# Patient Record
Sex: FEMALE | Race: WHITE | NOT HISPANIC OR LATINO | Employment: OTHER | URBAN - METROPOLITAN AREA
[De-identification: names, ages, dates, MRNs, and addresses within clinical notes are randomized per-mention and may not be internally consistent; named-entity substitution may affect disease eponyms.]

---

## 2023-03-13 ENCOUNTER — TELEPHONE (OUTPATIENT)
Dept: PRIMARY CARE | Facility: CLINIC | Age: 58
End: 2023-03-13

## 2023-03-13 DIAGNOSIS — J40 BRONCHITIS: Primary | ICD-10-CM

## 2023-03-13 RX ORDER — AZITHROMYCIN 250 MG/1
TABLET, FILM COATED ORAL
Qty: 6 TABLET | Refills: 0 | Status: SHIPPED | OUTPATIENT
Start: 2023-03-13 | End: 2023-03-18

## 2023-03-15 DIAGNOSIS — M79.2 NEURALGIA AND NEURITIS, UNSPECIFIED: ICD-10-CM

## 2023-03-15 RX ORDER — DOXEPIN HYDROCHLORIDE 50 MG/G
CREAM TOPICAL
Qty: 135 G | Refills: 1 | Status: SHIPPED
Start: 2023-03-15 | End: 2023-08-09 | Stop reason: SDUPTHER

## 2023-03-16 ENCOUNTER — TELEPHONE (OUTPATIENT)
Dept: PRIMARY CARE | Facility: CLINIC | Age: 58
End: 2023-03-16

## 2023-03-16 DIAGNOSIS — R05.3 CHRONIC COUGH: ICD-10-CM

## 2023-03-16 DIAGNOSIS — R11.0 NAUSEA: Primary | ICD-10-CM

## 2023-03-20 RX ORDER — ONDANSETRON 8 MG/1
8 TABLET, ORALLY DISINTEGRATING ORAL EVERY 8 HOURS PRN
Qty: 20 TABLET | Refills: 2 | Status: SHIPPED | OUTPATIENT
Start: 2023-03-20 | End: 2023-04-12 | Stop reason: SDUPTHER

## 2023-03-20 RX ORDER — PROMETHAZINE HYDROCHLORIDE AND DEXTROMETHORPHAN HYDROBROMIDE 6.25; 15 MG/5ML; MG/5ML
SYRUP ORAL
COMMUNITY
Start: 2023-01-24 | End: 2023-03-20 | Stop reason: SDUPTHER

## 2023-03-20 RX ORDER — ONDANSETRON 8 MG/1
TABLET, ORALLY DISINTEGRATING ORAL
COMMUNITY
Start: 2019-10-11 | End: 2023-03-20 | Stop reason: SDUPTHER

## 2023-03-20 RX ORDER — PROMETHAZINE HYDROCHLORIDE AND DEXTROMETHORPHAN HYDROBROMIDE 6.25; 15 MG/5ML; MG/5ML
5 SYRUP ORAL 4 TIMES DAILY PRN
Qty: 118 ML | Refills: 2 | Status: SHIPPED | OUTPATIENT
Start: 2023-03-20 | End: 2023-04-19

## 2023-03-21 LAB
ANION GAP IN SER/PLAS: 11 MMOL/L (ref 10–20)
CALCIUM (MG/DL) IN SER/PLAS: 9.6 MG/DL (ref 8.6–10.3)
CARBON DIOXIDE, TOTAL (MMOL/L) IN SER/PLAS: 29 MMOL/L (ref 21–32)
CHLORIDE (MMOL/L) IN SER/PLAS: 106 MMOL/L (ref 98–107)
CREATININE (MG/DL) IN SER/PLAS: 0.81 MG/DL (ref 0.5–1.05)
GFR FEMALE: 83 ML/MIN/1.73M2
GLUCOSE (MG/DL) IN SER/PLAS: 80 MG/DL (ref 74–99)
POTASSIUM (MMOL/L) IN SER/PLAS: 4.3 MMOL/L (ref 3.5–5.3)
SODIUM (MMOL/L) IN SER/PLAS: 142 MMOL/L (ref 136–145)
UREA NITROGEN (MG/DL) IN SER/PLAS: 18 MG/DL (ref 6–23)

## 2023-04-11 ENCOUNTER — TELEPHONE (OUTPATIENT)
Dept: PRIMARY CARE | Facility: CLINIC | Age: 58
End: 2023-04-11
Payer: COMMERCIAL

## 2023-04-11 DIAGNOSIS — R11.0 NAUSEA: ICD-10-CM

## 2023-04-12 RX ORDER — ONDANSETRON 8 MG/1
8 TABLET, ORALLY DISINTEGRATING ORAL EVERY 8 HOURS PRN
Qty: 30 TABLET | Refills: 2 | Status: SHIPPED | OUTPATIENT
Start: 2023-04-12 | End: 2023-05-12

## 2023-04-27 ENCOUNTER — TELEPHONE (OUTPATIENT)
Dept: PRIMARY CARE | Facility: CLINIC | Age: 58
End: 2023-04-27
Payer: COMMERCIAL

## 2023-04-27 DIAGNOSIS — J40 BRONCHITIS: Primary | ICD-10-CM

## 2023-04-27 RX ORDER — DEXTROMETHORPHAN HBR. AND GUAIFENESIN 10; 100 MG/5ML; MG/5ML
10 SOLUTION ORAL 4 TIMES DAILY PRN
Qty: 236 ML | Refills: 0 | Status: SHIPPED | OUTPATIENT
Start: 2023-04-27 | End: 2023-05-07

## 2023-04-27 RX ORDER — AMOXICILLIN AND CLAVULANATE POTASSIUM 875; 125 MG/1; MG/1
875 TABLET, FILM COATED ORAL 2 TIMES DAILY
Qty: 20 TABLET | Refills: 0 | Status: SHIPPED | OUTPATIENT
Start: 2023-04-27 | End: 2023-05-07

## 2023-04-27 NOTE — TELEPHONE ENCOUNTER
Patient was scheduled to see ALBINO tomorrow. We had to reschedule her. She is requesting an antibiotic and cough medication. Said she has been in bed for a week coughing

## 2023-05-15 ENCOUNTER — TELEPHONE (OUTPATIENT)
Dept: PRIMARY CARE | Facility: CLINIC | Age: 58
End: 2023-05-15
Payer: COMMERCIAL

## 2023-05-15 NOTE — TELEPHONE ENCOUNTER
Patient said she recently had a CT and it showed inflamed bladder. She is askinng what she should do next.

## 2023-05-22 ENCOUNTER — OFFICE VISIT (OUTPATIENT)
Dept: PRIMARY CARE | Facility: CLINIC | Age: 58
End: 2023-05-22
Payer: COMMERCIAL

## 2023-05-22 VITALS
WEIGHT: 149 LBS | BODY MASS INDEX: 25.44 KG/M2 | TEMPERATURE: 98 F | HEIGHT: 64 IN | OXYGEN SATURATION: 97 % | HEART RATE: 107 BPM | RESPIRATION RATE: 18 BRPM

## 2023-05-22 DIAGNOSIS — J43.9 PULMONARY EMPHYSEMA, UNSPECIFIED EMPHYSEMA TYPE (MULTI): ICD-10-CM

## 2023-05-22 DIAGNOSIS — R09.89 CHEST CONGESTION: ICD-10-CM

## 2023-05-22 DIAGNOSIS — J30.1 SEASONAL ALLERGIC RHINITIS DUE TO POLLEN: ICD-10-CM

## 2023-05-22 DIAGNOSIS — K58.2 IRRITABLE BOWEL SYNDROME WITH BOTH CONSTIPATION AND DIARRHEA: ICD-10-CM

## 2023-05-22 DIAGNOSIS — K52.9 COLITIS: ICD-10-CM

## 2023-05-22 DIAGNOSIS — K21.9 GASTROESOPHAGEAL REFLUX DISEASE WITHOUT ESOPHAGITIS: ICD-10-CM

## 2023-05-22 DIAGNOSIS — E78.5 BORDERLINE HYPERLIPIDEMIA: ICD-10-CM

## 2023-05-22 DIAGNOSIS — J41.0 SIMPLE CHRONIC BRONCHITIS (MULTI): ICD-10-CM

## 2023-05-22 DIAGNOSIS — E11.9 DIET-CONTROLLED TYPE 2 DIABETES MELLITUS (MULTI): ICD-10-CM

## 2023-05-22 DIAGNOSIS — D50.0 ANEMIA DUE TO BLOOD LOSS: ICD-10-CM

## 2023-05-22 DIAGNOSIS — N32.89 BLADDER WALL THICKENING: Primary | ICD-10-CM

## 2023-05-22 DIAGNOSIS — M79.7 FIBROMYALGIA, PRIMARY: ICD-10-CM

## 2023-05-22 PROBLEM — L73.9 FOLLICULITIS: Status: RESOLVED | Noted: 2023-05-22 | Resolved: 2023-05-22

## 2023-05-22 PROBLEM — R19.4 CHANGE IN BOWEL HABITS: Status: RESOLVED | Noted: 2023-05-22 | Resolved: 2023-05-22

## 2023-05-22 PROBLEM — M19.90 OSTEOARTHRITIS: Status: ACTIVE | Noted: 2023-05-22

## 2023-05-22 PROBLEM — F17.210 CIGARETTE SMOKER: Status: ACTIVE | Noted: 2023-05-22

## 2023-05-22 PROBLEM — M51.16 DISPLACEMENT OF LUMBAR DISC WITH RADICULOPATHY: Status: ACTIVE | Noted: 2023-05-22

## 2023-05-22 PROBLEM — R63.5 ABNORMAL WEIGHT GAIN: Status: RESOLVED | Noted: 2023-05-22 | Resolved: 2023-05-22

## 2023-05-22 PROBLEM — G93.32 CHRONIC FATIGUE SYNDROME: Status: ACTIVE | Noted: 2023-05-22

## 2023-05-22 PROBLEM — R20.2 LEFT HAND PARESTHESIA: Status: ACTIVE | Noted: 2023-05-22

## 2023-05-22 PROBLEM — G89.29 CHRONIC LOW BACK PAIN: Status: ACTIVE | Noted: 2023-05-22

## 2023-05-22 PROBLEM — G43.E09 CHRONIC MIGRAINE WITH AURA: Status: ACTIVE | Noted: 2023-05-22

## 2023-05-22 PROBLEM — M50.90 CERVICAL DISC DISEASE: Status: ACTIVE | Noted: 2023-05-22

## 2023-05-22 PROBLEM — B96.89 BACTERIAL VAGINITIS: Status: RESOLVED | Noted: 2023-05-22 | Resolved: 2023-05-22

## 2023-05-22 PROBLEM — M41.9 LUMBAR SCOLIOSIS: Status: ACTIVE | Noted: 2023-05-22

## 2023-05-22 PROBLEM — G47.00 INSOMNIA: Status: ACTIVE | Noted: 2023-05-22

## 2023-05-22 PROBLEM — R10.9 ABDOMINAL PAIN: Status: RESOLVED | Noted: 2023-05-22 | Resolved: 2023-05-22

## 2023-05-22 PROBLEM — M48.07 SPINAL STENOSIS OF LUMBOSACRAL REGION: Status: ACTIVE | Noted: 2023-05-22

## 2023-05-22 PROBLEM — R53.81 MALAISE AND FATIGUE: Status: ACTIVE | Noted: 2023-05-22

## 2023-05-22 PROBLEM — R25.2 MUSCLE CRAMPING: Status: ACTIVE | Noted: 2023-05-22

## 2023-05-22 PROBLEM — A09: Status: RESOLVED | Noted: 2023-05-22 | Resolved: 2023-05-22

## 2023-05-22 PROBLEM — J30.9 ALLERGIC RHINITIS: Status: ACTIVE | Noted: 2023-05-22

## 2023-05-22 PROBLEM — R21 RASH, SKIN: Status: ACTIVE | Noted: 2023-05-22

## 2023-05-22 PROBLEM — N89.8 VAGINAL DISCHARGE: Status: RESOLVED | Noted: 2023-05-22 | Resolved: 2023-05-22

## 2023-05-22 PROBLEM — R04.2 HEMOPTYSIS: Status: ACTIVE | Noted: 2023-05-22

## 2023-05-22 PROBLEM — E53.8 VITAMIN B12 DEFICIENCY: Status: ACTIVE | Noted: 2023-05-22

## 2023-05-22 PROBLEM — J98.9 RESPIRATORY ILLNESS: Status: RESOLVED | Noted: 2023-05-22 | Resolved: 2023-05-22

## 2023-05-22 PROBLEM — N76.0 BACTERIAL VAGINITIS: Status: RESOLVED | Noted: 2023-05-22 | Resolved: 2023-05-22

## 2023-05-22 PROBLEM — B37.81 CANDIDA ESOPHAGITIS (MULTI): Status: RESOLVED | Noted: 2023-05-22 | Resolved: 2023-05-22

## 2023-05-22 PROBLEM — M47.816 LUMBAR SPONDYLOSIS: Status: ACTIVE | Noted: 2023-05-22

## 2023-05-22 PROBLEM — M51.369 DEGENERATION OF INTERVERTEBRAL DISC OF LUMBAR REGION: Status: ACTIVE | Noted: 2023-05-22

## 2023-05-22 PROBLEM — R29.898 WEAKNESS OF BOTH LOWER EXTREMITIES: Status: ACTIVE | Noted: 2023-05-22

## 2023-05-22 PROBLEM — S92.355A CLOSED NONDISPLACED FRACTURE OF FIFTH LEFT METATARSAL BONE: Status: RESOLVED | Noted: 2023-05-22 | Resolved: 2023-05-22

## 2023-05-22 PROBLEM — N95.2 VAGINAL ATROPHY: Status: ACTIVE | Noted: 2023-05-22

## 2023-05-22 PROBLEM — K30 FUNCTIONAL DYSPEPSIA: Status: RESOLVED | Noted: 2023-05-22 | Resolved: 2023-05-22

## 2023-05-22 PROBLEM — E55.9 VITAMIN D DEFICIENCY: Status: ACTIVE | Noted: 2023-05-22

## 2023-05-22 PROBLEM — R19.7 DIARRHEA: Status: ACTIVE | Noted: 2023-05-22

## 2023-05-22 PROBLEM — M51.36 DEGENERATION OF INTERVERTEBRAL DISC OF LUMBAR REGION: Status: ACTIVE | Noted: 2023-05-22

## 2023-05-22 PROBLEM — J20.9 ACUTE BRONCHITIS: Status: RESOLVED | Noted: 2023-05-22 | Resolved: 2023-05-22

## 2023-05-22 PROBLEM — Z91.018 FOOD ALLERGY: Status: ACTIVE | Noted: 2023-05-22

## 2023-05-22 PROBLEM — M50.13 CERVICAL DISC DISORDER WITH RADICULOPATHY, CERVICOTHORACIC REGION: Status: ACTIVE | Noted: 2023-05-22

## 2023-05-22 PROBLEM — D72.829 LEUKOCYTOSIS: Status: ACTIVE | Noted: 2023-05-22

## 2023-05-22 PROBLEM — R14.0 BLOATING: Status: RESOLVED | Noted: 2023-05-22 | Resolved: 2023-05-22

## 2023-05-22 PROBLEM — R11.2 NAUSEA AND VOMITING: Status: ACTIVE | Noted: 2023-05-22

## 2023-05-22 PROBLEM — M48.062 NEUROGENIC CLAUDICATION DUE TO LUMBAR SPINAL STENOSIS: Status: ACTIVE | Noted: 2023-05-22

## 2023-05-22 PROBLEM — M54.16 LUMBAR RADICULOPATHY: Status: ACTIVE | Noted: 2023-05-22

## 2023-05-22 PROBLEM — M54.6 LEFT-SIDED THORACIC BACK PAIN: Status: ACTIVE | Noted: 2023-05-22

## 2023-05-22 PROBLEM — B34.9 ACUTE VIRAL SYNDROME: Status: RESOLVED | Noted: 2023-05-22 | Resolved: 2023-05-22

## 2023-05-22 PROBLEM — M81.0 OSTEOPOROSIS: Status: ACTIVE | Noted: 2023-05-22

## 2023-05-22 PROBLEM — R13.10 DYSPHAGIA: Status: ACTIVE | Noted: 2023-05-22

## 2023-05-22 PROBLEM — M17.9 DEGENERATIVE JOINT DISEASE OF KNEE: Status: ACTIVE | Noted: 2023-05-22

## 2023-05-22 PROBLEM — M54.2 CHRONIC NECK PAIN: Status: ACTIVE | Noted: 2023-05-22

## 2023-05-22 PROBLEM — L30.9 CHRONIC ECZEMA: Status: ACTIVE | Noted: 2023-05-22

## 2023-05-22 PROBLEM — F17.200 NICOTINE DEPENDENCE: Status: ACTIVE | Noted: 2023-05-22

## 2023-05-22 PROBLEM — G47.10 HYPERSOMNIA: Status: ACTIVE | Noted: 2023-05-22

## 2023-05-22 PROBLEM — R10.32 ABDOMINAL PAIN, ACUTE, LEFT LOWER QUADRANT: Status: RESOLVED | Noted: 2023-05-22 | Resolved: 2023-05-22

## 2023-05-22 PROBLEM — K31.819 GASTRIC AVM: Status: ACTIVE | Noted: 2023-05-22

## 2023-05-22 PROBLEM — R26.9 GAIT ABNORMALITY: Status: ACTIVE | Noted: 2023-05-22

## 2023-05-22 PROBLEM — K59.09 CHRONIC CONSTIPATION: Status: ACTIVE | Noted: 2023-05-22

## 2023-05-22 PROBLEM — R70.0 ELEVATED SED RATE: Status: ACTIVE | Noted: 2023-05-22

## 2023-05-22 PROBLEM — R19.5 LARGE STOOL: Status: RESOLVED | Noted: 2023-05-22 | Resolved: 2023-05-22

## 2023-05-22 PROBLEM — K92.1 MELENA: Status: ACTIVE | Noted: 2023-05-22

## 2023-05-22 PROBLEM — R41.3 TRANSIENT AMNESIA: Status: ACTIVE | Noted: 2023-05-22

## 2023-05-22 PROBLEM — M79.2 NEUROPATHIC PAIN: Status: ACTIVE | Noted: 2023-05-22

## 2023-05-22 PROBLEM — R15.9 FULL INCONTINENCE OF FECES: Status: RESOLVED | Noted: 2023-05-22 | Resolved: 2023-05-22

## 2023-05-22 PROBLEM — R53.83 MALAISE AND FATIGUE: Status: ACTIVE | Noted: 2023-05-22

## 2023-05-22 PROBLEM — K92.1 BLACK STOOLS: Status: RESOLVED | Noted: 2023-05-22 | Resolved: 2023-05-22

## 2023-05-22 PROBLEM — M79.18 CERVICAL MYOFASCIAL PAIN SYNDROME: Status: ACTIVE | Noted: 2023-05-22

## 2023-05-22 PROBLEM — R05.9 COUGH: Status: RESOLVED | Noted: 2023-05-22 | Resolved: 2023-05-22

## 2023-05-22 PROBLEM — M54.50 CHRONIC LOW BACK PAIN: Status: ACTIVE | Noted: 2023-05-22

## 2023-05-22 PROBLEM — G89.29 CHRONIC NECK PAIN: Status: ACTIVE | Noted: 2023-05-22

## 2023-05-22 LAB
POC APPEARANCE, URINE: CLEAR
POC BILIRUBIN, URINE: NEGATIVE
POC BLOOD, URINE: NEGATIVE
POC COLOR, URINE: YELLOW
POC GLUCOSE, URINE: NEGATIVE MG/DL
POC KETONES, URINE: NEGATIVE MG/DL
POC LEUKOCYTES, URINE: NEGATIVE
POC NITRITE,URINE: NEGATIVE
POC PH, URINE: 5 PH
POC PROTEIN, URINE: NEGATIVE MG/DL
POC SPECIFIC GRAVITY, URINE: <=1.005
POC UROBILINOGEN, URINE: 0.2 EU/DL

## 2023-05-22 PROCEDURE — 99214 OFFICE O/P EST MOD 30 MIN: CPT | Performed by: FAMILY MEDICINE

## 2023-05-22 PROCEDURE — 81003 URINALYSIS AUTO W/O SCOPE: CPT | Performed by: FAMILY MEDICINE

## 2023-05-22 RX ORDER — ESOMEPRAZOLE MAGNESIUM 40 MG/1
1 CAPSULE, DELAYED RELEASE ORAL DAILY
COMMUNITY
Start: 2016-01-18 | End: 2023-05-22 | Stop reason: SDUPTHER

## 2023-05-22 RX ORDER — PNV NO.95/FERROUS FUM/FOLIC AC 28MG-0.8MG
50 TABLET ORAL DAILY
COMMUNITY
End: 2023-11-07 | Stop reason: ALTCHOICE

## 2023-05-22 RX ORDER — PRAVASTATIN SODIUM 20 MG/1
TABLET ORAL
Qty: 30 TABLET | Refills: 11 | Status: SHIPPED | OUTPATIENT
Start: 2023-05-22 | End: 2023-06-13 | Stop reason: SDUPTHER

## 2023-05-22 RX ORDER — PRAVASTATIN SODIUM 20 MG/1
TABLET ORAL
COMMUNITY
End: 2023-05-22 | Stop reason: SDUPTHER

## 2023-05-22 RX ORDER — BUSPIRONE HYDROCHLORIDE 15 MG/1
TABLET ORAL
COMMUNITY
Start: 2016-03-09

## 2023-05-22 RX ORDER — SUMATRIPTAN 50 MG/1
TABLET, FILM COATED ORAL
COMMUNITY
Start: 2023-01-24 | End: 2023-10-30 | Stop reason: SDUPTHER

## 2023-05-22 RX ORDER — POLYETHYLENE GLYCOL 3350 17 G/17G
POWDER, FOR SOLUTION ORAL
COMMUNITY
Start: 2020-10-20 | End: 2023-11-07 | Stop reason: SDUPTHER

## 2023-05-22 RX ORDER — CYCLOBENZAPRINE HCL 10 MG
TABLET ORAL
COMMUNITY
Start: 2017-06-23 | End: 2023-05-22 | Stop reason: SDUPTHER

## 2023-05-22 RX ORDER — ONDANSETRON 8 MG/1
TABLET, ORALLY DISINTEGRATING ORAL
Qty: 20 TABLET | Refills: 5 | Status: SHIPPED | OUTPATIENT
Start: 2023-05-22 | End: 2023-07-13 | Stop reason: SDUPTHER

## 2023-05-22 RX ORDER — ASPIRIN 325 MG
TABLET, DELAYED RELEASE (ENTERIC COATED) ORAL
COMMUNITY
Start: 2016-08-24 | End: 2023-08-28

## 2023-05-22 RX ORDER — IPRATROPIUM BROMIDE AND ALBUTEROL SULFATE 2.5; .5 MG/3ML; MG/3ML
SOLUTION RESPIRATORY (INHALATION)
COMMUNITY
Start: 2017-01-26 | End: 2023-09-14 | Stop reason: SDUPTHER

## 2023-05-22 RX ORDER — ONDANSETRON 8 MG/1
TABLET, ORALLY DISINTEGRATING ORAL
COMMUNITY
Start: 2023-05-12 | End: 2023-05-22 | Stop reason: SDUPTHER

## 2023-05-22 RX ORDER — HYDROCORTISONE 1 %
CREAM (GRAM) TOPICAL
COMMUNITY
Start: 2023-02-09 | End: 2024-03-08 | Stop reason: ENTERED-IN-ERROR

## 2023-05-22 RX ORDER — GALCANEZUMAB 120 MG/ML
INJECTION, SOLUTION SUBCUTANEOUS
COMMUNITY
Start: 2020-10-27 | End: 2024-02-29 | Stop reason: WASHOUT

## 2023-05-22 RX ORDER — LORATADINE 10 MG/1
TABLET ORAL DAILY
COMMUNITY
Start: 2017-07-17 | End: 2023-05-22 | Stop reason: SDUPTHER

## 2023-05-22 RX ORDER — ESOMEPRAZOLE MAGNESIUM 40 MG/1
40 CAPSULE, DELAYED RELEASE ORAL DAILY
Qty: 30 CAPSULE | Refills: 11 | Status: SHIPPED | OUTPATIENT
Start: 2023-05-22 | End: 2023-05-30

## 2023-05-22 RX ORDER — PROMETHAZINE HYDROCHLORIDE 25 MG/1
TABLET ORAL
COMMUNITY
Start: 2016-12-29 | End: 2023-11-07 | Stop reason: ALTCHOICE

## 2023-05-22 RX ORDER — CYCLOBENZAPRINE HCL 10 MG
10 TABLET ORAL 3 TIMES DAILY PRN
Qty: 90 TABLET | Refills: 4 | Status: SHIPPED | OUTPATIENT
Start: 2023-05-22 | End: 2023-10-06

## 2023-05-22 RX ORDER — ALBUTEROL SULFATE 90 UG/1
AEROSOL, METERED RESPIRATORY (INHALATION)
COMMUNITY
Start: 2022-02-11 | End: 2023-10-30 | Stop reason: SDUPTHER

## 2023-05-22 RX ORDER — SELENIUM 50 MCG
TABLET ORAL DAILY
COMMUNITY
End: 2023-05-22 | Stop reason: SDUPTHER

## 2023-05-22 RX ORDER — PSYLLIUM HUSK 3.4 G/5.8G
POWDER ORAL
COMMUNITY
Start: 2022-10-24 | End: 2023-11-07 | Stop reason: SDUPTHER

## 2023-05-22 RX ORDER — ERGOCALCIFEROL 1.25 MG/1
50000 CAPSULE ORAL
COMMUNITY
End: 2023-10-31 | Stop reason: SDUPTHER

## 2023-05-22 RX ORDER — L. ACIDOPHILUS/L.BULGARICUS 1MM CELL
TABLET ORAL
COMMUNITY
Start: 2021-08-22 | End: 2023-05-22 | Stop reason: SDUPTHER

## 2023-05-22 RX ORDER — IBUPROFEN 200 MG
TABLET ORAL DAILY
COMMUNITY
End: 2023-08-01 | Stop reason: SDUPTHER

## 2023-05-22 RX ORDER — MUPIROCIN 20 MG/G
OINTMENT TOPICAL
COMMUNITY
Start: 2016-09-07

## 2023-05-22 RX ORDER — FLUTICASONE FUROATE, UMECLIDINIUM BROMIDE AND VILANTEROL TRIFENATATE 200; 62.5; 25 UG/1; UG/1; UG/1
POWDER RESPIRATORY (INHALATION)
COMMUNITY
Start: 2021-12-28 | End: 2024-01-04

## 2023-05-22 RX ORDER — PREGABALIN 150 MG/1
1 CAPSULE ORAL 3 TIMES DAILY
COMMUNITY
Start: 2016-01-19 | End: 2023-11-20 | Stop reason: SDUPTHER

## 2023-05-22 RX ORDER — DIAZEPAM 5 MG/1
TABLET ORAL
COMMUNITY

## 2023-05-22 RX ORDER — ACYCLOVIR 200 MG/1
200 CAPSULE ORAL DAILY
COMMUNITY
Start: 2017-01-06

## 2023-05-22 RX ORDER — LORATADINE 10 MG/1
10 TABLET ORAL DAILY
Qty: 30 TABLET | Refills: 11 | Status: SHIPPED | OUTPATIENT
Start: 2023-05-22 | End: 2023-06-02

## 2023-05-22 RX ORDER — SELENIUM 50 MCG
1 TABLET ORAL DAILY
Qty: 30 CAPSULE | Refills: 11 | Status: SHIPPED | OUTPATIENT
Start: 2023-05-22 | End: 2024-04-11 | Stop reason: ALTCHOICE

## 2023-05-22 RX ORDER — IBUPROFEN 200 MG
1 TABLET ORAL DAILY
COMMUNITY
Start: 2022-10-12 | End: 2023-11-07 | Stop reason: ALTCHOICE

## 2023-05-22 RX ORDER — DICYCLOMINE HYDROCHLORIDE 20 MG/1
TABLET ORAL
COMMUNITY
Start: 2021-10-19 | End: 2023-05-22 | Stop reason: SDUPTHER

## 2023-05-22 RX ORDER — ZOLPIDEM TARTRATE 10 MG/1
TABLET ORAL
COMMUNITY

## 2023-05-22 RX ORDER — DICYCLOMINE HYDROCHLORIDE 20 MG/1
TABLET ORAL
Qty: 180 TABLET | Refills: 2 | Status: SHIPPED | OUTPATIENT
Start: 2023-05-22 | End: 2023-11-07 | Stop reason: SDUPTHER

## 2023-05-22 ASSESSMENT — ENCOUNTER SYMPTOMS
ABDOMINAL PAIN: 1
DIARRHEA: 1
CONSTIPATION: 1
ABDOMINAL DISTENTION: 1
NAUSEA: 1

## 2023-05-22 NOTE — PROGRESS NOTES
"Subjective   Reason for Visit: Kelli Ortega is an 57 y.o. female here for a Medicare Wellness visit.     Past Medical, Surgical, and Family History reviewed and updated in chart.    Reviewed all medications by prescribing practitioner or clinical pharmacist (such as prescriptions, OTCs, herbal therapies and supplements) and documented in the medical record.    HPI  57 year old female presents for follo wup from GI and abnormal CT, as well as med refills.   Patient Care Team:  Mariaelena Dyer MD as PCP - General     Review of Systems   Gastrointestinal:  Positive for abdominal distention, abdominal pain, constipation, diarrhea and nausea.       Objective   Vitals:  Pulse 107   Temp 36.7 °C (98 °F) (Temporal)   Resp 18   Ht 1.626 m (5' 4\")   Wt 67.6 kg (149 lb)   SpO2 97%   BMI 25.58 kg/m²       Physical Exam  Vitals reviewed.   Constitutional:       Appearance: Normal appearance.   HENT:      Head: Normocephalic and atraumatic.      Right Ear: Tympanic membrane normal.      Left Ear: Tympanic membrane normal.      Nose: Nose normal.      Mouth/Throat:      Mouth: Mucous membranes are moist.      Pharynx: Oropharynx is clear.   Eyes:      Extraocular Movements: Extraocular movements intact.      Conjunctiva/sclera: Conjunctivae normal.      Pupils: Pupils are equal, round, and reactive to light.   Cardiovascular:      Rate and Rhythm: Normal rate and regular rhythm.      Pulses: Normal pulses.      Heart sounds: Normal heart sounds.   Pulmonary:      Effort: Pulmonary effort is normal.      Breath sounds: Normal breath sounds.   Abdominal:      General: Abdomen is flat. Bowel sounds are normal.      Palpations: Abdomen is soft.   Musculoskeletal:         General: Normal range of motion.      Cervical back: Normal range of motion and neck supple.   Skin:     General: Skin is warm and dry.      Capillary Refill: Capillary refill takes less than 2 seconds.   Neurological:      General: No focal deficit " present.      Mental Status: She is alert and oriented to person, place, and time.   Psychiatric:         Mood and Affect: Mood normal.         Behavior: Behavior normal.         Assessment/Plan   Problem List Items Addressed This Visit       Allergic rhinitis    Relevant Medications    loratadine (Claritin) 10 mg tablet    Anemia due to blood loss    Relevant Orders    POCT UA Automated manually resulted (Completed)    Hemoglobin A1C    CBC and Auto Differential    Comprehensive Metabolic Panel    Lipid Panel    TSH with reflex to Free T4 if abnormal    WIL with Reflex to ROBEL    C-Reactive Protein    Rheumatoid Factor    Sedimentation Rate    Uric Acid    GERD (gastroesophageal reflux disease)    Relevant Medications    esomeprazole (NexIUM) 40 mg DR capsule    COPD (chronic obstructive pulmonary disease) with emphysema (CMS/[facility])    Fibromyalgia, primary    Relevant Medications    cyclobenzaprine (Flexeril) 10 mg tablet    Other Relevant Orders    TSH with reflex to Free T4 if abnormal     Other Visit Diagnoses       Bladder wall thickening    -  Primary    Relevant Orders    POCT UA Automated manually resulted (Completed)    Referral to Urology    Colitis        Relevant Orders    CBC and Auto Differential    WIL with Reflex to ROBEL    C-Reactive Protein    Rheumatoid Factor    Sedimentation Rate    Chest congestion        Relevant Orders    XR chest 2 views    Diet-controlled type 2 diabetes mellitus (CMS/[facility])        Relevant Orders    Hemoglobin A1C    Borderline hyperlipidemia        Relevant Medications    pravastatin (Pravachol) 20 mg tablet    Other Relevant Orders    Lipid Panel    Irritable bowel syndrome with both constipation and diarrhea        Relevant Medications    lactobacillus acidophilus 500 million cell capsule    dicyclomine (Bentyl) 20 mg tablet    ondansetron ODT (Zofran-ODT) 8 mg disintegrating tablet

## 2023-05-22 NOTE — PATIENT INSTRUCTIONS
Problem List Items Addressed This Visit       Allergic rhinitis    Relevant Medications    loratadine (Claritin) 10 mg tablet    Anemia due to blood loss    Relevant Orders    POCT UA Automated manually resulted (Completed)    Hemoglobin A1C    CBC and Auto Differential    Comprehensive Metabolic Panel    Lipid Panel    TSH with reflex to Free T4 if abnormal    WIL with Reflex to ROBEL    C-Reactive Protein    Rheumatoid Factor    Sedimentation Rate    Uric Acid    GERD (gastroesophageal reflux disease)    Relevant Medications    esomeprazole (NexIUM) 40 mg DR capsule    COPD (chronic obstructive pulmonary disease) with emphysema (CMS/[facility])    Fibromyalgia, primary    Relevant Medications    cyclobenzaprine (Flexeril) 10 mg tablet    Other Relevant Orders    TSH with reflex to Free T4 if abnormal     Other Visit Diagnoses       Bladder wall thickening    -  Primary    Relevant Orders    POCT UA Automated manually resulted (Completed)    Referral to Urology    Colitis        Relevant Orders    CBC and Auto Differential    WIL with Reflex to ROBEL    C-Reactive Protein    Rheumatoid Factor    Sedimentation Rate    Chest congestion        Relevant Orders    XR chest 2 views    Diet-controlled type 2 diabetes mellitus (CMS/HCC)        Relevant Orders    Hemoglobin A1C    Borderline hyperlipidemia        Relevant Medications    pravastatin (Pravachol) 20 mg tablet    Other Relevant Orders    Lipid Panel    Irritable bowel syndrome with both constipation and diarrhea        Relevant Medications    lactobacillus acidophilus 500 million cell capsule    dicyclomine (Bentyl) 20 mg tablet    ondansetron ODT (Zofran-ODT) 8 mg disintegrating tablet

## 2023-05-23 ENCOUNTER — LAB (OUTPATIENT)
Dept: LAB | Facility: LAB | Age: 58
End: 2023-05-23
Payer: COMMERCIAL

## 2023-05-23 ENCOUNTER — TELEPHONE (OUTPATIENT)
Dept: PRIMARY CARE | Facility: CLINIC | Age: 58
End: 2023-05-23

## 2023-05-23 DIAGNOSIS — E11.9 DIET-CONTROLLED TYPE 2 DIABETES MELLITUS (MULTI): ICD-10-CM

## 2023-05-23 DIAGNOSIS — E78.5 BORDERLINE HYPERLIPIDEMIA: ICD-10-CM

## 2023-05-23 DIAGNOSIS — D50.0 ANEMIA DUE TO BLOOD LOSS: ICD-10-CM

## 2023-05-23 DIAGNOSIS — K52.9 COLITIS: ICD-10-CM

## 2023-05-23 DIAGNOSIS — M79.7 FIBROMYALGIA, PRIMARY: ICD-10-CM

## 2023-05-23 LAB
ALANINE AMINOTRANSFERASE (SGPT) (U/L) IN SER/PLAS: 13 U/L (ref 7–45)
ALBUMIN (G/DL) IN SER/PLAS: 4.4 G/DL (ref 3.4–5)
ALKALINE PHOSPHATASE (U/L) IN SER/PLAS: 38 U/L (ref 33–110)
ANION GAP IN SER/PLAS: 16 MMOL/L (ref 10–20)
ASPARTATE AMINOTRANSFERASE (SGOT) (U/L) IN SER/PLAS: 15 U/L (ref 9–39)
BASOPHILS (10*3/UL) IN BLOOD BY AUTOMATED COUNT: 0.03 X10E9/L (ref 0–0.1)
BASOPHILS/100 LEUKOCYTES IN BLOOD BY AUTOMATED COUNT: 0.2 % (ref 0–2)
BILIRUBIN TOTAL (MG/DL) IN SER/PLAS: 0.3 MG/DL (ref 0–1.2)
C REACTIVE PROTEIN (MG/L) IN SER/PLAS: 0.89 MG/DL
CALCIUM (MG/DL) IN SER/PLAS: 9.3 MG/DL (ref 8.6–10.3)
CARBON DIOXIDE, TOTAL (MMOL/L) IN SER/PLAS: 25 MMOL/L (ref 21–32)
CHLORIDE (MMOL/L) IN SER/PLAS: 102 MMOL/L (ref 98–107)
CHOLESTEROL (MG/DL) IN SER/PLAS: 232 MG/DL (ref 0–199)
CHOLESTEROL IN HDL (MG/DL) IN SER/PLAS: 54.7 MG/DL
CHOLESTEROL/HDL RATIO: 4.2
CREATININE (MG/DL) IN SER/PLAS: 0.86 MG/DL (ref 0.5–1.05)
EOSINOPHILS (10*3/UL) IN BLOOD BY AUTOMATED COUNT: 0.11 X10E9/L (ref 0–0.7)
EOSINOPHILS/100 LEUKOCYTES IN BLOOD BY AUTOMATED COUNT: 0.6 % (ref 0–6)
ERYTHROCYTE DISTRIBUTION WIDTH (RATIO) BY AUTOMATED COUNT: 19.4 % (ref 11.5–14.5)
ERYTHROCYTE MEAN CORPUSCULAR HEMOGLOBIN CONCENTRATION (G/DL) BY AUTOMATED: 31.8 G/DL (ref 32–36)
ERYTHROCYTE MEAN CORPUSCULAR VOLUME (FL) BY AUTOMATED COUNT: 89 FL (ref 80–100)
ERYTHROCYTES (10*6/UL) IN BLOOD BY AUTOMATED COUNT: 4.62 X10E12/L (ref 4–5.2)
GFR FEMALE: 78 ML/MIN/1.73M2
GLUCOSE (MG/DL) IN SER/PLAS: 101 MG/DL (ref 74–99)
HEMATOCRIT (%) IN BLOOD BY AUTOMATED COUNT: 40.9 % (ref 36–46)
HEMOGLOBIN (G/DL) IN BLOOD: 13 G/DL (ref 12–16)
IMMATURE GRANULOCYTES/100 LEUKOCYTES IN BLOOD BY AUTOMATED COUNT: 0.4 % (ref 0–0.9)
LDL: 128 MG/DL (ref 0–99)
LEUKOCYTES (10*3/UL) IN BLOOD BY AUTOMATED COUNT: 16.9 X10E9/L (ref 4.4–11.3)
LYMPHOCYTES (10*3/UL) IN BLOOD BY AUTOMATED COUNT: 2.99 X10E9/L (ref 1.2–4.8)
LYMPHOCYTES/100 LEUKOCYTES IN BLOOD BY AUTOMATED COUNT: 17.7 % (ref 13–44)
MONOCYTES (10*3/UL) IN BLOOD BY AUTOMATED COUNT: 1.07 X10E9/L (ref 0.1–1)
MONOCYTES/100 LEUKOCYTES IN BLOOD BY AUTOMATED COUNT: 6.3 % (ref 2–10)
NEUTROPHILS (10*3/UL) IN BLOOD BY AUTOMATED COUNT: 12.67 X10E9/L (ref 1.2–7.7)
NEUTROPHILS/100 LEUKOCYTES IN BLOOD BY AUTOMATED COUNT: 74.8 % (ref 40–80)
NON HDL CHOLESTEROL: 177 MG/DL
PLATELETS (10*3/UL) IN BLOOD AUTOMATED COUNT: 312 X10E9/L (ref 150–450)
POTASSIUM (MMOL/L) IN SER/PLAS: 4.4 MMOL/L (ref 3.5–5.3)
PROTEIN TOTAL: 6.7 G/DL (ref 6.4–8.2)
SEDIMENTATION RATE, ERYTHROCYTE: 40 MM/H (ref 0–30)
SODIUM (MMOL/L) IN SER/PLAS: 139 MMOL/L (ref 136–145)
THYROTROPIN (MIU/L) IN SER/PLAS BY DETECTION LIMIT <= 0.05 MIU/L: 1.36 MIU/L (ref 0.44–3.98)
TRIGLYCERIDE (MG/DL) IN SER/PLAS: 247 MG/DL (ref 0–149)
URATE (MG/DL) IN SER/PLAS: 4 MG/DL (ref 2.3–6.7)
UREA NITROGEN (MG/DL) IN SER/PLAS: 12 MG/DL (ref 6–23)
VLDL: 49 MG/DL (ref 0–40)

## 2023-05-23 PROCEDURE — 84550 ASSAY OF BLOOD/URIC ACID: CPT

## 2023-05-23 PROCEDURE — 85652 RBC SED RATE AUTOMATED: CPT

## 2023-05-23 PROCEDURE — 83036 HEMOGLOBIN GLYCOSYLATED A1C: CPT

## 2023-05-23 PROCEDURE — 86431 RHEUMATOID FACTOR QUANT: CPT

## 2023-05-23 PROCEDURE — 86140 C-REACTIVE PROTEIN: CPT

## 2023-05-23 PROCEDURE — 85025 COMPLETE CBC W/AUTO DIFF WBC: CPT

## 2023-05-23 PROCEDURE — 86038 ANTINUCLEAR ANTIBODIES: CPT

## 2023-05-23 PROCEDURE — 80061 LIPID PANEL: CPT

## 2023-05-23 PROCEDURE — 84443 ASSAY THYROID STIM HORMONE: CPT

## 2023-05-23 PROCEDURE — 80053 COMPREHEN METABOLIC PANEL: CPT

## 2023-05-23 PROCEDURE — 36415 COLL VENOUS BLD VENIPUNCTURE: CPT

## 2023-05-23 RX ORDER — AZITHROMYCIN 250 MG/1
TABLET, FILM COATED ORAL
Qty: 6 TABLET | Refills: 0 | Status: SHIPPED | OUTPATIENT
Start: 2023-05-23 | End: 2023-05-28

## 2023-05-24 LAB
ANTI-NUCLEAR ANTIBODY (ANA): NEGATIVE
ESTIMATED AVERAGE GLUCOSE FOR HBA1C: 126 MG/DL
HEMOGLOBIN A1C/HEMOGLOBIN TOTAL IN BLOOD: 6 %
RHEUMATOID FACTOR (IU/ML) IN SERUM OR PLASMA: 10 IU/ML (ref 0–15)

## 2023-05-24 NOTE — RESULT ENCOUNTER NOTE
Labs back and looked pretty good, Sed rate and white count were up, which can be infection in chest. Hopefully anitbiotic makes you feel better.

## 2023-05-26 ENCOUNTER — TELEPHONE (OUTPATIENT)
Dept: PRIMARY CARE | Facility: CLINIC | Age: 58
End: 2023-05-26
Payer: COMMERCIAL

## 2023-06-01 PROBLEM — N94.10 PAIN IN FEMALE GENITALIA ON INTERCOURSE: Status: ACTIVE | Noted: 2023-06-01

## 2023-06-05 ENCOUNTER — TELEPHONE (OUTPATIENT)
Dept: PRIMARY CARE | Facility: CLINIC | Age: 58
End: 2023-06-05
Payer: COMMERCIAL

## 2023-06-05 DIAGNOSIS — K21.9 GASTROESOPHAGEAL REFLUX DISEASE WITHOUT ESOPHAGITIS: ICD-10-CM

## 2023-06-05 DIAGNOSIS — J43.9 PULMONARY EMPHYSEMA, UNSPECIFIED EMPHYSEMA TYPE (MULTI): Primary | ICD-10-CM

## 2023-06-05 RX ORDER — ESOMEPRAZOLE MAGNESIUM 40 MG/1
40 CAPSULE, DELAYED RELEASE ORAL DAILY
Qty: 30 CAPSULE | Refills: 5 | Status: SHIPPED | OUTPATIENT
Start: 2023-06-05 | End: 2023-08-01 | Stop reason: SDUPTHER

## 2023-06-05 NOTE — TELEPHONE ENCOUNTER
Pt having trouble getting brand Nexium approved, asking for generic to [facility].    Also, face mask for nebulizer is not working. Requesting script, please send to [facility].

## 2023-06-13 DIAGNOSIS — E78.5 BORDERLINE HYPERLIPIDEMIA: ICD-10-CM

## 2023-06-13 RX ORDER — PRAVASTATIN SODIUM 20 MG/1
TABLET ORAL
Qty: 30 TABLET | Refills: 11 | Status: SHIPPED | OUTPATIENT
Start: 2023-06-13 | End: 2024-01-24 | Stop reason: WASHOUT

## 2023-06-16 ENCOUNTER — TELEPHONE (OUTPATIENT)
Dept: PRIMARY CARE | Facility: CLINIC | Age: 58
End: 2023-06-16
Payer: COMMERCIAL

## 2023-06-16 DIAGNOSIS — J43.9 PULMONARY EMPHYSEMA, UNSPECIFIED EMPHYSEMA TYPE (MULTI): Primary | ICD-10-CM

## 2023-06-16 DIAGNOSIS — J40 BRONCHITIS: ICD-10-CM

## 2023-06-16 RX ORDER — AZITHROMYCIN 250 MG/1
TABLET, FILM COATED ORAL
Qty: 6 TABLET | Refills: 0 | Status: SHIPPED | OUTPATIENT
Start: 2023-06-16 | End: 2023-06-21

## 2023-06-19 LAB
RBC, URINE: 1 /HPF (ref 0–5)
SQUAMOUS EPITHELIAL CELLS, URINE: 1 /HPF
WBC, URINE: <1 /HPF (ref 0–5)

## 2023-06-21 ENCOUNTER — APPOINTMENT (OUTPATIENT)
Dept: PRIMARY CARE | Facility: CLINIC | Age: 58
End: 2023-06-21
Payer: COMMERCIAL

## 2023-06-22 ENCOUNTER — TELEPHONE (OUTPATIENT)
Dept: PRIMARY CARE | Facility: CLINIC | Age: 58
End: 2023-06-22
Payer: COMMERCIAL

## 2023-06-26 NOTE — TELEPHONE ENCOUNTER
Notified patient that A1C is 6.0  in May.  Patient is not diabetic, although it runs in the family she is at risk.  Patient notified that her Appendectomy was in 2005, and that she still has her gallbladder.    Sent a note to Dr. Dyer.    Alba Ortiz MA

## 2023-06-29 ENCOUNTER — TELEPHONE (OUTPATIENT)
Dept: PRIMARY CARE | Facility: CLINIC | Age: 58
End: 2023-06-29
Payer: COMMERCIAL

## 2023-06-29 DIAGNOSIS — R70.0 ELEVATED SED RATE: Primary | ICD-10-CM

## 2023-06-29 NOTE — TELEPHONE ENCOUNTER
Pt needs refill of Vitamin B12 100mcg.     Also wants to know if she could be retaining water. She seems to gain weight overnight. Would you be able to refer her to Dr. Valerio again?

## 2023-07-06 ENCOUNTER — HOSPITAL ENCOUNTER (OUTPATIENT)
Dept: DATA CONVERSION | Facility: HOSPITAL | Age: 58
End: 2023-07-06
Attending: ANESTHESIOLOGY | Admitting: ANESTHESIOLOGY
Payer: MEDICARE

## 2023-07-06 DIAGNOSIS — M48.062 SPINAL STENOSIS, LUMBAR REGION WITH NEUROGENIC CLAUDICATION: ICD-10-CM

## 2023-07-06 DIAGNOSIS — M48.061 SPINAL STENOSIS, LUMBAR REGION WITHOUT NEUROGENIC CLAUDICATION: ICD-10-CM

## 2023-08-01 ENCOUNTER — TELEPHONE (OUTPATIENT)
Dept: PRIMARY CARE | Facility: CLINIC | Age: 58
End: 2023-08-01
Payer: COMMERCIAL

## 2023-08-01 DIAGNOSIS — K21.9 GASTROESOPHAGEAL REFLUX DISEASE WITHOUT ESOPHAGITIS: ICD-10-CM

## 2023-08-01 DIAGNOSIS — Z00.00 ROUTINE GENERAL MEDICAL EXAMINATION AT A HEALTH CARE FACILITY: Primary | ICD-10-CM

## 2023-08-01 RX ORDER — IBUPROFEN 200 MG
1 TABLET ORAL DAILY
Qty: 30 TABLET | Refills: 11 | Status: SHIPPED | OUTPATIENT
Start: 2023-08-01 | End: 2024-07-31

## 2023-08-01 RX ORDER — ESOMEPRAZOLE MAGNESIUM 40 MG/1
40 CAPSULE, DELAYED RELEASE ORAL DAILY
Qty: 30 CAPSULE | Refills: 5 | Status: SHIPPED | OUTPATIENT
Start: 2023-08-01 | End: 2023-08-03

## 2023-08-02 DIAGNOSIS — K21.9 GASTROESOPHAGEAL REFLUX DISEASE WITHOUT ESOPHAGITIS: ICD-10-CM

## 2023-08-03 RX ORDER — ESOMEPRAZOLE MAGNESIUM 40 MG/1
40 CAPSULE, DELAYED RELEASE ORAL DAILY
Qty: 30 CAPSULE | Refills: 0 | Status: SHIPPED | OUTPATIENT
Start: 2023-08-03 | End: 2023-08-09 | Stop reason: SDUPTHER

## 2023-08-09 ENCOUNTER — TELEPHONE (OUTPATIENT)
Dept: PRIMARY CARE | Facility: CLINIC | Age: 58
End: 2023-08-09
Payer: COMMERCIAL

## 2023-08-09 DIAGNOSIS — K21.9 GASTROESOPHAGEAL REFLUX DISEASE WITHOUT ESOPHAGITIS: ICD-10-CM

## 2023-08-09 DIAGNOSIS — M79.2 NEURALGIA AND NEURITIS, UNSPECIFIED: ICD-10-CM

## 2023-08-09 DIAGNOSIS — M79.672 LEFT FOOT PAIN: Primary | ICD-10-CM

## 2023-08-09 RX ORDER — ESOMEPRAZOLE MAGNESIUM 40 MG/1
40 CAPSULE, DELAYED RELEASE ORAL DAILY
Qty: 30 CAPSULE | Refills: 5 | Status: SHIPPED | OUTPATIENT
Start: 2023-08-09 | End: 2023-09-08

## 2023-08-09 RX ORDER — DOXEPIN HYDROCHLORIDE 50 MG/G
CREAM TOPICAL
Qty: 135 G | Refills: 1 | Status: SHIPPED | OUTPATIENT
Start: 2023-08-09 | End: 2023-10-06 | Stop reason: SDUPTHER

## 2023-08-09 NOTE — TELEPHONE ENCOUNTER
Refill    Nexium 40mg - [facility]     Doxepin cream -[facility]     Requesting xray of left foot, Thinks she broke it.

## 2023-08-26 ENCOUNTER — TELEPHONE (OUTPATIENT)
Dept: PRIMARY CARE | Facility: CLINIC | Age: 58
End: 2023-08-26
Payer: COMMERCIAL

## 2023-08-26 DIAGNOSIS — E55.9 VITAMIN D DEFICIENCY: Primary | ICD-10-CM

## 2023-08-28 RX ORDER — ASPIRIN 325 MG
TABLET, DELAYED RELEASE (ENTERIC COATED) ORAL
Qty: 3 CAPSULE | Refills: 0 | Status: SHIPPED | OUTPATIENT
Start: 2023-08-28 | End: 2023-09-26

## 2023-09-08 ENCOUNTER — TELEPHONE (OUTPATIENT)
Dept: PRIMARY CARE | Facility: CLINIC | Age: 58
End: 2023-09-08
Payer: COMMERCIAL

## 2023-09-08 DIAGNOSIS — J43.9 PULMONARY EMPHYSEMA, UNSPECIFIED EMPHYSEMA TYPE (MULTI): Primary | ICD-10-CM

## 2023-09-08 DIAGNOSIS — K21.9 GASTROESOPHAGEAL REFLUX DISEASE WITHOUT ESOPHAGITIS: ICD-10-CM

## 2023-09-08 RX ORDER — ESOMEPRAZOLE MAGNESIUM 40 MG/1
40 CAPSULE, DELAYED RELEASE ORAL DAILY
Qty: 30 CAPSULE | Refills: 0 | Status: SHIPPED | OUTPATIENT
Start: 2023-09-08 | End: 2023-09-11 | Stop reason: SDUPTHER

## 2023-09-08 NOTE — TELEPHONE ENCOUNTER
[facility] called and said that the doxepin cream needs prior auth   Nexium was approved - script needs to go to [facility]

## 2023-09-11 RX ORDER — ESOMEPRAZOLE MAGNESIUM 40 MG/1
40 CAPSULE, DELAYED RELEASE ORAL DAILY
Qty: 30 CAPSULE | Refills: 11 | Status: SHIPPED | OUTPATIENT
Start: 2023-09-11 | End: 2023-09-14 | Stop reason: ALTCHOICE

## 2023-09-14 RX ORDER — ESOMEPRAZOLE MAGNESIUM 40 MG/1
40 CAPSULE, DELAYED RELEASE ORAL
Qty: 30 CAPSULE | Refills: 11 | Status: SHIPPED | OUTPATIENT
Start: 2023-09-14 | End: 2023-09-14 | Stop reason: SDUPTHER

## 2023-09-14 RX ORDER — IPRATROPIUM BROMIDE AND ALBUTEROL SULFATE 2.5; .5 MG/3ML; MG/3ML
SOLUTION RESPIRATORY (INHALATION)
Qty: 180 ML | Refills: 11 | Status: SHIPPED | OUTPATIENT
Start: 2023-09-14 | End: 2023-09-18

## 2023-09-14 RX ORDER — ESOMEPRAZOLE MAGNESIUM 40 MG/1
40 CAPSULE, DELAYED RELEASE ORAL
Qty: 90 CAPSULE | Refills: 3 | Status: SHIPPED | OUTPATIENT
Start: 2023-09-14 | End: 2024-02-29 | Stop reason: WASHOUT

## 2023-09-26 DIAGNOSIS — E55.9 VITAMIN D DEFICIENCY: ICD-10-CM

## 2023-09-26 RX ORDER — ASPIRIN 325 MG
TABLET, DELAYED RELEASE (ENTERIC COATED) ORAL
Qty: 3 CAPSULE | Refills: 0 | Status: SHIPPED | OUTPATIENT
Start: 2023-09-26 | End: 2024-02-22 | Stop reason: SDUPTHER

## 2023-09-30 ENCOUNTER — PHARMACY VISIT (OUTPATIENT)
Dept: PHARMACY | Facility: CLINIC | Age: 58
End: 2023-09-30
Payer: COMMERCIAL

## 2023-09-30 PROCEDURE — RXMED WILLOW AMBULATORY MEDICATION CHARGE

## 2023-10-02 NOTE — TELEPHONE ENCOUNTER
Is wondering if you can send an antibiotic, covid test was negative but she has been in contact with someone who had covid, she has had diarrhea and vomiting since Saturday, no fever stuffy nose.

## 2023-10-02 NOTE — OP NOTE
Post Operative Note:     PreOp Diagnosis: Spinal stenosis with neurogenic  claudication   Post-Procedure Diagnosis: Spinal stenosis with neurogenic  claudication   Procedure: 1.  L4-L5 interlaminar lumbar epidural  steroid injection  2.   3.   4.   5.   Surgeon: Ricki Davis   Resident/Fellow/Other Assistant: Hakeem Puri   Anesthesia: Local   Estimated Blood Loss (mL): none   Specimen: no   Findings: No abnormal anatomical findings     Operative Report Dictated:  Dictation: not applicable - note contains Operative  Report   Operative Report:    History reviewed patient interviewed and examined.  Risks and benefits of procedure discussed patient agreed to proceed and consent was signed.  A second identification  was done in the operating room.  With the patient in the prone position and under IV sedation the back was prepped and draped in a sterile fashion.  Under fluoroscopic guidance L4-L5 interspace was identified.  Lidocaine 0.5% was used for skin and tissue  infiltration.  An 18-gauge Touhy needle was inserted into the epidural space under fluoroscopic guidance in AP and lateral view and with loss-of-resistance technique.  IV contrast showed adequate epidural spread without any vascular or intrathecal uptake  confirmed in AP and lateral views..  Depo-Medrol 40 mg mixed with lidocaine 0.5% a total of 5cc was injected.  Washout view showed good epidural spread in the AP view.  Needle removed Band-Aid applied.  Patient tolerated the procedure without any problems and was transferred to the recovery room in stable condition.      Electronic Signatures:  Ricki Davis)  (Signed 06-Jul-2023 13:31)   Authored: Post Operative Note, Note Completion      Last Updated: 06-Jul-2023 13:31 by Ricki Davis)

## 2023-10-06 ENCOUNTER — TELEPHONE (OUTPATIENT)
Dept: PRIMARY CARE | Facility: CLINIC | Age: 58
End: 2023-10-06
Payer: COMMERCIAL

## 2023-10-06 DIAGNOSIS — M54.50 CHRONIC BILATERAL LOW BACK PAIN WITHOUT SCIATICA: ICD-10-CM

## 2023-10-06 DIAGNOSIS — G89.29 CHRONIC BILATERAL LOW BACK PAIN WITHOUT SCIATICA: ICD-10-CM

## 2023-10-06 DIAGNOSIS — M79.7 FIBROMYALGIA, PRIMARY: ICD-10-CM

## 2023-10-06 DIAGNOSIS — J40 BRONCHITIS: Primary | ICD-10-CM

## 2023-10-06 RX ORDER — CYCLOBENZAPRINE HCL 10 MG
TABLET ORAL
Qty: 90 TABLET | Refills: 0 | Status: SHIPPED | OUTPATIENT
Start: 2023-10-06 | End: 2023-10-31 | Stop reason: SDUPTHER

## 2023-10-11 NOTE — TELEPHONE ENCOUNTER
Patient requesting a different type of cream for pain as doxepin is not on formulary, this LPN asked patient to call insurance company to see what is on formulary , patient to call back FELICITA

## 2023-10-13 RX ORDER — AZITHROMYCIN 250 MG/1
TABLET, FILM COATED ORAL
Qty: 6 TABLET | Refills: 0 | Status: SHIPPED | OUTPATIENT
Start: 2023-10-13 | End: 2023-10-18

## 2023-10-13 NOTE — TELEPHONE ENCOUNTER
Amoxapine   Amieriptyline  Desipramine   (Formulary from [facility])     She is also wondering if she can get an anitbiotic she has a chest cold for about 2 weeks.

## 2023-10-16 NOTE — TELEPHONE ENCOUNTER
Doxepin has not been approved suggested alternatives are  Hydrocortisone 1%  Fluocinolone 0.025%  Desonide 0.05% and Alclomitizole Dipropinate 0.05%   Please advise

## 2023-10-23 RX ORDER — LIDOCAINE 50 MG/G
OINTMENT TOPICAL AS NEEDED
Qty: 50 G | Refills: 3 | COMMUNITY
Start: 2023-10-23 | End: 2023-10-31 | Stop reason: SDUPTHER

## 2023-10-26 ENCOUNTER — TELEPHONE (OUTPATIENT)
Dept: PRIMARY CARE | Facility: CLINIC | Age: 58
End: 2023-10-26
Payer: COMMERCIAL

## 2023-10-26 DIAGNOSIS — K58.2 IRRITABLE BOWEL SYNDROME WITH BOTH CONSTIPATION AND DIARRHEA: ICD-10-CM

## 2023-10-26 DIAGNOSIS — E55.9 VITAMIN D DEFICIENCY: Primary | ICD-10-CM

## 2023-10-30 ENCOUNTER — TELEPHONE (OUTPATIENT)
Dept: NEUROLOGY | Facility: HOSPITAL | Age: 58
End: 2023-10-30
Payer: COMMERCIAL

## 2023-10-30 DIAGNOSIS — G43.E19 INTRACTABLE CHRONIC MIGRAINE WITH AURA AND WITHOUT STATUS MIGRAINOSUS: Primary | ICD-10-CM

## 2023-10-30 DIAGNOSIS — J43.9 PULMONARY EMPHYSEMA, UNSPECIFIED EMPHYSEMA TYPE (MULTI): Primary | ICD-10-CM

## 2023-10-30 RX ORDER — ALBUTEROL SULFATE 90 UG/1
2 AEROSOL, METERED RESPIRATORY (INHALATION) EVERY 4 HOURS PRN
Qty: 18 G | Refills: 3 | Status: SHIPPED | OUTPATIENT
Start: 2023-10-30 | End: 2024-01-10 | Stop reason: SDUPTHER

## 2023-10-30 RX ORDER — SUMATRIPTAN 50 MG/1
TABLET, FILM COATED ORAL
Qty: 9 TABLET | Refills: 5 | Status: SHIPPED | OUTPATIENT
Start: 2023-10-30 | End: 2024-01-24 | Stop reason: CLARIF

## 2023-10-30 NOTE — TELEPHONE ENCOUNTER
Patient called for refill on Sumatriptan 50 mg 1 at onset and 1 2 hours later if needed.     Ajit [facility]

## 2023-10-31 RX ORDER — ONDANSETRON 8 MG/1
TABLET, ORALLY DISINTEGRATING ORAL
Qty: 60 TABLET | Refills: 3 | Status: SHIPPED | OUTPATIENT
Start: 2023-10-31 | End: 2024-01-18 | Stop reason: SDUPTHER

## 2023-10-31 RX ORDER — ERGOCALCIFEROL 1.25 MG/1
50000 CAPSULE ORAL
Qty: 2 CAPSULE | Refills: 11 | Status: SHIPPED | OUTPATIENT
Start: 2023-10-31 | End: 2024-01-24 | Stop reason: WASHOUT

## 2023-10-31 RX ORDER — CYCLOBENZAPRINE HCL 10 MG
10 TABLET ORAL 3 TIMES DAILY PRN
Qty: 90 TABLET | Refills: 2 | Status: SHIPPED | OUTPATIENT
Start: 2023-10-31 | End: 2024-01-30 | Stop reason: SDUPTHER

## 2023-10-31 RX ORDER — LIDOCAINE 50 MG/G
OINTMENT TOPICAL AS NEEDED
Qty: 50 G | Refills: 3 | Status: SHIPPED | OUTPATIENT
Start: 2023-10-31 | End: 2024-01-18

## 2023-11-06 PROBLEM — R30.0 DYSURIA: Status: ACTIVE | Noted: 2023-11-06

## 2023-11-06 PROBLEM — N32.89 BLADDER WALL THICKENING: Status: ACTIVE | Noted: 2023-11-06

## 2023-11-06 PROBLEM — N31.8 FREQUENCY-URGENCY SYNDROME: Status: ACTIVE | Noted: 2023-11-06

## 2023-11-06 RX ORDER — NYSTATIN 100000 U/G
CREAM TOPICAL
COMMUNITY
Start: 2023-10-27

## 2023-11-06 RX ORDER — SIMETHICONE 125 MG/1
125 CAPSULE, LIQUID FILLED ORAL 4 TIMES DAILY PRN
COMMUNITY
Start: 2023-10-11 | End: 2024-06-03

## 2023-11-07 ENCOUNTER — OFFICE VISIT (OUTPATIENT)
Dept: GASTROENTEROLOGY | Facility: CLINIC | Age: 58
End: 2023-11-07
Payer: COMMERCIAL

## 2023-11-07 VITALS
BODY MASS INDEX: 25.75 KG/M2 | WEIGHT: 150 LBS | HEART RATE: 109 BPM | SYSTOLIC BLOOD PRESSURE: 114 MMHG | DIASTOLIC BLOOD PRESSURE: 75 MMHG

## 2023-11-07 DIAGNOSIS — K58.2 IRRITABLE BOWEL SYNDROME WITH BOTH CONSTIPATION AND DIARRHEA: Primary | ICD-10-CM

## 2023-11-07 DIAGNOSIS — R11.2 NAUSEA AND VOMITING, UNSPECIFIED VOMITING TYPE: ICD-10-CM

## 2023-11-07 PROBLEM — G43.709 CHRONIC MIGRAINE WITHOUT AURA WITHOUT STATUS MIGRAINOSUS, NOT INTRACTABLE: Status: ACTIVE | Noted: 2023-11-07

## 2023-11-07 PROBLEM — K58.9 IBS (IRRITABLE BOWEL SYNDROME): Chronic | Status: ACTIVE | Noted: 2023-11-07

## 2023-11-07 PROBLEM — F41.9 ANXIETY: Status: ACTIVE | Noted: 2023-11-07

## 2023-11-07 PROBLEM — K92.1 MELENA: Status: RESOLVED | Noted: 2023-05-22 | Resolved: 2023-11-07

## 2023-11-07 PROCEDURE — 99215 OFFICE O/P EST HI 40 MIN: CPT | Performed by: PHYSICIAN ASSISTANT

## 2023-11-07 RX ORDER — PSYLLIUM HUSK 3.4 G/5.8G
POWDER ORAL
Qty: 1040 G | Refills: 2 | Status: SHIPPED | OUTPATIENT
Start: 2023-11-07

## 2023-11-07 RX ORDER — DICYCLOMINE HYDROCHLORIDE 20 MG/1
TABLET ORAL
Qty: 180 TABLET | Refills: 6 | Status: SHIPPED | OUTPATIENT
Start: 2023-11-07

## 2023-11-07 RX ORDER — POLYETHYLENE GLYCOL 3350 17 G/17G
POWDER, FOR SOLUTION ORAL
Qty: 17 G | Refills: 11 | Status: SHIPPED | OUTPATIENT
Start: 2023-11-07

## 2023-11-07 ASSESSMENT — ENCOUNTER SYMPTOMS
APPETITE CHANGE: 0
VOMITING: 1
BLOOD IN STOOL: 0
NAUSEA: 1
ROS GI COMMENTS: +FECAL INCONTINENCE
UNEXPECTED WEIGHT CHANGE: 0
FEVER: 0
ABDOMINAL PAIN: 1
CONSTIPATION: 0
CHILLS: 0
DIARRHEA: 0

## 2023-11-07 NOTE — PATIENT INSTRUCTIONS
Thank you for coming in for your appointment.    -For the bowel incontinence, start metamucil daily  -Continue bentyl, miralax as needed. You can also use over the counter imodium as needed for diarrhea  -Continue acid reflux medications and antinausea medication per PCP  Follow up in 1 year, or sooner if needed.

## 2023-11-07 NOTE — PROGRESS NOTES
Subjective   Patient ID: Kelli Ortega is a 57 y.o. female presents for follow up of IBS.     Patient's PCP is Dr. Dyer    HPI  Patient presents for follow up. She has been seen numerous times for IBS, chronic N/V, oropharyngeal dysphagia and has had extensive GI evaluation, documented below. She states that overall she is doing OK. Last week on Friday, she had acute onset N/V/D, which lasted 1-2 days and has improved, although she doesn't feel back to 100%. No reported hematemesis or coffee ground emesis. No melena or hematochezia. Her PCP gives her chronic zofran and phenergan. She states that her only significant symptom at this time is some fecal incontinence with coughing that has been ongoing for several months She denies any urinary incontinence, severe back pain, or numbness/tingling. She has been using dicyclomine for abdominal pain. Denies weight loss. Still with episodic trouble swallowing liquids, stable. No significant heartburn reported.   Prior GI evaluation:      Esophagram 3/2023: Consistent with oropharygneal dysphagia, no esophageal dysphagia signs seen      CT scan 5/2023: Mild enterocolitis (distal ileum and right colon), bladder WT, stable liver lesion (likely hemangioma from prior testing and stable dating back to 2019)  Gastric emptying study normal in August 2022  EGD July 2022: Gastritis, biopsies showed chronic gastritis without dysplasia, malignancy, H. pylori infection  Colonoscopy February 2022: tortuous colon  EGD February 2022: 2 fundic gland polyps removed, chronic gastritis seen (confirmed on biopsy)  Colonoscopy December 2019: normal  EGD December 2019: small hiatal hernia and gastritis seen. Gastric biopsies showed reactive gastropathy  EGD March 2018: chronic gastritis and esophageal candidiasis seen  EGD October 2017: Chronic gastritis and gastric AVM seen  EGD October 2013: retained food in stomach, gastric polyps and gastric AVM seen     Review of Systems    Constitutional:  Negative for appetite change, chills, fever and unexpected weight change.   Gastrointestinal:  Positive for abdominal pain, nausea and vomiting. Negative for blood in stool, constipation and diarrhea.        +fecal incontinence     Medications:  Prior to Admission medications    Medication Sig Start Date End Date Taking? Authorizing Provider   acyclovir (Zovirax) 200 mg capsule Take by mouth once daily. 1/6/17  Yes Historical Provider, MD   albuterol 90 mcg/actuation inhaler Inhale 2 puffs every 4 hours if needed for wheezing or shortness of breath. 10/30/23  Yes REDD Fatima-CNP   b complex-vitamin c (multivitamin, stress formula) tablet Take 1 tablet by mouth once daily. 8/1/23 7/31/24 Yes Mariaelena Dyer MD   busPIRone (Buspar) 15 mg tablet TAKE 2 TABLETS 3 TIMES DAILY. 3/9/16  Yes Historical Provider, MD   cholecalciferol (Vitamin D-3) 1,250 mcg (50,000 unit) capsule TAKE ONE (1) CAPSULE BY MOUTH EVERY OTHER WEEK. 9/26/23  Yes Mariaelena Dyer MD   cyclobenzaprine (Flexeril) 10 mg tablet Take 1 tablet (10 mg) by mouth 3 times a day as needed for muscle spasms. 10/31/23 1/29/24 Yes Mariaelena Dyer MD   diazePAM (Valium) 5 mg tablet TAKE AS DIRECTED. TAKE 1 TABLET ORALLY TWICE DAILY AND 1 TABLET ORALLY DAILY IF NEEDED FOR ANXIETY.   Yes Historical Provider, MD   doxepin (Zonalon) 5 % cream APPLY 2-3 GRAMS TOPICALLY TWICE DAILY TO THREE TIMES DAILY AS NEEDED FOR NEUROPATHIC PAIN RELIEF. 90 days supply. Letter attached regarding approval. 10/6/23  Yes Mariaelena Dyer MD   Emgality Pen 120 mg/mL auto-injector Inject under the skin. 10/27/20  Yes Historical Provider, MD   ergocalciferol (Vitamin D-2) 1.25 MG (67445 UT) capsule Take 1 capsule (50,000 Units) by mouth every 14 (fourteen) days. 10/31/23 10/30/24 Yes Mariaelena Dyer MD   galcanezumab (Emgality) 120 mg/mL auto-injector INJECT 120 MG (1 PEN) UNDER THE SKIN ONCE A MONTH AS DIRECTED. 1/24/23 1/23/24 Yes Keven MARTINEZ  MD Shawn   Gas Relief Extra Strength 125 mg capsule Take 1 capsule (125 mg) by mouth 4 times a day as needed for flatulence. 10/11/23  Yes Historical Provider, MD   hydrocortisone (hydrocortisone-aloe vera) 1 % cream APPLY SPARINGLY AND RUB IN WELL TO  AFFECTED AREA(S) AS DIRECTED. 2/9/23  Yes Historical Provider, MD   hydrocortisone 1 % cream APPLY SPARINGLY AND RUB IN WELL TO  AFFECTED AREA(S) AS DIRECTED. 2/9/23  Yes Historical Provider, MD   ipratropium-albuteroL (Duo-Neb) 0.5-2.5 mg/3 mL nebulizer solution USE 1 VIAL IN NEBULIZER 4 TIMES DAILY 9/18/23  Yes Mariaelena Dyer MD   lactobacillus acidophilus 500 million cell capsule Take 1 capsule by mouth once daily. 5/22/23 5/21/24 Yes Mariaelena Dyer MD   lidocaine (Xylocaine) 5 % ointment Apply topically if needed for mild pain (1 - 3). 10/31/23  Yes Mariaelena Dyer MD   loratadine (Claritin) 10 mg tablet TAKE ONE (1) TABLET BY MOUTH ONCE DAILY AT BEDTIME. **(CLARITIN 10 MG TAB EQUIV)** 6/2/23  Yes Mariaelena Dyer MD   mupirocin (Bactroban) 2 % ointment Apply topically 3 times a day. 9/7/16  Yes Historical Provider, MD   nebulizer accessories kit 1 each once daily. Needs a replacement mask- hers is not working 6/5/23  Yes Thelma Jackson APRN-CNP   NexIUM 40 mg DR capsule Take 1 capsule (40 mg) by mouth once daily in the morning. Take before meals. Do not open capsule. 9/14/23 9/13/24 Yes Mary Duckworth APRN-CNP   nystatin (Mycostatin) cream Apply topically. 10/27/23  Yes Historical Provider, MD   ondansetron ODT (Zofran-ODT) 8 mg disintegrating tablet DISSOLVE 1 TABLET (8 MG) ON TONGUE AND SWALLOW EVERY 8 HOURS IF NEEDED FOR VOMITING. **(ZOFRAN ODT 8 MG TAB EQUIV)** 10/31/23  Yes Mariaelena Dyer MD   pravastatin (Pravachol) 20 mg tablet TAKE 1 TABLET BY MOUTH DAILY AT BEDTIME. **(PRAVACHOL 20 MG TAB EQUIV)** Clarification of previous order. 6/13/23  Yes Mariaelena Dyer MD   pregabalin (Lyrica) 150 mg capsule Take 1 capsule (150  mg) by mouth 3 times a day. 1/19/16  Yes Historical Provider, MD   SUMAtriptan (Imitrex) 50 mg tablet take 1 tab for migraine relief at onset of migraine. Can take another 1 tab 2 hours later. Max 2 tabs in 24 hours 10/30/23  Yes Keven Escobar MD   Trelegy Ellipta 200-62.5-25 mcg blister with device INHALE ONE (1) PUFF BY MOUTH INTO THE LUNGS ONCE DAILY. 12/28/21  Yes Historical Provider, MD   umeclidinium-vilanteroL (Anoro Ellipta) 62.5-25 mcg/actuation blister with device Inhale. 3/9/16  Yes Historical Provider, MD   zolpidem (Ambien) 10 mg tablet TAKE 1/2 - 1 TABLET BY MOUTH AT BEDTIME AS NEEDED FOR SLEEP. DX: INSOMNIA RELATED TO BIPOLAR D/O: F51.09/F31.81.   Yes Historical Provider, MD   dicyclomine (Bentyl) 20 mg tablet TAKE ONE TO TWO TABLETS BY MOUTH THREE (3) TIMES PER DAY OR AS NEEDED FOR PAIN. **(BENTYL 20 MG TAB EQUIV)** 5/22/23 11/7/23 Yes Mariaelena Dyer MD   polyethylene glycol (Miralax) 17 gram/dose powder MIX 1 CAPFUL IN 8 OUNCES OF WATER AND DRINK AT BEDTIME AS NEEDED FOR CONSTIPATION. 10/20/20 11/7/23 Yes Historical Provider, MD   psyllium husk, aspartame, (Metamucil Sugar-Free, aspart,) 3.4 gram/5.8 gram powder Take by mouth. 10/24/22 11/7/23 Yes Historical Provider, MD   dicyclomine (Bentyl) 20 mg tablet TAKE ONE TO TWO TABLETS BY MOUTH THREE (3) TIMES PER DAY OR AS NEEDED FOR PAIN. **(BENTYL 20 MG TAB EQUIV)** 11/7/23   Sheba Arciniega PA-C   polyethylene glycol (Miralax) 17 gram/dose powder MIX 1 CAPFUL IN 8 OUNCES OF WATER AND DRINK AT BEDTIME AS NEEDED FOR CONSTIPATION. 11/7/23   Sheba Arciniega PA-C   psyllium husk, aspartame, (Metamucil Sugar-Free, aspart,) 3.4 gram/5.8 gram powder Dissolve 2 scoops into 8 ounces of water and drink daily. 11/7/23   Sheba Arciniega PA-C   cyanocobalamin (Vitamin B-12) 1,000 mcg tablet TAKE ONE (1) TABLET BY MOUTH ONCE DAILY AS DIRECTED. 6/29/23 11/7/23  Mariaelena Dyer MD   cyanocobalamin (Vitamin B-12) 100 mcg tablet Take 0.5 tablets (50  mcg) by mouth once daily.  11/7/23  Historical Provider, MD   estradiol (Estrace) 0.1 MG/GM vaginal cream Insert into the vagina. 9/24/20 11/7/23  Historical Provider, MD   hyoscyamine 0.125 mg disintegrating tablet  3/3/20 11/7/23  Historical Provider, MD   L. acidophilus/Bifid. animalis 32 billion cell capsule Take by mouth. 12/11/20 11/7/23  Historical Provider, MD   nicotine (Nicoderm CQ) 14 mg/24 hr patch Place 1 patch on the skin once daily. 10/12/22 11/7/23  Historical Provider, MD   pantoprazole (ProtoNix) 40 mg EC tablet Take 1 tablet (40 mg) by mouth once daily.  11/7/23  Historical Provider, MD   promethazine (Phenergan) 25 mg tablet TAKE ONE (1) TABLET BY MOUTH EVERY SIX (6) HOURS IF NEEDED FOR NAUSEA. **(PHENERGAN 25 MG TAB EQUIV)** 12/29/16 11/7/23  Historical Provider, MD   promethazine-DM (Phenergan-DM) 6.25-15 mg/5 mL syrup  1/28/20 11/7/23  Historical Provider, MD   topiramate (Topamax) 25 mg tablet Take 1 tablet (25 mg) by mouth twice a day.  11/7/23  Historical Provider, MD       Allergies:  Amitriptyline, Bupropion, Bupropion hcl, Cefazolin, Diclofenac, Diphenhydramine, Divalproex, Divalproex sodium, Doxycycline, Guaifenesin, Hydrocodone, Hydrocodone-acetaminophen, Hydroxyzine, Ibuprofen, Ketorolac, Lepirudin, Loperamide, Meloxicam, Milnacipran, Mirtazapine, Morphine, Nabumetone, Ondansetron hcl, Paroxetine, Tramadol, and Tramadol-acetaminophen    Past Medical History:  She has a past medical history of Abdominal pain (05/22/2023), Abdominal pain, acute, left lower quadrant (05/22/2023), Acute bronchitis (05/22/2023), Acute viral syndrome (05/22/2023), Allergy status to unspecified drugs, medicaments and biological substances, Anxiety disorder, unspecified, 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, unspecified joint (CMS/HCC), Bacterial vaginitis (05/22/2023), Bipolar disorder, unspecified (CMS/[facility]), Candida esophagitis (CMS/[facility]) (05/22/2023), Change in bowel habits (05/22/2023), Change in bowel habits  (05/22/2023), Contact with and (suspected) exposure to covid-19 (12/01/2021), Cough (05/22/2023), Enterocolitis due to Clostridium difficile, not specified as recurrent, Folliculitis (05/22/2023), Full incontinence of feces (05/22/2023), Functional dyspepsia (05/22/2023), Other conditions influencing health status, Other conditions influencing health status (09/15/2021), Panic disorder (episodic paroxysmal anxiety), Personal history of other diseases of the digestive system, Personal history of other diseases of the musculoskeletal system and connective tissue, Personal history of other diseases of the musculoskeletal system and connective tissue, Personal history of other diseases of the musculoskeletal system and connective tissue, Personal history of other diseases of the musculoskeletal system and connective tissue, Personal history of other diseases of the nervous system and sense organs, Personal history of other diseases of the respiratory system, Personal history of other diseases of the respiratory system, Personal history of other infectious and parasitic diseases, Personal history of other infectious and parasitic diseases (10/03/2017), Personal history of other mental and behavioral disorders, Personal history of other specified conditions (08/22/2019), Personal history of pneumonia (recurrent), Respiratory illness (05/22/2023), and Vaginal discharge (05/22/2023).    Past Surgical History:  She has a past surgical history that includes Appendectomy (08/24/2016); Hysterectomy (08/24/2016); Esophagogastroduodenoscopy (10/30/2017); Colon surgery (10/03/2017); and Bladder surgery (10/03/2017).    Social History:  She reports that she has been smoking cigarettes. She has never used smokeless tobacco. She reports that she does not currently use alcohol. She reports that she does not use drugs.    Objective   Physical Exam  Vitals reviewed.   Constitutional:       General: She is not in acute distress.      Appearance: Normal appearance.   Cardiovascular:      Rate and Rhythm: Normal rate and regular rhythm.   Pulmonary:      Effort: Pulmonary effort is normal.      Breath sounds: Normal breath sounds.   Abdominal:      General: Bowel sounds are normal. There is no distension.      Palpations: Abdomen is soft.      Tenderness: There is no abdominal tenderness. There is no guarding or rebound.   Neurological:      General: No focal deficit present.      Mental Status: She is alert and oriented to person, place, and time.   Psychiatric:         Mood and Affect: Mood normal.         Behavior: Behavior normal.         Assessment/Plan   Problem List Items Addressed This Visit             ICD-10-CM       Gastrointestinal and Abdominal    IBS (irritable bowel syndrome) - Primary (Chronic) K58.9     Overall, symptoms are stable. Patient has had some fecal incontinence with coughing. No urinary incontinence, severe back pain, or numbness/tingling. Recommended fiber daily, prescription sent. If no improvement, recommended imodium PRN. Currently occurring 1-2 times a week. Refills of miralax and bentyl requested. She will follow up in 1 year or sooner if needed. If symptoms worsen, consider trial of xifaxan vs referral to integrative medicine. Reports allergy to amitriptyline. Could consider viberzi, but would need to be cautious given her other chronic medications.          Relevant Medications    dicyclomine (Bentyl) 20 mg tablet    psyllium husk, aspartame, (Metamucil Sugar-Free, aspart,) 3.4 gram/5.8 gram powder    polyethylene glycol (Miralax) 17 gram/dose powder    Nausea and vomiting R11.2     Last week, patient had 3 days of sudden onset vomiting and diarrhea. No report of hematemesis. Symptoms have improved and are near baseline. Likely viral illness. Patient to continue zofran and phenergan per PCP.                 Sheba Arciniega PA-C

## 2023-11-07 NOTE — ASSESSMENT & PLAN NOTE
Overall, symptoms are stable. Patient has had some fecal incontinence with coughing. No urinary incontinence, severe back pain, or numbness/tingling. Recommended fiber daily, prescription sent. If no improvement, recommended imodium PRN. Currently occurring 1-2 times a week. Refills of miralax and bentyl requested. She will follow up in 1 year or sooner if needed. If symptoms worsen, consider trial of xifaxan vs referral to integrative medicine. Reports allergy to amitriptyline. Could consider viberzi, but would need to be cautious given her other chronic medications.

## 2023-11-07 NOTE — ASSESSMENT & PLAN NOTE
Last week, patient had 3 days of sudden onset vomiting and diarrhea. No report of hematemesis. Symptoms have improved and are near baseline. Likely viral illness. Patient to continue zofran and phenergan per PCP.

## 2023-11-09 ENCOUNTER — PHARMACY VISIT (OUTPATIENT)
Dept: PHARMACY | Facility: CLINIC | Age: 58
End: 2023-11-09
Payer: COMMERCIAL

## 2023-11-09 ENCOUNTER — TELEPHONE (OUTPATIENT)
Dept: GASTROENTEROLOGY | Facility: CLINIC | Age: 58
End: 2023-11-09
Payer: COMMERCIAL

## 2023-11-09 DIAGNOSIS — R11.2 NAUSEA AND VOMITING, UNSPECIFIED VOMITING TYPE: Primary | ICD-10-CM

## 2023-11-09 RX ORDER — PROMETHAZINE HYDROCHLORIDE 12.5 MG/1
12.5 TABLET ORAL 3 TIMES DAILY PRN
Qty: 9 TABLET | Refills: 0 | Status: SHIPPED | OUTPATIENT
Start: 2023-11-09 | End: 2023-11-16

## 2023-11-09 NOTE — TELEPHONE ENCOUNTER
"At her office appointment yesterday, patient states that she had a \"GI bug\", the nausea may still be related to let. Short course of phenergan sent. Dicyclomine doesn't help nausea. As for the \"swelling\", she could try gas-x. She should go to the ER if swelling worsens or she develops significant abodminal pain or stops having BM/gas.  "

## 2023-11-09 NOTE — TELEPHONE ENCOUNTER
Left message in DETAIL of recommendations. (Patient previously mentioned on initial call that a detailed message would be appropriate.

## 2023-11-09 NOTE — TELEPHONE ENCOUNTER
"Patient calling- she was seen this week and at the time felt OK, but now has concerns.     She woke today with nausea and swelling in her \"lower stomach\"  The Zofran is not helping and she does not have any Phenergan on hand to try. She is questioning if increasing her Bentyl would be advisable.   No concerns with her Bms when asked.     Would you have any further recommendations? Please advise.   "

## 2023-11-13 ENCOUNTER — TELEPHONE (OUTPATIENT)
Dept: PRIMARY CARE | Facility: CLINIC | Age: 58
End: 2023-11-13
Payer: COMMERCIAL

## 2023-11-13 NOTE — TELEPHONE ENCOUNTER
Chief Complaint: pneumonia ?    Symptoms: Coughing, hot/cold sweats, low grade fever, chest hurts, SOB    Duration: Week to 2 weeks    Treatments: nothing    Patient Requesting:  Recommendations    Covid Test: No    Did patient have Covid Vaccine? no    Recent covid booster:  no    Pharmacy:  [facility]

## 2023-11-14 ENCOUNTER — APPOINTMENT (OUTPATIENT)
Dept: PAIN MEDICINE | Facility: CLINIC | Age: 58
End: 2023-11-14
Payer: COMMERCIAL

## 2023-11-17 ENCOUNTER — PHARMACY VISIT (OUTPATIENT)
Dept: PHARMACY | Facility: CLINIC | Age: 58
End: 2023-11-17
Payer: COMMERCIAL

## 2023-11-17 PROCEDURE — RXMED WILLOW AMBULATORY MEDICATION CHARGE

## 2023-11-20 ENCOUNTER — OFFICE VISIT (OUTPATIENT)
Dept: PAIN MEDICINE | Facility: CLINIC | Age: 58
End: 2023-11-20
Payer: COMMERCIAL

## 2023-11-20 DIAGNOSIS — M54.16 LUMBAR RADICULOPATHY: ICD-10-CM

## 2023-11-20 PROCEDURE — 99213 OFFICE O/P EST LOW 20 MIN: CPT | Performed by: ANESTHESIOLOGY

## 2023-11-20 RX ORDER — PREGABALIN 150 MG/1
150 CAPSULE ORAL 3 TIMES DAILY
Qty: 90 CAPSULE | Refills: 3 | Status: SHIPPED | OUTPATIENT
Start: 2023-11-20 | End: 2024-04-12 | Stop reason: SDUPTHER

## 2023-11-20 ASSESSMENT — PAIN - FUNCTIONAL ASSESSMENT: PAIN_FUNCTIONAL_ASSESSMENT: 0-10

## 2023-11-20 ASSESSMENT — PAIN SCALES - GENERAL: PAINLEVEL_OUTOF10: 8

## 2023-11-20 NOTE — PROGRESS NOTES
Subjective   Patient ID: Kelli Ortega is a 57 y.o. female with a past medical history of spinal stenosis with neurogenic claudication,  COPD, DM, HLD.    HPI:   56 yo F presenting with low back pain that goes down her left leg, into her toes. Moving and walking makes the pain worse, most notably over the past 3-4 days. Thinks it may have been from picking up an object at the grocery store. Notes that she can only stand for 10-15 min before she has to rest. Laying flat and a heating pad help.     In July of this year, she had a L4-5 interlaminar epidural steroid injection which provided her with 50% 2-3 months. Previously has an L5 transforaminal on the left.     Endorses numbness in her hands and feet, which comes and goes. No bowel or bladder incontinence.     Physical Therapy: The patient has not done physical therapy within the past six months  Other Conservative Measures she has tried: Heating Pad  Classes of medications tried in the past: Acetaminophen, NSAIDs, Gabapentenoids, Muscle Relaxants, and Topical agents    Last OARRS Review: No data recorded    I have personally reviewed the OARRS report for Kleli Ortega I have considered the risks of abuse, dependence, addiction and diversion    OARRS:  I have personally reviewed the OARRS report for Kelli Ortega. I have considered the risks of abuse, dependence, addiction and diversion    Is the patient prescribed a combination of a benzodiazepine and opioid?  No      Review of Systems   13-point ROS done and negative except for HPI.     Current Outpatient Medications   Medication Instructions    acyclovir (Zovirax) 200 mg capsule oral, Daily    albuterol 90 mcg/actuation inhaler 2 puffs, inhalation, Every 4 hours PRN    b complex-vitamin c (multivitamin, stress formula) tablet 1 tablet, oral, Daily    busPIRone (Buspar) 15 mg tablet TAKE 2 TABLETS 3 TIMES DAILY.    cholecalciferol (Vitamin D-3) 1,250 mcg (50,000 unit) capsule TAKE ONE (1) CAPSULE BY  MOUTH EVERY OTHER WEEK.    cyclobenzaprine (FLEXERIL) 10 mg, oral, 3 times daily PRN    diazePAM (Valium) 5 mg tablet TAKE AS DIRECTED. TAKE 1 TABLET ORALLY TWICE DAILY AND 1 TABLET ORALLY DAILY IF NEEDED FOR ANXIETY.    dicyclomine (Bentyl) 20 mg tablet TAKE ONE TO TWO TABLETS BY MOUTH THREE (3) TIMES PER DAY OR AS NEEDED FOR PAIN. **(BENTYL 20 MG TAB EQUIV)**    doxepin (Zonalon) 5 % cream APPLY 2-3 GRAMS TOPICALLY TWICE DAILY TO THREE TIMES DAILY AS NEEDED FOR NEUROPATHIC PAIN RELIEF. 90 days supply. Letter attached regarding approval.    Emgality Pen 120 mg/mL auto-injector subcutaneous    ergocalciferol (VITAMIN D-2) 50,000 Units, oral, Every 14 days    galcanezumab (Emgality) 120 mg/mL auto-injector INJECT 120 MG (1 PEN) UNDER THE SKIN ONCE A MONTH AS DIRECTED.    Gas Relief Extra Strength 125 mg, oral, 4 times daily PRN    hydrocortisone (hydrocortisone-aloe vera) 1 % cream APPLY SPARINGLY AND RUB IN WELL TO  AFFECTED AREA(S) AS DIRECTED.    hydrocortisone 1 % cream APPLY SPARINGLY AND RUB IN WELL TO  AFFECTED AREA(S) AS DIRECTED.    ipratropium-albuteroL (Duo-Neb) 0.5-2.5 mg/3 mL nebulizer solution USE 1 VIAL IN NEBULIZER 4 TIMES DAILY    lactobacillus acidophilus 500 million cell capsule 1 capsule, oral, Daily    lidocaine (Xylocaine) 5 % ointment Topical, As needed    loratadine (Claritin) 10 mg tablet TAKE ONE (1) TABLET BY MOUTH ONCE DAILY AT BEDTIME. **(CLARITIN 10 MG TAB EQUIV)**    mupirocin (Bactroban) 2 % ointment Topical, 3 times daily RT    nebulizer accessories kit 1 each, miscellaneous, Daily, Needs a replacement mask- hers is not working    NexIUM 40 mg, oral, Daily before breakfast, Do not open capsule.    nystatin (Mycostatin) cream Topical    ondansetron ODT (Zofran-ODT) 8 mg disintegrating tablet DISSOLVE 1 TABLET (8 MG) ON TONGUE AND SWALLOW EVERY 8 HOURS IF NEEDED FOR VOMITING. **(ZOFRAN ODT 8 MG TAB EQUIV)**    polyethylene glycol (Miralax) 17 gram/dose powder MIX 1 CAPFUL IN 8 OUNCES OF  WATER AND DRINK AT BEDTIME AS NEEDED FOR CONSTIPATION.    pravastatin (Pravachol) 20 mg tablet TAKE 1 TABLET BY MOUTH DAILY AT BEDTIME. **(PRAVACHOL 20 MG TAB EQUIV)** Clarification of previous order.    pregabalin (Lyrica) 150 mg capsule 1 capsule, oral, 3 times daily    psyllium husk, aspartame, (Metamucil Sugar-Free, aspart,) 3.4 gram/5.8 gram powder Dissolve 2 scoops into 8 ounces of water and drink daily.    SUMAtriptan (Imitrex) 50 mg tablet take 1 tab for migraine relief at onset of migraine. Can take another 1 tab 2 hours later. Max 2 tabs in 24 hours    Trelegy Ellipta 200-62.5-25 mcg blister with device INHALE ONE (1) PUFF BY MOUTH INTO THE LUNGS ONCE DAILY.    umeclidinium-vilanteroL (Anoro Ellipta) 62.5-25 mcg/actuation blister with device inhalation    zolpidem (Ambien) 10 mg tablet TAKE 1/2 - 1 TABLET BY MOUTH AT BEDTIME AS NEEDED FOR SLEEP. DX: INSOMNIA RELATED TO BIPOLAR D/O: F51.09/F31.81.       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, unspecified joint (CMS/[facility])     Pseudomonas arthritis    Bacterial vaginitis 05/22/2023    Bipolar disorder, unspecified (CMS/[facility])     Bipolar disease, chronic    Candida esophagitis (CMS/[facility]) 05/22/2023    Change in bowel habits 05/22/2023    Change in bowel habits 05/22/2023    Contact with and (suspected) exposure to covid-19 12/01/2021    Close exposure to COVID-19 virus    Cough 05/22/2023    Enterocolitis due to Clostridium difficile, not specified as recurrent     Clostridium difficile colitis    Folliculitis 05/22/2023    Full incontinence of feces 05/22/2023    Functional dyspepsia 05/22/2023    Other conditions influencing  health status     Epicondylitis    Other conditions influencing health status 09/15/2021    History of cough    Panic disorder (episodic paroxysmal anxiety)     Panic attacks    Personal history of other diseases of the digestive system     History of irritable bowel syndrome    Personal history of other diseases of the musculoskeletal system and connective tissue     History of fibromyalgia    Personal history of other diseases of the musculoskeletal system and connective tissue     History of osteopenia    Personal history of other diseases of the musculoskeletal system and connective tissue     History of degenerative disc disease    Personal history of other diseases of the musculoskeletal system and connective tissue     History of osteoporosis    Personal history of other diseases of the nervous system and sense organs     History of migraine    Personal history of other diseases of the respiratory system     History of pulmonary emphysema    Personal history of other diseases of the respiratory system     History of chronic obstructive lung disease    Personal history of other infectious and parasitic diseases     History of herpes genitalis    Personal history of other infectious and parasitic diseases 10/03/2017    History of Clostridioides difficile colitis    Personal history of other mental and behavioral disorders     History of depression    Personal history of other specified conditions 08/22/2019    History of abdominal pain    Personal history of pneumonia (recurrent)     History of pneumonia    Respiratory illness 05/22/2023    Vaginal discharge 05/22/2023        Past Surgical History:   Procedure Laterality Date    APPENDECTOMY  08/24/2016    Appendectomy    BLADDER SURGERY  10/03/2017    Bladder Surgery    COLON SURGERY  10/03/2017    Colon Surgery    ESOPHAGOGASTRODUODENOSCOPY  10/30/2017    Diagnostic Esophagogastroduodenoscopy    HYSTERECTOMY  08/24/2016    Hysterectomy        Family History    Problem Relation Name Age of Onset    Diabetes Mother      Hypertension Mother      Breast cancer Sister      Asthma Son      Cancer Other uncle         Allergies   Allergen Reactions    Amitriptyline Unknown     delirious    Bupropion Unknown     delirious    Bupropion Hcl Other    Cefazolin Unknown    Diclofenac Unknown     Black stools    Diphenhydramine Unknown     Interacts with other medications    Divalproex Unknown    Divalproex Sodium Unknown     Hair loss    Doxycycline Unknown     delirious    Guaifenesin Unknown     emesis    Hydrocodone Unknown    Hydrocodone-Acetaminophen Unknown     headache    Hydroxyzine Unknown     Itching and vomiting    Ibuprofen Unknown     Bleeding of stomach    Ketorolac Unknown    Lepirudin Itching    Loperamide Unknown    Meloxicam Unknown     Stomach bleeding    Milnacipran Other and Unknown     delerium    Mirtazapine Unknown     Black stools    Morphine Unknown     Severe mood change    Nabumetone Unknown     Black stools    Ondansetron Hcl Unknown     vomiting    Paroxetine Unknown    Tramadol Unknown     itching    Tramadol-Acetaminophen Unknown     itching        Objective     There were no vitals filed for this visit.     Physical Exam      General: NAD, well groomed, well nourished  Eyes: Non-icteric sclera, EOMI  Ears, Nose, Mouth, and Throat: External ears and nose appear to be without deformity or rash. No lesions or masses noted. Hearing is grossly intact.   Neck: Trachea midline  Respiratory: Nonlabored breathing   Cardiovascular: no peripheral edema   Skin: No rashes or open lesions/ulcers identified on skin.  Back:   Palpation: Tenderness to palpation over lumbar paraspinous muscles, L3-L5  Straight leg raise: positive at 40 degrees on the left  SI Joint: No tenderness to palpation over SI joint, bilaterally. No pain with compression, Gaenslen test, HELDER test, bilaterally.    Neurologic:   Cranial nerves grossly intact.   Strength 5/5 and symmetric  plantar/dorsiflexion   Sensation: Dec sensation to light touch on the L side  DTRs:normal and symmetric throughout  Knight: absent  Clonus: absent    Psychiatric: Alert, orientation to person, place, and time. Cooperative.    Imaging personally reviewed and independently interpreted: Central canal stenosis with ligamentum hypertrophy L3-L5.    Assessment/Plan   Kelli Ortega is a 57 y.o. female with a past medical history of spinal stenosis with neurogenic claudication, COPD, DM, HLD presenting with low back pain that goes down her left leg, into her toes.    Plan:  -L sided L4-L5 transforaminal epidural steroid injection  -Counseled the patient on the importance of smoking cessation  -If no benefit from transforaminal injection, may consider the MILD procedure for her       We discussed  the risks, benefits and alternatives of the procedure including but not limited to: , Lack of efficacy , Transiently worsening pain , Bleeding, Infection , and Nerve Damage    Follow up:  After procedure    The patient was invited to contact us back anytime with any questions or concerns and follow-up with us in the office as needed.     There are no diagnoses linked to this encounter.

## 2023-11-22 ENCOUNTER — OFFICE VISIT (OUTPATIENT)
Dept: PRIMARY CARE | Facility: CLINIC | Age: 58
End: 2023-11-22
Payer: COMMERCIAL

## 2023-11-22 VITALS
HEART RATE: 78 BPM | DIASTOLIC BLOOD PRESSURE: 80 MMHG | BODY MASS INDEX: 25.99 KG/M2 | RESPIRATION RATE: 18 BRPM | TEMPERATURE: 98.6 F | SYSTOLIC BLOOD PRESSURE: 124 MMHG | OXYGEN SATURATION: 94 % | HEIGHT: 64 IN | WEIGHT: 152.2 LBS

## 2023-11-22 DIAGNOSIS — G89.29 CHRONIC LOW BACK PAIN, UNSPECIFIED BACK PAIN LATERALITY, UNSPECIFIED WHETHER SCIATICA PRESENT: ICD-10-CM

## 2023-11-22 DIAGNOSIS — M54.50 CHRONIC LOW BACK PAIN, UNSPECIFIED BACK PAIN LATERALITY, UNSPECIFIED WHETHER SCIATICA PRESENT: ICD-10-CM

## 2023-11-22 DIAGNOSIS — M51.36 DEGENERATION OF INTERVERTEBRAL DISC OF LUMBAR REGION: ICD-10-CM

## 2023-11-22 DIAGNOSIS — R19.7 DIARRHEA, UNSPECIFIED TYPE: ICD-10-CM

## 2023-11-22 DIAGNOSIS — G89.29 CHRONIC NECK PAIN: ICD-10-CM

## 2023-11-22 DIAGNOSIS — Z23 FLU VACCINE NEED: ICD-10-CM

## 2023-11-22 DIAGNOSIS — M48.062 NEUROGENIC CLAUDICATION DUE TO LUMBAR SPINAL STENOSIS: ICD-10-CM

## 2023-11-22 DIAGNOSIS — E55.9 VITAMIN D DEFICIENCY: ICD-10-CM

## 2023-11-22 DIAGNOSIS — J43.9 PULMONARY EMPHYSEMA, UNSPECIFIED EMPHYSEMA TYPE (MULTI): ICD-10-CM

## 2023-11-22 DIAGNOSIS — F41.9 ANXIETY: ICD-10-CM

## 2023-11-22 DIAGNOSIS — K21.9 GASTROESOPHAGEAL REFLUX DISEASE WITHOUT ESOPHAGITIS: ICD-10-CM

## 2023-11-22 DIAGNOSIS — E78.2 MIXED HYPERLIPIDEMIA: Primary | ICD-10-CM

## 2023-11-22 DIAGNOSIS — D50.0 ANEMIA DUE TO BLOOD LOSS: ICD-10-CM

## 2023-11-22 DIAGNOSIS — R73.9 HYPERGLYCEMIA: ICD-10-CM

## 2023-11-22 DIAGNOSIS — K59.09 CHRONIC CONSTIPATION: ICD-10-CM

## 2023-11-22 DIAGNOSIS — M81.0 OSTEOPOROSIS, UNSPECIFIED OSTEOPOROSIS TYPE, UNSPECIFIED PATHOLOGICAL FRACTURE PRESENCE: ICD-10-CM

## 2023-11-22 DIAGNOSIS — F17.210 CIGARETTE NICOTINE DEPENDENCE WITHOUT COMPLICATION: ICD-10-CM

## 2023-11-22 DIAGNOSIS — M51.16 DISPLACEMENT OF LUMBAR DISC WITH RADICULOPATHY: ICD-10-CM

## 2023-11-22 DIAGNOSIS — J44.1 COPD EXACERBATION (MULTI): ICD-10-CM

## 2023-11-22 DIAGNOSIS — E53.8 VITAMIN B12 DEFICIENCY: ICD-10-CM

## 2023-11-22 DIAGNOSIS — M54.2 CHRONIC NECK PAIN: ICD-10-CM

## 2023-11-22 PROCEDURE — 4004F PT TOBACCO SCREEN RCVD TLK: CPT | Performed by: FAMILY MEDICINE

## 2023-11-22 PROCEDURE — 90686 IIV4 VACC NO PRSV 0.5 ML IM: CPT | Performed by: FAMILY MEDICINE

## 2023-11-22 PROCEDURE — G0008 ADMIN INFLUENZA VIRUS VAC: HCPCS | Performed by: FAMILY MEDICINE

## 2023-11-22 PROCEDURE — 99214 OFFICE O/P EST MOD 30 MIN: CPT | Performed by: FAMILY MEDICINE

## 2023-11-22 RX ORDER — LEVOFLOXACIN 500 MG/1
500 TABLET, FILM COATED ORAL DAILY
Qty: 7 TABLET | Refills: 0 | Status: SHIPPED | OUTPATIENT
Start: 2023-11-22 | End: 2023-11-29

## 2023-11-22 ASSESSMENT — ENCOUNTER SYMPTOMS
WHEEZING: 1
CHEST TIGHTNESS: 1
SHORTNESS OF BREATH: 1
COUGH: 1

## 2023-11-22 NOTE — PATIENT INSTRUCTIONS
Diagnoses and all orders for this visit:  Mixed hyperlipidemia  -     Hemoglobin A1C; Future  -     Vitamin B12; Future  -     Vitamin D 25-Hydroxy,Total (for eval of Vitamin D levels); Future  -     CBC and Auto Differential; Future  -     Comprehensive Metabolic Panel; Future  -     Lipid Panel; Future  -     TSH with reflex to Free T4 if abnormal; Future  Osteoporosis, unspecified osteoporosis type, unspecified pathological fracture presence  -     XR DEXA bone density; Future  Vitamin B12 deficiency  -     Hemoglobin A1C; Future  -     Vitamin B12; Future  -     Vitamin D 25-Hydroxy,Total (for eval of Vitamin D levels); Future  -     CBC and Auto Differential; Future  -     Comprehensive Metabolic Panel; Future  -     Lipid Panel; Future  -     TSH with reflex to Free T4 if abnormal; Future  Vitamin D deficiency  -     Hemoglobin A1C; Future  -     Vitamin B12; Future  -     Vitamin D 25-Hydroxy,Total (for eval of Vitamin D levels); Future  -     CBC and Auto Differential; Future  -     Comprehensive Metabolic Panel; Future  -     Lipid Panel; Future  -     TSH with reflex to Free T4 if abnormal; Future  Chronic constipation  Diarrhea, unspecified type  Gastroesophageal reflux disease without esophagitis  -     Hemoglobin A1C; Future  -     Vitamin B12; Future  -     Vitamin D 25-Hydroxy,Total (for eval of Vitamin D levels); Future  -     CBC and Auto Differential; Future  -     Comprehensive Metabolic Panel; Future  -     Lipid Panel; Future  -     TSH with reflex to Free T4 if abnormal; Future  Anemia due to blood loss  -     Hemoglobin A1C; Future  -     Vitamin B12; Future  -     Vitamin D 25-Hydroxy,Total (for eval of Vitamin D levels); Future  -     CBC and Auto Differential; Future  -     Comprehensive Metabolic Panel; Future  -     Lipid Panel; Future  -     TSH with reflex to Free T4 if abnormal; Future  Anxiety  Chronic low back pain, unspecified back pain laterality, unspecified whether sciatica  present  Chronic neck pain  Neurogenic claudication due to lumbar spinal stenosis  Degeneration of intervertebral disc of lumbar region  Displacement of lumbar disc with radiculopathy  Pulmonary emphysema, unspecified emphysema type (CMS/HCC)  -     Hemoglobin A1C; Future  -     Vitamin B12; Future  -     Vitamin D 25-Hydroxy,Total (for eval of Vitamin D levels); Future  -     CBC and Auto Differential; Future  -     Comprehensive Metabolic Panel; Future  -     Lipid Panel; Future  -     TSH with reflex to Free T4 if abnormal; Future  Cigarette nicotine dependence without complication  Flu vaccine need  -     Flu vaccine (IIV4) age 6 months and greater, preservative free  COPD exacerbation (CMS/[facility])  -     levoFLOXacin (Levaquin) 500 mg tablet; Take 1 tablet (500 mg) by mouth once daily for 7 days.  Hyperglycemia  -     Hemoglobin A1C; Future

## 2023-11-22 NOTE — TELEPHONE ENCOUNTER
MD Ladi Royal MA  Caller: Unspecified (1 week ago)  Looked like she has appointment tomorrow- good, she needs to be seen and I would like to listen to her lungs.

## 2023-11-22 NOTE — LETTER
November 22, 2023       Marielena Finn had nothing to do with you being discharged from our practice     Sincerely,         Mariaelena Dyer M.D.  [facility]  [address] [place] [address] [address]  [place] [address]  [phone]  f: [phone]

## 2023-11-22 NOTE — PROGRESS NOTES
Subjective   Patient ID: Kelli Ortega is a 57 y.o. female.    HPI  Cough congestion three weeks ago, cough congestion low temperature which is a fever for her, got over the counter tussin,nebulizer   Review of Systems   Respiratory:  Positive for cough, chest tightness, shortness of breath and wheezing.    Cardiovascular:  Positive for chest pain.   All other systems reviewed and are negative.      Objective   Physical Exam  Vitals reviewed.   Constitutional:       Appearance: Normal appearance.   HENT:      Head: Normocephalic and atraumatic.      Right Ear: Tympanic membrane normal.      Left Ear: Tympanic membrane normal.      Nose: Nose normal.      Mouth/Throat:      Mouth: Mucous membranes are moist.      Pharynx: Oropharynx is clear.   Eyes:      Extraocular Movements: Extraocular movements intact.      Conjunctiva/sclera: Conjunctivae normal.      Pupils: Pupils are equal, round, and reactive to light.   Cardiovascular:      Rate and Rhythm: Normal rate and regular rhythm.      Pulses: Normal pulses.      Heart sounds: Normal heart sounds.   Pulmonary:      Effort: Pulmonary effort is normal.      Breath sounds: Normal breath sounds.   Abdominal:      General: Abdomen is flat. Bowel sounds are normal.      Palpations: Abdomen is soft.   Musculoskeletal:         General: Normal range of motion.      Cervical back: Normal range of motion and neck supple.   Skin:     General: Skin is warm and dry.      Capillary Refill: Capillary refill takes less than 2 seconds.   Neurological:      General: No focal deficit present.      Mental Status: She is alert and oriented to person, place, and time.   Psychiatric:         Mood and Affect: Mood normal.         Behavior: Behavior normal.         Assessment/Plan   Diagnoses and all orders for this visit:  Mixed hyperlipidemia  -     Hemoglobin A1C; Future  -     Vitamin B12; Future  -     Vitamin D 25-Hydroxy,Total (for eval of Vitamin D levels); Future  -     CBC and  Auto Differential; Future  -     Comprehensive Metabolic Panel; Future  -     Lipid Panel; Future  -     TSH with reflex to Free T4 if abnormal; Future  Osteoporosis, unspecified osteoporosis type, unspecified pathological fracture presence  -     XR DEXA bone density; Future  Vitamin B12 deficiency  -     Hemoglobin A1C; Future  -     Vitamin B12; Future  -     Vitamin D 25-Hydroxy,Total (for eval of Vitamin D levels); Future  -     CBC and Auto Differential; Future  -     Comprehensive Metabolic Panel; Future  -     Lipid Panel; Future  -     TSH with reflex to Free T4 if abnormal; Future  Vitamin D deficiency  -     Hemoglobin A1C; Future  -     Vitamin B12; Future  -     Vitamin D 25-Hydroxy,Total (for eval of Vitamin D levels); Future  -     CBC and Auto Differential; Future  -     Comprehensive Metabolic Panel; Future  -     Lipid Panel; Future  -     TSH with reflex to Free T4 if abnormal; Future  Chronic constipation  Diarrhea, unspecified type  Gastroesophageal reflux disease without esophagitis  -     Hemoglobin A1C; Future  -     Vitamin B12; Future  -     Vitamin D 25-Hydroxy,Total (for eval of Vitamin D levels); Future  -     CBC and Auto Differential; Future  -     Comprehensive Metabolic Panel; Future  -     Lipid Panel; Future  -     TSH with reflex to Free T4 if abnormal; Future  Anemia due to blood loss  -     Hemoglobin A1C; Future  -     Vitamin B12; Future  -     Vitamin D 25-Hydroxy,Total (for eval of Vitamin D levels); Future  -     CBC and Auto Differential; Future  -     Comprehensive Metabolic Panel; Future  -     Lipid Panel; Future  -     TSH with reflex to Free T4 if abnormal; Future  Anxiety  Chronic low back pain, unspecified back pain laterality, unspecified whether sciatica present  Chronic neck pain  Neurogenic claudication due to lumbar spinal stenosis  Degeneration of intervertebral disc of lumbar region  Displacement of lumbar disc with radiculopathy  Pulmonary emphysema,  unspecified emphysema type (CMS/HCC)  -     Hemoglobin A1C; Future  -     Vitamin B12; Future  -     Vitamin D 25-Hydroxy,Total (for eval of Vitamin D levels); Future  -     CBC and Auto Differential; Future  -     Comprehensive Metabolic Panel; Future  -     Lipid Panel; Future  -     TSH with reflex to Free T4 if abnormal; Future  Cigarette nicotine dependence without complication  Flu vaccine need  -     Flu vaccine (IIV4) age 6 months and greater, preservative free  COPD exacerbation (CMS/[facility])  -     levoFLOXacin (Levaquin) 500 mg tablet; Take 1 tablet (500 mg) by mouth once daily for 7 days.  Hyperglycemia  -     Hemoglobin A1C; Future  Recheck six months  HAPPY HOLIDAYS.

## 2023-11-27 ENCOUNTER — TELEPHONE (OUTPATIENT)
Dept: PHARMACY | Facility: HOSPITAL | Age: 58
End: 2023-11-27
Payer: COMMERCIAL

## 2023-11-27 NOTE — TELEPHONE ENCOUNTER
Population Health: Outreach by Ambulatory Pharmacy Team    Payor: [employer] [place]  Reason: Adherence  Medication: pravastatin (Pravachol) 20 mg tablet   Outcome: Left Voicemail    Ivania Knott, PharmD   Clinical Pharmacist, [facility]

## 2023-11-29 ENCOUNTER — HOSPITAL ENCOUNTER (OUTPATIENT)
Dept: RADIOLOGY | Facility: HOSPITAL | Age: 58
Discharge: HOME | End: 2023-11-29
Payer: COMMERCIAL

## 2023-11-29 ENCOUNTER — APPOINTMENT (OUTPATIENT)
Dept: LAB | Facility: LAB | Age: 58
End: 2023-11-29
Payer: COMMERCIAL

## 2023-11-29 DIAGNOSIS — F17.210 NICOTINE DEPENDENCE, CIGARETTES, UNCOMPLICATED: ICD-10-CM

## 2023-11-29 PROCEDURE — 71271 CT THORAX LUNG CANCER SCR C-: CPT

## 2023-11-29 PROCEDURE — 71271 CT THORAX LUNG CANCER SCR C-: CPT | Performed by: RADIOLOGY

## 2023-12-04 ENCOUNTER — TELEPHONE (OUTPATIENT)
Dept: GASTROENTEROLOGY | Facility: CLINIC | Age: 58
End: 2023-12-04
Payer: COMMERCIAL

## 2023-12-04 NOTE — TELEPHONE ENCOUNTER
Patient notified and stated she did take metamucil (not miralax) this morning after speaking with the office and it did seem to help. She will call back in a few days with an update.

## 2023-12-04 NOTE — TELEPHONE ENCOUNTER
NIKKI PT - out until 12/11/23    Patient called and stated that she is having bowel movements with coughing and without even knowing it. She was last seen 11/7 and advised to do miralax and metamucil. She is taking the bentyl but she stopped these due to the incontinence (did not think it was helping) but stated that she will try restarting the metamucil to see if it helps but did request any other recommendations. Please advise.

## 2023-12-06 ENCOUNTER — PREP FOR PROCEDURE (OUTPATIENT)
Dept: PULMONOLOGY | Facility: HOSPITAL | Age: 58
End: 2023-12-06
Payer: COMMERCIAL

## 2023-12-06 DIAGNOSIS — F17.210 CIGARETTE SMOKER: Primary | ICD-10-CM

## 2023-12-06 DIAGNOSIS — J43.9 PULMONARY EMPHYSEMA, UNSPECIFIED EMPHYSEMA TYPE (MULTI): ICD-10-CM

## 2023-12-08 ENCOUNTER — TELEPHONE (OUTPATIENT)
Dept: PULMONOLOGY | Facility: HOSPITAL | Age: 58
End: 2023-12-08
Payer: COMMERCIAL

## 2023-12-08 RX ORDER — PREDNISONE 10 MG/1
TABLET ORAL
Qty: 40 TABLET | Refills: 0 | Status: SHIPPED | OUTPATIENT
Start: 2023-12-08 | End: 2023-12-24

## 2023-12-08 NOTE — TELEPHONE ENCOUNTER
----- Message from AYAZ Fatima sent at 12/8/2023  9:46 AM EST -----  I sent her a prednisone taper, I do not think she needs further antibiotics at this time.   ----- Message -----  From: Ilda Pruitt RN  Sent: 12/8/2023   8:49 AM EST  To: AYAZ Fatima    Patient was just at her PCP 1 week ago and she was coughing and had a fever and they gave her levaquin. Patient is still coughing up off white mucous. Patient admits to still having shortness of breath. Patient also takes mucinex 1x per week because it causes her Diarrhea.   ----- Message -----  From: AYAZ Fatima  Sent: 12/6/2023   4:05 PM EST  To: Ilda Pruitt RN    Please call the patient and let her know that I got her CT chest and it shows a new area that looks like it could be mucous impaction but I want to see if she has any infectious s/s cough? SOB? If so I will send her an antibiotic. If not no antibiotics needed. We have to repeat a CT chest for her in the end of February for follow up. I ordered this please give her info to schedule she does not see me back until April. I would recommend Mucinex for her but it is listed as one of her allergies if you want to ask what her reaction is. I recommended it due to it looking like mucous plugging and this will help thin secretions.

## 2023-12-13 PROCEDURE — RXMED WILLOW AMBULATORY MEDICATION CHARGE

## 2023-12-15 ENCOUNTER — SPECIALTY PHARMACY (OUTPATIENT)
Dept: PHARMACY | Facility: CLINIC | Age: 58
End: 2023-12-15

## 2023-12-16 ENCOUNTER — SPECIALTY PHARMACY (OUTPATIENT)
Dept: PHARMACY | Facility: CLINIC | Age: 58
End: 2023-12-16

## 2023-12-18 ENCOUNTER — PHARMACY VISIT (OUTPATIENT)
Dept: PHARMACY | Facility: CLINIC | Age: 58
End: 2023-12-18
Payer: COMMERCIAL

## 2023-12-20 ENCOUNTER — TELEPHONE (OUTPATIENT)
Dept: GASTROENTEROLOGY | Facility: CLINIC | Age: 58
End: 2023-12-20
Payer: COMMERCIAL

## 2023-12-29 ENCOUNTER — SPECIALTY PHARMACY (OUTPATIENT)
Dept: PHARMACY | Facility: CLINIC | Age: 58
End: 2023-12-29

## 2024-01-04 ENCOUNTER — TELEPHONE (OUTPATIENT)
Dept: PRIMARY CARE | Facility: CLINIC | Age: 59
End: 2024-01-04
Payer: MEDICARE

## 2024-01-04 ENCOUNTER — APPOINTMENT (OUTPATIENT)
Dept: RADIOLOGY | Facility: CLINIC | Age: 59
End: 2024-01-04
Payer: COMMERCIAL

## 2024-01-04 DIAGNOSIS — J42 CHRONIC BRONCHITIS, UNSPECIFIED CHRONIC BRONCHITIS TYPE (MULTI): Primary | ICD-10-CM

## 2024-01-04 DIAGNOSIS — J43.9 PULMONARY EMPHYSEMA, UNSPECIFIED EMPHYSEMA TYPE (MULTI): Primary | ICD-10-CM

## 2024-01-04 RX ORDER — FLUTICASONE FUROATE, UMECLIDINIUM BROMIDE AND VILANTEROL TRIFENATATE 200; 62.5; 25 UG/1; UG/1; UG/1
POWDER RESPIRATORY (INHALATION)
Qty: 1 EACH | Refills: 5 | Status: SHIPPED | OUTPATIENT
Start: 2024-01-04 | End: 2024-05-29

## 2024-01-04 RX ORDER — AZITHROMYCIN 250 MG/1
TABLET, FILM COATED ORAL
Qty: 6 TABLET | Refills: 0 | Status: SHIPPED | OUTPATIENT
Start: 2024-01-04 | End: 2024-01-09

## 2024-01-04 NOTE — TELEPHONE ENCOUNTER
Chief Complaint : Pneumonia     Symptoms: Mucus brown, blood when she blows her nose, trouble sleeping, lung pain, vomiting     Duration: 3 days     Treatments:     Patient Requesting: Antibiotics     Did the Patient have the covid Vaccine:    Pharmacy: [facility]     Covid Tested:

## 2024-01-08 ENCOUNTER — APPOINTMENT (OUTPATIENT)
Dept: GASTROENTEROLOGY | Facility: CLINIC | Age: 59
End: 2024-01-08
Payer: MEDICARE

## 2024-01-10 DIAGNOSIS — J43.9 PULMONARY EMPHYSEMA, UNSPECIFIED EMPHYSEMA TYPE (MULTI): ICD-10-CM

## 2024-01-10 RX ORDER — ALBUTEROL SULFATE 90 UG/1
2 AEROSOL, METERED RESPIRATORY (INHALATION) EVERY 4 HOURS PRN
Qty: 18 G | Refills: 11 | Status: SHIPPED | OUTPATIENT
Start: 2024-01-10

## 2024-01-12 PROCEDURE — RXMED WILLOW AMBULATORY MEDICATION CHARGE

## 2024-01-13 ENCOUNTER — PHARMACY VISIT (OUTPATIENT)
Dept: PHARMACY | Facility: CLINIC | Age: 59
End: 2024-01-13
Payer: MEDICARE

## 2024-01-16 ENCOUNTER — DOCUMENTATION (OUTPATIENT)
Dept: NEUROLOGY | Facility: HOSPITAL | Age: 59
End: 2024-01-16
Payer: MEDICARE

## 2024-01-16 ENCOUNTER — TELEPHONE (OUTPATIENT)
Dept: PRIMARY CARE | Facility: CLINIC | Age: 59
End: 2024-01-16
Payer: MEDICARE

## 2024-01-16 ENCOUNTER — SPECIALTY PHARMACY (OUTPATIENT)
Dept: PHARMACY | Facility: CLINIC | Age: 59
End: 2024-01-16

## 2024-01-17 ENCOUNTER — TELEPHONE (OUTPATIENT)
Dept: NEUROLOGY | Facility: HOSPITAL | Age: 59
End: 2024-01-17
Payer: MEDICARE

## 2024-01-17 NOTE — TELEPHONE ENCOUNTER
Pt calling to report that she received a phone call ([phone], Adithya/Javid?) with the representative stating that any further Sumatriptan needs a PA.    Provider (REY Clark) can alternatively order Rizatriptan with no PA being necessary.    Pt has next appt 1/24 with Amber, will discuss then.

## 2024-01-18 ENCOUNTER — TELEPHONE (OUTPATIENT)
Dept: PRIMARY CARE | Facility: CLINIC | Age: 59
End: 2024-01-18
Payer: MEDICARE

## 2024-01-18 DIAGNOSIS — K58.2 IRRITABLE BOWEL SYNDROME WITH BOTH CONSTIPATION AND DIARRHEA: ICD-10-CM

## 2024-01-18 RX ORDER — ONDANSETRON 8 MG/1
TABLET, ORALLY DISINTEGRATING ORAL
Qty: 60 TABLET | Refills: 0 | Status: SHIPPED | OUTPATIENT
Start: 2024-01-18 | End: 2024-02-02

## 2024-01-18 NOTE — TELEPHONE ENCOUNTER
Med Refill   ondansetron ODT (Zofran-ODT) 8 mg disintegrating tablet [[id number]]     [facility] - [place], [place] - [address]  [address] [facility] [address]  Phone: [phone]  Fax: [phone]

## 2024-01-20 ENCOUNTER — SPECIALTY PHARMACY (OUTPATIENT)
Dept: PHARMACY | Facility: CLINIC | Age: 59
End: 2024-01-20

## 2024-01-23 RX ORDER — GALCANEZUMAB 120 MG/ML
INJECTION, SOLUTION SUBCUTANEOUS
Qty: 1 ML | Refills: 11 | OUTPATIENT
Start: 2024-01-23 | End: 2025-01-21

## 2024-01-24 ENCOUNTER — OFFICE VISIT (OUTPATIENT)
Dept: NEUROLOGY | Facility: HOSPITAL | Age: 59
End: 2024-01-24
Payer: MEDICARE

## 2024-01-24 ENCOUNTER — LAB (OUTPATIENT)
Dept: LAB | Facility: LAB | Age: 59
End: 2024-01-24
Payer: MEDICARE

## 2024-01-24 ENCOUNTER — APPOINTMENT (OUTPATIENT)
Dept: NEUROLOGY | Facility: HOSPITAL | Age: 59
End: 2024-01-24
Payer: MEDICARE

## 2024-01-24 VITALS
RESPIRATION RATE: 16 BRPM | DIASTOLIC BLOOD PRESSURE: 77 MMHG | BODY MASS INDEX: 26.52 KG/M2 | WEIGHT: 149.7 LBS | HEART RATE: 114 BPM | SYSTOLIC BLOOD PRESSURE: 111 MMHG | HEIGHT: 63 IN

## 2024-01-24 DIAGNOSIS — R41.3 TRANSIENT AMNESIA: ICD-10-CM

## 2024-01-24 DIAGNOSIS — R63.5 ABNORMAL WEIGHT GAIN: Primary | ICD-10-CM

## 2024-01-24 DIAGNOSIS — M54.16 LUMBAR RADICULOPATHY: ICD-10-CM

## 2024-01-24 DIAGNOSIS — R63.5 ABNORMAL WEIGHT GAIN: ICD-10-CM

## 2024-01-24 DIAGNOSIS — G43.E09 CHRONIC MIGRAINE WITH AURA WITHOUT STATUS MIGRAINOSUS, NOT INTRACTABLE: Primary | ICD-10-CM

## 2024-01-24 PROBLEM — F31.9 BIPOLAR DISORDER (MULTI): Status: ACTIVE | Noted: 2024-01-24

## 2024-01-24 PROBLEM — Z87.898 HISTORY OF HALLUCINATIONS: Status: ACTIVE | Noted: 2024-01-24

## 2024-01-24 LAB
CORTIS SERPL-MCNC: 9.3 UG/DL (ref 2.5–20)
CRP SERPL-MCNC: 0.83 MG/DL
ERYTHROCYTE [SEDIMENTATION RATE] IN BLOOD BY WESTERGREN METHOD: 42 MM/H (ref 0–30)
IGG SERPL-MCNC: 615 MG/DL (ref 700–1600)
RHEUMATOID FACT SER NEPH-ACNC: 10 IU/ML (ref 0–15)
TSH SERPL-ACNC: 1.02 MIU/L (ref 0.44–3.98)

## 2024-01-24 PROCEDURE — 86317 IMMUNOASSAY INFECTIOUS AGENT: CPT

## 2024-01-24 PROCEDURE — 86140 C-REACTIVE PROTEIN: CPT

## 2024-01-24 PROCEDURE — 85652 RBC SED RATE AUTOMATED: CPT

## 2024-01-24 PROCEDURE — 82784 ASSAY IGA/IGD/IGG/IGM EACH: CPT

## 2024-01-24 PROCEDURE — 84443 ASSAY THYROID STIM HORMONE: CPT

## 2024-01-24 PROCEDURE — 86431 RHEUMATOID FACTOR QUANT: CPT

## 2024-01-24 PROCEDURE — 82533 TOTAL CORTISOL: CPT

## 2024-01-24 PROCEDURE — 99215 OFFICE O/P EST HI 40 MIN: CPT | Performed by: NURSE PRACTITIONER

## 2024-01-24 PROCEDURE — 36415 COLL VENOUS BLD VENIPUNCTURE: CPT

## 2024-01-24 RX ORDER — FREMANEZUMAB-VFRM 225 MG/1.5ML
225 INJECTION SUBCUTANEOUS
Qty: 1.5 ML | Refills: 11 | Status: SHIPPED | OUTPATIENT
Start: 2024-01-24 | End: 2024-05-17 | Stop reason: ALTCHOICE

## 2024-01-24 RX ORDER — RIZATRIPTAN BENZOATE 10 MG/1
10 TABLET ORAL ONCE AS NEEDED
Qty: 9 TABLET | Refills: 0 | Status: SHIPPED | OUTPATIENT
Start: 2024-01-24 | End: 2024-02-29

## 2024-01-24 ASSESSMENT — ENCOUNTER SYMPTOMS
LOSS OF SENSATION IN FEET: 1
DEPRESSION: 1
OCCASIONAL FEELINGS OF UNSTEADINESS: 1

## 2024-01-24 ASSESSMENT — PATIENT HEALTH QUESTIONNAIRE - PHQ9
2. FEELING DOWN, DEPRESSED OR HOPELESS: SEVERAL DAYS
10. IF YOU CHECKED OFF ANY PROBLEMS, HOW DIFFICULT HAVE THESE PROBLEMS MADE IT FOR YOU TO DO YOUR WORK, TAKE CARE OF THINGS AT HOME, OR GET ALONG WITH OTHER PEOPLE: SOMEWHAT DIFFICULT
1. LITTLE INTEREST OR PLEASURE IN DOING THINGS: SEVERAL DAYS
SUM OF ALL RESPONSES TO PHQ9 QUESTIONS 1 AND 2: 2

## 2024-01-24 ASSESSMENT — PAIN SCALES - GENERAL: PAINLEVEL: 0-NO PAIN

## 2024-01-24 NOTE — ASSESSMENT & PLAN NOTE
Pt has paperwork from insurance stating Emgality was only temporarily authorized, no further information. Also that sumatriptan is no longer on formulary but rizatriptan is approved without PA.   Rx sent to change to rizatriptan as needed.   Rx sent to try Ajovy - previously failed aimovig. Discussed dosing and possible side effects.  To let me know if she has any issues.

## 2024-01-24 NOTE — PROGRESS NOTES
Patient ID: Kelli Ortega is a 58 y.o. female.    Transforaminal    Date/Time: 1/24/2024 7:54 AM    Performed by: Ricki Davis MD  Authorized by: Ricki Davis MD    Consent:     Consent obtained:  Written    Consent given by:  Patient    Risks, benefits, and alternatives were discussed: yes      Risks discussed:  Infection, pain, bleeding and nerve damage  Universal protocol:     Procedure explained and questions answered to patient or proxy's satisfaction: yes      Relevant documents present and verified: yes      Test results available: yes      Imaging studies available: yes      Required blood products, implants, devices, and special equipment available: yes      Site/side marked: yes      Immediately prior to procedure, a time out was called: yes      Patient identity confirmed:  Verbally with patient and arm band  Indications:     Indications:  BACK PAIN  Pre-procedure details:     Skin preparation:  Chlorhexidine    Preparation: Patient was prepped and draped in the usual sterile fashion    Sedation:     Sedation type:  None  Anesthesia:     Anesthesia method:  Local infiltration    Local anesthetic: LIDOCAINE 0.5%  Procedure specific details:      History reviewed patient interviewed and examined.  Risks and benefits of procedures discussed and patient agreed to proceed and consent was signed.  With the patient in the prone position the lower back was prepped and draped in sterile fashion.  Under AP guidance L4 and L5 vertebral bodies were identified.  In a left lateral oblique view the L4 and L5 foramen were identified and used as a trajectory view.  A 22-gauge 5 spinal needle was introduced into L4 and L5 foramen.  Position was identified in AP and lateral.  IV contrast showed adequate foraminal spread without any vascular or intrathecal uptake.  Lidocaine 0.5% mixed with dexamethasone 10 mg a total of 1.5 cc was injected in each foramen.  Patient tolerated the procedure without any problems  and was transferred to recovery room in stable condition.     Post-procedure details:     Procedure completion:  Tolerated     independent

## 2024-01-24 NOTE — ASSESSMENT & PLAN NOTE
No active current hallucinations.   Pt thought content and mood/affect are normal   Not distressed by these hallucinations.   Discussed when to seek emergency care.
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
## 2024-01-25 ENCOUNTER — HOSPITAL ENCOUNTER (OUTPATIENT)
Dept: RADIOLOGY | Facility: HOSPITAL | Age: 59
Discharge: HOME | End: 2024-01-25
Payer: MEDICARE

## 2024-01-25 ENCOUNTER — OFFICE VISIT (OUTPATIENT)
Dept: PAIN MEDICINE | Facility: CLINIC | Age: 59
End: 2024-01-25
Payer: MEDICARE

## 2024-01-25 ENCOUNTER — SPECIALTY PHARMACY (OUTPATIENT)
Dept: PHARMACY | Facility: CLINIC | Age: 59
End: 2024-01-25

## 2024-01-25 VITALS — SYSTOLIC BLOOD PRESSURE: 116 MMHG | HEART RATE: 91 BPM | RESPIRATION RATE: 16 BRPM | DIASTOLIC BLOOD PRESSURE: 86 MMHG

## 2024-01-25 DIAGNOSIS — M54.16 LUMBAR RADICULOPATHY: ICD-10-CM

## 2024-01-25 DIAGNOSIS — M54.16 LUMBAR RADICULOPATHY: Primary | ICD-10-CM

## 2024-01-25 PROCEDURE — 64483 NJX AA&/STRD TFRM EPI L/S 1: CPT | Performed by: ANESTHESIOLOGY

## 2024-01-25 PROCEDURE — RXMED WILLOW AMBULATORY MEDICATION CHARGE

## 2024-01-25 PROCEDURE — 2500000004 HC RX 250 GENERAL PHARMACY W/ HCPCS (ALT 636 FOR OP/ED)

## 2024-01-25 PROCEDURE — 77003 FLUOROGUIDE FOR SPINE INJECT: CPT

## 2024-01-25 PROCEDURE — 64484 NJX AA&/STRD TFRM EPI L/S EA: CPT | Performed by: ANESTHESIOLOGY

## 2024-01-25 PROCEDURE — 2500000005 HC RX 250 GENERAL PHARMACY W/O HCPCS

## 2024-01-25 PROCEDURE — 2550000001 HC RX 255 CONTRASTS: Performed by: ANESTHESIOLOGY

## 2024-01-25 RX ADMIN — IOHEXOL 5 ML: 240 INJECTION, SOLUTION INTRATHECAL; INTRAVASCULAR; INTRAVENOUS; ORAL at 10:38

## 2024-01-25 ASSESSMENT — ENCOUNTER SYMPTOMS
LOSS OF SENSATION IN FEET: 0
OCCASIONAL FEELINGS OF UNSTEADINESS: 0
DEPRESSION: 0

## 2024-01-25 ASSESSMENT — PAIN - FUNCTIONAL ASSESSMENT
PAIN_FUNCTIONAL_ASSESSMENT: 0-10
PAIN_FUNCTIONAL_ASSESSMENT: 0-10

## 2024-01-25 ASSESSMENT — PAIN DESCRIPTION - DESCRIPTORS: DESCRIPTORS: RADIATING

## 2024-01-25 ASSESSMENT — PAIN SCALES - GENERAL: PAINLEVEL_OUTOF10: 6

## 2024-01-25 NOTE — PROGRESS NOTES
History Of Present Illness  Kelli Ortega is a 58 y.o. female presents for procedure state below. Endorses no changes in past medical history or medical health since last seen in clinic.     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, unspecified joint (CMS/HCC), Bacterial vaginitis (05/22/2023), Bipolar disorder, unspecified (CMS/HCC), Candida esophagitis (CMS/HCC) (05/22/2023), Change in bowel habits (05/22/2023), Change in bowel habits (05/22/2023), Contact with and (suspected) exposure to covid-19 (12/01/2021), Cough (05/22/2023), Enterocolitis due to Clostridium difficile, not specified as recurrent, Folliculitis (05/22/2023), Full incontinence of feces (05/22/2023), Functional dyspepsia (05/22/2023), Other conditions influencing health status, Other conditions influencing health status (09/15/2021), Panic disorder (episodic paroxysmal anxiety), Personal history of other diseases of the digestive system, Personal history of other diseases of the musculoskeletal system and connective tissue, Personal history of other diseases of the musculoskeletal system and connective tissue, Personal history of other diseases of the musculoskeletal system and connective tissue, Personal history of other diseases of the musculoskeletal system and connective tissue, Personal history of other diseases of the nervous system and sense organs, Personal history of other diseases of the respiratory system, Personal history of other diseases of the respiratory system, Personal history of other infectious and parasitic diseases, Personal history of other infectious and parasitic diseases (10/03/2017), Personal history of other mental  and behavioral disorders, Personal history of other specified conditions (08/22/2019), Personal history of pneumonia (recurrent), Respiratory illness (05/22/2023), and Vaginal discharge (05/22/2023).    Surgical History  She has a past surgical history that includes Appendectomy (08/24/2016); Hysterectomy (08/24/2016); Esophagogastroduodenoscopy (10/30/2017); Colon surgery (10/03/2017); and Bladder surgery (10/03/2017).     Social History  She reports that she has been smoking cigarettes. She has a 21.00 pack-year smoking history. She has never used smokeless tobacco. She reports that she does not currently use alcohol. She reports that she does not use drugs.    Family History  Family History   Problem Relation Name Age of Onset    Diabetes Mother      Hypertension Mother      Breast cancer Sister      Asthma Son      Cancer Other uncle         Allergies  Amitriptyline, Bupropion, Bupropion hcl, Cefazolin, Diclofenac, Diphenhydramine, Divalproex, Divalproex sodium, Doxycycline, Guaifenesin, Hydrocodone, Hydrocodone-acetaminophen, Hydroxyzine, Ibuprofen, Ketorolac, Lepirudin, Loperamide, Meloxicam, Milnacipran, Mirtazapine, Morphine, Nabumetone, Ondansetron hcl, Paroxetine, Tramadol, and Tramadol-acetaminophen    Review of Symptoms:   Constitutional: Negative for chills, diaphoresis or fever  HENT: Negative for neck swelling  Eyes:.  Negative for eye pain  Respiratory:.  Negative for cough, shortness of breath or wheezing    Cardiovascular:.  Negative for chest pain or palpitations  Gastrointestinal:.  Negative for abdominal pain, nausea and vomiting  Genitourinary:.  Negative for urgency  Musculoskeletal:  Positive for back pain. Positive for joint pain. Denies falls within the past 3 months.  Skin: Negative for wounds or itching   Neurological: Negative for dizziness, seizures, loss of consciousness and weakness  Endo/Heme/Allergies: Does not bruise/bleed easily  Psychiatric/Behavioral: Negative for depression.  The patient does not appear anxious.       PHYSICAL EXAM  Vitals signs reviewed  Constitutional:       General: Not in acute distress     Appearance: Normal appearance. Not ill-appearing.  HENT:     Head: Normocephalic and atraumatic  Eyes:     Conjunctiva/sclera: Conjunctivae normal  Cardiovascular:     Rate and Rhythm: Normal rate and regular rhythm  Pulmonary:     Effort: No respiratory distress  Abdominal:     Palpations: Abdomen is soft  Musculoskeletal: DIAZ  Skin:     General: Skin is warm and dry  Neurological:     General: No focal deficit present  Psychiatric:         Mood and Affect: Mood normal         Behavior: Behavior normal     Last Recorded Vitals  There were no vitals taken for this visit.    Relevant Results  Current Outpatient Medications   Medication Instructions    acyclovir (Zovirax) 200 mg capsule oral, Daily    Ajovy Autoinjector 225 mg, subcutaneous, Every 28 days    albuterol 90 mcg/actuation inhaler 2 puffs, inhalation, Every 4 hours PRN    b complex-vitamin c (multivitamin, stress formula) tablet 1 tablet, oral, Daily    busPIRone (Buspar) 15 mg tablet TAKE 2 TABLETS 3 TIMES DAILY.    cholecalciferol (Vitamin D-3) 1,250 mcg (50,000 unit) capsule TAKE ONE (1) CAPSULE BY MOUTH EVERY OTHER WEEK.    cyclobenzaprine (FLEXERIL) 10 mg, oral, 3 times daily PRN    diazePAM (Valium) 5 mg tablet TAKE AS DIRECTED. TAKE 1 TABLET ORALLY TWICE DAILY AND 1 TABLET ORALLY DAILY IF NEEDED FOR ANXIETY.    dicyclomine (Bentyl) 20 mg tablet TAKE ONE TO TWO TABLETS BY MOUTH THREE (3) TIMES PER DAY OR AS NEEDED FOR PAIN. **(BENTYL 20 MG TAB EQUIV)**    Emgality Pen 120 mg/mL auto-injector subcutaneous    Gas Relief Extra Strength 125 mg, oral, 4 times daily PRN    hydrocortisone (hydrocortisone-aloe vera) 1 % cream APPLY SPARINGLY AND RUB IN WELL TO  AFFECTED AREA(S) AS DIRECTED.    hydrocortisone 1 % cream APPLY SPARINGLY AND RUB IN WELL TO  AFFECTED AREA(S) AS DIRECTED.    ipratropium-albuteroL (Duo-Neb)  0.5-2.5 mg/3 mL nebulizer solution USE 1 VIAL IN NEBULIZER 4 TIMES DAILY    lactobacillus acidophilus 500 million cell capsule 1 capsule, oral, Daily    lidocaine (Xylocaine) 5 % ointment Topical, As needed    loratadine (Claritin) 10 mg tablet TAKE ONE (1) TABLET BY MOUTH ONCE DAILY AT BEDTIME. **(CLARITIN 10 MG TAB EQUIV)**    mupirocin (Bactroban) 2 % ointment Topical, 3 times daily RT    nebulizer accessories kit 1 each, miscellaneous, Daily, Needs a replacement mask- hers is not working    NexIUM 40 mg, oral, Daily before breakfast, Do not open capsule.    nystatin (Mycostatin) cream Topical    ondansetron ODT (Zofran-ODT) 8 mg disintegrating tablet DISSOLVE 1 TABLET (8 MG) ON TONGUE AND SWALLOW EVERY 8 HOURS IF NEEDED FOR VOMITING. **(ZOFRAN ODT 8 MG TAB EQUIV)**    polyethylene glycol (Miralax) 17 gram/dose powder MIX 1 CAPFUL IN 8 OUNCES OF WATER AND DRINK AT BEDTIME AS NEEDED FOR CONSTIPATION.    pregabalin (LYRICA) 150 mg, oral, 3 times daily    psyllium husk, aspartame, (Metamucil Sugar-Free, aspart,) 3.4 gram/5.8 gram powder Dissolve 2 scoops into 8 ounces of water and drink daily.    rizatriptan (MAXALT) 10 mg, oral, Once as needed, May repeat in 2 hours if unresolved. Do not exceed 30 mg in 24 hours.    Trelegy Ellipta 200-62.5-25 mcg blister with device INHALE 1 PUFF BY MOUTH AND INTO THE LUNGS DAILY RINSE MOUTH AFTER EACH USE    umeclidinium-vilanteroL (Anoro Ellipta) 62.5-25 mcg/actuation blister with device inhalation    zolpidem (Ambien) 10 mg tablet TAKE 1/2 - 1 TABLET BY MOUTH AT BEDTIME AS NEEDED FOR SLEEP. DX: INSOMNIA RELATED TO BIPOLAR D/O: F51.09/F31.81.         MR lumbar spine wo IV contrast 11/28/2022    Narrative  MRN: [id number]  Patient Name: LEA IZQUIERDO    STUDY:  MRI L-SPINE WO;  11/28/2022 2:14 pm    INDICATION:  Left-sided low back pain that radiates into the posterior lateral hip  and knee over the last year.  Currently in physical therapy and not  much improvement.  Is tried  chiropractory therapy and again has felt  worse.  Does have allodynia to light touch  M51.16: Displacement of  lumbar disc with radiculopathy.    COMPARISON:  None.    ACCESSION NUMBER(S):  [id number]    ORDERING CLINICIAN:  GINETTE REED    TECHNIQUE:  Sagittal T1, T2, STIR, axial T1 and T2 weighted images of the lumbar  spine were acquired.    FINDINGS:  Alignment: There are 5 lumbar type vertebrae. The spine curves 10-15  degrees convex to the left centered at L3 with curvature to the right  at the lumbosacral junction as well..    Vertebrae/Intervertebral Discs: The vertebral bodies demonstrate  expected height.The left L5 and S1 vertebral bodies have bands of  degenerative marrow edema adjacent to the L5-S1 disc. The L1-2  through L5-S1 discs have degenerative desiccation with mild narrowing  of the disc on the left at L5-S1.    Conus: The lower thoracic cord appears unremarkable. The conus  terminates at L1-2.    T11-12: No stenosis is noted.    T12-L1: No stenosis is noted.    L1-2: The thecal sac is minimally indented by disc bulge.    L2-3: The lateral recesses are mildly stenosed by ligament thickening  and disc bulge.    L3-4: The lateral recesses and spinal canal are mildly stenosed by  disc bulge, ligament thickening and facet hypertrophy. The facet  joints are moderately to severely arthritic bilaterally.    L4-5: Spinal canal and lateral recesses are severely stenosed by  central disc extrusion, facet hypertrophy and ligament thickening.  The facet joints are severely arthritic bilaterally. The neural  foramina are mildly stenosed bilaterally as well.    L5-S1: The left lateral recess is mildly stenosed by disc bulge and  ligament thickening. The left neural foramen is mildly stenosed as  well.    The prevertebral and posterior paraspinous soft tissues are  unremarkable.    Impression  Multilevel degenerative changes are present with severe stenosis at  L4-5.     No image results found.       1. Lumbar  radiculopathy  Transforaminal           ASSESSMENT/PLAN  Kelli Ortega is a 58 y.o. female presents for left L4, L5 Transforaminal Epidural Steroid Injection     Our plan is as follows:  - Follow In pain clinic  - Continue to participate in physical therapy as well as physician directed home exercises  - Continue pain medications as prescribed       Donavan Rabago MD

## 2024-01-26 ENCOUNTER — PHARMACY VISIT (OUTPATIENT)
Dept: PHARMACY | Facility: CLINIC | Age: 59
End: 2024-01-26
Payer: MEDICARE

## 2024-01-27 LAB
C TETANI TOXOID IGG SERPL IA-ACNC: 1.6 IU/ML
S PN DA SERO 19F IGG SER-MCNC: 1.74 UG/ML
S PNEUM DA 1 IGG SER-MCNC: 0.46 UG/ML
S PNEUM DA 12F IGG SER-MCNC: 0.31 UG/ML
S PNEUM DA 14 IGG SER-MCNC: 1.15 UG/ML
S PNEUM DA 18C IGG SER-MCNC: 0.55 UG/ML
S PNEUM DA 23F IGG SER-MCNC: 1.16 UG/ML
S PNEUM DA 3 IGG SER-MCNC: 0.43 UG/ML
S PNEUM DA 4 IGG SER-MCNC: 0.31 UG/ML
S PNEUM DA 5 IGG SER-MCNC: 0.12 UG/ML
S PNEUM DA 6B IGG SER-MCNC: 0.35 UG/ML
S PNEUM DA 7F IGG SER-MCNC: 0.22 UG/ML
S PNEUM DA 8 IGG SER-MCNC: 0.13 UG/ML
S PNEUM DA 9N IGG SER-MCNC: 0.32 UG/ML
S PNEUM DA 9V IGG SER-MCNC: 2.78 UG/ML
S PNEUM SEROTYPE IGG SER-IMP: NORMAL

## 2024-01-29 DIAGNOSIS — M54.50 CHRONIC BILATERAL LOW BACK PAIN WITHOUT SCIATICA: ICD-10-CM

## 2024-01-29 DIAGNOSIS — G89.29 CHRONIC BILATERAL LOW BACK PAIN WITHOUT SCIATICA: ICD-10-CM

## 2024-01-30 ENCOUNTER — APPOINTMENT (OUTPATIENT)
Dept: GASTROENTEROLOGY | Facility: CLINIC | Age: 59
End: 2024-01-30
Payer: MEDICARE

## 2024-01-30 DIAGNOSIS — M79.7 FIBROMYALGIA, PRIMARY: ICD-10-CM

## 2024-01-30 RX ORDER — LIDOCAINE 50 MG/G
OINTMENT TOPICAL AS NEEDED
Qty: 35 G | Refills: 11 | Status: SHIPPED | OUTPATIENT
Start: 2024-01-30 | End: 2024-02-02 | Stop reason: SDUPTHER

## 2024-01-30 RX ORDER — CYCLOBENZAPRINE HCL 10 MG
10 TABLET ORAL 3 TIMES DAILY PRN
Qty: 90 TABLET | Refills: 2 | Status: SHIPPED | OUTPATIENT
Start: 2024-01-30 | End: 2024-04-29

## 2024-02-02 ENCOUNTER — TELEPHONE (OUTPATIENT)
Dept: NEUROLOGY | Facility: HOSPITAL | Age: 59
End: 2024-02-02
Payer: MEDICARE

## 2024-02-02 ENCOUNTER — TELEPHONE (OUTPATIENT)
Dept: PRIMARY CARE | Facility: CLINIC | Age: 59
End: 2024-02-02
Payer: MEDICARE

## 2024-02-02 NOTE — TELEPHONE ENCOUNTER
lidocaine (Xylocaine) 5 % ointment [[id number]]  DISCONTINUED     Prior auth required exceeded the number of grams allowed by insurance. Please advise     Med refill   ondansetron ODT (Zofran-ODT) 8 mg disintegrating tablet [[id number]]     [facility] - [place], [place] - [address] [place] [address] [address]  [address] [facility] [address]  Phone: [phone]  Fax: [phone]

## 2024-02-02 NOTE — TELEPHONE ENCOUNTER
Pt states that insurance company has informed her that PA will be needed for Ajovy (after the first dose she just injected)  AND Rizatriptan (for the specific number of pills you ordered).  Has taken only 1 of the Rizatriptans, says it works better than the Sumatriptan, so she is happy to stay on this.    However, pt states that if she can go back to [facility] she would prefer that.  Pt says only reason she was switched was to avoid a PA, and now that one is required, she wants to go back to [facility].    Thank you.

## 2024-02-05 ENCOUNTER — TELEPHONE (OUTPATIENT)
Dept: PRIMARY CARE | Facility: CLINIC | Age: 59
End: 2024-02-05
Payer: MEDICARE

## 2024-02-05 DIAGNOSIS — K21.9 GASTROESOPHAGEAL REFLUX DISEASE WITHOUT ESOPHAGITIS: Primary | ICD-10-CM

## 2024-02-05 NOTE — TELEPHONE ENCOUNTER
She called cause she needs a auth on her Nexium 40 mg takes 2-3 a day pharmacy is [facility].

## 2024-02-06 ENCOUNTER — APPOINTMENT (OUTPATIENT)
Dept: GASTROENTEROLOGY | Facility: CLINIC | Age: 59
End: 2024-02-06
Payer: MEDICARE

## 2024-02-06 RX ORDER — OMEPRAZOLE 40 MG/1
40 CAPSULE, DELAYED RELEASE ORAL
Qty: 30 CAPSULE | Refills: 2 | Status: SHIPPED | OUTPATIENT
Start: 2024-02-06 | End: 2024-02-29 | Stop reason: SDUPTHER

## 2024-02-09 NOTE — TELEPHONE ENCOUNTER
She call ed back and stated that she still had some Nexium and she will  the Omeprazole tomorrow on Saturday

## 2024-02-12 ENCOUNTER — APPOINTMENT (OUTPATIENT)
Dept: PAIN MEDICINE | Facility: CLINIC | Age: 59
End: 2024-02-12
Payer: MEDICARE

## 2024-02-13 ENCOUNTER — HOSPITAL ENCOUNTER (OUTPATIENT)
Dept: RADIOLOGY | Facility: CLINIC | Age: 59
Discharge: HOME | End: 2024-02-13
Payer: MEDICARE

## 2024-02-13 DIAGNOSIS — M81.0 OSTEOPOROSIS, UNSPECIFIED OSTEOPOROSIS TYPE, UNSPECIFIED PATHOLOGICAL FRACTURE PRESENCE: ICD-10-CM

## 2024-02-13 PROCEDURE — 77080 DXA BONE DENSITY AXIAL: CPT | Performed by: RADIOLOGY

## 2024-02-13 PROCEDURE — 77080 DXA BONE DENSITY AXIAL: CPT

## 2024-02-16 PROCEDURE — RXMED WILLOW AMBULATORY MEDICATION CHARGE

## 2024-02-18 NOTE — RESULT ENCOUNTER NOTE
Bone scan showed osteopenia. Recommendations are calcium plus vitamin D as well as weight bearing exercise to build your bones. Repeat every two years.

## 2024-02-19 ENCOUNTER — TELEPHONE (OUTPATIENT)
Dept: PRIMARY CARE | Facility: CLINIC | Age: 59
End: 2024-02-19
Payer: MEDICARE

## 2024-02-19 ENCOUNTER — PHARMACY VISIT (OUTPATIENT)
Dept: PHARMACY | Facility: CLINIC | Age: 59
End: 2024-02-19
Payer: MEDICARE

## 2024-02-20 ENCOUNTER — TELEPHONE (OUTPATIENT)
Dept: PRIMARY CARE | Facility: CLINIC | Age: 59
End: 2024-02-20
Payer: MEDICARE

## 2024-02-20 DIAGNOSIS — E55.9 VITAMIN D DEFICIENCY: ICD-10-CM

## 2024-02-20 NOTE — TELEPHONE ENCOUNTER
She called back cause Dr. Dyer took her off the vit D so is Dr. Dyer wants to go back on please send Vit D to her pharmacy [facility]

## 2024-02-20 NOTE — TELEPHONE ENCOUNTER
----- Message from Mariaelena Dyer MD sent at 2/18/2024  4:49 PM EST -----  Bone scan showed osteopenia. Recommendations are calcium plus vitamin D as well as weight bearing exercise to build your bones. Repeat every two years.

## 2024-02-21 ENCOUNTER — TELEPHONE (OUTPATIENT)
Dept: PAIN MEDICINE | Facility: CLINIC | Age: 59
End: 2024-02-21
Payer: MEDICARE

## 2024-02-21 NOTE — TELEPHONE ENCOUNTER
FYI patient called and asked for Dr. FAULKNER to review her bone density and blood work prior to appt. In March. She is under the care of appropriate physicians but wanted him to know what is going on.

## 2024-02-22 RX ORDER — ASPIRIN 325 MG
TABLET, DELAYED RELEASE (ENTERIC COATED) ORAL
Qty: 3 CAPSULE | Refills: 11 | Status: SHIPPED | OUTPATIENT
Start: 2024-02-22

## 2024-02-22 NOTE — TELEPHONE ENCOUNTER
Pt notified    It looks like immunoglobulins were low and they will give IVIG if you keep getting infections.

## 2024-02-24 ENCOUNTER — SPECIALTY PHARMACY (OUTPATIENT)
Dept: PHARMACY | Facility: CLINIC | Age: 59
End: 2024-02-24

## 2024-02-26 ENCOUNTER — TELEPHONE (OUTPATIENT)
Dept: PRIMARY CARE | Facility: CLINIC | Age: 59
End: 2024-02-26
Payer: MEDICARE

## 2024-02-26 NOTE — TELEPHONE ENCOUNTER
Patient called requesting is there anyway she could boost her immune system. So she can have some energy again, all she feels like doing is sleeping please advise

## 2024-02-27 NOTE — TELEPHONE ENCOUNTER
Patient notified to speak to Dr Valerio about IVIG as she did not remember the conversation with him on 2/23/24.   Patient stated will call verbalized understanding and voiced gratitude to

## 2024-02-28 DIAGNOSIS — K58.2 IRRITABLE BOWEL SYNDROME WITH BOTH CONSTIPATION AND DIARRHEA: ICD-10-CM

## 2024-02-28 RX ORDER — ONDANSETRON 8 MG/1
8 TABLET, ORALLY DISINTEGRATING ORAL EVERY 8 HOURS PRN
Qty: 60 TABLET | Refills: 11 | Status: SHIPPED | OUTPATIENT
Start: 2024-02-28 | End: 2024-02-29 | Stop reason: SDUPTHER

## 2024-02-29 ENCOUNTER — OFFICE VISIT (OUTPATIENT)
Dept: GASTROENTEROLOGY | Facility: CLINIC | Age: 59
End: 2024-02-29
Payer: MEDICARE

## 2024-02-29 ENCOUNTER — APPOINTMENT (OUTPATIENT)
Dept: RADIOLOGY | Facility: CLINIC | Age: 59
End: 2024-02-29
Payer: MEDICARE

## 2024-02-29 ENCOUNTER — TELEPHONE (OUTPATIENT)
Dept: NEUROLOGY | Facility: HOSPITAL | Age: 59
End: 2024-02-29

## 2024-02-29 VITALS
OXYGEN SATURATION: 96 % | DIASTOLIC BLOOD PRESSURE: 92 MMHG | HEIGHT: 63 IN | HEART RATE: 115 BPM | BODY MASS INDEX: 26.05 KG/M2 | SYSTOLIC BLOOD PRESSURE: 132 MMHG | WEIGHT: 147 LBS

## 2024-02-29 DIAGNOSIS — K58.2 IRRITABLE BOWEL SYNDROME WITH BOTH CONSTIPATION AND DIARRHEA: ICD-10-CM

## 2024-02-29 DIAGNOSIS — K21.9 GASTROESOPHAGEAL REFLUX DISEASE WITHOUT ESOPHAGITIS: ICD-10-CM

## 2024-02-29 DIAGNOSIS — R11.0 NAUSEA: ICD-10-CM

## 2024-02-29 DIAGNOSIS — G43.E09 CHRONIC MIGRAINE WITH AURA WITHOUT STATUS MIGRAINOSUS, NOT INTRACTABLE: ICD-10-CM

## 2024-02-29 DIAGNOSIS — R13.12 OROPHARYNGEAL DYSPHAGIA: Primary | ICD-10-CM

## 2024-02-29 PROCEDURE — 99215 OFFICE O/P EST HI 40 MIN: CPT | Performed by: PHYSICIAN ASSISTANT

## 2024-02-29 RX ORDER — LOPERAMIDE HCL 2 MG
2 TABLET ORAL 4 TIMES DAILY PRN
Qty: 30 TABLET | Refills: 3 | Status: SHIPPED | OUTPATIENT
Start: 2024-02-29 | End: 2024-03-30

## 2024-02-29 RX ORDER — OMEPRAZOLE 40 MG/1
40 CAPSULE, DELAYED RELEASE ORAL
Qty: 60 CAPSULE | Refills: 11 | Status: SHIPPED | OUTPATIENT
Start: 2024-02-29 | End: 2025-02-28

## 2024-02-29 RX ORDER — ONDANSETRON 8 MG/1
8 TABLET, ORALLY DISINTEGRATING ORAL EVERY 8 HOURS PRN
Qty: 60 TABLET | Refills: 3 | Status: SHIPPED | OUTPATIENT
Start: 2024-02-29

## 2024-02-29 RX ORDER — RIZATRIPTAN BENZOATE 10 MG/1
TABLET ORAL
Qty: 9 TABLET | Refills: 10 | Status: SHIPPED | OUTPATIENT
Start: 2024-02-29

## 2024-02-29 NOTE — PROGRESS NOTES
Subjective   Patient ID: Kelli Ortega is a 58 y.o. female presents for follow up of IBS.     Patient's PCP is Dr. Dyer    HPI  Patient presents for follow up. She has been seen numerous times for IBS, chronic N/V, oropharyngeal dysphagia and has had extensive GI evaluation, documented below. She returns today to discuss multiple symptoms. She states that she has had worsening upper abdominal pain after eating. This is associated with bloating and nausea. She has had intermittent vomiting. She denies coffee ground emesis. She continues to have a lot of coughing/choking on saliva and trouble swallowing. However, in addition to coughing with eating, she also feels that food is sticking in the epigastric region. She is still on omeprazole daily. Her IBS-D diarrhea is stable. She is having diarrhea every other day, using imodium PRN. No melena or hematochezia. She has been under a lot of stress due to medical issues, including hemoptysis (seeing pulmonology and has upcoming CT scan), abnormal blood work according to patient. I do not have any recent blood work for review. She states that her depression has been worse. She recently lost her dog. Denies weight loss, melena, hematochezia. Denies SI/HI.    Prior GI evaluation:      Esophagram 3/2023: Consistent with oropharygneal dysphagia, no esophageal dysphagia signs seen      CT scan 5/2023: Mild enterocolitis (distal ileum and right colon), bladder WT, stable liver lesion (likely hemangioma from prior testing and stable dating back to 2019)  Gastric emptying study normal in August 2022  EGD July 2022: Gastritis, biopsies showed chronic gastritis without dysplasia, malignancy, H. pylori infection  Colonoscopy February 2022: tortuous colon  EGD February 2022: 2 fundic gland polyps removed, chronic gastritis seen (confirmed on biopsy)  Colonoscopy December 2019: normal  EGD December 2019: small hiatal hernia and gastritis seen. Gastric biopsies showed reactive  gastropathy  EGD March 2018: chronic gastritis and esophageal candidiasis seen  EGD October 2017: Chronic gastritis and gastric AVM seen  EGD October 2013: retained food in stomach, gastric polyps and gastric AVM seen     ROS:  Constitutional: +fatigue  Skin: No reported icterus, lesions, or rash.  Eye: No reported itching, pain, vision changes.  Ear: No reported discharge, hearing loss, or pain.  Nose: No reported congestion, discharge, or epistaxis.  Mouth/throat: No reported dysphagia, hoarseness, or throat pain.  Resp: No reported cough, dyspnea, or wheezing.  Cardiovascular: No reported chest pain, lower extremity edema, or palpitations.   GI: +worsening epigastric pain, ongoing nausea/vomiting, stable diarrhea  : No reported dysuria, hematuria, or frequency.  Neuro: No reported confusion, memory loss, headaches, or dizziness.  Psych: +depression  Musculoskeletal: No reported arthralgia, joint swelling, or myalgias.  Heme/lymph: No reported easy bleeding or bruising, or swollen lymph nodes.  Endocrine: No reported cold/heat intolerance, polydipsia, or polyuria.    Medications:  Prior to Admission medications    Medication Sig Start Date End Date Taking? Authorizing Provider   acyclovir (Zovirax) 200 mg capsule Take by mouth once daily. 1/6/17  Yes Historical Provider, MD   albuterol 90 mcg/actuation inhaler Inhale 2 puffs every 4 hours if needed for wheezing or shortness of breath. 10/30/23  Yes REDD Fatima-CNP   b complex-vitamin c (multivitamin, stress formula) tablet Take 1 tablet by mouth once daily. 8/1/23 7/31/24 Yes Mariaelena Dyer MD   busPIRone (Buspar) 15 mg tablet TAKE 2 TABLETS 3 TIMES DAILY. 3/9/16  Yes Historical Provider, MD   cholecalciferol (Vitamin D-3) 1,250 mcg (50,000 unit) capsule TAKE ONE (1) CAPSULE BY MOUTH EVERY OTHER WEEK. 9/26/23  Yes Mariaelena Dyer MD   cyclobenzaprine (Flexeril) 10 mg tablet Take 1 tablet (10 mg) by mouth 3 times a day as needed for muscle  spasms. 10/31/23 1/29/24 Yes Mariaelena Dyer MD   diazePAM (Valium) 5 mg tablet TAKE AS DIRECTED. TAKE 1 TABLET ORALLY TWICE DAILY AND 1 TABLET ORALLY DAILY IF NEEDED FOR ANXIETY.   Yes Historical Provider, MD   doxepin (Zonalon) 5 % cream APPLY 2-3 GRAMS TOPICALLY TWICE DAILY TO THREE TIMES DAILY AS NEEDED FOR NEUROPATHIC PAIN RELIEF. 90 days supply. Letter attached regarding approval. 10/6/23  Yes Mariaelena Dyer MD   Emgality Pen 120 mg/mL auto-injector Inject under the skin. 10/27/20  Yes Historical Provider, MD   ergocalciferol (Vitamin D-2) 1.25 MG (11518 UT) capsule Take 1 capsule (50,000 Units) by mouth every 14 (fourteen) days. 10/31/23 10/30/24 Yes Mariaelena Dyer MD   galcanezumab (Emgality) 120 mg/mL auto-injector INJECT 120 MG (1 PEN) UNDER THE SKIN ONCE A MONTH AS DIRECTED. 1/24/23 1/23/24 Yes Keven Escobar MD   Gas Relief Extra Strength 125 mg capsule Take 1 capsule (125 mg) by mouth 4 times a day as needed for flatulence. 10/11/23  Yes Historical Provider, MD   hydrocortisone (hydrocortisone-aloe vera) 1 % cream APPLY SPARINGLY AND RUB IN WELL TO  AFFECTED AREA(S) AS DIRECTED. 2/9/23  Yes Historical Provider, MD   hydrocortisone 1 % cream APPLY SPARINGLY AND RUB IN WELL TO  AFFECTED AREA(S) AS DIRECTED. 2/9/23  Yes Historical Provider, MD   ipratropium-albuteroL (Duo-Neb) 0.5-2.5 mg/3 mL nebulizer solution USE 1 VIAL IN NEBULIZER 4 TIMES DAILY 9/18/23  Yes Mariaelena Dyer MD   lactobacillus acidophilus 500 million cell capsule Take 1 capsule by mouth once daily. 5/22/23 5/21/24 Yes Mariaelena Dyer MD   lidocaine (Xylocaine) 5 % ointment Apply topically if needed for mild pain (1 - 3). 10/31/23  Yes Mariaelena Dyer MD   loratadine (Claritin) 10 mg tablet TAKE ONE (1) TABLET BY MOUTH ONCE DAILY AT BEDTIME. **(CLARITIN 10 MG TAB EQUIV)** 6/2/23  Yes Mariaelena Dyer MD   mupirocin (Bactroban) 2 % ointment Apply topically 3 times a day. 9/7/16  Yes Historical Provider, MD    nebulizer accessories kit 1 each once daily. Needs a replacement mask- hers is not working 6/5/23  Yes Thelma Jackson APRN-CNP   NexIUM 40 mg DR capsule Take 1 capsule (40 mg) by mouth once daily in the morning. Take before meals. Do not open capsule. 9/14/23 9/13/24 Yes REDD Landrum-CNP   nystatin (Mycostatin) cream Apply topically. 10/27/23  Yes Historical Provider, MD   ondansetron ODT (Zofran-ODT) 8 mg disintegrating tablet DISSOLVE 1 TABLET (8 MG) ON TONGUE AND SWALLOW EVERY 8 HOURS IF NEEDED FOR VOMITING. **(ZOFRAN ODT 8 MG TAB EQUIV)** 10/31/23  Yes Mariaelena Dyer MD   pravastatin (Pravachol) 20 mg tablet TAKE 1 TABLET BY MOUTH DAILY AT BEDTIME. **(PRAVACHOL 20 MG TAB EQUIV)** Clarification of previous order. 6/13/23  Yes Mariaelena Dyer MD   pregabalin (Lyrica) 150 mg capsule Take 1 capsule (150 mg) by mouth 3 times a day. 1/19/16  Yes Historical Provider, MD   SUMAtriptan (Imitrex) 50 mg tablet take 1 tab for migraine relief at onset of migraine. Can take another 1 tab 2 hours later. Max 2 tabs in 24 hours 10/30/23  Yes Keven Escobar MD   Trelegy Ellipta 200-62.5-25 mcg blister with device INHALE ONE (1) PUFF BY MOUTH INTO THE LUNGS ONCE DAILY. 12/28/21  Yes Historical Provider, MD   umeclidinium-vilanteroL (Anoro Ellipta) 62.5-25 mcg/actuation blister with device Inhale. 3/9/16  Yes Historical Provider, MD   zolpidem (Ambien) 10 mg tablet TAKE 1/2 - 1 TABLET BY MOUTH AT BEDTIME AS NEEDED FOR SLEEP. DX: INSOMNIA RELATED TO BIPOLAR D/O: F51.09/F31.81.   Yes Historical Provider, MD   dicyclomine (Bentyl) 20 mg tablet TAKE ONE TO TWO TABLETS BY MOUTH THREE (3) TIMES PER DAY OR AS NEEDED FOR PAIN. **(BENTYL 20 MG TAB EQUIV)** 5/22/23 11/7/23 Yes Mariaelena Dyer MD   polyethylene glycol (Miralax) 17 gram/dose powder MIX 1 CAPFUL IN 8 OUNCES OF WATER AND DRINK AT BEDTIME AS NEEDED FOR CONSTIPATION. 10/20/20 11/7/23 Yes Historical Provider, MD   psyllium husk, aspartame, (Metamucil  Sugar-Free, aspart,) 3.4 gram/5.8 gram powder Take by mouth. 10/24/22 11/7/23 Yes Historical Provider, MD   dicyclomine (Bentyl) 20 mg tablet TAKE ONE TO TWO TABLETS BY MOUTH THREE (3) TIMES PER DAY OR AS NEEDED FOR PAIN. **(BENTYL 20 MG TAB EQUIV)** 11/7/23   Sheba Arciniega PA-C   polyethylene glycol (Miralax) 17 gram/dose powder MIX 1 CAPFUL IN 8 OUNCES OF WATER AND DRINK AT BEDTIME AS NEEDED FOR CONSTIPATION. 11/7/23   Sheba Arciniega PA-C   psyllium husk, aspartame, (Metamucil Sugar-Free, aspart,) 3.4 gram/5.8 gram powder Dissolve 2 scoops into 8 ounces of water and drink daily. 11/7/23   Sheba Arciniega PA-C   cyanocobalamin (Vitamin B-12) 1,000 mcg tablet TAKE ONE (1) TABLET BY MOUTH ONCE DAILY AS DIRECTED. 6/29/23 11/7/23  Mariaelena Dyer MD   cyanocobalamin (Vitamin B-12) 100 mcg tablet Take 0.5 tablets (50 mcg) by mouth once daily.  11/7/23  Historical Provider, MD   estradiol (Estrace) 0.1 MG/GM vaginal cream Insert into the vagina. 9/24/20 11/7/23  Historical Provider, MD   hyoscyamine 0.125 mg disintegrating tablet  3/3/20 11/7/23  Historical Provider, MD   L. acidophilus/Bifid. animalis 32 billion cell capsule Take by mouth. 12/11/20 11/7/23  Historical Provider, MD   nicotine (Nicoderm CQ) 14 mg/24 hr patch Place 1 patch on the skin once daily. 10/12/22 11/7/23  Historical Provider, MD   pantoprazole (ProtoNix) 40 mg EC tablet Take 1 tablet (40 mg) by mouth once daily.  11/7/23  Historical Provider, MD   promethazine (Phenergan) 25 mg tablet TAKE ONE (1) TABLET BY MOUTH EVERY SIX (6) HOURS IF NEEDED FOR NAUSEA. **(PHENERGAN 25 MG TAB EQUIV)** 12/29/16 11/7/23  Historical Provider, MD   promethazine-DM (Phenergan-DM) 6.25-15 mg/5 mL syrup  1/28/20 11/7/23  Historical Provider, MD   topiramate (Topamax) 25 mg tablet Take 1 tablet (25 mg) by mouth twice a day.  11/7/23  Historical Provider, MD       Allergies:  Amitriptyline, Bupropion, Bupropion hcl, Cefazolin, Diclofenac, Diphenhydramine,  Divalproex, Divalproex sodium, Doxycycline, Guaifenesin, Hydrocodone, Hydrocodone-acetaminophen, Hydroxyzine, Ibuprofen, Ketorolac, Lepirudin, Meloxicam, Milnacipran, Mirtazapine, Morphine, Nabumetone, Ondansetron hcl, Paroxetine, Tramadol, and Tramadol-acetaminophen    Past Medical History:  She has a past medical history of Abdominal pain (05/22/2023), Abdominal pain, acute, left lower quadrant (05/22/2023), Acute bronchitis (05/22/2023), Acute viral syndrome (05/22/2023), Allergy status to unspecified drugs, medicaments and biological substances, Anxiety disorder, unspecified, 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, unspecified joint (CMS/[facility]), Bacterial vaginitis (05/22/2023), Bipolar disorder, unspecified (CMS/[facility]), Candida esophagitis (CMS/[facility]) (05/22/2023), Change in bowel habits (05/22/2023), Change in bowel habits (05/22/2023), Contact with and (suspected) exposure to covid-19 (12/01/2021), Cough (05/22/2023), Enterocolitis due to Clostridium difficile, not specified as recurrent, Folliculitis (05/22/2023), Full incontinence of feces (05/22/2023), Functional dyspepsia (05/22/2023), Other conditions influencing health status, Other conditions influencing health status (09/15/2021), Panic disorder (episodic paroxysmal anxiety), Personal history of other diseases of the digestive system, Personal history of other diseases of the musculoskeletal system and connective tissue, Personal history of other diseases of the musculoskeletal system and connective tissue, Personal history of other diseases of the musculoskeletal system and connective tissue, Personal history of other diseases of the musculoskeletal system and connective tissue, Personal history of other diseases of the nervous system and sense organs, Personal history of other diseases of the respiratory system, Personal history of other diseases of the respiratory system, Personal history of other infectious and parasitic diseases, Personal history of other  infectious and parasitic diseases (10/03/2017), Personal history of other mental and behavioral disorders, Personal history of other specified conditions (08/22/2019), Personal history of pneumonia (recurrent), Respiratory illness (05/22/2023), and Vaginal discharge (05/22/2023).    Past Surgical History:  She has a past surgical history that includes Appendectomy (08/24/2016); Hysterectomy (08/24/2016); Esophagogastroduodenoscopy (10/30/2017); Colon surgery (10/03/2017); Bladder surgery (10/03/2017); and Small intestine surgery (2012 Oct).    Social History:  She reports that she has been smoking cigarettes. She has a 21.00 pack-year smoking history. She has never used smokeless tobacco. She reports that she does not currently use alcohol. She reports that she does not currently use drugs.    Objective   Physical Exam  Vitals reviewed.   Constitutional:       General: She is not in acute distress.     Appearance: Normal appearance.   Cardiovascular:      Rate and Rhythm: Normal rate and regular rhythm.   Pulmonary:      Effort: Pulmonary effort is normal.      Breath sounds: Normal breath sounds.   Abdominal:      General: Bowel sounds are normal. There is no distension.      Palpations: Abdomen is soft.      Tenderness: There is abdominal tenderness (TTP in epigastric region). There is no guarding or rebound.   Neurological:      General: No focal deficit present.      Mental Status: She is alert and oriented to person, place, and time.   Psychiatric:         Behavior: Behavior normal.      Comments: Depressed mood         Assessment/Plan   Problem List Items Addressed This Visit             ICD-10-CM    GERD (gastroesophageal reflux disease) K21.9     Refills of omeprazole sent. Patient with worse epigastric pain/bloating, N/V. Suspect functional dyspepsia. Discussed in great detail with patient.         Relevant Medications    omeprazole (PriLOSEC) 40 mg DR capsule    Other Relevant Orders    EGD    Dysphagia -  Primary R13.10     Patient with documented oropharyngeal dysphagia. Still with coughing and choking with liquids. Referral to speech therapy made. In addition to the oropharyngeal dysphagia, patient now feels like food is sticking in the distal esophagus, which is a new symptom for her. She is on PPI. Will order EGD to evaluate for stricture or stenosis. Could consider esophageal manometry in the future.         Relevant Orders    EGD    Referral to Speech Therapy    Nausea R11.0     Zofran refills sent. EKG ordered to rule out QTC prolongation.          Relevant Orders    ECG 12 lead (Ancillary Performed)    EGD    IBS (irritable bowel syndrome) (Chronic) K58.9     Overall stable without new symptoms. Recommended imodium more frequently. Currently having diarrhea every other day. Prescription for imodium sent.         Relevant Medications    ondansetron ODT (Zofran-ODT) 8 mg disintegrating tablet    loperamide (Imodium A-D) 2 mg tablet          Sheba Arciniega PA-C

## 2024-02-29 NOTE — ASSESSMENT & PLAN NOTE
Overall stable without new symptoms. Recommended imodium more frequently. Currently having diarrhea every other day. Prescription for imodium sent.

## 2024-02-29 NOTE — PATIENT INSTRUCTIONS
Thank you for coming in for your appointment.    -Refills of omeprazole sent  -I have referred you to speech therapy for your trouble swallowing  -I have refilled zofran. Because of using this chronically, I have ordered an EKG to make sure heart measurements are OK  -Use imodium as needed every other day. Prescription sent  -Better management of your depression will likely help your digestive symptoms  -Get EGD done. I will get clearance form Jennifer Lu.

## 2024-02-29 NOTE — TELEPHONE ENCOUNTER
Pt requesting refill of Rizatriptan.    I called back and LVMM that she has 10 refills at pharmacy.

## 2024-02-29 NOTE — ASSESSMENT & PLAN NOTE
Refills of omeprazole sent. Patient with worse epigastric pain/bloating, N/V. Suspect functional dyspepsia. Discussed in great detail with patient.

## 2024-02-29 NOTE — ASSESSMENT & PLAN NOTE
Patient with documented oropharyngeal dysphagia. Still with coughing and choking with liquids. Referral to speech therapy made. In addition to the oropharyngeal dysphagia, patient now feels like food is sticking in the distal esophagus, which is a new symptom for her. She is on PPI. Will order EGD to evaluate for stricture or stenosis. Could consider esophageal manometry in the future.

## 2024-03-01 ENCOUNTER — TELEPHONE (OUTPATIENT)
Dept: NEUROLOGY | Facility: HOSPITAL | Age: 59
End: 2024-03-01
Payer: MEDICARE

## 2024-03-01 NOTE — TELEPHONE ENCOUNTER
Pt called and spoke with .  She was returning my call (message I left yesterday confirming she has 10 refills of Rizatriptan).    I in turn called her and left VMM repeating this info.

## 2024-03-07 ENCOUNTER — HOSPITAL ENCOUNTER (INPATIENT)
Facility: HOSPITAL | Age: 59
LOS: 5 days | Discharge: HOME | DRG: 389 | End: 2024-03-13
Attending: STUDENT IN AN ORGANIZED HEALTH CARE EDUCATION/TRAINING PROGRAM | Admitting: SURGERY
Payer: MEDICARE

## 2024-03-07 ENCOUNTER — APPOINTMENT (OUTPATIENT)
Dept: RADIOLOGY | Facility: HOSPITAL | Age: 59
DRG: 389 | End: 2024-03-07
Payer: MEDICARE

## 2024-03-07 DIAGNOSIS — K56.609 SBO (SMALL BOWEL OBSTRUCTION) (MULTI): ICD-10-CM

## 2024-03-07 DIAGNOSIS — R10.84 GENERALIZED ABDOMINAL PAIN: Primary | ICD-10-CM

## 2024-03-07 LAB
ALBUMIN SERPL BCP-MCNC: 4.5 G/DL (ref 3.4–5)
ALP SERPL-CCNC: 38 U/L (ref 33–136)
ALT SERPL W P-5'-P-CCNC: 10 U/L (ref 7–45)
ANION GAP SERPL CALC-SCNC: 16 MMOL/L (ref 10–20)
APPEARANCE UR: ABNORMAL
AST SERPL W P-5'-P-CCNC: 15 U/L (ref 9–39)
BASOPHILS # BLD AUTO: 0.05 X10*3/UL (ref 0–0.1)
BASOPHILS NFR BLD AUTO: 0.2 %
BILIRUB SERPL-MCNC: 0.3 MG/DL (ref 0–1.2)
BILIRUB UR STRIP.AUTO-MCNC: NEGATIVE MG/DL
BUN SERPL-MCNC: 11 MG/DL (ref 6–23)
CALCIUM SERPL-MCNC: 10 MG/DL (ref 8.6–10.3)
CHLORIDE SERPL-SCNC: 106 MMOL/L (ref 98–107)
CO2 SERPL-SCNC: 23 MMOL/L (ref 21–32)
COLOR UR: YELLOW
CREAT SERPL-MCNC: 1.05 MG/DL (ref 0.5–1.05)
EGFRCR SERPLBLD CKD-EPI 2021: 61 ML/MIN/1.73M*2
EOSINOPHIL # BLD AUTO: 0.02 X10*3/UL (ref 0–0.7)
EOSINOPHIL NFR BLD AUTO: 0.1 %
ERYTHROCYTE [DISTWIDTH] IN BLOOD BY AUTOMATED COUNT: 20.7 % (ref 11.5–14.5)
GLUCOSE SERPL-MCNC: 123 MG/DL (ref 74–99)
GLUCOSE UR STRIP.AUTO-MCNC: NEGATIVE MG/DL
HCG UR QL IA.RAPID: NEGATIVE
HCT VFR BLD AUTO: 42.3 % (ref 36–46)
HGB BLD-MCNC: 14.5 G/DL (ref 12–16)
HOLD SPECIMEN: 293
IMM GRANULOCYTES # BLD AUTO: 0.12 X10*3/UL (ref 0–0.7)
IMM GRANULOCYTES NFR BLD AUTO: 0.5 % (ref 0–0.9)
KETONES UR STRIP.AUTO-MCNC: NEGATIVE MG/DL
LACTATE SERPL-SCNC: 0.5 MMOL/L (ref 0.4–2)
LACTATE SERPL-SCNC: 1.8 MMOL/L (ref 0.4–2)
LEUKOCYTE ESTERASE UR QL STRIP.AUTO: NEGATIVE
LIPASE SERPL-CCNC: 9 U/L (ref 9–82)
LYMPHOCYTES # BLD AUTO: 2.7 X10*3/UL (ref 1.2–4.8)
LYMPHOCYTES NFR BLD AUTO: 10.9 %
MCH RBC QN AUTO: 28.5 PG (ref 26–34)
MCHC RBC AUTO-ENTMCNC: 34.3 G/DL (ref 32–36)
MCV RBC AUTO: 83 FL (ref 80–100)
MONOCYTES # BLD AUTO: 1.31 X10*3/UL (ref 0.1–1)
MONOCYTES NFR BLD AUTO: 5.3 %
NEUTROPHILS # BLD AUTO: 20.61 X10*3/UL (ref 1.2–7.7)
NEUTROPHILS NFR BLD AUTO: 83 %
NITRITE UR QL STRIP.AUTO: NEGATIVE
NRBC BLD-RTO: 0 /100 WBCS (ref 0–0)
OVALOCYTES BLD QL SMEAR: NORMAL
PH UR STRIP.AUTO: 7 [PH]
PLATELET # BLD AUTO: 355 X10*3/UL (ref 150–450)
POLYCHROMASIA BLD QL SMEAR: NORMAL
POTASSIUM SERPL-SCNC: 3.8 MMOL/L (ref 3.5–5.3)
PROT SERPL-MCNC: 7.8 G/DL (ref 6.4–8.2)
PROT UR STRIP.AUTO-MCNC: NEGATIVE MG/DL
RBC # BLD AUTO: 5.08 X10*6/UL (ref 4–5.2)
RBC # UR STRIP.AUTO: NEGATIVE /UL
RBC MORPH BLD: NORMAL
SODIUM SERPL-SCNC: 141 MMOL/L (ref 136–145)
SP GR UR STRIP.AUTO: 1.01
UROBILINOGEN UR STRIP.AUTO-MCNC: <2 MG/DL
WBC # BLD AUTO: 24.8 X10*3/UL (ref 4.4–11.3)

## 2024-03-07 PROCEDURE — 2500000004 HC RX 250 GENERAL PHARMACY W/ HCPCS (ALT 636 FOR OP/ED): Performed by: STUDENT IN AN ORGANIZED HEALTH CARE EDUCATION/TRAINING PROGRAM

## 2024-03-07 PROCEDURE — 83690 ASSAY OF LIPASE: CPT | Performed by: NURSE PRACTITIONER

## 2024-03-07 PROCEDURE — 81003 URINALYSIS AUTO W/O SCOPE: CPT | Performed by: NURSE PRACTITIONER

## 2024-03-07 PROCEDURE — 74177 CT ABD & PELVIS W/CONTRAST: CPT

## 2024-03-07 PROCEDURE — 85025 COMPLETE CBC W/AUTO DIFF WBC: CPT | Performed by: NURSE PRACTITIONER

## 2024-03-07 PROCEDURE — 96372 THER/PROPH/DIAG INJ SC/IM: CPT

## 2024-03-07 PROCEDURE — 36415 COLL VENOUS BLD VENIPUNCTURE: CPT

## 2024-03-07 PROCEDURE — 0D9670Z DRAINAGE OF STOMACH WITH DRAINAGE DEVICE, VIA NATURAL OR ARTIFICIAL OPENING: ICD-10-PCS

## 2024-03-07 PROCEDURE — 80053 COMPREHEN METABOLIC PANEL: CPT | Performed by: NURSE PRACTITIONER

## 2024-03-07 PROCEDURE — 96375 TX/PRO/DX INJ NEW DRUG ADDON: CPT

## 2024-03-07 PROCEDURE — 2500000004 HC RX 250 GENERAL PHARMACY W/ HCPCS (ALT 636 FOR OP/ED): Performed by: NURSE PRACTITIONER

## 2024-03-07 PROCEDURE — 71045 X-RAY EXAM CHEST 1 VIEW: CPT | Performed by: STUDENT IN AN ORGANIZED HEALTH CARE EDUCATION/TRAINING PROGRAM

## 2024-03-07 PROCEDURE — 71045 X-RAY EXAM CHEST 1 VIEW: CPT

## 2024-03-07 PROCEDURE — 81025 URINE PREGNANCY TEST: CPT | Performed by: NURSE PRACTITIONER

## 2024-03-07 PROCEDURE — 83605 ASSAY OF LACTIC ACID: CPT | Performed by: NURSE PRACTITIONER

## 2024-03-07 PROCEDURE — 85025 COMPLETE CBC W/AUTO DIFF WBC: CPT

## 2024-03-07 PROCEDURE — 96365 THER/PROPH/DIAG IV INF INIT: CPT | Mod: 59

## 2024-03-07 PROCEDURE — 99285 EMERGENCY DEPT VISIT HI MDM: CPT | Mod: 25

## 2024-03-07 PROCEDURE — 2500000004 HC RX 250 GENERAL PHARMACY W/ HCPCS (ALT 636 FOR OP/ED)

## 2024-03-07 PROCEDURE — 96361 HYDRATE IV INFUSION ADD-ON: CPT

## 2024-03-07 PROCEDURE — 83605 ASSAY OF LACTIC ACID: CPT

## 2024-03-07 PROCEDURE — 74177 CT ABD & PELVIS W/CONTRAST: CPT | Performed by: RADIOLOGY

## 2024-03-07 PROCEDURE — 36415 COLL VENOUS BLD VENIPUNCTURE: CPT | Performed by: NURSE PRACTITIONER

## 2024-03-07 PROCEDURE — 2550000001 HC RX 255 CONTRASTS: Performed by: NURSE PRACTITIONER

## 2024-03-07 RX ORDER — SODIUM CHLORIDE, SODIUM LACTATE, POTASSIUM CHLORIDE, CALCIUM CHLORIDE 600; 310; 30; 20 MG/100ML; MG/100ML; MG/100ML; MG/100ML
150 INJECTION, SOLUTION INTRAVENOUS CONTINUOUS
Status: DISCONTINUED | OUTPATIENT
Start: 2024-03-07 | End: 2024-03-11

## 2024-03-07 RX ORDER — PROCHLORPERAZINE EDISYLATE 5 MG/ML
10 INJECTION INTRAMUSCULAR; INTRAVENOUS ONCE
Status: COMPLETED | OUTPATIENT
Start: 2024-03-07 | End: 2024-03-07

## 2024-03-07 RX ORDER — METOCLOPRAMIDE HYDROCHLORIDE 5 MG/ML
10 INJECTION INTRAMUSCULAR; INTRAVENOUS ONCE
Status: COMPLETED | OUTPATIENT
Start: 2024-03-07 | End: 2024-03-07

## 2024-03-07 RX ORDER — FENTANYL CITRATE 50 UG/ML
100 INJECTION, SOLUTION INTRAMUSCULAR; INTRAVENOUS ONCE
Status: COMPLETED | OUTPATIENT
Start: 2024-03-07 | End: 2024-03-07

## 2024-03-07 RX ADMIN — PROCHLORPERAZINE EDISYLATE 10 MG: 5 INJECTION INTRAMUSCULAR; INTRAVENOUS at 19:53

## 2024-03-07 RX ADMIN — IOHEXOL 75 ML: 350 INJECTION, SOLUTION INTRAVENOUS at 13:35

## 2024-03-07 RX ADMIN — SODIUM CHLORIDE, POTASSIUM CHLORIDE, SODIUM LACTATE AND CALCIUM CHLORIDE 125 ML/HR: 600; 310; 30; 20 INJECTION, SOLUTION INTRAVENOUS at 23:06

## 2024-03-07 RX ADMIN — HYDROMORPHONE HYDROCHLORIDE 0.5 MG: 0.5 INJECTION, SOLUTION INTRAMUSCULAR; INTRAVENOUS; SUBCUTANEOUS at 17:13

## 2024-03-07 RX ADMIN — SODIUM CHLORIDE 1000 ML: 9 INJECTION, SOLUTION INTRAVENOUS at 12:18

## 2024-03-07 RX ADMIN — METOCLOPRAMIDE 10 MG: 5 INJECTION, SOLUTION INTRAMUSCULAR; INTRAVENOUS at 12:19

## 2024-03-07 RX ADMIN — PIPERACILLIN SODIUM AND TAZOBACTAM SODIUM 4.5 G: 4; .5 INJECTION, SOLUTION INTRAVENOUS at 15:05

## 2024-03-07 RX ADMIN — FENTANYL CITRATE 100 MCG: 50 INJECTION INTRAMUSCULAR; INTRAVENOUS at 12:19

## 2024-03-07 ASSESSMENT — PAIN DESCRIPTION - ORIENTATION: ORIENTATION: MID;UPPER

## 2024-03-07 ASSESSMENT — PAIN DESCRIPTION - DESCRIPTORS: DESCRIPTORS: SHARP

## 2024-03-07 ASSESSMENT — PAIN DESCRIPTION - PROGRESSION: CLINICAL_PROGRESSION: GRADUALLY WORSENING

## 2024-03-07 ASSESSMENT — PAIN - FUNCTIONAL ASSESSMENT: PAIN_FUNCTIONAL_ASSESSMENT: 0-10

## 2024-03-07 ASSESSMENT — LIFESTYLE VARIABLES
EVER HAD A DRINK FIRST THING IN THE MORNING TO STEADY YOUR NERVES TO GET RID OF A HANGOVER: NO
HAVE PEOPLE ANNOYED YOU BY CRITICIZING YOUR DRINKING: NO
HAVE YOU EVER FELT YOU SHOULD CUT DOWN ON YOUR DRINKING: NO
EVER FELT BAD OR GUILTY ABOUT YOUR DRINKING: NO

## 2024-03-07 ASSESSMENT — PAIN DESCRIPTION - FREQUENCY: FREQUENCY: CONSTANT/CONTINUOUS

## 2024-03-07 ASSESSMENT — PAIN DESCRIPTION - ONSET: ONSET: ONGOING

## 2024-03-07 ASSESSMENT — PAIN SCALES - GENERAL: PAINLEVEL_OUTOF10: 9

## 2024-03-07 ASSESSMENT — PAIN DESCRIPTION - LOCATION: LOCATION: ABDOMEN

## 2024-03-07 NOTE — ED PROVIDER NOTES
HPI   Chief Complaint   Patient presents with    Abdominal Pain    Nausea    Vomiting       This is a 58-year-old  female, with a past medical history of GERD, dysphagia, IBS, osteopenia, chronic back pain, bipolar disorder, anxiety, and small bowel obstruction with resection, that is presenting to the emergency room with complaints of abdominal pain.  The patient was riding in the car from her doctor's office when she felt a pop in her epigastric region.  The pain has gotten progressively worse.  She developed nausea and vomiting later in the evening.  Patient has not had any alteration in her urine or bowel function.  Denies any fever or cold-like symptoms.  She denies any trauma or exertion.  Patient states that does not feel like her GERD.  The patient feels dehydrated.  Patient has not experienced any chest pain, shortness of breath, dizziness, palpitations, paresthesias, focal weakness, headache, or syncope.  Patient reported that her abdomen was extremely distended yesterday.  The patient was able to have a bowel movement this morning.  She reports she has had decreased gas production.      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, unspecified joint (CMS/[facility])     Pseudomonas arthritis    Bacterial vaginitis 05/22/2023    Bipolar disorder, unspecified (CMS/[facility])     Bipolar disease, chronic    Candida esophagitis (CMS/[facility]) 05/22/2023    Change in bowel habits 05/22/2023    Change in bowel habits 05/22/2023    Contact with and  (suspected) exposure to covid-19 12/01/2021    Close exposure to COVID-19 virus    Cough 05/22/2023    Enterocolitis due to Clostridium difficile, not specified as recurrent     Clostridium difficile colitis    Folliculitis 05/22/2023    Full incontinence of feces 05/22/2023    Functional dyspepsia 05/22/2023    Other conditions influencing health status     Epicondylitis    Other conditions influencing health status 09/15/2021    History of cough    Panic disorder (episodic paroxysmal anxiety)     Panic attacks    Personal history of other diseases of the digestive system     History of irritable bowel syndrome    Personal history of other diseases of the musculoskeletal system and connective tissue     History of fibromyalgia    Personal history of other diseases of the musculoskeletal system and connective tissue     History of osteopenia    Personal history of other diseases of the musculoskeletal system and connective tissue     History of degenerative disc disease    Personal history of other diseases of the musculoskeletal system and connective tissue     History of osteoporosis    Personal history of other diseases of the nervous system and sense organs     History of migraine    Personal history of other diseases of the respiratory system     History of pulmonary emphysema    Personal history of other diseases of the respiratory system     History of chronic obstructive lung disease    Personal history of other infectious and parasitic diseases     History of herpes genitalis    Personal history of other infectious and parasitic diseases 10/03/2017    History of Clostridioides difficile colitis    Personal history of other mental and behavioral disorders     History of depression    Personal history of other specified conditions 08/22/2019    History of abdominal pain    Personal history of pneumonia (recurrent)     History of pneumonia    Respiratory illness 05/22/2023    Vaginal discharge 05/22/2023      Past Surgical History:   Procedure Laterality Date    APPENDECTOMY  08/24/2016    Appendectomy    BLADDER SURGERY  10/03/2017    Bladder Surgery    COLON SURGERY  10/03/2017    Colon Surgery    ESOPHAGOGASTRODUODENOSCOPY  10/30/2017    Diagnostic Esophagogastroduodenoscopy    HYSTERECTOMY  08/24/2016    Hysterectomy    SMALL INTESTINE SURGERY  2012 Oct     Family History   Problem Relation Name Age of Onset    Diabetes Mother      Hypertension Mother      Breast cancer Sister      Asthma Son      Cancer Other uncle      Social History     Tobacco Use    Smoking status: Every Day     Packs/day: 0.50     Years: 42.00     Additional pack years: 0.00     Total pack years: 21.00     Types: Cigarettes    Smokeless tobacco: Never   Vaping Use    Vaping Use: Never used   Substance Use Topics    Alcohol use: Not Currently    Drug use: Not Currently       Physical Exam   ED Triage Vitals [03/07/24 1020]   Temperature Heart Rate Respirations BP   36.6 °C (97.8 °F) (!) 101 18 134/84      Pulse Ox Temp Source Heart Rate Source Patient Position   94 % Temporal Monitor Sitting      BP Location FiO2 (%)     Left arm --       Physical Exam  Vitals and nursing note reviewed.   HENT:      Head: Normocephalic.      Mouth/Throat:      Mouth: Mucous membranes are moist.   Eyes:      Extraocular Movements: Extraocular movements intact.      Pupils: Pupils are equal, round, and reactive to light.   Cardiovascular:      Rate and Rhythm: Normal rate and regular rhythm.      Heart sounds: Normal heart sounds.   Pulmonary:      Effort: Pulmonary effort is normal.      Breath sounds: Normal breath sounds.   Abdominal:      General: Abdomen is flat. Bowel sounds are normal.      Palpations: Abdomen is soft.      Tenderness: There is generalized abdominal tenderness and tenderness in the epigastric area. There is guarding. There is no rebound. Positive signs include McBurney's sign. Negative signs include Gordon's sign, Rovsing's sign, psoas  sign and obturator sign.   Skin:     General: Skin is warm.      Capillary Refill: Capillary refill takes less than 2 seconds.   Neurological:      General: No focal deficit present.      Mental Status: She is alert.   Psychiatric:         Mood and Affect: Mood normal.         ED Course & MDM        Medical Decision Making  The patient was seen and evaluated with the attending physician, Dr. Hudson.  The patient is presenting to the emergency room complaints of epigastric abdominal pain with nausea and vomiting.  Differential diagnosis includes gastroenteritis, GERD, gastritis, small bowel obstruction, cholecystitis, pancreatitis, constipation, diverticulitis, peptic ulcer disease, or other acute process.  A saline lock was established and laboratory studies were drawn with results as noted.  The patient was administered 1 L of normal saline wide open for hydration, Reglan 10 mg IVP, and fentanyl 100 mcg IVP.  The patient had an elevated white count of 24.8.  Remainder of her laboratory studies were largely unremarkable.  She had a normal lipase.  She had preserved renal and liver function.  Patient had a normal lactate of 1.8.  Her routine urinalysis did not show any signs of infection.  A CAT scan of the abdomen and pelvis was performed and revealed that the patient had a mechanical small bowel obstruction with a transition point in the left lower quadrant.  The patient was administered Zosyn IVPB for antibiotic coverage due to her elevated white count.  Surgical consult was placed.  The surgical resident is currently involved in the case and will assess the patient once he is complete.  The patient was administered Dilaudid 0.5 mg IVP and Compazine 10 mg IVP for recurrent pain and nausea.  The patient became very agitated due to the wait and she wanted to go out and smoke and get something to eat.  We explained to the patient that she is not able to smoke and she is not able to eat until cleared by surgery.   The NG tube was placed since the patient became symptomatic again.  An x-ray was obtained to confirm proper placement.  We are awaiting surgical consultation for final disposition.  Care of the patient was endorsed to oncoming physician        Procedure  Procedures     REDD Moore-REY  03/07/24 1159       REDD Moore-REY  03/07/24 6230

## 2024-03-08 ENCOUNTER — APPOINTMENT (OUTPATIENT)
Dept: RADIOLOGY | Facility: HOSPITAL | Age: 59
DRG: 389 | End: 2024-03-08
Payer: MEDICARE

## 2024-03-08 PROBLEM — K56.609 SMALL BOWEL OBSTRUCTION (MULTI): Status: ACTIVE | Noted: 2024-03-08

## 2024-03-08 PROBLEM — R10.84 GENERALIZED ABDOMINAL PAIN: Status: ACTIVE | Noted: 2024-03-08

## 2024-03-08 LAB
ANION GAP SERPL CALC-SCNC: 12 MMOL/L (ref 10–20)
BASOPHILS # BLD AUTO: 0.01 X10*3/UL (ref 0–0.1)
BASOPHILS # BLD AUTO: 0.02 X10*3/UL (ref 0–0.1)
BASOPHILS NFR BLD AUTO: 0.1 %
BASOPHILS NFR BLD AUTO: 0.2 %
BUN SERPL-MCNC: 11 MG/DL (ref 6–23)
CALCIUM SERPL-MCNC: 8.1 MG/DL (ref 8.6–10.3)
CHLORIDE SERPL-SCNC: 107 MMOL/L (ref 98–107)
CO2 SERPL-SCNC: 24 MMOL/L (ref 21–32)
CREAT SERPL-MCNC: 0.8 MG/DL (ref 0.5–1.05)
EGFRCR SERPLBLD CKD-EPI 2021: 84 ML/MIN/1.73M*2
EOSINOPHIL # BLD AUTO: 0.01 X10*3/UL (ref 0–0.7)
EOSINOPHIL # BLD AUTO: 0.01 X10*3/UL (ref 0–0.7)
EOSINOPHIL NFR BLD AUTO: 0.1 %
EOSINOPHIL NFR BLD AUTO: 0.1 %
ERYTHROCYTE [DISTWIDTH] IN BLOOD BY AUTOMATED COUNT: 20.3 % (ref 11.5–14.5)
ERYTHROCYTE [DISTWIDTH] IN BLOOD BY AUTOMATED COUNT: 20.5 % (ref 11.5–14.5)
GLUCOSE SERPL-MCNC: 107 MG/DL (ref 74–99)
HCT VFR BLD AUTO: 34.7 % (ref 36–46)
HCT VFR BLD AUTO: 37.6 % (ref 36–46)
HGB BLD-MCNC: 11.5 G/DL (ref 12–16)
HGB BLD-MCNC: 12.8 G/DL (ref 12–16)
IMM GRANULOCYTES # BLD AUTO: 0.03 X10*3/UL (ref 0–0.7)
IMM GRANULOCYTES # BLD AUTO: 0.03 X10*3/UL (ref 0–0.7)
IMM GRANULOCYTES NFR BLD AUTO: 0.2 % (ref 0–0.9)
IMM GRANULOCYTES NFR BLD AUTO: 0.3 % (ref 0–0.9)
LYMPHOCYTES # BLD AUTO: 2.57 X10*3/UL (ref 1.2–4.8)
LYMPHOCYTES # BLD AUTO: 2.88 X10*3/UL (ref 1.2–4.8)
LYMPHOCYTES NFR BLD AUTO: 20.2 %
LYMPHOCYTES NFR BLD AUTO: 21.5 %
MAGNESIUM SERPL-MCNC: 1.77 MG/DL (ref 1.6–2.4)
MCH RBC QN AUTO: 28 PG (ref 26–34)
MCH RBC QN AUTO: 28.6 PG (ref 26–34)
MCHC RBC AUTO-ENTMCNC: 33.1 G/DL (ref 32–36)
MCHC RBC AUTO-ENTMCNC: 34 G/DL (ref 32–36)
MCV RBC AUTO: 84 FL (ref 80–100)
MCV RBC AUTO: 85 FL (ref 80–100)
MONOCYTES # BLD AUTO: 0.73 X10*3/UL (ref 0.1–1)
MONOCYTES # BLD AUTO: 0.89 X10*3/UL (ref 0.1–1)
MONOCYTES NFR BLD AUTO: 6.1 %
MONOCYTES NFR BLD AUTO: 6.2 %
NEUTROPHILS # BLD AUTO: 10.43 X10*3/UL (ref 1.2–7.7)
NEUTROPHILS # BLD AUTO: 8.62 X10*3/UL (ref 1.2–7.7)
NEUTROPHILS NFR BLD AUTO: 71.8 %
NEUTROPHILS NFR BLD AUTO: 73.2 %
NRBC BLD-RTO: 0 /100 WBCS (ref 0–0)
NRBC BLD-RTO: 0 /100 WBCS (ref 0–0)
OVALOCYTES BLD QL SMEAR: NORMAL
OVALOCYTES BLD QL SMEAR: NORMAL
PHOSPHATE SERPL-MCNC: 3.5 MG/DL (ref 2.5–4.9)
PLATELET # BLD AUTO: 264 X10*3/UL (ref 150–450)
PLATELET # BLD AUTO: 293 X10*3/UL (ref 150–450)
POLYCHROMASIA BLD QL SMEAR: NORMAL
POLYCHROMASIA BLD QL SMEAR: NORMAL
POTASSIUM SERPL-SCNC: 3.2 MMOL/L (ref 3.5–5.3)
RBC # BLD AUTO: 4.1 X10*6/UL (ref 4–5.2)
RBC # BLD AUTO: 4.48 X10*6/UL (ref 4–5.2)
RBC MORPH BLD: NORMAL
RBC MORPH BLD: NORMAL
SODIUM SERPL-SCNC: 140 MMOL/L (ref 136–145)
WBC # BLD AUTO: 12 X10*3/UL (ref 4.4–11.3)
WBC # BLD AUTO: 14.3 X10*3/UL (ref 4.4–11.3)

## 2024-03-08 PROCEDURE — 2500000004 HC RX 250 GENERAL PHARMACY W/ HCPCS (ALT 636 FOR OP/ED): Performed by: HOSPITALIST

## 2024-03-08 PROCEDURE — 82374 ASSAY BLOOD CARBON DIOXIDE: CPT

## 2024-03-08 PROCEDURE — 96375 TX/PRO/DX INJ NEW DRUG ADDON: CPT

## 2024-03-08 PROCEDURE — 84100 ASSAY OF PHOSPHORUS: CPT

## 2024-03-08 PROCEDURE — 1100000001 HC PRIVATE ROOM DAILY

## 2024-03-08 PROCEDURE — 99222 1ST HOSP IP/OBS MODERATE 55: CPT | Performed by: SURGERY

## 2024-03-08 PROCEDURE — 96374 THER/PROPH/DIAG INJ IV PUSH: CPT | Mod: 59

## 2024-03-08 PROCEDURE — 74018 RADEX ABDOMEN 1 VIEW: CPT | Performed by: RADIOLOGY

## 2024-03-08 PROCEDURE — 2500000004 HC RX 250 GENERAL PHARMACY W/ HCPCS (ALT 636 FOR OP/ED): Performed by: STUDENT IN AN ORGANIZED HEALTH CARE EDUCATION/TRAINING PROGRAM

## 2024-03-08 PROCEDURE — 36415 COLL VENOUS BLD VENIPUNCTURE: CPT

## 2024-03-08 PROCEDURE — 96372 THER/PROPH/DIAG INJ SC/IM: CPT

## 2024-03-08 PROCEDURE — 2500000002 HC RX 250 W HCPCS SELF ADMINISTERED DRUGS (ALT 637 FOR MEDICARE OP, ALT 636 FOR OP/ED): Performed by: STUDENT IN AN ORGANIZED HEALTH CARE EDUCATION/TRAINING PROGRAM

## 2024-03-08 PROCEDURE — 83735 ASSAY OF MAGNESIUM: CPT

## 2024-03-08 PROCEDURE — 2500000004 HC RX 250 GENERAL PHARMACY W/ HCPCS (ALT 636 FOR OP/ED)

## 2024-03-08 PROCEDURE — 2500000001 HC RX 250 WO HCPCS SELF ADMINISTERED DRUGS (ALT 637 FOR MEDICARE OP): Performed by: STUDENT IN AN ORGANIZED HEALTH CARE EDUCATION/TRAINING PROGRAM

## 2024-03-08 PROCEDURE — 85025 COMPLETE CBC W/AUTO DIFF WBC: CPT

## 2024-03-08 PROCEDURE — 96376 TX/PRO/DX INJ SAME DRUG ADON: CPT

## 2024-03-08 PROCEDURE — C9113 INJ PANTOPRAZOLE SODIUM, VIA: HCPCS

## 2024-03-08 PROCEDURE — 44005 FREEING OF BOWEL ADHESION: CPT | Performed by: HOSPITALIST

## 2024-03-08 PROCEDURE — 74018 RADEX ABDOMEN 1 VIEW: CPT

## 2024-03-08 PROCEDURE — 1210000001 HC SEMI-PRIVATE ROOM DAILY

## 2024-03-08 PROCEDURE — 2500000002 HC RX 250 W HCPCS SELF ADMINISTERED DRUGS (ALT 637 FOR MEDICARE OP, ALT 636 FOR OP/ED)

## 2024-03-08 PROCEDURE — 99223 1ST HOSP IP/OBS HIGH 75: CPT | Performed by: HOSPITALIST

## 2024-03-08 RX ORDER — ONDANSETRON HYDROCHLORIDE 2 MG/ML
4 INJECTION, SOLUTION INTRAVENOUS EVERY 8 HOURS PRN
Status: DISCONTINUED | OUTPATIENT
Start: 2024-03-08 | End: 2024-03-13 | Stop reason: HOSPADM

## 2024-03-08 RX ORDER — TOPIRAMATE 25 MG/1
25 TABLET ORAL 2 TIMES DAILY
Status: CANCELLED | OUTPATIENT
Start: 2024-03-08

## 2024-03-08 RX ORDER — TOPIRAMATE 25 MG/1
25 TABLET ORAL 2 TIMES DAILY
Status: DISCONTINUED | OUTPATIENT
Start: 2024-03-08 | End: 2024-03-13 | Stop reason: HOSPADM

## 2024-03-08 RX ORDER — DIAZEPAM 5 MG/1
5 TABLET ORAL DAILY PRN
Status: DISCONTINUED | OUTPATIENT
Start: 2024-03-08 | End: 2024-03-13 | Stop reason: HOSPADM

## 2024-03-08 RX ORDER — LORATADINE 10 MG/1
10 TABLET ORAL NIGHTLY
Status: DISCONTINUED | OUTPATIENT
Start: 2024-03-08 | End: 2024-03-13 | Stop reason: HOSPADM

## 2024-03-08 RX ORDER — MORPHINE SULFATE 2 MG/ML
2 INJECTION, SOLUTION INTRAMUSCULAR; INTRAVENOUS EVERY 4 HOURS PRN
Status: DISCONTINUED | OUTPATIENT
Start: 2024-03-08 | End: 2024-03-13 | Stop reason: HOSPADM

## 2024-03-08 RX ORDER — MAGNESIUM SULFATE 1 G/100ML
1 INJECTION INTRAVENOUS ONCE
Status: COMPLETED | OUTPATIENT
Start: 2024-03-08 | End: 2024-03-08

## 2024-03-08 RX ORDER — PRAVASTATIN SODIUM 20 MG/1
20 TABLET ORAL DAILY
COMMUNITY
End: 2024-05-29

## 2024-03-08 RX ORDER — ONDANSETRON 4 MG/1
4 TABLET, ORALLY DISINTEGRATING ORAL EVERY 8 HOURS PRN
Status: DISCONTINUED | OUTPATIENT
Start: 2024-03-08 | End: 2024-03-13 | Stop reason: HOSPADM

## 2024-03-08 RX ORDER — NALOXONE HYDROCHLORIDE 1 MG/ML
0.2 INJECTION INTRAMUSCULAR; INTRAVENOUS; SUBCUTANEOUS EVERY 5 MIN PRN
Status: DISCONTINUED | OUTPATIENT
Start: 2024-03-08 | End: 2024-03-13 | Stop reason: HOSPADM

## 2024-03-08 RX ORDER — PREGABALIN 75 MG/1
150 CAPSULE ORAL 3 TIMES DAILY
Status: DISCONTINUED | OUTPATIENT
Start: 2024-03-08 | End: 2024-03-13 | Stop reason: HOSPADM

## 2024-03-08 RX ORDER — POTASSIUM CHLORIDE 14.9 MG/ML
20 INJECTION INTRAVENOUS ONCE
Status: COMPLETED | OUTPATIENT
Start: 2024-03-08 | End: 2024-03-08

## 2024-03-08 RX ORDER — TOPIRAMATE 25 MG/1
25 TABLET ORAL 2 TIMES DAILY
COMMUNITY

## 2024-03-08 RX ORDER — FLUTICASONE FUROATE AND VILANTEROL 200; 25 UG/1; UG/1
1 POWDER RESPIRATORY (INHALATION) DAILY
Status: DISCONTINUED | OUTPATIENT
Start: 2024-03-08 | End: 2024-03-13 | Stop reason: HOSPADM

## 2024-03-08 RX ORDER — ALBUTEROL SULFATE 90 UG/1
2 AEROSOL, METERED RESPIRATORY (INHALATION) EVERY 4 HOURS PRN
Status: DISCONTINUED | OUTPATIENT
Start: 2024-03-08 | End: 2024-03-13 | Stop reason: HOSPADM

## 2024-03-08 RX ORDER — DIAZEPAM 5 MG/1
5 TABLET ORAL 2 TIMES DAILY
Status: DISCONTINUED | OUTPATIENT
Start: 2024-03-08 | End: 2024-03-13 | Stop reason: HOSPADM

## 2024-03-08 RX ORDER — ACYCLOVIR 200 MG/1
200 CAPSULE ORAL DAILY
Status: DISCONTINUED | OUTPATIENT
Start: 2024-03-08 | End: 2024-03-13 | Stop reason: HOSPADM

## 2024-03-08 RX ORDER — DEXTROSE 50 % IN WATER (D50W) INTRAVENOUS SYRINGE
25
Status: DISCONTINUED | OUTPATIENT
Start: 2024-03-08 | End: 2024-03-13 | Stop reason: HOSPADM

## 2024-03-08 RX ORDER — PRAVASTATIN SODIUM 20 MG/1
20 TABLET ORAL DAILY
Status: CANCELLED | OUTPATIENT
Start: 2024-03-08

## 2024-03-08 RX ORDER — BUSPIRONE HYDROCHLORIDE 15 MG/1
15 TABLET ORAL 3 TIMES DAILY
Status: DISCONTINUED | OUTPATIENT
Start: 2024-03-08 | End: 2024-03-13 | Stop reason: HOSPADM

## 2024-03-08 RX ORDER — PANTOPRAZOLE SODIUM 40 MG/10ML
40 INJECTION, POWDER, LYOPHILIZED, FOR SOLUTION INTRAVENOUS DAILY
Status: DISCONTINUED | OUTPATIENT
Start: 2024-03-08 | End: 2024-03-11

## 2024-03-08 RX ORDER — DEXTROSE MONOHYDRATE 100 MG/ML
0.3 INJECTION, SOLUTION INTRAVENOUS ONCE AS NEEDED
Status: DISCONTINUED | OUTPATIENT
Start: 2024-03-08 | End: 2024-03-13 | Stop reason: HOSPADM

## 2024-03-08 RX ORDER — ENOXAPARIN SODIUM 100 MG/ML
40 INJECTION SUBCUTANEOUS EVERY 24 HOURS
Status: DISCONTINUED | OUTPATIENT
Start: 2024-03-08 | End: 2024-03-13 | Stop reason: HOSPADM

## 2024-03-08 RX ORDER — ZOLPIDEM TARTRATE 5 MG/1
10 TABLET ORAL NIGHTLY PRN
Status: DISCONTINUED | OUTPATIENT
Start: 2024-03-08 | End: 2024-03-13 | Stop reason: HOSPADM

## 2024-03-08 RX ORDER — PANTOPRAZOLE SODIUM 40 MG/1
40 TABLET, DELAYED RELEASE ORAL DAILY
Status: DISCONTINUED | OUTPATIENT
Start: 2024-03-08 | End: 2024-03-10

## 2024-03-08 RX ORDER — POTASSIUM CHLORIDE 14.9 MG/ML
20 INJECTION INTRAVENOUS
Status: DISPENSED | OUTPATIENT
Start: 2024-03-08 | End: 2024-03-08

## 2024-03-08 RX ADMIN — PREGABALIN 150 MG: 75 CAPSULE ORAL at 20:19

## 2024-03-08 RX ADMIN — POTASSIUM CHLORIDE 20 MEQ: 14.9 INJECTION, SOLUTION INTRAVENOUS at 11:27

## 2024-03-08 RX ADMIN — TIOTROPIUM BROMIDE INHALATION SPRAY 2 PUFF: 3.12 SPRAY, METERED RESPIRATORY (INHALATION) at 11:26

## 2024-03-08 RX ADMIN — SODIUM CHLORIDE, POTASSIUM CHLORIDE, SODIUM LACTATE AND CALCIUM CHLORIDE 150 ML/HR: 600; 310; 30; 20 INJECTION, SOLUTION INTRAVENOUS at 08:24

## 2024-03-08 RX ADMIN — POTASSIUM CHLORIDE 20 MEQ: 14.9 INJECTION, SOLUTION INTRAVENOUS at 14:50

## 2024-03-08 RX ADMIN — BUSPIRONE HYDROCHLORIDE 15 MG: 15 TABLET ORAL at 20:19

## 2024-03-08 RX ADMIN — HYDROMORPHONE HYDROCHLORIDE 0.4 MG: 1 INJECTION, SOLUTION INTRAMUSCULAR; INTRAVENOUS; SUBCUTANEOUS at 01:29

## 2024-03-08 RX ADMIN — ENOXAPARIN SODIUM 40 MG: 40 INJECTION SUBCUTANEOUS at 09:38

## 2024-03-08 RX ADMIN — MAGNESIUM SULFATE HEPTAHYDRATE 1 G: 1 INJECTION, SOLUTION INTRAVENOUS at 12:53

## 2024-03-08 RX ADMIN — FLUTICASONE FUROATE AND VILANTEROL TRIFENATATE 1 PUFF: 200; 25 POWDER RESPIRATORY (INHALATION) at 11:26

## 2024-03-08 RX ADMIN — HYDROMORPHONE HYDROCHLORIDE 0.4 MG: 1 INJECTION, SOLUTION INTRAMUSCULAR; INTRAVENOUS; SUBCUTANEOUS at 20:19

## 2024-03-08 RX ADMIN — MORPHINE SULFATE 2 MG: 2 INJECTION, SOLUTION INTRAMUSCULAR; INTRAVENOUS at 11:26

## 2024-03-08 RX ADMIN — PANTOPRAZOLE SODIUM 40 MG: 40 INJECTION, POWDER, FOR SOLUTION INTRAVENOUS at 09:39

## 2024-03-08 RX ADMIN — ONDANSETRON 4 MG: 2 INJECTION INTRAMUSCULAR; INTRAVENOUS at 01:32

## 2024-03-08 RX ADMIN — ZOLPIDEM TARTRATE 10 MG: 5 TABLET ORAL at 20:19

## 2024-03-08 RX ADMIN — HYDROMORPHONE HYDROCHLORIDE 0.4 MG: 1 INJECTION, SOLUTION INTRAMUSCULAR; INTRAVENOUS; SUBCUTANEOUS at 15:02

## 2024-03-08 SDOH — SOCIAL STABILITY: SOCIAL INSECURITY: WERE YOU ABLE TO COMPLETE ALL THE BEHAVIORAL HEALTH SCREENINGS?: YES

## 2024-03-08 SDOH — SOCIAL STABILITY: SOCIAL INSECURITY: HAVE YOU HAD THOUGHTS OF HARMING ANYONE ELSE?: NO

## 2024-03-08 ASSESSMENT — ACTIVITIES OF DAILY LIVING (ADL)
FEEDING YOURSELF: INDEPENDENT
BATHING: INDEPENDENT
HEARING - RIGHT EAR: FUNCTIONAL
LACK_OF_TRANSPORTATION: NO
TOILETING: NEEDS ASSISTANCE
HEARING - LEFT EAR: FUNCTIONAL
JUDGMENT_ADEQUATE_SAFELY_COMPLETE_DAILY_ACTIVITIES: YES
ADEQUATE_TO_COMPLETE_ADL: YES
GROOMING: INDEPENDENT
GROOMING: INDEPENDENT
WALKS IN HOME: INDEPENDENT
BATHING: NEEDS ASSISTANCE
PATIENT'S MEMORY ADEQUATE TO SAFELY COMPLETE DAILY ACTIVITIES?: YES
FEEDING YOURSELF: INDEPENDENT
DRESSING YOURSELF: INDEPENDENT
ADEQUATE_TO_COMPLETE_ADL: YES
WALKS IN HOME: NEEDS ASSISTANCE
LACK_OF_TRANSPORTATION: NO
HEARING - LEFT EAR: FUNCTIONAL
TOILETING: INDEPENDENT
JUDGMENT_ADEQUATE_SAFELY_COMPLETE_DAILY_ACTIVITIES: YES
DRESSING YOURSELF: INDEPENDENT
PATIENT'S MEMORY ADEQUATE TO SAFELY COMPLETE DAILY ACTIVITIES?: YES
HEARING - RIGHT EAR: FUNCTIONAL

## 2024-03-08 ASSESSMENT — COGNITIVE AND FUNCTIONAL STATUS - GENERAL
PATIENT BASELINE BEDBOUND: NO
DAILY ACTIVITIY SCORE: 24
MOBILITY SCORE: 24
MOBILITY SCORE: 24
DAILY ACTIVITIY SCORE: 24

## 2024-03-08 ASSESSMENT — ENCOUNTER SYMPTOMS
ABDOMINAL PAIN: 1
DIARRHEA: 0
CHILLS: 0
CONSTIPATION: 0
SHORTNESS OF BREATH: 0
NAUSEA: 1
DYSURIA: 0
FEVER: 0
BACK PAIN: 0
EYE REDNESS: 0
EYE DISCHARGE: 0
LIGHT-HEADEDNESS: 0
COLOR CHANGE: 0
ARTHRALGIAS: 0
SORE THROAT: 0
VOMITING: 1
ABDOMINAL DISTENTION: 1
FREQUENCY: 0
COUGH: 0
HEADACHES: 0

## 2024-03-08 ASSESSMENT — PAIN - FUNCTIONAL ASSESSMENT
PAIN_FUNCTIONAL_ASSESSMENT: 0-10

## 2024-03-08 ASSESSMENT — PAIN SCALES - GENERAL
PAINLEVEL_OUTOF10: 7
PAINLEVEL_OUTOF10: 0 - NO PAIN
PAINLEVEL_OUTOF10: 0 - NO PAIN
PAINLEVEL_OUTOF10: 7
PAINLEVEL_OUTOF10: 9
PAINLEVEL_OUTOF10: 9
PAINLEVEL_OUTOF10: 3
PAINLEVEL_OUTOF10: 5 - MODERATE PAIN

## 2024-03-08 ASSESSMENT — COLUMBIA-SUICIDE SEVERITY RATING SCALE - C-SSRS
6. HAVE YOU EVER DONE ANYTHING, STARTED TO DO ANYTHING, OR PREPARED TO DO ANYTHING TO END YOUR LIFE?: NO
2. HAVE YOU ACTUALLY HAD ANY THOUGHTS OF KILLING YOURSELF?: NO
1. IN THE PAST MONTH, HAVE YOU WISHED YOU WERE DEAD OR WISHED YOU COULD GO TO SLEEP AND NOT WAKE UP?: NO

## 2024-03-08 ASSESSMENT — PAIN DESCRIPTION - LOCATION: LOCATION: ABDOMEN

## 2024-03-08 ASSESSMENT — LIFESTYLE VARIABLES
SKIP TO QUESTIONS 9-10: 1
AUDIT-C TOTAL SCORE: 0
PRESCIPTION_ABUSE_PAST_12_MONTHS: NO
HOW OFTEN DO YOU HAVE A DRINK CONTAINING ALCOHOL: NEVER
HOW MANY STANDARD DRINKS CONTAINING ALCOHOL DO YOU HAVE ON A TYPICAL DAY: PATIENT DOES NOT DRINK
AUDIT-C TOTAL SCORE: 0
SUBSTANCE_ABUSE_PAST_12_MONTHS: YES
HOW OFTEN DO YOU HAVE 6 OR MORE DRINKS ON ONE OCCASION: NEVER

## 2024-03-08 ASSESSMENT — PATIENT HEALTH QUESTIONNAIRE - PHQ9
1. LITTLE INTEREST OR PLEASURE IN DOING THINGS: NOT AT ALL
SUM OF ALL RESPONSES TO PHQ9 QUESTIONS 1 & 2: 0
2. FEELING DOWN, DEPRESSED OR HOPELESS: NOT AT ALL

## 2024-03-08 NOTE — PROGRESS NOTES
Kelli Ortega is a 58 y.o. female admitted for Small bowel obstruction (CMS/[facility]). Pharmacy reviewed the patient's [id number] medications and allergies for accuracy.    The list below reflects the PTA list prior to pharmacy medication history. A summary a changes to the PTA medication list has been listed below. Please review each medication in order reconciliation for additional clarification and justification.    Source of information  PATIENT    Medications added:  PRAVASTATIN 20MG 1QD  TOPIRAMATE 25MG 1BID    Medications modified:  LIDOCAINE 5% OINTMENT PT STILL USING  NYSTATIN CREAM APPLY TOPICALLY TO MOUTH AND LIPS PRN      Medications to be removed  HYDROCORTISONE CREAM WITH VERA  HYDROCRTISONE CREAM  ANORO ELLIPTA    Medications of concern:      Prior to Admission Medications   Prescriptions Last Dose Informant Patient Reported? Taking?   Gas Relief Extra Strength 125 mg capsule   Yes    Sig: Take 1 capsule (125 mg) by mouth 4 times a day as needed for flatulence.   Trelegy Ellipta 200-62.5-25 mcg blister with device   No    Sig: INHALE 1 PUFF BY MOUTH AND INTO THE LUNGS DAILY RINSE MOUTH AFTER EACH USE   acyclovir (Zovirax) 200 mg capsule   Yes    Sig: Take by mouth once daily.   albuterol 90 mcg/actuation inhaler   No    Sig: Inhale 2 puffs every 4 hours if needed for wheezing or shortness of breath.   b complex-vitamin c (multivitamin, stress formula) tablet   No    Sig: Take 1 tablet by mouth once daily.   busPIRone (Buspar) 15 mg tablet   Yes    Sig: TAKE 2 TABLETS 3 TIMES DAILY.   cholecalciferol (Vitamin D-3) 50,000 unit capsule   No    Sig: TAKE ONE (1) CAPSULE BY MOUTH EVERY OTHER WEEK.   cyclobenzaprine (Flexeril) 10 mg tablet   No    Sig: Take 1 tablet (10 mg) by mouth 3 times a day as needed for muscle spasms.   diazePAM (Valium) 5 mg tablet   Yes    Sig: TAKE AS DIRECTED. TAKE 1 TABLET ORALLY TWICE DAILY AND 1 TABLET ORALLY DAILY IF NEEDED FOR ANXIETY.   dicyclomine (Bentyl) 20 mg  tablet   No    Sig: TAKE ONE TO TWO TABLETS BY MOUTH THREE (3) TIMES PER DAY OR AS NEEDED FOR PAIN. **(BENTYL 20 MG TAB EQUIV)**   fremanezumab (Ajovy Autoinjector) 225 mg/1.5 mL auto-injector   No    Sig: Inject 1 Pen (225 mg) under the skin every 28 (twenty-eight) days.   hydrocortisone (hydrocortisone-aloe vera) 1 % cream   Yes    Sig: APPLY SPARINGLY AND RUB IN WELL TO  AFFECTED AREA(S) AS DIRECTED.   hydrocortisone 1 % cream   Yes    Sig: APPLY SPARINGLY AND RUB IN WELL TO  AFFECTED AREA(S) AS DIRECTED.   ipratropium-albuteroL (Duo-Neb) 0.5-2.5 mg/3 mL nebulizer solution   No    Sig: USE 1 VIAL IN NEBULIZER 4 TIMES DAILY   lactobacillus acidophilus 500 million cell capsule   No    Sig: Take 1 capsule by mouth once daily.   lidocaine (Xylocaine) 5 % ointment   No    Sig: Apply topically if needed for mild pain (1 - 3).   loperamide (Imodium A-D) 2 mg tablet   No    Sig: Take 1 tablet (2 mg) by mouth 4 times a day as needed for diarrhea.   loratadine (Claritin) 10 mg tablet   No    Sig: TAKE ONE (1) TABLET BY MOUTH ONCE DAILY AT BEDTIME. **(CLARITIN 10 MG TAB EQUIV)**   mupirocin (Bactroban) 2 % ointment   Yes    Sig: Apply topically 3 times a day.   nebulizer accessories kit   No    Si each once daily. Needs a replacement mask- hers is not working   nystatin (Mycostatin) cream   Yes    Sig: Apply topically.   omeprazole (PriLOSEC) 40 mg DR capsule   No    Sig: Take 1 capsule (40 mg) by mouth 2 times a day before meals. Do not crush or chew.   ondansetron ODT (Zofran-ODT) 8 mg disintegrating tablet   No    Sig: Take 1 tablet (8 mg) by mouth every 8 hours if needed for nausea or vomiting.   polyethylene glycol (Miralax) 17 gram/dose powder   No    Sig: MIX 1 CAPFUL IN 8 OUNCES OF WATER AND DRINK AT BEDTIME AS NEEDED FOR CONSTIPATION.   pregabalin (Lyrica) 150 mg capsule   No    Sig: Take 1 capsule (150 mg) by mouth 3 times a day.   psyllium husk, aspartame, (Metamucil Sugar-Free, aspart,) 3.4 gram/5.8 gram  powder   No    Sig: Dissolve 2 scoops into 8 ounces of water and drink daily.   rizatriptan (Maxalt) 10 mg tablet   No    Sig: TAKE 1 TABLET BY MOUTH 1 TIME IF NEEDED FOR MIGRAINE (MAY REPEAT X1) FOR UP TO 9 DOSES. MAY REPEAT IN 2 HOURS IF UNRESOLVED. DO NOT EXCEED 3/24HOURS   umeclidinium-vilanteroL (Anoro Ellipta) 62.5-25 mcg/actuation blister with device   Yes    Sig: Inhale.   zolpidem (Ambien) 10 mg tablet   Yes    Sig: TAKE 1/2 - 1 TABLET BY MOUTH AT BEDTIME AS NEEDED FOR SLEEP. DX: INSOMNIA RELATED TO BIPOLAR D/O: F51.09/F31.81.      Facility-Administered Medications: None       Huma Ellison

## 2024-03-08 NOTE — CARE PLAN
Problem: Pain  Goal: My pain/discomfort is manageable  Outcome: Progressing     Problem: Safety  Goal: Patient will be injury free during hospitalization  Outcome: Progressing  Goal: I will remain free of falls  Outcome: Progressing     Problem: Daily Care  Goal: Daily care needs are met  Outcome: Progressing     Problem: Psychosocial Needs  Goal: Demonstrates ability to cope with hospitalization/illness  Outcome: Progressing  Goal: Collaborate with me, my family, and caregiver to identify my specific goals  Outcome: Progressing     Problem: Discharge Barriers  Goal: My discharge needs are met  Outcome: Progressing   The patient's goals for the shift include      The clinical goals for the shift include free from falls and injuries, tolerate gastric decompression, pain managed, verbalize decrease in nausea

## 2024-03-08 NOTE — CONSULTS
Reason For Consult  Hospitalist service consulted for medical management during hospital course.    History Of Present Illness  Kelli Ortega is a 58 y.o. female presenting with Small bowel obstruction.      58-year-old  female who apparently presented to the emergency room department complaining of abdominal pain felt a pop apparently in her epigastrium earlier this morning prior to presentation.  She reported that the abdominal pain came in her heart and statin and that she had several bouts of nausea and vomiting was encouraged by her son to come to the hospital for further evaluation.  She reported significant surgical pass of Meckel's diverticulectomy.  At the time of evaluation emergency department she endorses generalized abdominal pain but denies any other major symptomatology.  Further review of systems positive for recent weight gain.  Hospitalist service was consulted for medical management during patient hospital course of.  Available diagnostic studies were reviewed electrolyte imbalance noted with hypokalemia.       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, unspecified joint (CMS/[facility]), Bacterial vaginitis (05/22/2023), Bipolar disorder, unspecified (CMS/[facility]), Candida esophagitis (CMS/[facility]) (05/22/2023), Change in bowel habits (05/22/2023), Change in bowel habits (05/22/2023), Contact with and (suspected) exposure to covid-19 (12/01/2021), Cough (05/22/2023), Enterocolitis due to Clostridium difficile, not specified as recurrent, Folliculitis (05/22/2023), Full incontinence of feces (05/22/2023), Functional dyspepsia (05/22/2023), Other conditions influencing health status, Other conditions  influencing health status (09/15/2021), Panic disorder (episodic paroxysmal anxiety), Personal history of other diseases of the digestive system, Personal history of other diseases of the musculoskeletal system and connective tissue, Personal history of other diseases of the musculoskeletal system and connective tissue, Personal history of other diseases of the musculoskeletal system and connective tissue, Personal history of other diseases of the musculoskeletal system and connective tissue, Personal history of other diseases of the nervous system and sense organs, Personal history of other diseases of the respiratory system, Personal history of other diseases of the respiratory system, Personal history of other infectious and parasitic diseases, Personal history of other infectious and parasitic diseases (10/03/2017), Personal history of other mental and behavioral disorders, Personal history of other specified conditions (08/22/2019), Personal history of pneumonia (recurrent), Respiratory illness (05/22/2023), and Vaginal discharge (05/22/2023).    Surgical History  She has a past surgical history that includes Appendectomy (08/24/2016); Hysterectomy (08/24/2016); Esophagogastroduodenoscopy (10/30/2017); Colon surgery (10/03/2017); Bladder surgery (10/03/2017); and Small intestine surgery (2012 Oct).     Social History  She reports that she has been smoking cigarettes. She has a 21.00 pack-year smoking history. She has never used smokeless tobacco. She reports that she does not currently use alcohol. She reports that she does not currently use drugs.    Family History  Family History   Problem Relation Name Age of Onset    Diabetes Mother      Hypertension Mother      Breast cancer Sister      Asthma Son      Cancer Other uncle         Allergies  Amitriptyline, Bupropion, Bupropion hcl, Cefazolin, Diclofenac, Diphenhydramine, Divalproex, Divalproex sodium, Doxycycline, Guaifenesin, Hydrocodone,  Hydrocodone-acetaminophen, Hydroxyzine, Ibuprofen, Ketorolac, Lepirudin, Meloxicam, Milnacipran, Mirtazapine, Morphine, Nabumetone, Ondansetron hcl, Paroxetine, Tramadol, and Tramadol-acetaminophen    Review of Systems  A complete 12 point review systems performed, apart from endorsing abdominal pain, recent weight gain and, nausea and vomiting no other acute positive review of systems reported on 12 point review of systems.       Physical Exam  Constitutional:       Appearance: Patient appeared in no acute cardiopulmonary distress.     Comments: Patient alert and oriented to person place time and situation.  HENT:      Head: Normocephalic and atraumatic.      Right Ear: External ear normal.      Left Ear: External ear normal.      Nose: Nose normal. No congestion or rhinorrhea. Nasogastric tube noted in place.     Mouth/Throat: Mucous membranes Moist, Trachea midline.   Eyes:      Extraocular Movements: Extraocular movements intact.      Pupils: Pupils are equal, round, and reactive to light.      Comments:    Cardiovascular:      Rate and Rhythm: Normal rate and regular rhythm. No clicks rubs or gallops, normal S1 and S2.      Pulmonary:   Lungs clear to auscultation globally no appreciation of adventitious sounds     Abdominal:      General: Abdomen soft, nontender, hypoactive bowel sounds, no involuntary guarding or rebound tenderness appreciated.     Tenderness: None   Musculoskeletal:   No pitting edema, cyanosis or appreciation of acute new musculoskeletal deformity, osteoarthritic changes noted for upper extremity.          Lymphadenopathy:      Cervical: No cervical adenopathy.   Skin:     General: Skin is warm.      Capillary Refill: Capillary refill Less than 2 seconds.     Coloration:       Findings: No abnormal appearing skin rashes or lesions that appeared acute noted on unclothed area of the skin..   Neurological:      General: No focal sensory or motor deficits appreciated, no meningeal signs or  "dysmetria noted.      Cranial Nerves: Cranial nerves II to XII appearing grossly intact.       Psychiatric:         The patient appeared to be displaying a  normal mood and affect at the time of evaluation.         Last Recorded Vitals  Blood pressure 112/74, pulse 91, temperature 36.3 °C (97.3 °F), resp. rate 18, height 1.6 m (5' 3\"), weight 65.3 kg (144 lb), SpO2 96 %.    Relevant Results      GLUCOSE     123      107  GLUCOSE     SODIUM     141      140  SODIUM     POTASSIUM     3.8      3.2  POTASSIUM     CHLORIDE     106      107  CHLORIDE     Bicarbonate     23      24  Bicarbonate     Anion Gap     16      12  Anion Gap     Blood Urea Nitrogen     11      11  Blood Urea Nitrogen     Creatinine     1.05      0.80  Creatinine     EGFR     61      84  EGFR     Calcium     10.0      8.1  Calcium     PHOSPHORUS           3.5  PHOSPHORUS     Albumin     4.5        Albumin     Alkaline Phosphatase     38        Alkaline Phosphatase     ALT     10        ALT     AST     15        AST     Bilirubin Total     0.3        Bilirubin Total     Total Protein     7.8        Total Protein     MAGNESIUM           1.77  MAGNESIUM     Lactate     1.8    0.5    Lactate     C-Reactive Protein  0.83           C-Reactive Protein     LIPASE     9        LIPASE     ENDOCRINE    CORTISOL  9.3           CORTISOL     Thyroid Stimulating Hormone  1.02           Thyroid Stimulating Hormone     GLUCOSE     123      107  GLUCOSE     CBC AND DIFFERENTIAL    WBC     24.8    14.3  12.0  WBC     nRBC     0.0    0.0  0.0  nRBC     RBC     5.08    4.48  4.10  RBC     HEMOGLOBIN     14.5    12.8  11.5  HEMOGLOBIN     HEMATOCRIT     42.3    37.6  34.7  HEMATOCRIT     MCV     83    84  85  MCV     MCH     28.5    28.6  28.0  MCH     MCHC     34.3    34.0  33.1  MCHC     RED CELL DISTRIBUTION WIDTH     20.7    20.5  20.3  RED CELL DISTRIBUTION WIDTH     Platelets     355    293  264  Platelets     Neutrophils %     83.0    73.2  71.8  Neutrophils %  "    Immature Granulocytes %, Automated     0.5    0.2  0.3  Immature Granulocytes %, Automated     Lymphocytes %     10.9    20.2  21.5  Lymphocytes %     Monocytes %     5.3    6.2  6.1  Monocytes %     Eosinophils %     0.1    0.1  0.1  Eosinophils %     Basophils %     0.2    0.1  0.2  Basophils %     Neutrophils Absolute     20.61    10.43  8.62  Neutrophils Absolute     Immature Granulocytes Absolute, Automated     0.12    0.03  0.03  Immature Granulocytes Absolute, Automated     Lymphocytes Absolute     2.70    2.88  2.57  Lymphocytes Absolute     Monocytes Absolute     1.31    0.89  0.73  Monocytes Absolute     Eosinophils Absolute     0.02    0.01  0.01  Eosinophils Absolute     Basophils Absolute     0.05    0.01  0.02  Basophils Absolute     RBC Morphology     See Be...    See Be...  See Be...  RBC Morphology     Polychromasia     Mild    Mild  Mild  Polychromasia     Ovalocytes     Few    Few  Few  Ovalocytes         Assessment/Plan         1.  Small bowel obstruction    Continue conservative measures with nasogastric tube placement.,  Continue antiemetics, continue fluid hydration.    2.  Hypokalemia  Supplement potassium  Supplement surrogate magnesium.  Monitor for electrolyte imbalance patient at risk for electrolyte imbalance.    3.  Hyperlipidemia    Continue statin therapy once NG tube is removed, chronic longitudinal therapy.    4.  Secondary leukocytosis  Continue to monitor leukocytosis, continue monitor fever curve, suspicion of likely stress-induced neutrophilia.    5.  GI prophylaxis   PPI    6.  DVT prophylaxis low molecular weight heparin    Disposition: Patient appeared hemodynamically stable, appears nontoxic.  Urinalysis negative for leukocyte esterase and urine nitrate.  Elevated white blood cell count appreciated will monitor closely.  Monitor fever curve, clinically doubt an acute infectious process at this juncture.Hide hypoglycemic protocol    Monitor blood glucose levels, monitor  electrolytes, consideration of adding dextrose containing solution as maintenance IV with potassium supplementation if okay with surgical service, agree with fluid hydration at this juncture for treatment of dehydration.  Verify patient's home medications, monitor for withdrawal symptoms            I spent 65 minutes in the professional and overall care of this patient.      Holger Doyle, DO

## 2024-03-08 NOTE — PROGRESS NOTES
Emergency Medicine Transition of Care Note.    I received Kelli Ortega in signout from Dr. Hudson.  Please see the previous ED provider note for all HPI, PE and MDM up to the time of signout. This is in addition to the primary record.    In brief Kelli Ortega is an 58 y.o. female presenting for   Chief Complaint   Patient presents with    Abdominal Pain    Nausea    Vomiting     At the time of signout we were awaiting: surgery to see the patient    Diagnoses as of 03/09/24 0010   Generalized abdominal pain   SBO (small bowel obstruction) (CMS/HCC)       Medical Decision Making  Surgery did come and evaluate the patient.  The patient will be admitted to surgery with IMS consulting.    Final diagnoses:   [R10.84] Generalized abdominal pain   [K56.609] SBO (small bowel obstruction) (CMS/HCC)           Procedure  Procedures    Angely Juarez DO

## 2024-03-08 NOTE — H&P
"    GENERAL SURGERY HISTORY & PHYSICAL    Patient: Kelli Ortega  Room: [id number]    Age: 58 y.o.   Gender: female  Attending: Angely Juarez DO    MRN: [id number]  Admission Date: 3/7/2024    PCP: Mariaelena Dyer MD         SUBJECTIVE     Chief Complaint  Abdominal Pain, Nausea, and Vomiting       HPI  Kelli Ortega is a 58 y.o. female on day 0 of admission presenting with Small bowel obstruction (CMS/HCC).  Patient states that yesterday she felt a \"pop\" in her epigastrium. Beginning early this morning she has felt significant abdominal pain accompanied by several episodes of nausea and vomiting, and her son urged her to be seen at the hospital. She had a bowel movement this morning but has not been passing gas since yesterday. She has had similar symptoms during a bowel obstruction requiring small bowel resection in 2012. Her history is also significant for resection in 2005 2/2 a Meckels diverticulectomy. Patient was seen in the emergency room where an NGT was placed with 40cc of light clear fluid was removed. Labs were notable for a WBC of 24.8, lactic of 1.8. CT scan demonstrated dilated loops of small bowel without wall thickening, as well as a LLQ transition point with some fluid also noted. General surgery asked to evaluate the patient for SBO.    ROS  Review of Systems   Constitutional:  Negative for chills and fever.   HENT:  Negative for congestion and sore throat.    Eyes:  Negative for discharge and redness.   Respiratory:  Negative for cough and shortness of breath.    Gastrointestinal:  Positive for abdominal distention, abdominal pain, nausea and vomiting. Negative for constipation and diarrhea.   Endocrine: Negative for cold intolerance and heat intolerance.   Genitourinary:  Negative for dysuria and frequency.   Musculoskeletal:  Negative for arthralgias and back pain.   Skin:  Negative for color change and rash.   Neurological:  Negative for light-headedness and headaches.    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, unspecified joint (CMS/[facility])     Pseudomonas arthritis   • Bacterial vaginitis 05/22/2023   • Bipolar disorder, unspecified (CMS/[facility])     Bipolar disease, chronic   • Candida esophagitis (CMS/[facility]) 05/22/2023   • Change in bowel habits 05/22/2023   • Change in bowel habits 05/22/2023   • Contact with and (suspected) exposure to covid-19 12/01/2021    Close exposure to COVID-19 virus   • Cough 05/22/2023   • Enterocolitis due to Clostridium difficile, not specified as recurrent     Clostridium difficile colitis   • Folliculitis 05/22/2023   • Full incontinence of feces 05/22/2023   • Functional dyspepsia 05/22/2023   • Other conditions influencing health status     Epicondylitis   • Other conditions influencing health status 09/15/2021    History of cough   • Panic disorder (episodic paroxysmal anxiety)     Panic attacks   • Personal history of other diseases of the digestive system     History of irritable bowel syndrome   • Personal history of other diseases of the musculoskeletal system and connective tissue     History of fibromyalgia   • Personal history of other diseases of the musculoskeletal system and connective tissue     History of osteopenia   • Personal history of other diseases of the musculoskeletal system and connective tissue     History of degenerative disc disease   • Personal history of other diseases of the musculoskeletal system and connective tissue     History of osteoporosis   • Personal history of other diseases of the nervous system and sense organs     History of migraine   • Personal history of  other diseases of the respiratory system     History of pulmonary emphysema   • Personal history of other diseases of the respiratory system     History of chronic obstructive lung disease   • Personal history of other infectious and parasitic diseases     History of herpes genitalis   • Personal history of other infectious and parasitic diseases 10/03/2017    History of Clostridioides difficile colitis   • Personal history of other mental and behavioral disorders     History of depression   • Personal history of other specified conditions 08/22/2019    History of abdominal pain   • Personal history of pneumonia (recurrent)     History of pneumonia   • Respiratory illness 05/22/2023   • Vaginal discharge 05/22/2023        Past Surgical History:   Procedure Laterality Date   • APPENDECTOMY  08/24/2016    Appendectomy   • BLADDER SURGERY  10/03/2017    Bladder Surgery   • COLON SURGERY  10/03/2017    Colon Surgery   • ESOPHAGOGASTRODUODENOSCOPY  10/30/2017    Diagnostic Esophagogastroduodenoscopy   • HYSTERECTOMY  08/24/2016    Hysterectomy   • SMALL INTESTINE SURGERY  2012 Oct        Family History   Problem Relation Name Age of Onset   • Diabetes Mother     • Hypertension Mother     • Breast cancer Sister     • Asthma Son     • Cancer Other uncle         Allergies   Allergen Reactions   • Amitriptyline Unknown     delirious   • Bupropion Unknown     delirious   • Bupropion Hcl Other   • Cefazolin Unknown   • Diclofenac Unknown     Black stools   • Diphenhydramine Unknown     Interacts with other medications   • Divalproex Unknown   • Divalproex Sodium Unknown     Hair loss   • Doxycycline Unknown     delirious   • Guaifenesin Unknown     emesis   • Hydrocodone Unknown   • Hydrocodone-Acetaminophen Unknown     headache   • Hydroxyzine Unknown     Itching and vomiting   • Ibuprofen Unknown     Bleeding of stomach   • Ketorolac Unknown   • Lepirudin Itching   • Meloxicam Unknown     Stomach bleeding   • Milnacipran  Other and Unknown     delerium   • Mirtazapine Unknown     Black stools   • Morphine Unknown     Severe mood change   • Nabumetone Unknown     Black stools   • Ondansetron Hcl Unknown     vomiting   • Paroxetine Unknown   • Tramadol Unknown     itching   • Tramadol-Acetaminophen Unknown     itching        Social History     Tobacco Use   Smoking Status Every Day   • Packs/day: 0.50   • Years: 42.00   • Additional pack years: 0.00   • Total pack years: 21.00   • Types: Cigarettes   Smokeless Tobacco Never        Social History     Substance and Sexual Activity   Alcohol Use Not Currently        HOME MEDICATIONS  Current Outpatient Medications   Medication Instructions   • acyclovir (Zovirax) 200 mg capsule oral, Daily   • Ajovy Autoinjector 225 mg, subcutaneous, Every 28 days   • albuterol 90 mcg/actuation inhaler 2 puffs, inhalation, Every 4 hours PRN   • b complex-vitamin c (multivitamin, stress formula) tablet 1 tablet, oral, Daily   • busPIRone (Buspar) 15 mg tablet TAKE 2 TABLETS 3 TIMES DAILY.   • cholecalciferol (Vitamin D-3) 50,000 unit capsule TAKE ONE (1) CAPSULE BY MOUTH EVERY OTHER WEEK.   • cyclobenzaprine (FLEXERIL) 10 mg, oral, 3 times daily PRN   • diazePAM (Valium) 5 mg tablet TAKE AS DIRECTED. TAKE 1 TABLET ORALLY TWICE DAILY AND 1 TABLET ORALLY DAILY IF NEEDED FOR ANXIETY.   • dicyclomine (Bentyl) 20 mg tablet TAKE ONE TO TWO TABLETS BY MOUTH THREE (3) TIMES PER DAY OR AS NEEDED FOR PAIN. **(BENTYL 20 MG TAB EQUIV)**   • Gas Relief Extra Strength 125 mg, oral, 4 times daily PRN   • hydrocortisone (hydrocortisone-aloe vera) 1 % cream APPLY SPARINGLY AND RUB IN WELL TO  AFFECTED AREA(S) AS DIRECTED.   • hydrocortisone 1 % cream APPLY SPARINGLY AND RUB IN WELL TO  AFFECTED AREA(S) AS DIRECTED.   • ipratropium-albuteroL (Duo-Neb) 0.5-2.5 mg/3 mL nebulizer solution USE 1 VIAL IN NEBULIZER 4 TIMES DAILY   • lactobacillus acidophilus 500 million cell capsule 1 capsule, oral, Daily   • loperamide (IMODIUM  "A-D) 2 mg, oral, 4 times daily PRN   • loratadine (Claritin) 10 mg tablet TAKE ONE (1) TABLET BY MOUTH ONCE DAILY AT BEDTIME. **(CLARITIN 10 MG TAB EQUIV)**   • mupirocin (Bactroban) 2 % ointment Topical, 3 times daily RT   • nebulizer accessories kit 1 each, miscellaneous, Daily, Needs a replacement mask- hers is not working   • nystatin (Mycostatin) cream Topical   • omeprazole (PRILOSEC) 40 mg, oral, 2 times daily before meals, Do not crush or chew.   • ondansetron ODT (ZOFRAN-ODT) 8 mg, oral, Every 8 hours PRN   • polyethylene glycol (Miralax) 17 gram/dose powder MIX 1 CAPFUL IN 8 OUNCES OF WATER AND DRINK AT BEDTIME AS NEEDED FOR CONSTIPATION.   • pregabalin (LYRICA) 150 mg, oral, 3 times daily   • psyllium husk, aspartame, (Metamucil Sugar-Free, aspart,) 3.4 gram/5.8 gram powder Dissolve 2 scoops into 8 ounces of water and drink daily.   • rizatriptan (Maxalt) 10 mg tablet TAKE 1 TABLET BY MOUTH 1 TIME IF NEEDED FOR MIGRAINE (MAY REPEAT X1) FOR UP TO 9 DOSES. MAY REPEAT IN 2 HOURS IF UNRESOLVED. DO NOT EXCEED 3/24HOURS   • Trelegy Ellipta 200-62.5-25 mcg blister with device INHALE 1 PUFF BY MOUTH AND INTO THE LUNGS DAILY RINSE MOUTH AFTER EACH USE   • umeclidinium-vilanteroL (Anoro Ellipta) 62.5-25 mcg/actuation blister with device inhalation   • zolpidem (Ambien) 10 mg tablet TAKE 1/2 - 1 TABLET BY MOUTH AT BEDTIME AS NEEDED FOR SLEEP. DX: INSOMNIA RELATED TO BIPOLAR D/O: F51.09/F31.81.          OBJECTIVE   Last Recorded Vitals.  Blood pressure 109/69, pulse 92, temperature 36.6 °C (97.8 °F), temperature source Temporal, resp. rate 18, height 1.6 m (5' 3\"), weight 65.3 kg (144 lb), SpO2 98 %.     PHYSICAL EXAM  Physical Exam  Constitutional:       General: She is not in acute distress.  HENT:      Head: Normocephalic and atraumatic.   Eyes:      Extraocular Movements: Extraocular movements intact.      Pupils: Pupils are equal, round, and reactive to light.   Cardiovascular:      Rate and Rhythm: Normal rate " and regular rhythm.   Pulmonary:      Effort: Pulmonary effort is normal.      Breath sounds: Normal breath sounds.   Abdominal:      General: There is distension (mild).      Palpations: Abdomen is soft.      Tenderness: There is abdominal tenderness (point tenderness in few locations in all 4 quadrants). There is no guarding or rebound.   Musculoskeletal:         General: Normal range of motion.      Right lower leg: No edema.      Left lower leg: No edema.   Skin:     General: Skin is warm and dry.   Neurological:      General: No focal deficit present.      Mental Status: She is alert and oriented to person, place, and time.            RESULTS   Labs  Results for orders placed or performed during the hospital encounter of 03/07/24 (from the past 24 hour(s))   CBC and Auto Differential   Result Value Ref Range    WBC 24.8 (H) 4.4 - 11.3 x10*3/uL    nRBC 0.0 0.0 - 0.0 /100 WBCs    RBC 5.08 4.00 - 5.20 x10*6/uL    Hemoglobin 14.5 12.0 - 16.0 g/dL    Hematocrit 42.3 36.0 - 46.0 %    MCV 83 80 - 100 fL    MCH 28.5 26.0 - 34.0 pg    MCHC 34.3 32.0 - 36.0 g/dL    RDW 20.7 (H) 11.5 - 14.5 %    Platelets 355 150 - 450 x10*3/uL    Neutrophils % 83.0 40.0 - 80.0 %    Immature Granulocytes %, Automated 0.5 0.0 - 0.9 %    Lymphocytes % 10.9 13.0 - 44.0 %    Monocytes % 5.3 2.0 - 10.0 %    Eosinophils % 0.1 0.0 - 6.0 %    Basophils % 0.2 0.0 - 2.0 %    Neutrophils Absolute 20.61 (H) 1.20 - 7.70 x10*3/uL    Immature Granulocytes Absolute, Automated 0.12 0.00 - 0.70 x10*3/uL    Lymphocytes Absolute 2.70 1.20 - 4.80 x10*3/uL    Monocytes Absolute 1.31 (H) 0.10 - 1.00 x10*3/uL    Eosinophils Absolute 0.02 0.00 - 0.70 x10*3/uL    Basophils Absolute 0.05 0.00 - 0.10 x10*3/uL   Comprehensive metabolic panel   Result Value Ref Range    Glucose 123 (H) 74 - 99 mg/dL    Sodium 141 136 - 145 mmol/L    Potassium 3.8 3.5 - 5.3 mmol/L    Chloride 106 98 - 107 mmol/L    Bicarbonate 23 21 - 32 mmol/L    Anion Gap 16 10 - 20 mmol/L    Urea  Nitrogen 11 6 - 23 mg/dL    Creatinine 1.05 0.50 - 1.05 mg/dL    eGFR 61 >60 mL/min/1.73m*2    Calcium 10.0 8.6 - 10.3 mg/dL    Albumin 4.5 3.4 - 5.0 g/dL    Alkaline Phosphatase 38 33 - 136 U/L    Total Protein 7.8 6.4 - 8.2 g/dL    AST 15 9 - 39 U/L    Bilirubin, Total 0.3 0.0 - 1.2 mg/dL    ALT 10 7 - 45 U/L   Lactate   Result Value Ref Range    Lactate 1.8 0.4 - 2.0 mmol/L   Lipase   Result Value Ref Range    Lipase 9 9 - 82 U/L   Urinalysis with Reflex Culture and Microscopic   Result Value Ref Range    Color, Urine Yellow Straw, Yellow    Appearance, Urine Hazy (N) Clear    Specific Gravity, Urine 1.012 1.005 - 1.035    pH, Urine 7.0 5.0, 5.5, 6.0, 6.5, 7.0, 7.5, 8.0    Protein, Urine NEGATIVE NEGATIVE mg/dL    Glucose, Urine NEGATIVE NEGATIVE mg/dL    Blood, Urine NEGATIVE NEGATIVE    Ketones, Urine NEGATIVE NEGATIVE mg/dL    Bilirubin, Urine NEGATIVE NEGATIVE    Urobilinogen, Urine <2.0 <2.0 mg/dL    Nitrite, Urine NEGATIVE NEGATIVE    Leukocyte Esterase, Urine NEGATIVE NEGATIVE   Extra Urine Gray Tube   Result Value Ref Range    Extra Tube 293    Morphology   Result Value Ref Range    RBC Morphology See Below     Polychromasia Mild     Ovalocytes Few    hCG, Urine, Qualitative   Result Value Ref Range    HCG, Urine NEGATIVE NEGATIVE   CBC and Auto Differential   Result Value Ref Range    WBC 14.3 (H) 4.4 - 11.3 x10*3/uL    nRBC 0.0 0.0 - 0.0 /100 WBCs    RBC 4.48 4.00 - 5.20 x10*6/uL    Hemoglobin 12.8 12.0 - 16.0 g/dL    Hematocrit 37.6 36.0 - 46.0 %    MCV 84 80 - 100 fL    MCH 28.6 26.0 - 34.0 pg    MCHC 34.0 32.0 - 36.0 g/dL    RDW 20.5 (H) 11.5 - 14.5 %    Platelets 293 150 - 450 x10*3/uL    Neutrophils % 73.2 40.0 - 80.0 %    Immature Granulocytes %, Automated 0.2 0.0 - 0.9 %    Lymphocytes % 20.2 13.0 - 44.0 %    Monocytes % 6.2 2.0 - 10.0 %    Eosinophils % 0.1 0.0 - 6.0 %    Basophils % 0.1 0.0 - 2.0 %    Neutrophils Absolute 10.43 (H) 1.20 - 7.70 x10*3/uL    Immature Granulocytes Absolute,  Automated 0.03 0.00 - 0.70 x10*3/uL    Lymphocytes Absolute 2.88 1.20 - 4.80 x10*3/uL    Monocytes Absolute 0.89 0.10 - 1.00 x10*3/uL    Eosinophils Absolute 0.01 0.00 - 0.70 x10*3/uL    Basophils Absolute 0.01 0.00 - 0.10 x10*3/uL   Lactate   Result Value Ref Range    Lactate 0.5 0.4 - 2.0 mmol/L   Morphology   Result Value Ref Range    RBC Morphology See Below     Polychromasia Mild     Ovalocytes Few        Radiology Resutls  XR chest 1 view    Result Date: 3/7/2024  Interpreted By:  Calvin Guevara, STUDY: XR CHEST 1 VIEW;  3/7/2024 8:42 pm   INDICATION: Signs/Symptoms:NG placement verification.   COMPARISON: Radiographs of the chest dated 05/22/2023; CT of the abdomen and pelvis dated 03/07/2024.   ACCESSION NUMBER(S): [id number]   ORDERING CLINICIAN: THAI GALVEZ   FINDINGS: AP radiograph of the chest was provided.   Enteric tube courses inferior to the diaphragm, with the tip overlying the expected location of the gastric body in the left upper quadrant.   CARDIOMEDIASTINAL SILHOUETTE: Cardiomediastinal silhouette is normal in size and configuration.   LUNGS: Atelectatic changes are present in the left lung base.   ABDOMEN: There is paucity of gas in the upper abdomen.   BONES: No acute osseous changes.       1.  Enteric tube courses inferior to the diaphragm, with the tip overlying the expected location of the gastric body in the left upper quadrant. 2. Paucity of gas in the upper abdomen. 3. Mild atelectatic changes are present in the left lung base.       MACRO: None   Signed by: Calvin Guevara 3/7/2024 9:08 PM Dictation workstation:   [id number]    CT abdomen pelvis w IV contrast    Result Date: 3/7/2024  Interpreted By:  Phill Linares, STUDY: CT ABDOMEN PELVIS W IV CONTRAST; ;  3/7/2024 1:44 pm   INDICATION: Signs/Symptoms:abd pain. mid upper abdominal pain. Popping sensation and swelling.   COMPARISON: 05/04/2023   ACCESSION NUMBER(S): [id number]   ORDERING CLINICIAN:  HEMA VILLATORO   TECHNIQUE: Contiguous axial CT sections are performed from the lung bases to the lesser trochanters following the uneventful administration of 75 cc of intravenous Omnipaque 350. The study is supplemented with coronal and sagittal reformatted images.   FINDINGS: There is subsegmental atelectasis in the inferior lingula in medial right middle lobe as well as the left lower lobe.   There are multilevel discogenic degenerative changes of the lower lumbar spine with levo convexity. There are mild osteoarthritic changes of both hips. The osseous structures are intact.   There is a 12 x 6 mm hypodensity in the central right hepatic lobe which is unchanged from 05/04/2023 favoring benign etiology such as small cysts. The liver otherwise uniformly enhances. The medial margin of the inferior right lower lobe has a lobular contour. There is no surrounding fluid. There is no intrahepatic or extrahepatic bile duct dilatation.   The gallbladder, spleen, pancreas, and adrenal glands are of normal CT appearance.   The kidneys are symmetric in size and enhance symmetrically. No space-occupying renal mass is identified. There is no hydronephrosis or renal calculus. The perinephric fat is preserved. There is no hydroureter or obstructing ureteral calculus. The urinary bladder is not opacified and poorly distended though otherwise unremarkable. There is no bladder calculus or wall thickening.   The abdominal aorta is of normal and uniform caliber and normal enhancement. There is no periaortic mass or fluid collection. The IVC is unremarkable.   There are dilated small bowel loops within the mid and lower abdomen on both sides of midline. The dilated small bowel is fluid filled, reaching 4.5 cm in diameter in the left lower quadrant. There is some fecalization of dilated small bowel in the left lower quadrant. There is no associated wall thickening. There is abrupt caliber change in the left lower quadrant though no  sizable soft tissue mass at this site. This could be related to an area of stricture band adhesion. The distal small bowel is decompressed. The colon is unremarkable. The appendix is not clearly visualized. There is no evidence of appendicitis.   There is a small volume of free pelvic fluid posteriorly on the right. There is no free air collection in the abdomen or pelvis. There is a small fat containing umbilical hernia. There is no herniated bowel.       Mechanical small-bowel obstruction with transition point in the left lower quadrant. The etiology may be band adhesion or stricture. There is no obvious soft tissue mass at this site.   Small volume of free pelvic fluid posteriorly on the right.   Small fat containing umbilical hernia.   12 x 6 mm liver hypodensity remained stable from 05/04/2023 favoring benign etiology such as a small cyst.   General surgical consultation is recommended.       Critical Finding:  See findings. Notification was initiated on 3/7/2024 at 2:04 pm by  Phill Linares.  (**-OCF-**) Instructions:         MACRO: None   Signed by: Phill Linares 3/7/2024 2:05 PM Dictation workstation:   [id number]         ASSESSMENT / PLAN   Principal Problem:    Small bowel obstruction (CMS/HCC)  Active Problems:    Generalized abdominal pain       Plan  57 y/o female with small bowel obstruction  Patient with profound leukocytosis on admission improved to 14.3 from 24.8 after 1L NS. Lactic also down to 0.5. Likely a large element of dehydration 2/2 patient's symptoms.  Will continue to monitor abdominal exam for any significant changes. Patient is appropriate for conservative management at this point.  Continue NGT to LIWS. /hr. NPO, await return of bowel function  Monitor and replete lytes as needed.  Repeat KUB in the morning to evaluate gas pattern  Medicine on consult for chronic conditions, appreciate management and recommendations.    Discussed with Dr. Marbella Cheney,  DO PGY2  General Surgery

## 2024-03-08 NOTE — CARE PLAN
The patient's goals for the shift include        Problem: Pain  Goal: My pain/discomfort is manageable  Outcome: Progressing  Flowsheets (Taken 3/8/2024 1204)  Resident's pain/discomfort is manageable:   Offer non-pharmacological pain management interventions   Administer pain medication prior to activities that may trigger pain     Problem: Safety  Goal: Patient will be injury free during hospitalization  Outcome: Progressing  Goal: I will remain free of falls  Outcome: Progressing     Problem: Daily Care  Goal: Daily care needs are met  Outcome: Progressing     Problem: Psychosocial Needs  Goal: Demonstrates ability to cope with hospitalization/illness  Outcome: Progressing  Goal: Collaborate with me, my family, and caregiver to identify my specific goals  Outcome: Progressing     Problem: Discharge Barriers  Goal: My discharge needs are met  Outcome: Progressing     Problem: Fall/Injury  Goal: Not fall by end of shift  Outcome: Progressing  Goal: Be free from injury by end of the shift  Outcome: Progressing  Goal: Verbalize understanding of personal risk factors for fall in the hospital  Outcome: Progressing  Goal: Verbalize understanding of risk factor reduction measures to prevent injury from fall in the home  Outcome: Progressing  Goal: Use assistive devices by end of the shift  Outcome: Progressing  Goal: Pace activities to prevent fatigue by end of the shift  Outcome: Progressing     Problem: Pain  Goal: Takes deep breaths with improved pain control throughout the shift  Outcome: Progressing  Goal: Turns in bed with improved pain control throughout the shift  Outcome: Progressing  Goal: Walks with improved pain control throughout the shift  Outcome: Progressing  Goal: Performs ADL's with improved pain control throughout shift  Outcome: Progressing  Goal: Participates in PT with improved pain control throughout the shift  Outcome: Progressing  Goal: Free from opioid side effects throughout the shift  Outcome:  Progressing  Goal: Free from acute confusion related to pain meds throughout the shift  Outcome: Progressing     Problem: Skin  Goal: Decreased wound size/increased tissue granulation at next dressing change  Outcome: Progressing  Flowsheets (Taken 3/8/2024 1204)  Decreased wound size/increased tissue granulation at next dressing change: Promote sleep for wound healing  Goal: Participates in plan/prevention/treatment measures  Outcome: Progressing  Flowsheets (Taken 3/8/2024 1204)  Participates in plan/prevention/treatment measures:   Discuss with provider PT/OT consult   Elevate heels   Increase activity/out of bed for meals  Goal: Prevent/minimize sheer/friction injuries  Outcome: Progressing  Flowsheets (Taken 3/8/2024 1204)  Prevent/minimize sheer/friction injuries:   Turn/reposition every 2 hours/use positioning/transfer devices   Complete micro-shifts as needed if patient unable. Adjust patient position to relieve pressure points, not a full turn

## 2024-03-09 ENCOUNTER — APPOINTMENT (OUTPATIENT)
Dept: RADIOLOGY | Facility: HOSPITAL | Age: 59
End: 2024-03-09
Payer: MEDICARE

## 2024-03-09 LAB
ANION GAP SERPL CALC-SCNC: 11 MMOL/L (ref 10–20)
BASOPHILS # BLD AUTO: 0.01 X10*3/UL (ref 0–0.1)
BASOPHILS NFR BLD AUTO: 0.1 %
BUN SERPL-MCNC: 10 MG/DL (ref 6–23)
CALCIUM SERPL-MCNC: 7.9 MG/DL (ref 8.6–10.3)
CHLORIDE SERPL-SCNC: 110 MMOL/L (ref 98–107)
CO2 SERPL-SCNC: 24 MMOL/L (ref 21–32)
CREAT SERPL-MCNC: 0.78 MG/DL (ref 0.5–1.05)
EGFRCR SERPLBLD CKD-EPI 2021: 87 ML/MIN/1.73M*2
EOSINOPHIL # BLD AUTO: 0.08 X10*3/UL (ref 0–0.7)
EOSINOPHIL NFR BLD AUTO: 1 %
ERYTHROCYTE [DISTWIDTH] IN BLOOD BY AUTOMATED COUNT: 20 % (ref 11.5–14.5)
GLUCOSE SERPL-MCNC: 79 MG/DL (ref 74–99)
HCT VFR BLD AUTO: 31.6 % (ref 36–46)
HGB BLD-MCNC: 10.7 G/DL (ref 12–16)
IMM GRANULOCYTES # BLD AUTO: 0.02 X10*3/UL (ref 0–0.7)
IMM GRANULOCYTES NFR BLD AUTO: 0.2 % (ref 0–0.9)
LYMPHOCYTES # BLD AUTO: 3.14 X10*3/UL (ref 1.2–4.8)
LYMPHOCYTES NFR BLD AUTO: 38.5 %
MAGNESIUM SERPL-MCNC: 1.97 MG/DL (ref 1.6–2.4)
MCH RBC QN AUTO: 28.7 PG (ref 26–34)
MCHC RBC AUTO-ENTMCNC: 33.9 G/DL (ref 32–36)
MCV RBC AUTO: 85 FL (ref 80–100)
MONOCYTES # BLD AUTO: 0.68 X10*3/UL (ref 0.1–1)
MONOCYTES NFR BLD AUTO: 8.3 %
NEUTROPHILS # BLD AUTO: 4.23 X10*3/UL (ref 1.2–7.7)
NEUTROPHILS NFR BLD AUTO: 51.9 %
NRBC BLD-RTO: 0 /100 WBCS (ref 0–0)
PLATELET # BLD AUTO: 229 X10*3/UL (ref 150–450)
POTASSIUM SERPL-SCNC: 3.5 MMOL/L (ref 3.5–5.3)
RBC # BLD AUTO: 3.73 X10*6/UL (ref 4–5.2)
SODIUM SERPL-SCNC: 141 MMOL/L (ref 136–145)
WBC # BLD AUTO: 8.2 X10*3/UL (ref 4.4–11.3)

## 2024-03-09 PROCEDURE — 2500000004 HC RX 250 GENERAL PHARMACY W/ HCPCS (ALT 636 FOR OP/ED)

## 2024-03-09 PROCEDURE — 85025 COMPLETE CBC W/AUTO DIFF WBC: CPT | Performed by: STUDENT IN AN ORGANIZED HEALTH CARE EDUCATION/TRAINING PROGRAM

## 2024-03-09 PROCEDURE — 36415 COLL VENOUS BLD VENIPUNCTURE: CPT | Performed by: STUDENT IN AN ORGANIZED HEALTH CARE EDUCATION/TRAINING PROGRAM

## 2024-03-09 PROCEDURE — 1210000001 HC SEMI-PRIVATE ROOM DAILY

## 2024-03-09 PROCEDURE — 80048 BASIC METABOLIC PNL TOTAL CA: CPT | Performed by: HOSPITALIST

## 2024-03-09 PROCEDURE — 83735 ASSAY OF MAGNESIUM: CPT | Performed by: HOSPITALIST

## 2024-03-09 PROCEDURE — 1100000001 HC PRIVATE ROOM DAILY

## 2024-03-09 PROCEDURE — 2500000004 HC RX 250 GENERAL PHARMACY W/ HCPCS (ALT 636 FOR OP/ED): Performed by: STUDENT IN AN ORGANIZED HEALTH CARE EDUCATION/TRAINING PROGRAM

## 2024-03-09 PROCEDURE — 2500000002 HC RX 250 W HCPCS SELF ADMINISTERED DRUGS (ALT 637 FOR MEDICARE OP, ALT 636 FOR OP/ED): Performed by: STUDENT IN AN ORGANIZED HEALTH CARE EDUCATION/TRAINING PROGRAM

## 2024-03-09 PROCEDURE — 99232 SBSQ HOSP IP/OBS MODERATE 35: CPT | Performed by: HOSPITALIST

## 2024-03-09 PROCEDURE — 36415 COLL VENOUS BLD VENIPUNCTURE: CPT | Performed by: HOSPITALIST

## 2024-03-09 PROCEDURE — 99231 SBSQ HOSP IP/OBS SF/LOW 25: CPT | Performed by: SURGERY

## 2024-03-09 PROCEDURE — 2500000001 HC RX 250 WO HCPCS SELF ADMINISTERED DRUGS (ALT 637 FOR MEDICARE OP): Performed by: STUDENT IN AN ORGANIZED HEALTH CARE EDUCATION/TRAINING PROGRAM

## 2024-03-09 RX ORDER — ACETAMINOPHEN 325 MG/1
975 TABLET ORAL EVERY 8 HOURS
Status: DISCONTINUED | OUTPATIENT
Start: 2024-03-09 | End: 2024-03-13 | Stop reason: HOSPADM

## 2024-03-09 RX ADMIN — ACYCLOVIR 200 MG: 200 CAPSULE ORAL at 09:14

## 2024-03-09 RX ADMIN — SODIUM CHLORIDE, POTASSIUM CHLORIDE, SODIUM LACTATE AND CALCIUM CHLORIDE 150 ML/HR: 600; 310; 30; 20 INJECTION, SOLUTION INTRAVENOUS at 09:18

## 2024-03-09 RX ADMIN — HYDROMORPHONE HYDROCHLORIDE 0.4 MG: 1 INJECTION, SOLUTION INTRAMUSCULAR; INTRAVENOUS; SUBCUTANEOUS at 05:12

## 2024-03-09 RX ADMIN — TOPIRAMATE 25 MG: 25 TABLET, FILM COATED ORAL at 09:15

## 2024-03-09 RX ADMIN — BUSPIRONE HYDROCHLORIDE 15 MG: 15 TABLET ORAL at 15:51

## 2024-03-09 RX ADMIN — HYDROMORPHONE HYDROCHLORIDE 0.4 MG: 1 INJECTION, SOLUTION INTRAMUSCULAR; INTRAVENOUS; SUBCUTANEOUS at 09:12

## 2024-03-09 RX ADMIN — BUSPIRONE HYDROCHLORIDE 15 MG: 15 TABLET ORAL at 09:14

## 2024-03-09 RX ADMIN — PREGABALIN 150 MG: 75 CAPSULE ORAL at 21:01

## 2024-03-09 RX ADMIN — FLUTICASONE FUROATE AND VILANTEROL TRIFENATATE 1 PUFF: 200; 25 POWDER RESPIRATORY (INHALATION) at 09:13

## 2024-03-09 RX ADMIN — ACETAMINOPHEN 975 MG: 325 TABLET ORAL at 15:48

## 2024-03-09 RX ADMIN — TOPIRAMATE 25 MG: 25 TABLET, FILM COATED ORAL at 21:02

## 2024-03-09 RX ADMIN — ENOXAPARIN SODIUM 40 MG: 40 INJECTION SUBCUTANEOUS at 10:08

## 2024-03-09 RX ADMIN — DIAZEPAM 5 MG: 5 TABLET ORAL at 21:02

## 2024-03-09 RX ADMIN — PREGABALIN 150 MG: 75 CAPSULE ORAL at 15:50

## 2024-03-09 RX ADMIN — HYDROMORPHONE HYDROCHLORIDE 0.4 MG: 1 INJECTION, SOLUTION INTRAMUSCULAR; INTRAVENOUS; SUBCUTANEOUS at 13:24

## 2024-03-09 RX ADMIN — BUSPIRONE HYDROCHLORIDE 15 MG: 15 TABLET ORAL at 21:02

## 2024-03-09 RX ADMIN — HYDROMORPHONE HYDROCHLORIDE 0.4 MG: 1 INJECTION, SOLUTION INTRAMUSCULAR; INTRAVENOUS; SUBCUTANEOUS at 01:31

## 2024-03-09 RX ADMIN — LORATADINE 10 MG: 10 TABLET ORAL at 21:02

## 2024-03-09 RX ADMIN — HYDROMORPHONE HYDROCHLORIDE 0.4 MG: 1 INJECTION, SOLUTION INTRAMUSCULAR; INTRAVENOUS; SUBCUTANEOUS at 21:23

## 2024-03-09 RX ADMIN — ONDANSETRON 4 MG: 2 INJECTION INTRAMUSCULAR; INTRAVENOUS at 03:36

## 2024-03-09 RX ADMIN — SODIUM CHLORIDE, POTASSIUM CHLORIDE, SODIUM LACTATE AND CALCIUM CHLORIDE 150 ML/HR: 600; 310; 30; 20 INJECTION, SOLUTION INTRAVENOUS at 15:52

## 2024-03-09 RX ADMIN — PREGABALIN 150 MG: 75 CAPSULE ORAL at 09:14

## 2024-03-09 RX ADMIN — TIOTROPIUM BROMIDE INHALATION SPRAY 2 PUFF: 3.12 SPRAY, METERED RESPIRATORY (INHALATION) at 09:13

## 2024-03-09 ASSESSMENT — COGNITIVE AND FUNCTIONAL STATUS - GENERAL
DAILY ACTIVITIY SCORE: 24
MOBILITY SCORE: 24
MOBILITY SCORE: 24
DAILY ACTIVITIY SCORE: 24

## 2024-03-09 ASSESSMENT — ACTIVITIES OF DAILY LIVING (ADL): LACK_OF_TRANSPORTATION: NO

## 2024-03-09 ASSESSMENT — ENCOUNTER SYMPTOMS
FEVER: 0
CHILLS: 0
DIZZINESS: 0
VOMITING: 0
NAUSEA: 0
SHORTNESS OF BREATH: 0
LIGHT-HEADEDNESS: 0
CHEST TIGHTNESS: 0
ARTHRALGIAS: 0
ABDOMINAL PAIN: 1

## 2024-03-09 ASSESSMENT — PAIN SCALES - GENERAL
PAINLEVEL_OUTOF10: 7
PAINLEVEL_OUTOF10: 8
PAINLEVEL_OUTOF10: 4
PAINLEVEL_OUTOF10: 4
PAINLEVEL_OUTOF10: 7
PAINLEVEL_OUTOF10: 8

## 2024-03-09 ASSESSMENT — PAIN - FUNCTIONAL ASSESSMENT
PAIN_FUNCTIONAL_ASSESSMENT: 0-10

## 2024-03-09 ASSESSMENT — PAIN DESCRIPTION - DESCRIPTORS: DESCRIPTORS: SHARP

## 2024-03-09 ASSESSMENT — PAIN DESCRIPTION - LOCATION
LOCATION: ABDOMEN
LOCATION: ABDOMEN

## 2024-03-09 NOTE — CARE PLAN
The patient's goals for the shift include      The clinical goals for the shift include Patient's abdominal pain will be maintained at or below 4/10 on a 0-10 pain scale by end of shift, which patient states is a tolerable level.    Over the shift, the patient states pain has been maintained at 4/10 on a 0-10 pain scale. States she has been comfortable this shift.

## 2024-03-09 NOTE — CARE PLAN
Problem: Pain  Goal: My pain/discomfort is manageable  Outcome: Progressing     Problem: Safety  Goal: Patient will be injury free during hospitalization  Outcome: Progressing  Goal: I will remain free of falls  Outcome: Progressing     Problem: Daily Care  Goal: Daily care needs are met  Outcome: Progressing     Problem: Psychosocial Needs  Goal: Demonstrates ability to cope with hospitalization/illness  Outcome: Progressing  Goal: Collaborate with me, my family, and caregiver to identify my specific goals  Outcome: Progressing     Problem: Discharge Barriers  Goal: My discharge needs are met  Outcome: Progressing     Problem: Fall/Injury  Goal: Not fall by end of shift  Outcome: Progressing  Goal: Be free from injury by end of the shift  Outcome: Progressing  Goal: Verbalize understanding of personal risk factors for fall in the hospital  Outcome: Progressing  Goal: Verbalize understanding of risk factor reduction measures to prevent injury from fall in the home  Outcome: Progressing  Goal: Use assistive devices by end of the shift  Outcome: Progressing  Goal: Pace activities to prevent fatigue by end of the shift  Outcome: Progressing     Problem: Pain  Goal: Takes deep breaths with improved pain control throughout the shift  Outcome: Progressing  Goal: Turns in bed with improved pain control throughout the shift  Outcome: Progressing  Goal: Walks with improved pain control throughout the shift  Outcome: Progressing  Goal: Performs ADL's with improved pain control throughout shift  Outcome: Progressing  Goal: Participates in PT with improved pain control throughout the shift  Outcome: Progressing  Goal: Free from opioid side effects throughout the shift  Outcome: Progressing  Goal: Free from acute confusion related to pain meds throughout the shift  Outcome: Progressing     Problem: Skin  Goal: Decreased wound size/increased tissue granulation at next dressing change  Outcome: Progressing  Flowsheets (Taken  3/8/2024 2123)  Decreased wound size/increased tissue granulation at next dressing change:   Protective dressings over bony prominences   Promote sleep for wound healing  Goal: Participates in plan/prevention/treatment measures  Outcome: Progressing  Flowsheets (Taken 3/8/2024 2123)  Participates in plan/prevention/treatment measures:   Discuss with provider PT/OT consult   Increase activity/out of bed for meals   Elevate heels  Goal: Prevent/minimize sheer/friction injuries  Outcome: Progressing  Flowsheets (Taken 3/8/2024 2123)  Prevent/minimize sheer/friction injuries:   Increase activity/out of bed for meals   Turn/reposition every 2 hours/use positioning/transfer devices   HOB 30 degrees or less

## 2024-03-09 NOTE — PROGRESS NOTES
03/09/24 1120   Discharge Planning   Living Arrangements Children   Support Systems Children   Assistance Needed Indepdent   Type of Residence Private residence   Home or Post Acute Services None   Does the patient need discharge transport arranged? No   Financial Resource Strain   How hard is it for you to pay for the very basics like food, housing, medical care, and heating? Not hard   Housing Stability   In the last 12 months, was there a time when you were not able to pay the mortgage or rent on time? N   In the last 12 months, was there a time when you did not have a steady place to sleep or slept in a shelter (including now)? N   Transportation Needs   In the past 12 months, has lack of transportation kept you from medical appointments or from getting medications? no   In the past 12 months, has lack of transportation kept you from meetings, work, or from getting things needed for daily living? No     PCP is Mariaelena Dyer. Patient is from home with her son. Patient is independent with walker or cane on ambulation, self care, driving, shopping, and meals. Patient states she also has a scooter at home if needed. Patient states she receives a angélica via [employer] which assists with meals, rent, utilities, and repairs. Patient plans to return home with no needs upon discharge. [facility] will continue to follow for needs if they arise.

## 2024-03-09 NOTE — PROGRESS NOTES
Kelli Ortega [id number]   Service: Internal Medicine / Hospitalist Date of service: 3/9/2024                          Full Code                  Subjective      History Of Present Illness  Kelli Ortega is a 58 y.o. female presenting with Small bowel obstruction.        58-year-old  female who apparently presented to the emergency room department complaining of abdominal pain felt a pop apparently in her epigastrium earlier this morning prior to presentation.  She reported that the abdominal pain came in her heart and statin and that she had several bouts of nausea and vomiting was encouraged by her son to come to the hospital for further evaluation.  She reported significant surgical pass of Meckel's diverticulectomy.  At the time of evaluation emergency department she endorses generalized abdominal pain but denies any other major symptomatology.  Further review of systems positive for recent weight gain.  Hospitalist service was consulted for medical management during patient hospital course of.  Available diagnostic studies were reviewed electrolyte imbalance noted with hypokalemia.    3/9............Patient seen and examined this afternoon patient felt that she is doing much better felt that her abdominal distention have improved with nasogastric suction and. She endorsed abdominal pain.  She disclosed no prior history of seizure disorder.        Review of Systems:   Review of system otherwise negative if not aforementioned above in subjective.    Objective      Physical Exam       Appearance: Patient appeared in no acute cardiopulmonary distress.     Comments: Patient alert and oriented to person place time and situation.  HENT:      Head: Normocephalic and atraumatic.      Right Ear: External ear normal.      Left Ear: External ear normal.      Nose: Nose normal. No congestion or rhinorrhea. Nasogastric tube noted in place.     Mouth/Throat: Mucous membranes Moist, Trachea midline.   Eyes:       Extraocular Movements: Extraocular movements intact.      Pupils: Pupils are equal, round, and reactive to light.      Comments:    Cardiovascular:      Rate and Rhythm: Normal rate and regular rhythm. No clicks rubs or gallops, normal S1 and S2.      Pulmonary:   Lungs clear to auscultation globally no appreciation of adventitious sounds     Abdominal:      General: Abdomen soft, hypoactive bowel sounds, no involuntary guarding or rebound tenderness appreciated.     Tenderness: Minimal tenderness, mild distention  Musculoskeletal:   No pitting edema, cyanosis or appreciation of acute new musculoskeletal deformity, osteoarthritic changes noted for upper extremity.          Lymphadenopathy:      Cervical: No cervical adenopathy.   Skin:     General: Skin is warm.      Capillary Refill: Capillary refill Less than 2 seconds.     Coloration:       Findings: No abnormal appearing skin rashes or lesions that appeared acute noted on unclothed area of the skin..   Neurological:      General: No focal sensory or motor deficits appreciated, no meningeal signs or dysmetria noted.      Cranial Nerves: Cranial nerves II to XII appearing grossly intact.       Psychiatric:         The patient appeared to be displaying a  normal mood and affect at the time of evaluation.       Labs:     Lab Results   Component Value Date    GLUCOSE 79 03/09/2024    CALCIUM 7.9 (L) 03/09/2024     03/09/2024    K 3.5 03/09/2024    CO2 24 03/09/2024     (H) 03/09/2024    BUN 10 03/09/2024    CREATININE 0.78 03/09/2024      Lab Results   Component Value Date    WBC 8.2 03/09/2024    HGB 10.7 (L) 03/09/2024    HCT 31.6 (L) 03/09/2024    MCV 85 03/09/2024     03/09/2024      [unfilled]   [unfilled]   No results found for the last 90 days.              X-rays/ Images    [unfilled]   Procedure Component Value Units Date/Time   XR abdomen 1 view [[id number]] Collected: 03/08/24 0324   Order Status: Completed Updated: 03/08/24  0324   Narrative:     Interpreted By:  Marky Lucero,  STUDY:  XR ABDOMEN 1 VIEW;  3/8/2024 2:56 am      INDICATION:  Signs/Symptoms:SBO.      COMPARISON:  CT 3/7/2024      ACCESSION NUMBER(S):  [id number]      ORDERING CLINICIAN:  ELEAZAR WARNER      FINDINGS:  Nasogastric tube tip terminates in the midline upper abdomen at level  of the stomach. Dilated small bowel loops compatible patient's known  bowel obstruction are better assessed on 3/7/2024 CT examination.      No evidence of significant free abdominal air.       Impression:     Nasogastric tube tip terminates in the midline upper abdomen at level  of the stomach.      MACRO:  None      Signed by: Marky Lucero 3/8/2024 3:22 AM  Dictation workstation:   [id number]   XR chest 1 view [[id number]] Collected: 03/07/24 2109   Order Status: Completed Updated: 03/07/24 2109   Narrative:     Interpreted By:  Calvin Guevara,  STUDY:  XR CHEST 1 VIEW;  3/7/2024 8:42 pm      INDICATION:  Signs/Symptoms:NG placement verification.      COMPARISON:  Radiographs of the chest dated 05/22/2023; CT of the abdomen and  pelvis dated 03/07/2024.      ACCESSION NUMBER(S):  [id number]      ORDERING CLINICIAN:  THAI GALVEZ      FINDINGS:  AP radiograph of the chest was provided.      Enteric tube courses inferior to the diaphragm, with the tip  overlying the expected location of the gastric body in the left upper  quadrant.      CARDIOMEDIASTINAL SILHOUETTE:  Cardiomediastinal silhouette is normal in size and configuration.      LUNGS:  Atelectatic changes are present in the left lung base.      ABDOMEN:  There is paucity of gas in the upper abdomen.      BONES:  No acute osseous changes.       Impression:     1.  Enteric tube courses inferior to the diaphragm, with the tip  overlying the expected location of the gastric body in the left upper  quadrant.  2. Paucity of gas in the upper abdomen.  3. Mild atelectatic changes are present in the left lung base.               MACRO:  None      Signed by: Calvin Guevara 3/7/2024 9:08 PM  Dictation workstation:   [id number]   CT abdomen pelvis w IV contrast [[id number]] Collected: 03/07/24 1406   Order Status: Completed Updated: 03/07/24 1406   Narrative:     Interpreted By:  Phill Linares,  STUDY:  CT ABDOMEN PELVIS W IV CONTRAST; ;  3/7/2024 1:44 pm      INDICATION:  Signs/Symptoms:abd pain. mid upper abdominal pain. Popping sensation  and swelling.      COMPARISON:  05/04/2023      ACCESSION NUMBER(S):  [id number]      ORDERING CLINICIAN:  HEMA VILLATORO      TECHNIQUE:  Contiguous axial CT sections are performed from the lung bases to the  lesser trochanters following the uneventful administration of 75 cc  of intravenous Omnipaque 350. The study is supplemented with coronal  and sagittal reformatted images.      FINDINGS:  There is subsegmental atelectasis in the inferior lingula in medial  right middle lobe as well as the left lower lobe.      There are multilevel discogenic degenerative changes of the lower  lumbar spine with levo convexity. There are mild osteoarthritic  changes of both hips. The osseous structures are intact.      There is a 12 x 6 mm hypodensity in the central right hepatic lobe  which is unchanged from 05/04/2023 favoring benign etiology such as  small cysts. The liver otherwise uniformly enhances. The medial  margin of the inferior right lower lobe has a lobular contour. There  is no surrounding fluid. There is no intrahepatic or extrahepatic  bile duct dilatation.      The gallbladder, spleen, pancreas, and adrenal glands are of normal  CT appearance.      The kidneys are symmetric in size and enhance symmetrically. No  space-occupying renal mass is identified. There is no hydronephrosis  or renal calculus. The perinephric fat is preserved. There is no  hydroureter or obstructing ureteral calculus. The urinary bladder is  not opacified and poorly distended though otherwise  unremarkable.  There is no bladder calculus or wall thickening.      The abdominal aorta is of normal and uniform caliber and normal  enhancement. There is no periaortic mass or fluid collection. The IVC  is unremarkable.      There are dilated small bowel loops within the mid and lower abdomen  on both sides of midline. The dilated small bowel is fluid filled,  reaching 4.5 cm in diameter in the left lower quadrant. There is some  fecalization of dilated small bowel in the left lower quadrant. There  is no associated wall thickening. There is abrupt caliber change in  the left lower quadrant though no sizable soft tissue mass at this  site. This could be related to an area of stricture band adhesion.  The distal small bowel is decompressed. The colon is unremarkable.  The appendix is not clearly visualized. There is no evidence of  appendicitis.      There is a small volume of free pelvic fluid posteriorly on the  right. There is no free air collection in the abdomen or pelvis.  There is a small fat containing umbilical hernia. There is no  herniated bowel.       Impression:     Mechanical small-bowel obstruction with transition point in the left  lower quadrant. The etiology may be band adhesion or stricture. There  is no obvious soft tissue mass at this site.      Small volume of free pelvic fluid posteriorly on the right.      Small fat containing umbilical hernia.      12 x 6 mm liver hypodensity remained stable from 05/04/2023 favoring  benign etiology such as a small cyst.      General surgical consultation is recommended.              Critical Finding:  See findings. Notification was initiated on  3/7/2024 at 2:04 pm by  Phill Linares.  (**-[facility]-**)  Instructions:                  MACRO:  None      Signed by: Phill Linares 3/7/2024 2:05 PM  Dictation workstation:   [id number]             Medical Problems       Problem List       * (Principal) Small bowel obstruction (CMS/HCC)    Allergic  rhinitis    Anemia due to blood loss    GERD (gastroesophageal reflux disease)    Cervical disc disease    Cervical disc disorder with radiculopathy, cervicothoracic region    Cervical myofascial pain syndrome    Chronic low back pain    Chronic neck pain    Diarrhea    Chronic eczema    Chronic migraine with aura without status migrainosus, not intractable    Overview Addendum 1/24/2024  5:40 PM by AYAZ Yan     Has tried and failed multiple prophylactic migraine meds  Listed allergy to amitriptyline  s/p Depakote  s/p topiramate--stopped due to polypharmacy  s/p Aimovig, helping but still having HA and migraine and wanted back to Emgality  Was well controlled on Emgality SC monthly and sumatriptan 100mg prn  Switched to Ajovy monthly and rizatriptan PRN per insurance preferences           Cigarette smoker    COPD (chronic obstructive pulmonary disease) with emphysema (CMS/HCC)    Degeneration of intervertebral disc of lumbar region    Degenerative joint disease of knee    Osteoarthritis    Dysphagia    Elevated sed rate    Fibromyalgia, primary    Food allergy    Gait abnormality    Gastric AVM    Chronic fatigue syndrome    Hyperlipidemia    Hypersomnia    Insomnia    Chronic constipation    Left hand paresthesia    Left-sided thoracic back pain    Neurogenic claudication due to lumbar spinal stenosis    Leukocytosis    Displacement of lumbar disc with radiculopathy    Lumbar radiculopathy    Lumbar scoliosis    Lumbar spondylosis    Malaise and fatigue    Muscle cramping    Nausea    Neuropathic pain    Nicotine dependence    Osteoporosis    Rash, skin    Hemoptysis    Spinal stenosis of lumbosacral region    Transient amnesia    Overview Signed 1/24/2024  8:57 AM by AYAZ Yan     Having episodes of transient ammesia/AMS--previously thought to be due to polypharmacy  EEG wnl         Vaginal atrophy    Vitamin B12 deficiency    Vitamin D deficiency    Weakness of both lower  extremities    Pain in female genitalia on intercourse    Frequency-urgency syndrome    Dysuria    Bladder wall thickening    Anxiety    IBS (irritable bowel syndrome) (Chronic)    Bipolar disorder (CMS/HCC)    History of hallucinations    Overview Signed 1/24/2024  5:38 PM by AYAZ Yan     Follows with psychiatry at [facility]  Reports longstanding history of intermittent hallucinations with visual, auditory and tactile components         Generalized abdominal pain               Above medical problems may be reflective of historical medical problems that may have resolved and may not related to acute clinical condition/medical problems.    Clinical impression/plan:    1.  Small bowel obstruction     Continue conservative measures with nasogastric tube placement.,  Continue antiemetics, continue fluid hydration.     2.  Hypokalemia  Supplement potassium  Supplement surrogate magnesium.  Monitor for electrolyte imbalance patient at risk for electrolyte imbalance.    3/9............Resolved post supplementation     3.  Hyperlipidemia     Continue statin therapy once NG tube  removed, chronic longitudinal therapy.     4.  Secondary leukocytosis  Continue to monitor leukocytosis, continue monitor fever curve, suspicion of likely stress-induced neutrophilia.     5.  GI prophylaxis   PPI     6.  DVT prophylaxis low molecular weight heparin      Disposition/Additional care plan/interventions:3/9/2024    Clinical suspicion of stress-induced neutrophilia, now resolved.Monitor fever curve, monitor leukocytosis.    Hypokalemia improved continue to monitor potassium and magnesium levels.  Continue bowel decompression as per general surgery.  Hospitalist service will continue to follow along with surgical service.    The patient was informed of differential diagnosis , work up , plan of care and possible sequelae of clinical disposition.Patient in agreement with plan of care. Further recommendations  forthcoming in accordance with patient's clinical disposition and response to care.    Discharge planning:Discharge timing to cards recommendation by the surgical service.    Care time:Less than 55 minutes           Dictation performed with assistance of voice recognition device therefore transcription errors are possible.

## 2024-03-09 NOTE — PROGRESS NOTES
"GENERAL SURGERY PROGRESS NOTE    Kelli Ortega   1965   [id number]     Kelli Ortega is a 58 y.o. female on day 1 of admission presenting with Small bowel obstruction (CMS/HCC).      Subjective  Patient seen and assessed.  Patient denies any flatus overnight or bowel function.  She states that she feels perhaps slightly better compared to yesterday.    Review of Systems   Constitutional:  Negative for chills and fever.   HENT:  Negative for congestion.    Respiratory:  Negative for chest tightness and shortness of breath.    Cardiovascular:  Negative for chest pain.   Gastrointestinal:  Positive for abdominal pain. Negative for nausea and vomiting.   Musculoskeletal:  Negative for arthralgias.   Neurological:  Negative for dizziness and light-headedness.       Objective    Last Recorded Vitals  Blood pressure 105/70, pulse 87, temperature 36.4 °C (97.6 °F), temperature source Temporal, resp. rate 16, height 1.6 m (5' 2.99\"), weight 65.3 kg (144 lb), SpO2 96 %.    Intake/Output last 3 Shifts:  I/O last 3 completed shifts:  In: 2540 (38.9 mL/kg) [P.O.:60; I.V.:2180 (33.4 mL/kg); IV Piggyback:300]  Out: 1800 (27.6 mL/kg) [Urine:700 (0.3 mL/kg/hr); Emesis/NG output:400; Other:700]  Weight: 65.3 kg     Gen: NAD.  A&Ox3  HEENT: NC/AT.  Moist mucous membranes.  Neck: Normal range of motion.  CV: Regular rate.  Chest: Normal chest rise.  Normal respiratory effort.  Abdomen: Soft.  Some locations of tenderness to palpation in all 4 quadrants.  Mildly distended.  No rigidity or guarding.  Extremities: No edema.  Moving all extremities.    Relevant Results  Results for orders placed or performed during the hospital encounter of 03/07/24 (from the past 24 hour(s))   Magnesium   Result Value Ref Range    Magnesium 1.97 1.60 - 2.40 mg/dL   Basic Metabolic Panel   Result Value Ref Range    Glucose 79 74 - 99 mg/dL    Sodium 141 136 - 145 mmol/L    Potassium 3.5 3.5 - 5.3 mmol/L    Chloride 110 (H) 98 - 107 mmol/L    " Bicarbonate 24 21 - 32 mmol/L    Anion Gap 11 10 - 20 mmol/L    Urea Nitrogen 10 6 - 23 mg/dL    Creatinine 0.78 0.50 - 1.05 mg/dL    eGFR 87 >60 mL/min/1.73m*2    Calcium 7.9 (L) 8.6 - 10.3 mg/dL   CBC and Auto Differential   Result Value Ref Range    WBC 8.2 4.4 - 11.3 x10*3/uL    nRBC 0.0 0.0 - 0.0 /100 WBCs    RBC 3.73 (L) 4.00 - 5.20 x10*6/uL    Hemoglobin 10.7 (L) 12.0 - 16.0 g/dL    Hematocrit 31.6 (L) 36.0 - 46.0 %    MCV 85 80 - 100 fL    MCH 28.7 26.0 - 34.0 pg    MCHC 33.9 32.0 - 36.0 g/dL    RDW 20.0 (H) 11.5 - 14.5 %    Platelets 229 150 - 450 x10*3/uL    Neutrophils % 51.9 40.0 - 80.0 %    Immature Granulocytes %, Automated 0.2 0.0 - 0.9 %    Lymphocytes % 38.5 13.0 - 44.0 %    Monocytes % 8.3 2.0 - 10.0 %    Eosinophils % 1.0 0.0 - 6.0 %    Basophils % 0.1 0.0 - 2.0 %    Neutrophils Absolute 4.23 1.20 - 7.70 x10*3/uL    Immature Granulocytes Absolute, Automated 0.02 0.00 - 0.70 x10*3/uL    Lymphocytes Absolute 3.14 1.20 - 4.80 x10*3/uL    Monocytes Absolute 0.68 0.10 - 1.00 x10*3/uL    Eosinophils Absolute 0.08 0.00 - 0.70 x10*3/uL    Basophils Absolute 0.01 0.00 - 0.10 x10*3/uL       XR abdomen 1 view    Result Date: 3/8/2024  Interpreted By:  Marky Lucero, STUDY: XR ABDOMEN 1 VIEW;  3/8/2024 2:56 am   INDICATION: Signs/Symptoms:SBO.   COMPARISON: CT 3/7/2024   ACCESSION NUMBER(S): [id number]   ORDERING CLINICIAN: ELEAZAR WARNER   FINDINGS: Nasogastric tube tip terminates in the midline upper abdomen at level of the stomach. Dilated small bowel loops compatible patient's known bowel obstruction are better assessed on 3/7/2024 CT examination.   No evidence of significant free abdominal air.       Nasogastric tube tip terminates in the midline upper abdomen at level of the stomach.   MACRO: None   Signed by: Marky Lucero 3/8/2024 3:22 AM Dictation workstation:   [id number]    XR chest 1 view    Result Date: 3/7/2024  Interpreted By:  Calvin Guevara, STUDY: XR CHEST 1 VIEW;  3/7/2024 8:42 pm    INDICATION: Signs/Symptoms:NG placement verification.   COMPARISON: Radiographs of the chest dated 05/22/2023; CT of the abdomen and pelvis dated 03/07/2024.   ACCESSION NUMBER(S): [id number]   ORDERING CLINICIAN: THAI GALVEZ   FINDINGS: AP radiograph of the chest was provided.   Enteric tube courses inferior to the diaphragm, with the tip overlying the expected location of the gastric body in the left upper quadrant.   CARDIOMEDIASTINAL SILHOUETTE: Cardiomediastinal silhouette is normal in size and configuration.   LUNGS: Atelectatic changes are present in the left lung base.   ABDOMEN: There is paucity of gas in the upper abdomen.   BONES: No acute osseous changes.       1.  Enteric tube courses inferior to the diaphragm, with the tip overlying the expected location of the gastric body in the left upper quadrant. 2. Paucity of gas in the upper abdomen. 3. Mild atelectatic changes are present in the left lung base.       MACRO: None   Signed by: Calvin Guevara 3/7/2024 9:08 PM Dictation workstation:   [id number]    CT abdomen pelvis w IV contrast    Result Date: 3/7/2024  Interpreted By:  Phill Linares, STUDY: CT ABDOMEN PELVIS W IV CONTRAST; ;  3/7/2024 1:44 pm   INDICATION: Signs/Symptoms:abd pain. mid upper abdominal pain. Popping sensation and swelling.   COMPARISON: 05/04/2023   ACCESSION NUMBER(S): [id number]   ORDERING CLINICIAN: HEMA VILLATORO   TECHNIQUE: Contiguous axial CT sections are performed from the lung bases to the lesser trochanters following the uneventful administration of 75 cc of intravenous Omnipaque 350. The study is supplemented with coronal and sagittal reformatted images.   FINDINGS: There is subsegmental atelectasis in the inferior lingula in medial right middle lobe as well as the left lower lobe.   There are multilevel discogenic degenerative changes of the lower lumbar spine with levo convexity. There are mild osteoarthritic changes of both hips. The osseous  structures are intact.   There is a 12 x 6 mm hypodensity in the central right hepatic lobe which is unchanged from 05/04/2023 favoring benign etiology such as small cysts. The liver otherwise uniformly enhances. The medial margin of the inferior right lower lobe has a lobular contour. There is no surrounding fluid. There is no intrahepatic or extrahepatic bile duct dilatation.   The gallbladder, spleen, pancreas, and adrenal glands are of normal CT appearance.   The kidneys are symmetric in size and enhance symmetrically. No space-occupying renal mass is identified. There is no hydronephrosis or renal calculus. The perinephric fat is preserved. There is no hydroureter or obstructing ureteral calculus. The urinary bladder is not opacified and poorly distended though otherwise unremarkable. There is no bladder calculus or wall thickening.   The abdominal aorta is of normal and uniform caliber and normal enhancement. There is no periaortic mass or fluid collection. The IVC is unremarkable.   There are dilated small bowel loops within the mid and lower abdomen on both sides of midline. The dilated small bowel is fluid filled, reaching 4.5 cm in diameter in the left lower quadrant. There is some fecalization of dilated small bowel in the left lower quadrant. There is no associated wall thickening. There is abrupt caliber change in the left lower quadrant though no sizable soft tissue mass at this site. This could be related to an area of stricture band adhesion. The distal small bowel is decompressed. The colon is unremarkable. The appendix is not clearly visualized. There is no evidence of appendicitis.   There is a small volume of free pelvic fluid posteriorly on the right. There is no free air collection in the abdomen or pelvis. There is a small fat containing umbilical hernia. There is no herniated bowel.       Mechanical small-bowel obstruction with transition point in the left lower quadrant. The etiology may be  band adhesion or stricture. There is no obvious soft tissue mass at this site.   Small volume of free pelvic fluid posteriorly on the right.   Small fat containing umbilical hernia.   12 x 6 mm liver hypodensity remained stable from 05/04/2023 favoring benign etiology such as a small cyst.   General surgical consultation is recommended.       Critical Finding:  See findings. Notification was initiated on 3/7/2024 at 2:04 pm by  Phill Linares.  (**-OCF-**) Instructions:         MACRO: None   Signed by: Phill Linares 3/7/2024 2:05 PM Dictation workstation:   [id number]    XR DEXA bone density    Result Date: 2/13/2024  Interpreted By:  Ayad Navarro, STUDY: DEXA BONE DENSITY2/13/2024 2:37 pm   INDICATION: Signs/Symptoms:menopause. The patient is a 57 y/o  year old F.   COMPARISON: 09/10/2020   ACCESSION NUMBER(S): [id number]   ORDERING CLINICIAN: JOSÉ MANUEL CHOPRA   TECHNIQUE: DEXA BONE DENSITY   FINDINGS: SPINE L1-L4 Bone Mineral Density: 0.886 T-Score -1.5  Z-Score 0.0 Bone Mineral Density change vs baseline (%):  -8.6 Bone Mineral Density change vs previous (%): -8.6   LEFT FEMUR -TOTAL Bone Mineral Density: 0.727 T-Score -1.8   Z-Score  -0.8 Bone Mineral Density change vs baseline (%): -2.5 Bone Mineral Density change vs previous (%): -2.5   LEFT FEMUR -NECK Bone Mineral Density: 0.578 T-Score -2.4  Z-Score -1.2     World Health Organization (WHO) criteria for post-menopausal,  Women: Normal:         T-score at or above -1 SD Osteopenia:   T-score between -1 and -2.5 SD Osteoporosis: T-score at or below -2.5 SD     10-year Fracture Risk (%): Major Osteoporotic Fracture  12 Hip Fracture                        3.1   Note:  If no FRAX score is reported, it is because: Some T-score for Spine Total or Hip Total or Femoral Neck at or below -2.5   This exam was performed at [facility] on a [employer] C Dexa Unit.       DEXA:  According to World Health Organization criteria,  classification is low bone mass (osteopenia)   Followup recommended in two years or sooner as clinically warranted.   All images and detailed analysis are available on the  Radiology PACS.   MACRO: None   Signed by: Ayad Navarro 2/13/2024 5:45 PM Dictation workstation:   [id number]      Assessment and Plan  Principal Problem:    Small bowel obstruction (CMS/HCC)  Active Problems:    Generalized abdominal pain    58 y.o. female with small bowel obstruction    Plan  -Conservative management with NG tube decompression.  -Encourage patient to ambulate as much as possible and be out of bed  -Continue n.p.o. and IV fluids-as needed pain and nausea medication  -Discussed with Dr. Jessica Holly, PGY4  General Surgery

## 2024-03-10 ENCOUNTER — APPOINTMENT (OUTPATIENT)
Dept: RADIOLOGY | Facility: HOSPITAL | Age: 59
DRG: 389 | End: 2024-03-10
Payer: MEDICARE

## 2024-03-10 LAB
ANION GAP SERPL CALC-SCNC: 22 MMOL/L (ref 10–20)
BUN SERPL-MCNC: 9 MG/DL (ref 6–23)
CALCIUM SERPL-MCNC: 8.7 MG/DL (ref 8.6–10.3)
CHLORIDE SERPL-SCNC: 103 MMOL/L (ref 98–107)
CO2 SERPL-SCNC: 20 MMOL/L (ref 21–32)
CREAT SERPL-MCNC: 0.69 MG/DL (ref 0.5–1.05)
EGFRCR SERPLBLD CKD-EPI 2021: >90 ML/MIN/1.73M*2
ERYTHROCYTE [DISTWIDTH] IN BLOOD BY AUTOMATED COUNT: 19.8 % (ref 11.5–14.5)
GLUCOSE SERPL-MCNC: 65 MG/DL (ref 74–99)
HCT VFR BLD AUTO: 38.2 % (ref 36–46)
HGB BLD-MCNC: 12.8 G/DL (ref 12–16)
MAGNESIUM SERPL-MCNC: 1.63 MG/DL (ref 1.6–2.4)
MCH RBC QN AUTO: 28.6 PG (ref 26–34)
MCHC RBC AUTO-ENTMCNC: 33.5 G/DL (ref 32–36)
MCV RBC AUTO: 85 FL (ref 80–100)
NRBC BLD-RTO: 0 /100 WBCS (ref 0–0)
PLATELET # BLD AUTO: 272 X10*3/UL (ref 150–450)
POTASSIUM SERPL-SCNC: 3.1 MMOL/L (ref 3.5–5.3)
POTASSIUM SERPL-SCNC: 3.3 MMOL/L (ref 3.5–5.3)
RBC # BLD AUTO: 4.48 X10*6/UL (ref 4–5.2)
SODIUM SERPL-SCNC: 142 MMOL/L (ref 136–145)
WBC # BLD AUTO: 11 X10*3/UL (ref 4.4–11.3)

## 2024-03-10 PROCEDURE — 2500000004 HC RX 250 GENERAL PHARMACY W/ HCPCS (ALT 636 FOR OP/ED)

## 2024-03-10 PROCEDURE — 1100000001 HC PRIVATE ROOM DAILY

## 2024-03-10 PROCEDURE — C9113 INJ PANTOPRAZOLE SODIUM, VIA: HCPCS

## 2024-03-10 PROCEDURE — 2500000004 HC RX 250 GENERAL PHARMACY W/ HCPCS (ALT 636 FOR OP/ED): Performed by: HOSPITALIST

## 2024-03-10 PROCEDURE — 83735 ASSAY OF MAGNESIUM: CPT | Performed by: HOSPITALIST

## 2024-03-10 PROCEDURE — 99233 SBSQ HOSP IP/OBS HIGH 50: CPT | Performed by: HOSPITALIST

## 2024-03-10 PROCEDURE — 2500000004 HC RX 250 GENERAL PHARMACY W/ HCPCS (ALT 636 FOR OP/ED): Performed by: STUDENT IN AN ORGANIZED HEALTH CARE EDUCATION/TRAINING PROGRAM

## 2024-03-10 PROCEDURE — 71045 X-RAY EXAM CHEST 1 VIEW: CPT

## 2024-03-10 PROCEDURE — 74019 RADEX ABDOMEN 2 VIEWS: CPT

## 2024-03-10 PROCEDURE — 85027 COMPLETE CBC AUTOMATED: CPT | Performed by: STUDENT IN AN ORGANIZED HEALTH CARE EDUCATION/TRAINING PROGRAM

## 2024-03-10 PROCEDURE — 84132 ASSAY OF SERUM POTASSIUM: CPT | Performed by: HOSPITALIST

## 2024-03-10 PROCEDURE — 2500000001 HC RX 250 WO HCPCS SELF ADMINISTERED DRUGS (ALT 637 FOR MEDICARE OP): Performed by: STUDENT IN AN ORGANIZED HEALTH CARE EDUCATION/TRAINING PROGRAM

## 2024-03-10 PROCEDURE — 2500000002 HC RX 250 W HCPCS SELF ADMINISTERED DRUGS (ALT 637 FOR MEDICARE OP, ALT 636 FOR OP/ED): Performed by: STUDENT IN AN ORGANIZED HEALTH CARE EDUCATION/TRAINING PROGRAM

## 2024-03-10 PROCEDURE — 82374 ASSAY BLOOD CARBON DIOXIDE: CPT | Performed by: HOSPITALIST

## 2024-03-10 PROCEDURE — 36415 COLL VENOUS BLD VENIPUNCTURE: CPT | Performed by: HOSPITALIST

## 2024-03-10 PROCEDURE — 74022 RADEX COMPL AQT ABD SERIES: CPT

## 2024-03-10 RX ORDER — MAGNESIUM SULFATE HEPTAHYDRATE 40 MG/ML
2 INJECTION, SOLUTION INTRAVENOUS ONCE
Status: COMPLETED | OUTPATIENT
Start: 2024-03-10 | End: 2024-03-10

## 2024-03-10 RX ORDER — POTASSIUM CHLORIDE 14.9 MG/ML
20 INJECTION INTRAVENOUS
Status: DISPENSED | OUTPATIENT
Start: 2024-03-10 | End: 2024-03-10

## 2024-03-10 RX ORDER — DEXTROSE MONOHYDRATE, SODIUM CHLORIDE, AND POTASSIUM CHLORIDE 50; 1.49; 4.5 G/1000ML; G/1000ML; G/1000ML
100 INJECTION, SOLUTION INTRAVENOUS CONTINUOUS
Status: DISCONTINUED | OUTPATIENT
Start: 2024-03-10 | End: 2024-03-11

## 2024-03-10 RX ORDER — THIAMINE HYDROCHLORIDE 100 MG/ML
100 INJECTION, SOLUTION INTRAMUSCULAR; INTRAVENOUS ONCE
Status: COMPLETED | OUTPATIENT
Start: 2024-03-10 | End: 2024-03-10

## 2024-03-10 RX ADMIN — POTASSIUM CHLORIDE 20 MEQ: 14.9 INJECTION, SOLUTION INTRAVENOUS at 16:42

## 2024-03-10 RX ADMIN — HYDROMORPHONE HYDROCHLORIDE 0.4 MG: 1 INJECTION, SOLUTION INTRAMUSCULAR; INTRAVENOUS; SUBCUTANEOUS at 17:20

## 2024-03-10 RX ADMIN — TOPIRAMATE 25 MG: 25 TABLET, FILM COATED ORAL at 08:53

## 2024-03-10 RX ADMIN — PREGABALIN 150 MG: 75 CAPSULE ORAL at 15:13

## 2024-03-10 RX ADMIN — MAGNESIUM SULFATE HEPTAHYDRATE 2 G: 40 INJECTION, SOLUTION INTRAVENOUS at 12:35

## 2024-03-10 RX ADMIN — BUSPIRONE HYDROCHLORIDE 15 MG: 15 TABLET ORAL at 08:53

## 2024-03-10 RX ADMIN — PREGABALIN 150 MG: 75 CAPSULE ORAL at 08:52

## 2024-03-10 RX ADMIN — DIAZEPAM 5 MG: 5 TABLET ORAL at 21:33

## 2024-03-10 RX ADMIN — THIAMINE HYDROCHLORIDE 100 MG: 100 INJECTION, SOLUTION INTRAMUSCULAR; INTRAVENOUS at 12:34

## 2024-03-10 RX ADMIN — TIOTROPIUM BROMIDE INHALATION SPRAY 2 PUFF: 3.12 SPRAY, METERED RESPIRATORY (INHALATION) at 08:54

## 2024-03-10 RX ADMIN — POTASSIUM CHLORIDE, DEXTROSE MONOHYDRATE AND SODIUM CHLORIDE 100 ML/HR: 150; 5; 450 INJECTION, SOLUTION INTRAVENOUS at 15:02

## 2024-03-10 RX ADMIN — ENOXAPARIN SODIUM 40 MG: 40 INJECTION SUBCUTANEOUS at 08:54

## 2024-03-10 RX ADMIN — FLUTICASONE FUROATE AND VILANTEROL TRIFENATATE 1 PUFF: 200; 25 POWDER RESPIRATORY (INHALATION) at 08:54

## 2024-03-10 RX ADMIN — TOPIRAMATE 25 MG: 25 TABLET, FILM COATED ORAL at 21:34

## 2024-03-10 RX ADMIN — BUSPIRONE HYDROCHLORIDE 15 MG: 15 TABLET ORAL at 15:14

## 2024-03-10 RX ADMIN — LORATADINE 10 MG: 10 TABLET ORAL at 21:33

## 2024-03-10 RX ADMIN — PANTOPRAZOLE SODIUM 40 MG: 40 INJECTION, POWDER, FOR SOLUTION INTRAVENOUS at 08:54

## 2024-03-10 RX ADMIN — HYDROMORPHONE HYDROCHLORIDE 0.4 MG: 1 INJECTION, SOLUTION INTRAMUSCULAR; INTRAVENOUS; SUBCUTANEOUS at 08:51

## 2024-03-10 RX ADMIN — PREGABALIN 150 MG: 75 CAPSULE ORAL at 21:33

## 2024-03-10 RX ADMIN — BUSPIRONE HYDROCHLORIDE 15 MG: 15 TABLET ORAL at 21:33

## 2024-03-10 RX ADMIN — ACYCLOVIR 200 MG: 200 CAPSULE ORAL at 08:58

## 2024-03-10 ASSESSMENT — COGNITIVE AND FUNCTIONAL STATUS - GENERAL
MOBILITY SCORE: 24
DAILY ACTIVITIY SCORE: 24
DAILY ACTIVITIY SCORE: 24
MOBILITY SCORE: 24

## 2024-03-10 ASSESSMENT — PAIN - FUNCTIONAL ASSESSMENT
PAIN_FUNCTIONAL_ASSESSMENT: 0-10

## 2024-03-10 ASSESSMENT — PAIN DESCRIPTION - DESCRIPTORS
DESCRIPTORS: ACHING
DESCRIPTORS: ACHING

## 2024-03-10 ASSESSMENT — PAIN SCALES - GENERAL
PAINLEVEL_OUTOF10: 8
PAINLEVEL_OUTOF10: 7
PAINLEVEL_OUTOF10: 4
PAINLEVEL_OUTOF10: 4
PAINLEVEL_OUTOF10: 0 - NO PAIN

## 2024-03-10 ASSESSMENT — ENCOUNTER SYMPTOMS
LIGHT-HEADEDNESS: 0
ARTHRALGIAS: 0
CHEST TIGHTNESS: 0
ABDOMINAL PAIN: 1
CHILLS: 0
VOMITING: 0
SHORTNESS OF BREATH: 0
NAUSEA: 0
DIZZINESS: 0
FEVER: 0

## 2024-03-10 ASSESSMENT — PAIN DESCRIPTION - LOCATION
LOCATION: ABDOMEN
LOCATION: ABDOMEN

## 2024-03-10 NOTE — CARE PLAN
The patient's goals for the shift include      The clinical goals for the shift include Patient's abdominal discomfort will be maintained at or below 4/10 on a 010 pain scale by end of shift, which she states is a tolerable level.    Over the shift, the patient's abdominal discomfort has been maintained at a 4/10 on a 0-10 pain scale. States she has  been comfortable.

## 2024-03-10 NOTE — PROGRESS NOTES
Kelli Ortega [id number]   Service: Internal Medicine / Hospitalist Date of service: 3/10/2024                                  Full Code                        Subjective       History Of Present Illness  Kelli Ortega is a 58 y.o. female presenting with Small bowel obstruction.        58-year-old  female who apparently presented to the emergency room department complaining of abdominal pain felt a pop apparently in her epigastrium earlier this morning prior to presentation.  She reported that the abdominal pain came in her heart and statin and that she had several bouts of nausea and vomiting was encouraged by her son to come to the hospital for further evaluation.  She reported significant surgical pass of Meckel's diverticulectomy.  At the time of evaluation emergency department she endorses generalized abdominal pain but denies any other major symptomatology.  Further review of systems positive for recent weight gain.  Hospitalist service was consulted for medical management during patient hospital course of.  Available diagnostic studies were reviewed electrolyte imbalance noted with hypokalemia.     3/9............Patient seen and examined this afternoon patient felt that she is doing much better felt that her abdominal distention have improved with nasogastric suction and. She endorsed abdominal pain.  She disclosed no prior history of seizure disorder.    3/10................Patient reported improvement in abdominal pain, patient hoping that she can have something to eat.  No hypoglycemic symptomatology reported.  No reported: Fevers, chills, palpitations, dyspnea, chest pain or syncope.           Review of Systems:   Review of system otherwise negative if not aforementioned above in subjective.     Objective        Physical Exam      Appearance: Patient appeared in no acute cardiopulmonary distress.     Comments: Patient alert and oriented to person place time and situation.  HENT:       Head: Normocephalic and atraumatic.      Right Ear: External ear normal.      Left Ear: External ear normal.      Nose: Nose normal. No congestion or rhinorrhea. Nasogastric tube noted in place.     Mouth/Throat: Mucous membranes Moist, Trachea midline.   Eyes:      Extraocular Movements: Extraocular movements intact.      Pupils: Pupils are equal, round, and reactive to light.      Comments:    Cardiovascular:      Rate and Rhythm: Normal rate and regular rhythm. No clicks rubs or gallops, normal S1 and S2.      Pulmonary:   Lungs clear to auscultation globally no appreciation of adventitious sounds     Abdominal:      General: Abdomen soft, hypoactive bowel sounds, no involuntary guarding or rebound tenderness appreciated.     Tenderness: Minimal tenderness, mild distention  Musculoskeletal:   No pitting edema, cyanosis or appreciation of acute new musculoskeletal deformity, osteoarthritic changes noted for upper extremity.          Lymphadenopathy:      Cervical: No cervical adenopathy.   Skin:     General: Skin is warm.      Capillary Refill: Capillary refill Less than 2 seconds.     Coloration:       Findings: No abnormal appearing skin rashes or lesions that appeared acute noted on unclothed area of the skin..   Neurological:      General: No focal sensory or motor deficits appreciated, no meningeal signs or dysmetria noted.      Cranial Nerves: Cranial nerves II to XII appearing grossly intact.       Psychiatric:         The patient appeared to be displaying a  normal mood and affect at the time of evaluation.         Labs:           Lab Results   Component Value Date     GLUCOSE 79 03/09/2024     CALCIUM 7.9 (L) 03/09/2024      03/09/2024     K 3.5 03/09/2024     CO2 24 03/09/2024      (H) 03/09/2024     BUN 10 03/09/2024     CREATININE 0.78 03/09/2024            Lab Results   Component Value Date     WBC 8.2 03/09/2024     HGB 10.7 (L) 03/09/2024     HCT 31.6 (L) 03/09/2024     MCV 85 03/09/2024       03/09/2024      Lab Results   Component Value Date    GLUCOSE 65 (L) 03/10/2024    CALCIUM 8.7 03/10/2024     03/10/2024    K 3.1 (L) 03/10/2024    CO2 20 (L) 03/10/2024     03/10/2024    BUN 9 03/10/2024    CREATININE 0.69 03/10/2024      Lab Results   Component Value Date    WBC 11.0 03/10/2024    HGB 12.8 03/10/2024    HCT 38.2 03/10/2024    MCV 85 03/10/2024     03/10/2024      [unfilled]   [unfilled]   No results found for the last 90 days.                  X-rays/ Images     [unfilled]   Procedure Component Value Units Date/Time   XR abdomen 1 view [[id number]] Collected: 03/08/24 0324   Order Status: Completed Updated: 03/08/24 0324   Narrative:     Interpreted By:  Marky Lucero,  STUDY:  XR ABDOMEN 1 VIEW;  3/8/2024 2:56 am      INDICATION:  Signs/Symptoms:SBO.      COMPARISON:  CT 3/7/2024      ACCESSION NUMBER(S):  [id number]      ORDERING CLINICIAN:  ELEAZAR WARNER      FINDINGS:  Nasogastric tube tip terminates in the midline upper abdomen at level  of the stomach. Dilated small bowel loops compatible patient's known  bowel obstruction are better assessed on 3/7/2024 CT examination.      No evidence of significant free abdominal air.       Impression:     Nasogastric tube tip terminates in the midline upper abdomen at level  of the stomach.      MACRO:  None      Signed by: Marky Lucero 3/8/2024 3:22 AM  Dictation workstation:   [id number]   XR chest 1 view [[id number]] Collected: 03/07/24 2109   Order Status: Completed Updated: 03/07/24 2109   Narrative:     Interpreted By:  Calvin Guevara,  STUDY:  XR CHEST 1 VIEW;  3/7/2024 8:42 pm      INDICATION:  Signs/Symptoms:NG placement verification.      COMPARISON:  Radiographs of the chest dated 05/22/2023; CT of the abdomen and  pelvis dated 03/07/2024.      ACCESSION NUMBER(S):  [id number]      ORDERING CLINICIAN:  THAI GALVEZ      FINDINGS:  AP radiograph of the chest was provided.      Enteric  tube courses inferior to the diaphragm, with the tip  overlying the expected location of the gastric body in the left upper  quadrant.      CARDIOMEDIASTINAL SILHOUETTE:  Cardiomediastinal silhouette is normal in size and configuration.      LUNGS:  Atelectatic changes are present in the left lung base.      ABDOMEN:  There is paucity of gas in the upper abdomen.      BONES:  No acute osseous changes.       Impression:     1.  Enteric tube courses inferior to the diaphragm, with the tip  overlying the expected location of the gastric body in the left upper  quadrant.  2. Paucity of gas in the upper abdomen.  3. Mild atelectatic changes are present in the left lung base.              MACRO:  None      Signed by: Calvin Guevara 3/7/2024 9:08 PM  Dictation workstation:   [id number]   CT abdomen pelvis w IV contrast [[id number]] Collected: 03/07/24 1406   Order Status: Completed Updated: 03/07/24 1406   Narrative:     Interpreted By:  Phill Linares,  STUDY:  CT ABDOMEN PELVIS W IV CONTRAST; ;  3/7/2024 1:44 pm      INDICATION:  Signs/Symptoms:abd pain. mid upper abdominal pain. Popping sensation  and swelling.      COMPARISON:  05/04/2023      ACCESSION NUMBER(S):  [id number]      ORDERING CLINICIAN:  HEMA VILLATORO      TECHNIQUE:  Contiguous axial CT sections are performed from the lung bases to the  lesser trochanters following the uneventful administration of 75 cc  of intravenous Omnipaque 350. The study is supplemented with coronal  and sagittal reformatted images.      FINDINGS:  There is subsegmental atelectasis in the inferior lingula in medial  right middle lobe as well as the left lower lobe.      There are multilevel discogenic degenerative changes of the lower  lumbar spine with levo convexity. There are mild osteoarthritic  changes of both hips. The osseous structures are intact.      There is a 12 x 6 mm hypodensity in the central right hepatic lobe  which is unchanged from 05/04/2023  favoring benign etiology such as  small cysts. The liver otherwise uniformly enhances. The medial  margin of the inferior right lower lobe has a lobular contour. There  is no surrounding fluid. There is no intrahepatic or extrahepatic  bile duct dilatation.      The gallbladder, spleen, pancreas, and adrenal glands are of normal  CT appearance.      The kidneys are symmetric in size and enhance symmetrically. No  space-occupying renal mass is identified. There is no hydronephrosis  or renal calculus. The perinephric fat is preserved. There is no  hydroureter or obstructing ureteral calculus. The urinary bladder is  not opacified and poorly distended though otherwise unremarkable.  There is no bladder calculus or wall thickening.      The abdominal aorta is of normal and uniform caliber and normal  enhancement. There is no periaortic mass or fluid collection. The IVC  is unremarkable.      There are dilated small bowel loops within the mid and lower abdomen  on both sides of midline. The dilated small bowel is fluid filled,  reaching 4.5 cm in diameter in the left lower quadrant. There is some  fecalization of dilated small bowel in the left lower quadrant. There  is no associated wall thickening. There is abrupt caliber change in  the left lower quadrant though no sizable soft tissue mass at this  site. This could be related to an area of stricture band adhesion.  The distal small bowel is decompressed. The colon is unremarkable.  The appendix is not clearly visualized. There is no evidence of  appendicitis.      There is a small volume of free pelvic fluid posteriorly on the  right. There is no free air collection in the abdomen or pelvis.  There is a small fat containing umbilical hernia. There is no  herniated bowel.       Impression:     Mechanical small-bowel obstruction with transition point in the left  lower quadrant. The etiology may be band adhesion or stricture. There  is no obvious soft tissue mass at  this site.      Small volume of free pelvic fluid posteriorly on the right.      Small fat containing umbilical hernia.      12 x 6 mm liver hypodensity remained stable from 05/04/2023 favoring  benign etiology such as a small cyst.      General surgical consultation is recommended.              Critical Finding:  See findings. Notification was initiated on  3/7/2024 at 2:04 pm by  Phill Linares.  (**-OCF-**)  Instructions:                  MACRO:  None      Signed by: Phill Linares 3/7/2024 2:05 PM  Dictation workstation:   [id number]                  Medical Problems         Problem List         * (Principal) Small bowel obstruction (CMS/HCC)     Allergic rhinitis     Anemia due to blood loss     GERD (gastroesophageal reflux disease)     Cervical disc disease     Cervical disc disorder with radiculopathy, cervicothoracic region     Cervical myofascial pain syndrome     Chronic low back pain     Chronic neck pain     Diarrhea     Chronic eczema     Chronic migraine with aura without status migrainosus, not intractable     Overview Addendum 1/24/2024  5:40 PM by Amber Resendez, REDD-CNP       Has tried and failed multiple prophylactic migraine meds  Listed allergy to amitriptyline  s/p Depakote  s/p topiramate--stopped due to polypharmacy  s/p Aimovig, helping but still having HA and migraine and wanted back to Emgality  Was well controlled on Emgality SC monthly and sumatriptan 100mg prn  Switched to Ajovy monthly and rizatriptan PRN per insurance preferences              Cigarette smoker     COPD (chronic obstructive pulmonary disease) with emphysema (CMS/HCC)     Degeneration of intervertebral disc of lumbar region     Degenerative joint disease of knee     Osteoarthritis     Dysphagia     Elevated sed rate     Fibromyalgia, primary     Food allergy     Gait abnormality     Gastric AVM     Chronic fatigue syndrome     Hyperlipidemia     Hypersomnia     Insomnia     Chronic constipation     Left  hand paresthesia     Left-sided thoracic back pain     Neurogenic claudication due to lumbar spinal stenosis     Leukocytosis     Displacement of lumbar disc with radiculopathy     Lumbar radiculopathy     Lumbar scoliosis     Lumbar spondylosis     Malaise and fatigue     Muscle cramping     Nausea     Neuropathic pain     Nicotine dependence     Osteoporosis     Rash, skin     Hemoptysis     Spinal stenosis of lumbosacral region     Transient amnesia     Overview Signed 1/24/2024  8:57 AM by AYAZ Yan       Having episodes of transient ammesia/AMS--previously thought to be due to polypharmacy  EEG wnl           Vaginal atrophy     Vitamin B12 deficiency     Vitamin D deficiency     Weakness of both lower extremities     Pain in female genitalia on intercourse     Frequency-urgency syndrome     Dysuria     Bladder wall thickening     Anxiety     IBS (irritable bowel syndrome) (Chronic)     Bipolar disorder (CMS/HCC)     History of hallucinations     Overview Signed 1/24/2024  5:38 PM by AYAZ Yan       Follows with psychiatry at [facility]  Reports longstanding history of intermittent hallucinations with visual, auditory and tactile components           Generalized abdominal pain                  Above medical problems may be reflective of historical medical problems that may have resolved and may not related to acute clinical condition/medical problems.     Clinical impression/plan:     1.  Small bowel obstruction     Continue conservative measures with nasogastric tube placement.,  Continue antiemetics, continue fluid hydration.     2.  Hypokalemia  Supplement potassium  Supplement surrogate magnesium.  Monitor for electrolyte imbalance patient at risk for electrolyte imbalance.     3/10............Hypokalemia persists, supplement potassium     3.  Hyperlipidemia     Continue statin therapy once NG tube  removed, chronic longitudinal therapy.     4.  Secondary  leukocytosis  Continue to monitor leukocytosis, continue monitor fever curve, suspicion of likely stress-induced neutrophilia.     5.  GI prophylaxis   PPI     6.  DVT prophylaxis low molecular weight heparin        Disposition/Additional care plan/interventions:3/10/2024     KUB as per surgical service    Clinical suspicion of stress-induced neutrophilia, now resolved.Monitor fever curve, monitor leukocytosis.     Hypokalemia :Hypokalemia persists, intravenous supplementation, change maintenance IV to D5 half-normal with 20 mill equivalents of potassium.  Thiamine intravenously surrogate magnesium supplementation  Continue conservative management of small bowel obstruction with nasal gastric decompression.  Patient at risk for electrolyte imbalance continue supplement magnesium and potassium.    Continue bowel decompression as per general surgery.  Hospitalist service will continue to follow along with surgical service.     The patient was informed of differential diagnosis , work up , plan of care and possible sequelae of clinical disposition.Patient in agreement with plan of care. Further recommendations forthcoming in accordance with patient's clinical disposition and response to care.     Discharge planning:Discharge timing to cards recommendation by the surgical service.     Care time:Less than 55 minutes              Dictation performed with assistance of voice recognition device therefore transcription errors are possible.

## 2024-03-10 NOTE — CARE PLAN
The patient's goals for the shift include      The clinical goals for the shift include Pt pain will be managed

## 2024-03-10 NOTE — PROGRESS NOTES
"GENERAL SURGERY PROGRESS NOTE    Kelli Ortega   1965   [id number]     Kelli Ortega is a 58 y.o. female on day 2 of admission presenting with Small bowel obstruction (CMS/HCC).      Subjective  Patient seen and assessed.  Patient denies any flatus overnight or bowel function.  She states some improvement compared to yesterday.  Nursing reports that patient had almost 2 L of NG tube output    Review of Systems   Constitutional:  Negative for chills and fever.   HENT:  Negative for congestion.    Respiratory:  Negative for chest tightness and shortness of breath.    Cardiovascular:  Negative for chest pain.   Gastrointestinal:  Positive for abdominal pain (Improved). Negative for nausea and vomiting.   Musculoskeletal:  Negative for arthralgias.   Neurological:  Negative for dizziness and light-headedness.       Objective    Last Recorded Vitals  Blood pressure 123/75, pulse 76, temperature 36.3 °C (97.4 °F), temperature source Temporal, resp. rate 16, height 1.6 m (5' 2.99\"), weight 65.3 kg (144 lb), SpO2 94 %.    Intake/Output last 3 Shifts:  I/O last 3 completed shifts:  In: 3048.6 (46.7 mL/kg) [P.O.:125; I.V.:2923.6 (44.8 mL/kg)]  Out: 4830 (73.9 mL/kg) [Urine:2300 (1 mL/kg/hr); Emesis/NG output:2530]  Weight: 65.3 kg     Gen: NAD.  A&Ox3  HEENT: NC/AT.  NGT in place with dark urine somewhat sanguinous output likely secondary to mucosal injury.  Moist mucous membranes.  Neck: Normal range of motion.  CV: Regular rate.  Chest: Normal chest rise.  Normal respiratory effort.  Abdomen: Soft.  Some locations of tenderness to palpation in all 4 quadrants greatest in the epigastrium.  Mildly distended less compared to yesterday.  No rigidity or guarding.  Extremities: No edema.  Moving all extremities.    Relevant Results  Results for orders placed or performed during the hospital encounter of 03/07/24 (from the past 24 hour(s))   Basic Metabolic Panel   Result Value Ref Range    Glucose 65 (L) 74 - 99 mg/dL "    Sodium 142 136 - 145 mmol/L    Potassium 3.1 (L) 3.5 - 5.3 mmol/L    Chloride 103 98 - 107 mmol/L    Bicarbonate 20 (L) 21 - 32 mmol/L    Anion Gap 22 (H) 10 - 20 mmol/L    Urea Nitrogen 9 6 - 23 mg/dL    Creatinine 0.69 0.50 - 1.05 mg/dL    eGFR >90 >60 mL/min/1.73m*2    Calcium 8.7 8.6 - 10.3 mg/dL   Magnesium   Result Value Ref Range    Magnesium 1.63 1.60 - 2.40 mg/dL   CBC   Result Value Ref Range    WBC 11.0 4.4 - 11.3 x10*3/uL    nRBC 0.0 0.0 - 0.0 /100 WBCs    RBC 4.48 4.00 - 5.20 x10*6/uL    Hemoglobin 12.8 12.0 - 16.0 g/dL    Hematocrit 38.2 36.0 - 46.0 %    MCV 85 80 - 100 fL    MCH 28.6 26.0 - 34.0 pg    MCHC 33.5 32.0 - 36.0 g/dL    RDW 19.8 (H) 11.5 - 14.5 %    Platelets 272 150 - 450 x10*3/uL       Assessment and Plan  Principal Problem:    Small bowel obstruction (CMS/HCC)  Active Problems:    Generalized abdominal pain    58 y.o. female with small bowel obstruction    Plan  -Conservative management with NG tube decompression.  Will obtain acute abdominal series x-rays this morning  -Encourage patient to ambulate as much as possible and be out of bed  -Continue n.p.o. and IV fluids-as needed pain and nausea medication  -Discussed with Dr. Tom Holly, PGY4  General Surgery   Pt states she passed small amount of air from below, states pain is recurring this am.  NG with > then 2 liters, will check AAS today to assess bowels.  Abd softly distended no peritoneal signs.  Continue NG and monitoring,

## 2024-03-11 ENCOUNTER — ANESTHESIA (OUTPATIENT)
Dept: OPERATING ROOM | Facility: HOSPITAL | Age: 59
DRG: 389 | End: 2024-03-11
Payer: MEDICARE

## 2024-03-11 ENCOUNTER — ANESTHESIA EVENT (OUTPATIENT)
Dept: OPERATING ROOM | Facility: HOSPITAL | Age: 59
DRG: 389 | End: 2024-03-11
Payer: MEDICARE

## 2024-03-11 ENCOUNTER — APPOINTMENT (OUTPATIENT)
Dept: RADIOLOGY | Facility: HOSPITAL | Age: 59
DRG: 389 | End: 2024-03-11
Payer: MEDICARE

## 2024-03-11 ENCOUNTER — APPOINTMENT (OUTPATIENT)
Dept: PAIN MEDICINE | Facility: CLINIC | Age: 59
End: 2024-03-11
Payer: MEDICARE

## 2024-03-11 PROBLEM — K56.609 SBO (SMALL BOWEL OBSTRUCTION) (MULTI): Status: ACTIVE | Noted: 2024-03-07

## 2024-03-11 LAB
ABO GROUP (TYPE) IN BLOOD: NORMAL
ANION GAP SERPL CALC-SCNC: 18 MMOL/L (ref 10–20)
ANTIBODY SCREEN: NORMAL
BUN SERPL-MCNC: 9 MG/DL (ref 6–23)
CALCIUM SERPL-MCNC: 8.4 MG/DL (ref 8.6–10.3)
CHLORIDE SERPL-SCNC: 104 MMOL/L (ref 98–107)
CO2 SERPL-SCNC: 22 MMOL/L (ref 21–32)
CREAT SERPL-MCNC: 0.67 MG/DL (ref 0.5–1.05)
EGFRCR SERPLBLD CKD-EPI 2021: >90 ML/MIN/1.73M*2
ERYTHROCYTE [DISTWIDTH] IN BLOOD BY AUTOMATED COUNT: 20.3 % (ref 11.5–14.5)
GLUCOSE SERPL-MCNC: 114 MG/DL (ref 74–99)
HCT VFR BLD AUTO: 40.7 % (ref 36–46)
HGB BLD-MCNC: 13.3 G/DL (ref 12–16)
LACTATE SERPL-SCNC: 0.4 MMOL/L (ref 0.4–2)
LACTATE SERPL-SCNC: 0.5 MMOL/L (ref 0.4–2)
MAGNESIUM SERPL-MCNC: 2 MG/DL (ref 1.6–2.4)
MCH RBC QN AUTO: 28.6 PG (ref 26–34)
MCHC RBC AUTO-ENTMCNC: 32.7 G/DL (ref 32–36)
MCV RBC AUTO: 88 FL (ref 80–100)
NRBC BLD-RTO: 0 /100 WBCS (ref 0–0)
PLATELET # BLD AUTO: 223 X10*3/UL (ref 150–450)
POTASSIUM SERPL-SCNC: 3 MMOL/L (ref 3.5–5.3)
RBC # BLD AUTO: 4.65 X10*6/UL (ref 4–5.2)
RH FACTOR (ANTIGEN D): NORMAL
SODIUM SERPL-SCNC: 141 MMOL/L (ref 136–145)
WBC # BLD AUTO: 11 X10*3/UL (ref 4.4–11.3)

## 2024-03-11 PROCEDURE — 2500000002 HC RX 250 W HCPCS SELF ADMINISTERED DRUGS (ALT 637 FOR MEDICARE OP, ALT 636 FOR OP/ED): Performed by: STUDENT IN AN ORGANIZED HEALTH CARE EDUCATION/TRAINING PROGRAM

## 2024-03-11 PROCEDURE — 99233 SBSQ HOSP IP/OBS HIGH 50: CPT | Performed by: HOSPITALIST

## 2024-03-11 PROCEDURE — 74177 CT ABD & PELVIS W/CONTRAST: CPT

## 2024-03-11 PROCEDURE — C9113 INJ PANTOPRAZOLE SODIUM, VIA: HCPCS

## 2024-03-11 PROCEDURE — 80048 BASIC METABOLIC PNL TOTAL CA: CPT | Performed by: HOSPITALIST

## 2024-03-11 PROCEDURE — 85027 COMPLETE CBC AUTOMATED: CPT | Performed by: ANESTHESIOLOGY

## 2024-03-11 PROCEDURE — 86901 BLOOD TYPING SEROLOGIC RH(D): CPT | Performed by: ANESTHESIOLOGY

## 2024-03-11 PROCEDURE — C9113 INJ PANTOPRAZOLE SODIUM, VIA: HCPCS | Performed by: SURGERY

## 2024-03-11 PROCEDURE — 2500000004 HC RX 250 GENERAL PHARMACY W/ HCPCS (ALT 636 FOR OP/ED): Performed by: STUDENT IN AN ORGANIZED HEALTH CARE EDUCATION/TRAINING PROGRAM

## 2024-03-11 PROCEDURE — 2500000004 HC RX 250 GENERAL PHARMACY W/ HCPCS (ALT 636 FOR OP/ED): Performed by: HOSPITALIST

## 2024-03-11 PROCEDURE — 2550000001 HC RX 255 CONTRASTS: Performed by: SURGERY

## 2024-03-11 PROCEDURE — A9698 NON-RAD CONTRAST MATERIALNOC: HCPCS | Performed by: SURGERY

## 2024-03-11 PROCEDURE — 2500000004 HC RX 250 GENERAL PHARMACY W/ HCPCS (ALT 636 FOR OP/ED): Performed by: SURGERY

## 2024-03-11 PROCEDURE — 83735 ASSAY OF MAGNESIUM: CPT | Performed by: HOSPITALIST

## 2024-03-11 PROCEDURE — 2500000001 HC RX 250 WO HCPCS SELF ADMINISTERED DRUGS (ALT 637 FOR MEDICARE OP): Performed by: STUDENT IN AN ORGANIZED HEALTH CARE EDUCATION/TRAINING PROGRAM

## 2024-03-11 PROCEDURE — 83605 ASSAY OF LACTIC ACID: CPT | Performed by: ANESTHESIOLOGY

## 2024-03-11 PROCEDURE — 1100000001 HC PRIVATE ROOM DAILY

## 2024-03-11 PROCEDURE — 76937 US GUIDE VASCULAR ACCESS: CPT

## 2024-03-11 PROCEDURE — 2500000004 HC RX 250 GENERAL PHARMACY W/ HCPCS (ALT 636 FOR OP/ED)

## 2024-03-11 PROCEDURE — 83605 ASSAY OF LACTIC ACID: CPT | Performed by: INTERNAL MEDICINE

## 2024-03-11 PROCEDURE — 99232 SBSQ HOSP IP/OBS MODERATE 35: CPT | Performed by: SURGERY

## 2024-03-11 PROCEDURE — 36415 COLL VENOUS BLD VENIPUNCTURE: CPT | Performed by: INTERNAL MEDICINE

## 2024-03-11 PROCEDURE — 36415 COLL VENOUS BLD VENIPUNCTURE: CPT | Performed by: HOSPITALIST

## 2024-03-11 RX ORDER — FENTANYL CITRATE 50 UG/ML
12.5 INJECTION, SOLUTION INTRAMUSCULAR; INTRAVENOUS EVERY 5 MIN PRN
OUTPATIENT
Start: 2024-03-11

## 2024-03-11 RX ORDER — DEXTROSE MONOHYDRATE, SODIUM CHLORIDE, SODIUM LACTATE, POTASSIUM CHLORIDE, CALCIUM CHLORIDE 5; 600; 310; 179; 20 G/100ML; MG/100ML; MG/100ML; MG/100ML; MG/100ML
75 INJECTION, SOLUTION INTRAVENOUS CONTINUOUS
Status: DISCONTINUED | OUTPATIENT
Start: 2024-03-11 | End: 2024-03-11

## 2024-03-11 RX ORDER — SODIUM CHLORIDE, SODIUM LACTATE, POTASSIUM CHLORIDE, CALCIUM CHLORIDE 600; 310; 30; 20 MG/100ML; MG/100ML; MG/100ML; MG/100ML
50 INJECTION, SOLUTION INTRAVENOUS CONTINUOUS
Status: CANCELLED | OUTPATIENT
Start: 2024-03-11

## 2024-03-11 RX ORDER — METRONIDAZOLE 500 MG/100ML
500 INJECTION, SOLUTION INTRAVENOUS ONCE
Status: CANCELLED | OUTPATIENT
Start: 2024-03-11 | End: 2024-03-11

## 2024-03-11 RX ORDER — IPRATROPIUM BROMIDE AND ALBUTEROL SULFATE 2.5; .5 MG/3ML; MG/3ML
3 SOLUTION RESPIRATORY (INHALATION) ONCE
Status: CANCELLED | OUTPATIENT
Start: 2024-03-11 | End: 2024-03-11

## 2024-03-11 RX ORDER — CIPROFLOXACIN 2 MG/ML
400 INJECTION, SOLUTION INTRAVENOUS ONCE
Status: CANCELLED | OUTPATIENT
Start: 2024-03-11 | End: 2024-03-11

## 2024-03-11 RX ORDER — FAMOTIDINE 10 MG/ML
20 INJECTION INTRAVENOUS ONCE
Status: CANCELLED | OUTPATIENT
Start: 2024-03-11 | End: 2024-03-11

## 2024-03-11 RX ORDER — FENTANYL CITRATE 50 UG/ML
6.25 INJECTION, SOLUTION INTRAMUSCULAR; INTRAVENOUS EVERY 5 MIN PRN
OUTPATIENT
Start: 2024-03-11

## 2024-03-11 RX ORDER — DEXTROSE MONOHYDRATE, SODIUM CHLORIDE, AND POTASSIUM CHLORIDE 50; 2.98; 4.5 G/1000ML; G/1000ML; G/1000ML
75 INJECTION, SOLUTION INTRAVENOUS CONTINUOUS
Status: DISCONTINUED | OUTPATIENT
Start: 2024-03-11 | End: 2024-03-12

## 2024-03-11 RX ORDER — SODIUM CHLORIDE 9 MG/ML
50 INJECTION, SOLUTION INTRAVENOUS CONTINUOUS
Status: CANCELLED | OUTPATIENT
Start: 2024-03-11

## 2024-03-11 RX ORDER — PANTOPRAZOLE SODIUM 40 MG/10ML
40 INJECTION, POWDER, LYOPHILIZED, FOR SOLUTION INTRAVENOUS 2 TIMES DAILY
Status: DISCONTINUED | OUTPATIENT
Start: 2024-03-11 | End: 2024-03-13 | Stop reason: HOSPADM

## 2024-03-11 RX ORDER — POTASSIUM CHLORIDE 14.9 MG/ML
20 INJECTION INTRAVENOUS
Status: DISPENSED | OUTPATIENT
Start: 2024-03-11 | End: 2024-03-11

## 2024-03-11 RX ADMIN — POTASSIUM CHLORIDE 20 MEQ: 14.9 INJECTION, SOLUTION INTRAVENOUS at 09:28

## 2024-03-11 RX ADMIN — BUSPIRONE HYDROCHLORIDE 15 MG: 15 TABLET ORAL at 21:37

## 2024-03-11 RX ADMIN — POTASSIUM CHLORIDE 75 ML/HR: 149 INJECTION, SOLUTION, CONCENTRATE INTRAVENOUS at 15:30

## 2024-03-11 RX ADMIN — TOPIRAMATE 25 MG: 25 TABLET, FILM COATED ORAL at 09:28

## 2024-03-11 RX ADMIN — HYDROMORPHONE HYDROCHLORIDE 0.4 MG: 1 INJECTION, SOLUTION INTRAMUSCULAR; INTRAVENOUS; SUBCUTANEOUS at 09:43

## 2024-03-11 RX ADMIN — POTASSIUM CHLORIDE, DEXTROSE MONOHYDRATE AND SODIUM CHLORIDE 100 ML/HR: 150; 5; 450 INJECTION, SOLUTION INTRAVENOUS at 01:36

## 2024-03-11 RX ADMIN — PREGABALIN 150 MG: 75 CAPSULE ORAL at 15:40

## 2024-03-11 RX ADMIN — PREGABALIN 150 MG: 75 CAPSULE ORAL at 21:37

## 2024-03-11 RX ADMIN — TIOTROPIUM BROMIDE INHALATION SPRAY 2 PUFF: 3.12 SPRAY, METERED RESPIRATORY (INHALATION) at 09:31

## 2024-03-11 RX ADMIN — PANTOPRAZOLE SODIUM 40 MG: 40 INJECTION, POWDER, FOR SOLUTION INTRAVENOUS at 09:29

## 2024-03-11 RX ADMIN — FLUTICASONE FUROATE AND VILANTEROL TRIFENATATE 1 PUFF: 200; 25 POWDER RESPIRATORY (INHALATION) at 09:31

## 2024-03-11 RX ADMIN — TOPIRAMATE 25 MG: 25 TABLET, FILM COATED ORAL at 21:37

## 2024-03-11 RX ADMIN — HYDROMORPHONE HYDROCHLORIDE 0.4 MG: 1 INJECTION, SOLUTION INTRAMUSCULAR; INTRAVENOUS; SUBCUTANEOUS at 21:48

## 2024-03-11 RX ADMIN — DIAZEPAM 5 MG: 5 TABLET ORAL at 21:37

## 2024-03-11 RX ADMIN — PREGABALIN 150 MG: 75 CAPSULE ORAL at 09:28

## 2024-03-11 RX ADMIN — BUSPIRONE HYDROCHLORIDE 15 MG: 15 TABLET ORAL at 15:39

## 2024-03-11 RX ADMIN — DIAZEPAM 5 MG: 5 TABLET ORAL at 09:28

## 2024-03-11 RX ADMIN — PANTOPRAZOLE SODIUM 40 MG: 40 INJECTION, POWDER, FOR SOLUTION INTRAVENOUS at 21:36

## 2024-03-11 RX ADMIN — IOHEXOL 500 ML: 12 SOLUTION ORAL at 18:21

## 2024-03-11 RX ADMIN — LORATADINE 10 MG: 10 TABLET ORAL at 21:37

## 2024-03-11 RX ADMIN — BUSPIRONE HYDROCHLORIDE 15 MG: 15 TABLET ORAL at 09:28

## 2024-03-11 RX ADMIN — IOHEXOL 75 ML: 350 INJECTION, SOLUTION INTRAVENOUS at 18:19

## 2024-03-11 RX ADMIN — ZOLPIDEM TARTRATE 10 MG: 5 TABLET ORAL at 02:37

## 2024-03-11 RX ADMIN — ACYCLOVIR 200 MG: 200 CAPSULE ORAL at 09:28

## 2024-03-11 ASSESSMENT — COGNITIVE AND FUNCTIONAL STATUS - GENERAL
MOBILITY SCORE: 24
MOBILITY SCORE: 24
DAILY ACTIVITIY SCORE: 24
DAILY ACTIVITIY SCORE: 24
MOBILITY SCORE: 24
DAILY ACTIVITIY SCORE: 24
DAILY ACTIVITIY SCORE: 24
MOBILITY SCORE: 24

## 2024-03-11 ASSESSMENT — PAIN DESCRIPTION - ORIENTATION: ORIENTATION: MID

## 2024-03-11 ASSESSMENT — ENCOUNTER SYMPTOMS
FEVER: 0
VOMITING: 0
ABDOMINAL PAIN: 1
SHORTNESS OF BREATH: 0
NERVOUS/ANXIOUS: 1
NAUSEA: 0
WHEEZING: 0
AGITATION: 1

## 2024-03-11 ASSESSMENT — PAIN - FUNCTIONAL ASSESSMENT
PAIN_FUNCTIONAL_ASSESSMENT: 0-10

## 2024-03-11 ASSESSMENT — PAIN SCALES - GENERAL
PAINLEVEL_OUTOF10: 10 - WORST POSSIBLE PAIN
PAINLEVEL_OUTOF10: 5 - MODERATE PAIN
PAINLEVEL_OUTOF10: 7

## 2024-03-11 ASSESSMENT — PAIN DESCRIPTION - LOCATION: LOCATION: ABDOMEN

## 2024-03-11 NOTE — PROGRESS NOTES
Nutrition Initial Assessment:   Nutrition Assessment    Reason for Assessment: NPO/clear liquids >5 days    Medical history per chart:   Patient presents with SBO. Abdominal film from yesterday showed persistent small bowel obstruction with no significant air in the colon. NG tube output is greater than 2 L and has a dark coloration. She continues to have abdominal pain requiring IV Dilaudid. Reports a small bowel movement yesterday evening.   PMHX: SBR due to bleeding Meckel's diverticulum. Had to have anastomotic redo in 2012 (Dr. Santos) due to a food bezoar at the anastomosis. She performed an end-to-end handsewn anastomosis, GERD, chronic neck/low back pain, IBS, COPD, additional pmhx noted/reviewed    3/11: Patient seen in AM. NGT in place. Continues on ice chips. Has been npo since 3/8. Prior to admission, patient states she did not have good eating patterns, sometimes she eats breakfast and/or lunch, but will have a good dinner son prepares. She states she has not lost weight but has actually gained about 30-40 lbs the past 6 months of unclear etiology. Per review of weight history patient has actually lost about 8 lbs (non significant). Will continue to monitor NPO status and diet progression/need for supplements.     Current Diet: NPO Diet Except: Ice chips, Sips with meds; Effective now    Nutrition Related Findings:   Oral Symptoms: none     GI symptoms: abdominal pain.   BM: Last BM Date: 03/08/24  Wound Type: none (nursing/wound notes provide further details)  Edema: No  Food allergies: NKFA.   Meds/Labs reviewed.    Nutrition Significant Labs:  Results from last 7 days   Lab Units 03/11/24  0550 03/10/24  1513 03/10/24  0610 03/09/24  0342 03/08/24  0526   GLUCOSE mg/dL 114*  --  65* 79 107*   SODIUM mmol/L 141  --  142 141 140   POTASSIUM mmol/L 3.0* 3.3* 3.1* 3.5 3.2*   CHLORIDE mmol/L 104  --  103 110* 107   CO2 mmol/L 22  --  20* 24 24   BUN mg/dL 9  --  9 10 11   CREATININE mg/dL 0.67  --  0.69  "0.78 0.80   EGFR mL/min/1.73m*2 >90  --  >90 87 84   CALCIUM mg/dL 8.4*  --  8.7 7.9* 8.1*   PHOSPHORUS mg/dL  --   --   --   --  3.5   MAGNESIUM mg/dL 2.00  --  1.63 1.97 1.77     Lab Results   Component Value Date    HGBA1C 6.0 (A) 05/23/2023    HGBA1C 5.9 (A) 10/25/2022           Anthropometrics:  Height: 160 cm (5' 2.99\")   Weight: 65.3 kg (144 lb)   BMI (Calculated): 25.51           Weight History:   Wt Readings from Last 10 Encounters:   03/08/24 65.3 kg (144 lb)   02/29/24 66.7 kg (147 lb)   01/24/24 67.9 kg (149 lb 11.2 oz)   11/22/23 69 kg (152 lb 3.2 oz)   11/07/23 68 kg (150 lb)   05/31/23 68 kg (150 lb)   05/22/23 67.6 kg (149 lb)   04/10/23 67.9 kg (149 lb 9.6 oz)   03/01/23 63 kg (139 lb)   02/09/23 63 kg (139 lb)        Weight Change %:  Weight History / % Weight Change: Reports weight gain of about 30-40 lbs the past 6 months; however wt hx as notes about 8 lbs weight loss 3.5 months (5% loss which is non significant)  Significant Weight Loss: No          Nutrition Focused Physical Exam Findings:  Subcutaneous Fat Loss:   Orbital Fat Pads: Well nourshed (slightly bulging fat pads)  Buccal Fat Pads: Well nourished (full, rounded cheeks)  Triceps: Well nourished (ample fat tissue)  Muscle Wasting:  Temporalis: Well nourished (well-defined muscle)  Pectoralis (Clavicular Region): Well nourished (clavicle not visible)  Deltoid/Trapezius: Well nourished (rounded appearance at arm, shoulder, neck)  Interosseous: Well nourished (muscle bulges)  Edema:  Edema: none  Physical Findings:  Hair: Negative  Eyes: Negative  Mouth: Negative  Nails: Negative  Skin: Negative    Estimated Needs:   Total Energy Estimated Needs (kCal): 1632 kCal  Method for Estimating Needs: 25 kcal/kg  Total Protein Estimated Needs (g):  (65-78; pending surgery)  Method for Estimating Needs: 1.0-1.2 g/kg  Total Fluid Estimated Needs (mL): 1600 mL  Method for Estimating Needs: 1 mL/kcal        Nutrition Diagnosis   Nutrition " Diagnosis:  Malnutrition Diagnosis  Patient has Malnutrition Diagnosis: Yes  Diagnosis Status: New  Malnutrition Diagnosis: Mild malnutrition related to acute disease or injury  As Evidenced by: <50% of estaimted needs x 5 days    Nutrition Diagnosis  Patient has Nutrition Diagnosis: Yes  Diagnosis Status (1): New  Nutrition Diagnosis 1: Inadequate oral intake  Related to (1): altered GI function  As Evidenced by (1): npo/clears x 4 days       Nutrition Interventions/Recommendations   Nutrition Interventions and Recommendations:        Nutrition Prescription:  Individualized Nutrition Prescription Provided for : Advance diet to goal GI soft, fiber controlled. If unable to advance diet or tolerate diet advancement within the next 72 hours, consider PN. Add ensure plus with meals when on appropriate diet. Obtain weekly weights        Nutrition Interventions:   Food and/or Nutrient Delivery Interventions  Meals and Snacks: Fiber-modified diet  Goal: tolerate diet progresssion to goal GI soft, low fiber consuming >50%         Nutrition Education:   Education Documentation  No documentation found.      Nutrition Counseling  Counseling Theoretical Approach:  (other)  Goal: defer, NPO       Nutrition Monitoring and Evaluation   Monitoring/Evaluation:   Food/Nutrient Related History Monitoring  Monitoring and Evaluation Plan: Energy intake  Energy Intake: Estimated energy intake  Criteria: meet >75%    Body Composition/Growth/Weight History  Monitoring and Evaluation Plan: Weight  Weight: Measured weight  Criteria: stable    Biochemical Data, Medical Tests and Procedures  Monitoring and Evaluation Plan: Electrolyte/renal panel  Electrolyte and Renal Panel: Calcium, serum, Potassium  Criteria: wnl                 Time Spent/Follow-up Reminder:   Follow Up  Time Spent (min): 60 minutes  Last Date of Nutrition Visit: 03/11/24  Nutrition Follow-Up Needed?: Dietitian to reassess per policy  Follow up Comment: 3/13, check diet  adv

## 2024-03-11 NOTE — INTERVAL H&P NOTE
H&P reviewed. The patient was examined and there are no changes to the H&P.  Bowel obstruction is not responding to medical management.  Therefore, surgical intervention is recommended.

## 2024-03-11 NOTE — CONSULTS
Vascular Access Team  Consult     Visit Date: 3/11/2024      Patient Name: Kelli Ortega         MRN: [id number]                Reason for Consult: Difficult IV access       Assessment: US guided IV to left forearm without difficulty x1 attempt in aseptic fashion. Line flushes and draws easily without a tourniquet. Pt tolerated well. Family member present at bedside.           Lelia Luna RN  3/11/2024  1:47 PM

## 2024-03-11 NOTE — SIGNIFICANT EVENT
Called by th patient this am through my office.  Pt requesting me to take over her care.  She is concerned about proceeding to surgery today, I offered to do a CT with PO contrast to assess her bowels prior to surgery she is agreeable with that.  Dr Weller notified of patients request and agreeable for me to assume her care.  Daughter was also on the phone for these discussions, and agreed as well.  CT ordered, contrast now being given through the NG.

## 2024-03-11 NOTE — SIGNIFICANT EVENT
Abdominal films from yesterday show persistent small bowel obstruction with mostly fluid within the small bowel.  No significant air in the colon.  Continues to have high-volume NG tube output.  Discussed need for surgical intervention with the patient and the patient's daughter, Afsaneh.  Discussed the benefits and risk of surgery.  Risk included, but not limited to, infection, bleeding, injury to surrounding tissue, possible need for other procedure, possible need for bowel resection, possible need for ostomy, nonresolution of all symptoms, allergic reaction to medication and even death.  Will plan to do tap block to help with postoperative pain.  Given her mental status issues, we will plan for ICU admission postoperatively as she would be a high risk for pulling tubes and IVs.  May also need increased sedation due to her bipolar disorder.  Talked with the patient's daughter, Afsaneh, separately who understood her mother's condition and need for surgical intervention.  Proceed with surgery today.

## 2024-03-11 NOTE — PROGRESS NOTES
Kelli Ortega [id number]   Service: Internal Medicine / Hospitalist Date of service: 3/11/2024                                  Full Code                        Subjective       History Of Present Illness  Kelli Ortega is a 58 y.o. female presenting with Small bowel obstruction.        58-year-old  female who apparently presented to the emergency room department complaining of abdominal pain felt a pop apparently in her epigastrium earlier this morning prior to presentation.  She reported that the abdominal pain came in her heart and statin and that she had several bouts of nausea and vomiting was encouraged by her son to come to the hospital for further evaluation.  She reported significant surgical pass of Meckel's diverticulectomy.  At the time of evaluation emergency department she endorses generalized abdominal pain but denies any other major symptomatology.  Further review of systems positive for recent weight gain.  Hospitalist service was consulted for medical management during patient hospital course of.  Available diagnostic studies were reviewed electrolyte imbalance noted with hypokalemia.     3/9............Patient seen and examined this afternoon patient felt that she is doing much better felt that her abdominal distention have improved with nasogastric suction and. She endorsed abdominal pain.  She disclosed no prior history of seizure disorder.     3/10................Patient reported improvement in abdominal pain, patient hoping that she can have something to eat.  No hypoglycemic symptomatology reported.  No reported: Fevers, chills, palpitations, dyspnea, chest pain or syncope.    3/11......................Patient seen and examined in presence of her nurse.  At the time of evaluation today decision was made for conservative measures no planned surgical intervention at this time.  No reported : palpitations, headaches, abdominal pain, fevers, chills clinical nausea, vomiting or  dyspnea.           Review of Systems:   Review of system otherwise negative if not aforementioned above in subjective.     Objective        Physical Exam      Appearance: Patient appeared in no acute cardiopulmonary distress.     Comments: Patient alert and oriented to person place time and situation.  HENT:      Head: Normocephalic and atraumatic.      Right Ear: External ear normal.      Left Ear: External ear normal.      Nose: Nose normal. No congestion or rhinorrhea. Nasogastric tube noted in place.     Mouth/Throat: Mucous membranes Moist, Trachea midline.   Eyes:      Extraocular Movements: Extraocular movements intact.      Pupils: Pupils are equal, round, and reactive to light.      Comments:    Cardiovascular:      Rate and Rhythm: Normal rate and regular rhythm. No clicks rubs or gallops, normal S1 and S2.      Pulmonary:   Lungs clear to auscultation globally no appreciation of adventitious sounds     Abdominal:      General: Abdomen soft, hypoactive bowel sounds, no involuntary guarding or rebound tenderness appreciated.     Tenderness: Minimal tenderness, mild distention  Musculoskeletal:   No pitting edema, cyanosis or appreciation of acute new musculoskeletal deformity, osteoarthritic changes noted for upper extremity.          Lymphadenopathy:      Cervical: No cervical adenopathy.   Skin:     General: Skin is warm.      Capillary Refill: Capillary refill Less than 2 seconds.     Coloration:       Findings: No abnormal appearing skin rashes or lesions that appeared acute noted on unclothed area of the skin..   Neurological:      General: No focal sensory or motor deficits appreciated, no meningeal signs or dysmetria noted.      Cranial Nerves: Cranial nerves II to XII appearing grossly intact.       Psychiatric:         The patient appeared to be displaying a  normal mood and affect at the time of evaluation.         Labs:                      Lab Results   Component Value Date    GLUCOSE 114 (H)  03/11/2024    CALCIUM 8.4 (L) 03/11/2024     03/11/2024    K 3.0 (L) 03/11/2024    CO2 22 03/11/2024     03/11/2024    BUN 9 03/11/2024    CREATININE 0.67 03/11/2024      Lab Results   Component Value Date    WBC 11.0 03/11/2024    HGB 13.3 03/11/2024    HCT 40.7 03/11/2024    MCV 88 03/11/2024     03/11/2024      Lab Results   Component Value Date    GLUCOSE 114 (H) 03/11/2024    CALCIUM 8.4 (L) 03/11/2024     03/11/2024    K 3.0 (L) 03/11/2024    CO2 22 03/11/2024     03/11/2024    BUN 9 03/11/2024    CREATININE 0.67 03/11/2024      Lab Results   Component Value Date    WBC 11.0 03/11/2024    HGB 13.3 03/11/2024    HCT 40.7 03/11/2024    MCV 88 03/11/2024     03/11/2024      [unfilled]   [unfilled]   No results found for the last 90 days.                  X-rays/ Images     [unfilled]   Procedure Component Value Units Date/Time   XR abdomen 1 view [[id number]] Collected: 03/08/24 0324   Order Status: Completed Updated: 03/08/24 0324   Narrative:     Interpreted By:  Marky Lucero,  STUDY:  XR ABDOMEN 1 VIEW;  3/8/2024 2:56 am      INDICATION:  Signs/Symptoms:SBO.      COMPARISON:  CT 3/7/2024      ACCESSION NUMBER(S):  [id number]      ORDERING CLINICIAN:  ELEAZAR WARNER      FINDINGS:  Nasogastric tube tip terminates in the midline upper abdomen at level  of the stomach. Dilated small bowel loops compatible patient's known  bowel obstruction are better assessed on 3/7/2024 CT examination.      No evidence of significant free abdominal air.       Impression:     Nasogastric tube tip terminates in the midline upper abdomen at level  of the stomach.      MACRO:  None      Signed by: Marky Lucero 3/8/2024 3:22 AM  Dictation workstation:   [id number]   XR chest 1 view [[id number]] Collected: 03/07/24 2109   Order Status: Completed Updated: 03/07/24 2109   Narrative:     Interpreted By:  Calvin Guevara,  STUDY:  XR CHEST 1 VIEW;  3/7/2024 8:42 pm       INDICATION:  Signs/Symptoms:NG placement verification.      COMPARISON:  Radiographs of the chest dated 05/22/2023; CT of the abdomen and  pelvis dated 03/07/2024.      ACCESSION NUMBER(S):  [id number]      ORDERING CLINICIAN:  THAI GALVEZ      FINDINGS:  AP radiograph of the chest was provided.      Enteric tube courses inferior to the diaphragm, with the tip  overlying the expected location of the gastric body in the left upper  quadrant.      CARDIOMEDIASTINAL SILHOUETTE:  Cardiomediastinal silhouette is normal in size and configuration.      LUNGS:  Atelectatic changes are present in the left lung base.      ABDOMEN:  There is paucity of gas in the upper abdomen.      BONES:  No acute osseous changes.       Impression:     1.  Enteric tube courses inferior to the diaphragm, with the tip  overlying the expected location of the gastric body in the left upper  quadrant.  2. Paucity of gas in the upper abdomen.  3. Mild atelectatic changes are present in the left lung base.              MACRO:  None      Signed by: Calvin Guevara 3/7/2024 9:08 PM  Dictation workstation:   [id number]   CT abdomen pelvis w IV contrast [[id number]] Collected: 03/07/24 1406   Order Status: Completed Updated: 03/07/24 1406   Narrative:     Interpreted By:  Phill Linares,  STUDY:  CT ABDOMEN PELVIS W IV CONTRAST; ;  3/7/2024 1:44 pm      INDICATION:  Signs/Symptoms:abd pain. mid upper abdominal pain. Popping sensation  and swelling.      COMPARISON:  05/04/2023      ACCESSION NUMBER(S):  [id number]      ORDERING CLINICIAN:  HEMA VILLATORO      TECHNIQUE:  Contiguous axial CT sections are performed from the lung bases to the  lesser trochanters following the uneventful administration of 75 cc  of intravenous Omnipaque 350. The study is supplemented with coronal  and sagittal reformatted images.      FINDINGS:  There is subsegmental atelectasis in the inferior lingula in medial  right middle lobe as well as the  left lower lobe.      There are multilevel discogenic degenerative changes of the lower  lumbar spine with levo convexity. There are mild osteoarthritic  changes of both hips. The osseous structures are intact.      There is a 12 x 6 mm hypodensity in the central right hepatic lobe  which is unchanged from 05/04/2023 favoring benign etiology such as  small cysts. The liver otherwise uniformly enhances. The medial  margin of the inferior right lower lobe has a lobular contour. There  is no surrounding fluid. There is no intrahepatic or extrahepatic  bile duct dilatation.      The gallbladder, spleen, pancreas, and adrenal glands are of normal  CT appearance.      The kidneys are symmetric in size and enhance symmetrically. No  space-occupying renal mass is identified. There is no hydronephrosis  or renal calculus. The perinephric fat is preserved. There is no  hydroureter or obstructing ureteral calculus. The urinary bladder is  not opacified and poorly distended though otherwise unremarkable.  There is no bladder calculus or wall thickening.      The abdominal aorta is of normal and uniform caliber and normal  enhancement. There is no periaortic mass or fluid collection. The IVC  is unremarkable.      There are dilated small bowel loops within the mid and lower abdomen  on both sides of midline. The dilated small bowel is fluid filled,  reaching 4.5 cm in diameter in the left lower quadrant. There is some  fecalization of dilated small bowel in the left lower quadrant. There  is no associated wall thickening. There is abrupt caliber change in  the left lower quadrant though no sizable soft tissue mass at this  site. This could be related to an area of stricture band adhesion.  The distal small bowel is decompressed. The colon is unremarkable.  The appendix is not clearly visualized. There is no evidence of  appendicitis.      There is a small volume of free pelvic fluid posteriorly on the  right. There is no free  air collection in the abdomen or pelvis.  There is a small fat containing umbilical hernia. There is no  herniated bowel.       Impression:     Mechanical small-bowel obstruction with transition point in the left  lower quadrant. The etiology may be band adhesion or stricture. There  is no obvious soft tissue mass at this site.      Small volume of free pelvic fluid posteriorly on the right.      Small fat containing umbilical hernia.      12 x 6 mm liver hypodensity remained stable from 05/04/2023 favoring  benign etiology such as a small cyst.      General surgical consultation is recommended.              Critical Finding:  See findings. Notification was initiated on  3/7/2024 at 2:04 pm by  Phill Linares.  (**-OCF-**)  Instructions:                  MACRO:  None      Signed by: Phill Linares 3/7/2024 2:05 PM  Dictation workstation:   [id number]                  Medical Problems         Problem List         * (Principal) Small bowel obstruction (CMS/HCC)     Allergic rhinitis     Anemia due to blood loss     GERD (gastroesophageal reflux disease)     Cervical disc disease     Cervical disc disorder with radiculopathy, cervicothoracic region     Cervical myofascial pain syndrome     Chronic low back pain     Chronic neck pain     Diarrhea     Chronic eczema     Chronic migraine with aura without status migrainosus, not intractable     Overview Addendum 1/24/2024  5:40 PM by Amber Resendez, REDD-CNP       Has tried and failed multiple prophylactic migraine meds  Listed allergy to amitriptyline  s/p Depakote  s/p topiramate--stopped due to polypharmacy  s/p Aimovig, helping but still having HA and migraine and wanted back to Emgality  Was well controlled on Emgality SC monthly and sumatriptan 100mg prn  Switched to Ajovy monthly and rizatriptan PRN per insurance preferences              Cigarette smoker     COPD (chronic obstructive pulmonary disease) with emphysema (CMS/HCC)     Degeneration of  intervertebral disc of lumbar region     Degenerative joint disease of knee     Osteoarthritis     Dysphagia     Elevated sed rate     Fibromyalgia, primary     Food allergy     Gait abnormality     Gastric AVM     Chronic fatigue syndrome     Hyperlipidemia     Hypersomnia     Insomnia     Chronic constipation     Left hand paresthesia     Left-sided thoracic back pain     Neurogenic claudication due to lumbar spinal stenosis     Leukocytosis     Displacement of lumbar disc with radiculopathy     Lumbar radiculopathy     Lumbar scoliosis     Lumbar spondylosis     Malaise and fatigue     Muscle cramping     Nausea     Neuropathic pain     Nicotine dependence     Osteoporosis     Rash, skin     Hemoptysis     Spinal stenosis of lumbosacral region     Transient amnesia     Overview Signed 1/24/2024  8:57 AM by AYAZ Yan       Having episodes of transient ammesia/AMS--previously thought to be due to polypharmacy  EEG wnl           Vaginal atrophy     Vitamin B12 deficiency     Vitamin D deficiency     Weakness of both lower extremities     Pain in female genitalia on intercourse     Frequency-urgency syndrome     Dysuria     Bladder wall thickening     Anxiety     IBS (irritable bowel syndrome) (Chronic)     Bipolar disorder (CMS/HCC)     History of hallucinations     Overview Signed 1/24/2024  5:38 PM by AYAZ Yan       Follows with psychiatry at [facility]  Reports longstanding history of intermittent hallucinations with visual, auditory and tactile components           Generalized abdominal pain                  Above medical problems may be reflective of historical medical problems that may have resolved and may not related to acute clinical condition/medical problems.     Clinical impression/plan:     1.  Small bowel obstruction     Continue conservative measures with nasogastric tube placement.,  Continue antiemetics, continue fluid hydration.     2.  Hypokalemia  Supplement  potassium  Supplement surrogate magnesium.  Monitor for electrolyte imbalance patient at risk for electrolyte imbalance.     3/11............Hypokalemia persists, supplement potassium     3.  Hyperlipidemia     Continue statin therapy once NG tube  removed, chronic longitudinal therapy.     4.  Secondary leukocytosis  Continue to monitor leukocytosis, continue monitor fever curve, suspicion of likely stress-induced neutrophilia.     5.  GI prophylaxis   PPI     6.  DVT prophylaxis low molecular weight heparin        Disposition/Additional care plan/interventions:3/10/2024     KUB as per surgical service     Clinical suspicion of stress-induced neutrophilia, now resolved.Monitor fever curve, monitor leukocytosis.     Hypokalemia :Hypokalemia persists, intravenous supplementation, change maintenance IV to D5 half-normal with 20 mill equivalents of potassium.  Thiamine intravenously surrogate magnesium supplementation  Continue conservative management of small bowel obstruction with nasal gastric decompression.  Patient at risk for electrolyte imbalance continue supplement magnesium and potassium.     Continue bowel decompression as per general surgery.  Hospitalist service will continue to follow along with surgical service.         Disposition/Additional care plan/interventions:3/11/2024     Hypokalemia persistent.    Potassium and dextrose containing maintenance fluid ordered.  Reported plan for clear liquid diet today by general surgery continue to monitor clinical response.    Escalate potassium dosage and maintenance IV to optimize hypokalemia.    Continue conservative management as per surgical service.  Continue monitor electrolytes closely.  Discharge planning:Discharge timing to cards recommendation by the surgical service.   The patient was informed of differential diagnosis , work up , plan of care and possible sequelae of clinical disposition.Patient in agreement with plan of care. Further recommendations  forthcoming in accordance with patient's clinical disposition and response to care.              Care time: >  55 minutes.Discharge timing as per surgical service.              Dictation performed with assistance of voice recognition device therefore transcription errors are possible.

## 2024-03-11 NOTE — PROGRESS NOTES
"    GENERAL SURGERY / TRAUMA PROGRESS NOTE    Patient: Kelli Ortega  Room: 3312/3312-A    Age: 58 y.o.   Gender: female  Attending: Renae Tyson MD    MRN: [id number]  Admission Date: 3/7/2024    PCP: Mariaelena Dyer MD       Kelli Ortega is a 58 y.o. female on day 3 of admission presenting with Small bowel obstruction (CMS/HCC).    SUBJECTIVE   Interval History:  Abdominal film from yesterday showed persistent small bowel obstruction with no significant air in the colon.  NG tube output is greater than 2 L and has a dark coloration.  She continues to have abdominal pain requiring IV Dilaudid.  Reports a small bowel movement yesterday evening.    ROS  Review of Systems   Constitutional:  Negative for fever.   Respiratory:  Negative for shortness of breath and wheezing.    Gastrointestinal:  Positive for abdominal pain. Negative for nausea and vomiting.   Psychiatric/Behavioral:  Positive for agitation. The patient is nervous/anxious.           OBJECTIVE   Last Recorded Vitals  Blood pressure 160/79, pulse 97, temperature 36.3 °C (97.4 °F), resp. rate 18, height 1.6 m (5' 2.99\"), weight 65.3 kg (144 lb), SpO2 96 %.    Intake/Output last 3 Shifts:  I/O last 3 completed shifts:  In: 3020 (46.2 mL/kg) [P.O.:225; I.V.:2395 (36.7 mL/kg); NG/GT:60; IV Piggyback:340]  Out: 6650 (101.8 mL/kg) [Urine:1450 (0.6 mL/kg/hr); Emesis/NG output:5200]  Weight: 65.3 kg     PHYSICAL EXAM  Physical Exam   General: Well-developed, well-nourished and in no acute distress.  Head: Normocephalic. Atraumatic.  Neck/thyroid: Neck is supple.   Eyes: Pupils equal round and reactive to light. Conjunctiva normal.  ENMT: No masses or deformity of external nose. External ears without masses.  Respiratory/Chest:  Normal respiratory effort.  Cardiovascular: Regular rate and rhythm.   Abdomen: Soft and mildly distended.  Scars consistent with previous surgeries.  No point tenderness.  No peritoneal signs.  Musculoskeletal: Joints and " limbs are grossly normal.   Neuro: Oriented to person, place and time. No obvious neurological deficit.   Psych: Agitated    RESULTS   Labs  Results for orders placed or performed during the hospital encounter of 03/07/24 (from the past 24 hour(s))   Potassium   Result Value Ref Range    Potassium 3.3 (L) 3.5 - 5.3 mmol/L   Lactate   Result Value Ref Range    Lactate 0.4 0.4 - 2.0 mmol/L   Magnesium   Result Value Ref Range    Magnesium 2.00 1.60 - 2.40 mg/dL   Basic Metabolic Panel   Result Value Ref Range    Glucose 114 (H) 74 - 99 mg/dL    Sodium 141 136 - 145 mmol/L    Potassium 3.0 (L) 3.5 - 5.3 mmol/L    Chloride 104 98 - 107 mmol/L    Bicarbonate 22 21 - 32 mmol/L    Anion Gap 18 10 - 20 mmol/L    Urea Nitrogen 9 6 - 23 mg/dL    Creatinine 0.67 0.50 - 1.05 mg/dL    eGFR >90 >60 mL/min/1.73m*2    Calcium 8.4 (L) 8.6 - 10.3 mg/dL       Radiology Resutls  XR abdomen 2 views w chest 1 view    Result Date: 3/11/2024  Interpreted By:  Tita Harding, STUDY: XR ABDOMEN 2 VIEWS WITH CHEST 1 VIEW;  3/10/2024 10:46 am   INDICATION: Signs/Symptoms:SBO.   COMPARISON: 03/08/2024   ACCESSION NUMBER(S): [id number]   ORDERING CLINICIAN: NOE ROMERO   TECHNIQUE: Abdomen supine and upright views   Chest PA view   FINDINGS: NG tube tip projects in the proximal portion of the stomach.   ABDOMEN: Minimal colonic air is present. Air is noted in the dilated small bowel loop in the upper abdomen. Several small bowel air-fluid levels are identified, with a small bowel predominantly fluid-filled.   No evidence of pneumoperitoneum.   Osseous structures demonstrate no acute bony abnormalities.   CHEST: Cardiomediastinal silhouette in normal in size and configuration.   Lungs are clear. Left costophrenic angle is blunted consistent with minimal left pleural fluid.   No acute osseous changes.       1.  Small bowel is predominantly fluid-filled. A dilated partially air-filled small bowel loop is noted in the upper abdomen and there  are multiple air-fluid levels consistent with a small-bowel obstruction 2. Small left pleural effusion 3.  No evidence of acute cardiopulmonary process.     MACRO: None   Signed by: Tita Harding 3/11/2024 9:22 AM Dictation workstation:   [id number]         ASSESSMENT / PLAN   Principal Problem:    Small bowel obstruction (CMS/HCC)  Active Problems:    Generalized abdominal pain    SBO (small bowel obstruction) (CMS/HCC)       PLAN  1.  Had discussion with the patient and the patient's daughter that she is not improving with NG tube decompression.  With persistent bowel obstruction on x-ray yesterday and high-volume NG tube output, surgical intervention was recommended.  Discussed the benefits and risk of this with the patient.  Discussed possible need for bowel resection, or redo anastomosis and/or possible ostomy.  Had scheduled surgery today.  Received phone call from Dr. Santos (general surgery) who stated that the patient contacted her office after I had spoken with the patient and the patient's daughter.  The patient is requesting that Dr. Santos take over care.  Dr. Santos was in agreement.  Orders entered to transfer care to Dr. Santos.  Surgery canceled for today.      Renae Tyson MD, FACS  [facility] General Surgery  [address] [place] [address];   [facility] [address]  [place]  [address] [phone]

## 2024-03-11 NOTE — PROGRESS NOTES
Patient was planned for OR today, Patient requested that Dr. Santos take over care, pending CT. Patient plans to return home with no needs upon discharge. [facility] will continue to follow for needs if they arise.

## 2024-03-12 ENCOUNTER — TELEPHONE (OUTPATIENT)
Dept: PAIN MEDICINE | Facility: CLINIC | Age: 59
End: 2024-03-12
Payer: MEDICARE

## 2024-03-12 LAB
ANION GAP SERPL CALC-SCNC: 13 MMOL/L (ref 10–20)
BASOPHILS # BLD AUTO: 0.02 X10*3/UL (ref 0–0.1)
BASOPHILS NFR BLD AUTO: 0.2 %
BUN SERPL-MCNC: 7 MG/DL (ref 6–23)
CALCIUM SERPL-MCNC: 8.5 MG/DL (ref 8.6–10.3)
CHLORIDE SERPL-SCNC: 107 MMOL/L (ref 98–107)
CO2 SERPL-SCNC: 23 MMOL/L (ref 21–32)
CREAT SERPL-MCNC: 0.76 MG/DL (ref 0.5–1.05)
EGFRCR SERPLBLD CKD-EPI 2021: 90 ML/MIN/1.73M*2
EOSINOPHIL # BLD AUTO: 0.22 X10*3/UL (ref 0–0.7)
EOSINOPHIL NFR BLD AUTO: 1.8 %
ERYTHROCYTE [DISTWIDTH] IN BLOOD BY AUTOMATED COUNT: 19.9 % (ref 11.5–14.5)
GLUCOSE SERPL-MCNC: 107 MG/DL (ref 74–99)
HCT VFR BLD AUTO: 35.3 % (ref 36–46)
HGB BLD-MCNC: 11.9 G/DL (ref 12–16)
IMM GRANULOCYTES # BLD AUTO: 0.05 X10*3/UL (ref 0–0.7)
IMM GRANULOCYTES NFR BLD AUTO: 0.4 % (ref 0–0.9)
LYMPHOCYTES # BLD AUTO: 3.8 X10*3/UL (ref 1.2–4.8)
LYMPHOCYTES NFR BLD AUTO: 30.7 %
MAGNESIUM SERPL-MCNC: 1.82 MG/DL (ref 1.6–2.4)
MCH RBC QN AUTO: 28.6 PG (ref 26–34)
MCHC RBC AUTO-ENTMCNC: 33.7 G/DL (ref 32–36)
MCV RBC AUTO: 85 FL (ref 80–100)
MONOCYTES # BLD AUTO: 1.11 X10*3/UL (ref 0.1–1)
MONOCYTES NFR BLD AUTO: 9 %
NEUTROPHILS # BLD AUTO: 7.19 X10*3/UL (ref 1.2–7.7)
NEUTROPHILS NFR BLD AUTO: 57.9 %
NRBC BLD-RTO: 0 /100 WBCS (ref 0–0)
PLATELET # BLD AUTO: 273 X10*3/UL (ref 150–450)
POTASSIUM SERPL-SCNC: 3.5 MMOL/L (ref 3.5–5.3)
RBC # BLD AUTO: 4.16 X10*6/UL (ref 4–5.2)
SODIUM SERPL-SCNC: 139 MMOL/L (ref 136–145)
WBC # BLD AUTO: 12.4 X10*3/UL (ref 4.4–11.3)

## 2024-03-12 PROCEDURE — C9113 INJ PANTOPRAZOLE SODIUM, VIA: HCPCS | Performed by: SURGERY

## 2024-03-12 PROCEDURE — 99233 SBSQ HOSP IP/OBS HIGH 50: CPT | Performed by: HOSPITALIST

## 2024-03-12 PROCEDURE — 85025 COMPLETE CBC W/AUTO DIFF WBC: CPT

## 2024-03-12 PROCEDURE — 36415 COLL VENOUS BLD VENIPUNCTURE: CPT

## 2024-03-12 PROCEDURE — 2500000004 HC RX 250 GENERAL PHARMACY W/ HCPCS (ALT 636 FOR OP/ED): Performed by: HOSPITALIST

## 2024-03-12 PROCEDURE — 2500000001 HC RX 250 WO HCPCS SELF ADMINISTERED DRUGS (ALT 637 FOR MEDICARE OP): Performed by: STUDENT IN AN ORGANIZED HEALTH CARE EDUCATION/TRAINING PROGRAM

## 2024-03-12 PROCEDURE — 2500000004 HC RX 250 GENERAL PHARMACY W/ HCPCS (ALT 636 FOR OP/ED): Performed by: SURGERY

## 2024-03-12 PROCEDURE — 2500000004 HC RX 250 GENERAL PHARMACY W/ HCPCS (ALT 636 FOR OP/ED)

## 2024-03-12 PROCEDURE — 2500000004 HC RX 250 GENERAL PHARMACY W/ HCPCS (ALT 636 FOR OP/ED): Performed by: STUDENT IN AN ORGANIZED HEALTH CARE EDUCATION/TRAINING PROGRAM

## 2024-03-12 PROCEDURE — 2500000002 HC RX 250 W HCPCS SELF ADMINISTERED DRUGS (ALT 637 FOR MEDICARE OP, ALT 636 FOR OP/ED): Performed by: STUDENT IN AN ORGANIZED HEALTH CARE EDUCATION/TRAINING PROGRAM

## 2024-03-12 PROCEDURE — 80048 BASIC METABOLIC PNL TOTAL CA: CPT | Performed by: HOSPITALIST

## 2024-03-12 PROCEDURE — 83735 ASSAY OF MAGNESIUM: CPT | Performed by: HOSPITALIST

## 2024-03-12 PROCEDURE — 1100000001 HC PRIVATE ROOM DAILY

## 2024-03-12 RX ORDER — POLYETHYLENE GLYCOL 3350 17 G/17G
17 POWDER, FOR SOLUTION ORAL 2 TIMES DAILY
Status: DISCONTINUED | OUTPATIENT
Start: 2024-03-12 | End: 2024-03-13 | Stop reason: HOSPADM

## 2024-03-12 RX ADMIN — PANTOPRAZOLE SODIUM 40 MG: 40 INJECTION, POWDER, FOR SOLUTION INTRAVENOUS at 20:55

## 2024-03-12 RX ADMIN — MORPHINE SULFATE 2 MG: 2 INJECTION, SOLUTION INTRAMUSCULAR; INTRAVENOUS at 15:28

## 2024-03-12 RX ADMIN — LORATADINE 10 MG: 10 TABLET ORAL at 20:55

## 2024-03-12 RX ADMIN — BUSPIRONE HYDROCHLORIDE 15 MG: 15 TABLET ORAL at 08:38

## 2024-03-12 RX ADMIN — BUSPIRONE HYDROCHLORIDE 15 MG: 15 TABLET ORAL at 15:05

## 2024-03-12 RX ADMIN — TOPIRAMATE 25 MG: 25 TABLET, FILM COATED ORAL at 20:55

## 2024-03-12 RX ADMIN — PREGABALIN 150 MG: 75 CAPSULE ORAL at 08:37

## 2024-03-12 RX ADMIN — FLUTICASONE FUROATE AND VILANTEROL TRIFENATATE 1 PUFF: 200; 25 POWDER RESPIRATORY (INHALATION) at 08:33

## 2024-03-12 RX ADMIN — ACYCLOVIR 200 MG: 200 CAPSULE ORAL at 08:38

## 2024-03-12 RX ADMIN — PREGABALIN 150 MG: 75 CAPSULE ORAL at 20:55

## 2024-03-12 RX ADMIN — POLYETHYLENE GLYCOL 3350 17 G: 17 POWDER, FOR SOLUTION ORAL at 20:55

## 2024-03-12 RX ADMIN — POTASSIUM CHLORIDE 75 ML/HR: 149 INJECTION, SOLUTION, CONCENTRATE INTRAVENOUS at 11:06

## 2024-03-12 RX ADMIN — DIAZEPAM 5 MG: 5 TABLET ORAL at 08:37

## 2024-03-12 RX ADMIN — TIOTROPIUM BROMIDE INHALATION SPRAY 2 PUFF: 3.12 SPRAY, METERED RESPIRATORY (INHALATION) at 08:33

## 2024-03-12 RX ADMIN — PANTOPRAZOLE SODIUM 40 MG: 40 INJECTION, POWDER, FOR SOLUTION INTRAVENOUS at 08:38

## 2024-03-12 RX ADMIN — DIAZEPAM 5 MG: 5 TABLET ORAL at 20:55

## 2024-03-12 RX ADMIN — ENOXAPARIN SODIUM 40 MG: 40 INJECTION SUBCUTANEOUS at 08:37

## 2024-03-12 RX ADMIN — BUSPIRONE HYDROCHLORIDE 15 MG: 15 TABLET ORAL at 20:55

## 2024-03-12 RX ADMIN — TOPIRAMATE 25 MG: 25 TABLET, FILM COATED ORAL at 08:38

## 2024-03-12 RX ADMIN — PREGABALIN 150 MG: 75 CAPSULE ORAL at 15:04

## 2024-03-12 ASSESSMENT — COGNITIVE AND FUNCTIONAL STATUS - GENERAL
DAILY ACTIVITIY SCORE: 24
MOBILITY SCORE: 24
DAILY ACTIVITIY SCORE: 24
DAILY ACTIVITIY SCORE: 24
MOBILITY SCORE: 24
DAILY ACTIVITIY SCORE: 24

## 2024-03-12 ASSESSMENT — ENCOUNTER SYMPTOMS
FEVER: 0
NAUSEA: 0
COLOR CHANGE: 0
VOMITING: 0
EYE REDNESS: 0
DYSURIA: 0
CHILLS: 0
CONSTIPATION: 0
BACK PAIN: 0
FREQUENCY: 0
ABDOMINAL PAIN: 1
COUGH: 0
DIARRHEA: 0
SHORTNESS OF BREATH: 0
HEADACHES: 0
ARTHRALGIAS: 0
EYE DISCHARGE: 0
SORE THROAT: 0
LIGHT-HEADEDNESS: 0

## 2024-03-12 ASSESSMENT — PAIN SCALES - GENERAL: PAINLEVEL_OUTOF10: 0 - NO PAIN

## 2024-03-12 NOTE — PROGRESS NOTES
Kelli Ortega [id number]   Service: Internal Medicine / Hospitalist Date of service: 3/12/2024                                  Full Code                        Subjective       History Of Present Illness  Kelli Ortega is a 58 y.o. female presenting with Small bowel obstruction.        58-year-old  female who apparently presented to the emergency room department complaining of abdominal pain felt a pop apparently in her epigastrium earlier this morning prior to presentation.  She reported that the abdominal pain came in her heart and statin and that she had several bouts of nausea and vomiting was encouraged by her son to come to the hospital for further evaluation.  She reported significant surgical pass of Meckel's diverticulectomy.  At the time of evaluation emergency department she endorses generalized abdominal pain but denies any other major symptomatology.  Further review of systems positive for recent weight gain.  Hospitalist service was consulted for medical management during patient hospital course of.  Available diagnostic studies were reviewed electrolyte imbalance noted with hypokalemia.     3/9............Patient seen and examined this afternoon patient felt that she is doing much better felt that her abdominal distention have improved with nasogastric suction and. She endorsed abdominal pain.  She disclosed no prior history of seizure disorder.     3/10................Patient reported improvement in abdominal pain, patient hoping that she can have something to eat.  No hypoglycemic symptomatology reported.  No reported: Fevers, chills, palpitations, dyspnea, chest pain or syncope.     3/11......................Patient seen and examined in presence of her nurse.  At the time of evaluation today decision was made for conservative measures no planned surgical intervention at this time.  No reported : palpitations, headaches, abdominal pain, fevers, chills clinical nausea, vomiting or  dyspnea.    3/12.....................Patient reported feeling better today.  No reported headaches, chest pains, fevers, chills, nausea or vomiting.           Review of Systems:   Review of system otherwise negative if not aforementioned above in subjective.     Objective        Physical Exam      Appearance: Patient appeared in no acute cardiopulmonary distress.     Comments: Patient alert and oriented to person place time and situation.  HENT:      Head: Normocephalic and atraumatic.      Right Ear: External ear normal.      Left Ear: External ear normal.      Nose: Nose normal. No congestion or rhinorrhea.    Mouth/Throat: Mucous membranes Moist, Trachea midline.   Eyes:      Extraocular Movements: Extraocular movements intact.      Pupils: Pupils are equal, round, and reactive to light.      Comments:    Cardiovascular:      Rate and Rhythm: Normal rate and regular rhythm. No clicks rubs or gallops, normal S1 and S2.      Pulmonary:   Lungs clear to auscultation globally no appreciation of adventitious sounds     Abdominal:      General: Abdomen soft, active bowel sounds, no involuntary guarding or rebound tenderness appreciated.     Tenderness: None  Musculoskeletal:   No pitting edema, cyanosis or appreciation of acute new musculoskeletal deformity, osteoarthritic changes noted for upper extremity.          Lymphadenopathy:      Cervical: No cervical adenopathy.   Skin:     General: Skin is warm.      Capillary Refill: Capillary refill Less than 2 seconds.     Coloration:       Findings: No abnormal appearing skin rashes or lesions that appeared acute noted on unclothed area of the skin..   Neurological:      General: No focal sensory or motor deficits appreciated, no meningeal signs or dysmetria noted.      Cranial Nerves: Cranial nerves II to XII appearing grossly intact.       Psychiatric:         The patient appeared to be displaying a  normal mood and affect at the time of evaluation.         Labs:                                    Lab Results   Component Value Date     GLUCOSE 114 (H) 03/11/2024     CALCIUM 8.4 (L) 03/11/2024      03/11/2024     K 3.0 (L) 03/11/2024     CO2 22 03/11/2024      03/11/2024     BUN 9 03/11/2024     CREATININE 0.67 03/11/2024            Lab Results   Component Value Date     WBC 11.0 03/11/2024     HGB 13.3 03/11/2024     HCT 40.7 03/11/2024     MCV 88 03/11/2024      03/11/2024      Lab Results   Component Value Date    GLUCOSE 107 (H) 03/12/2024    CALCIUM 8.5 (L) 03/12/2024     03/12/2024    K 3.5 03/12/2024    CO2 23 03/12/2024     03/12/2024    BUN 7 03/12/2024    CREATININE 0.76 03/12/2024      Lab Results   Component Value Date    WBC 12.4 (H) 03/12/2024    HGB 11.9 (L) 03/12/2024    HCT 35.3 (L) 03/12/2024    MCV 85 03/12/2024     03/12/2024         [unfilled]   [unfilled]   No results found for the last 90 days.                  X-rays/ Images     [unfilled]   Procedure Component Value Units Date/Time   XR abdomen 1 view [[id number]] Collected: 03/08/24 0324   Order Status: Completed Updated: 03/08/24 0324   Narrative:     Interpreted By:  Marky Lucero,  STUDY:  XR ABDOMEN 1 VIEW;  3/8/2024 2:56 am      INDICATION:  Signs/Symptoms:SBO.      COMPARISON:  CT 3/7/2024      ACCESSION NUMBER(S):  [id number]      ORDERING CLINICIAN:  ELEAZAR WARNER      FINDINGS:  Nasogastric tube tip terminates in the midline upper abdomen at level  of the stomach. Dilated small bowel loops compatible patient's known  bowel obstruction are better assessed on 3/7/2024 CT examination.      No evidence of significant free abdominal air.       Impression:     Nasogastric tube tip terminates in the midline upper abdomen at level  of the stomach.      MACRO:  None      Signed by: Marky Lucero 3/8/2024 3:22 AM  Dictation workstation:   [id number]   XR chest 1 view [[id number]] Collected: 03/07/24 2109   Order Status: Completed Updated: 03/07/24 2109    Narrative:     Interpreted By:  Calvin Guevara,  STUDY:  XR CHEST 1 VIEW;  3/7/2024 8:42 pm      INDICATION:  Signs/Symptoms:NG placement verification.      COMPARISON:  Radiographs of the chest dated 05/22/2023; CT of the abdomen and  pelvis dated 03/07/2024.      ACCESSION NUMBER(S):  [id number]      ORDERING CLINICIAN:  THAI GALVEZ      FINDINGS:  AP radiograph of the chest was provided.      Enteric tube courses inferior to the diaphragm, with the tip  overlying the expected location of the gastric body in the left upper  quadrant.      CARDIOMEDIASTINAL SILHOUETTE:  Cardiomediastinal silhouette is normal in size and configuration.      LUNGS:  Atelectatic changes are present in the left lung base.      ABDOMEN:  There is paucity of gas in the upper abdomen.      BONES:  No acute osseous changes.       Impression:     1.  Enteric tube courses inferior to the diaphragm, with the tip  overlying the expected location of the gastric body in the left upper  quadrant.  2. Paucity of gas in the upper abdomen.  3. Mild atelectatic changes are present in the left lung base.              MACRO:  None      Signed by: Calvin Guevara 3/7/2024 9:08 PM  Dictation workstation:   [id number]   CT abdomen pelvis w IV contrast [[id number]] Collected: 03/07/24 1406   Order Status: Completed Updated: 03/07/24 1406   Narrative:     Interpreted By:  Phill Linares,  STUDY:  CT ABDOMEN PELVIS W IV CONTRAST; ;  3/7/2024 1:44 pm      INDICATION:  Signs/Symptoms:abd pain. mid upper abdominal pain. Popping sensation  and swelling.      COMPARISON:  05/04/2023      ACCESSION NUMBER(S):  [id number]      ORDERING CLINICIAN:  HEMA VILLATORO      TECHNIQUE:  Contiguous axial CT sections are performed from the lung bases to the  lesser trochanters following the uneventful administration of 75 cc  of intravenous Omnipaque 350. The study is supplemented with coronal  and sagittal reformatted images.       FINDINGS:  There is subsegmental atelectasis in the inferior lingula in medial  right middle lobe as well as the left lower lobe.      There are multilevel discogenic degenerative changes of the lower  lumbar spine with levo convexity. There are mild osteoarthritic  changes of both hips. The osseous structures are intact.      There is a 12 x 6 mm hypodensity in the central right hepatic lobe  which is unchanged from 05/04/2023 favoring benign etiology such as  small cysts. The liver otherwise uniformly enhances. The medial  margin of the inferior right lower lobe has a lobular contour. There  is no surrounding fluid. There is no intrahepatic or extrahepatic  bile duct dilatation.      The gallbladder, spleen, pancreas, and adrenal glands are of normal  CT appearance.      The kidneys are symmetric in size and enhance symmetrically. No  space-occupying renal mass is identified. There is no hydronephrosis  or renal calculus. The perinephric fat is preserved. There is no  hydroureter or obstructing ureteral calculus. The urinary bladder is  not opacified and poorly distended though otherwise unremarkable.  There is no bladder calculus or wall thickening.      The abdominal aorta is of normal and uniform caliber and normal  enhancement. There is no periaortic mass or fluid collection. The IVC  is unremarkable.      There are dilated small bowel loops within the mid and lower abdomen  on both sides of midline. The dilated small bowel is fluid filled,  reaching 4.5 cm in diameter in the left lower quadrant. There is some  fecalization of dilated small bowel in the left lower quadrant. There  is no associated wall thickening. There is abrupt caliber change in  the left lower quadrant though no sizable soft tissue mass at this  site. This could be related to an area of stricture band adhesion.  The distal small bowel is decompressed. The colon is unremarkable.  The appendix is not clearly visualized. There is no evidence  of  appendicitis.      There is a small volume of free pelvic fluid posteriorly on the  right. There is no free air collection in the abdomen or pelvis.  There is a small fat containing umbilical hernia. There is no  herniated bowel.       Impression:     Mechanical small-bowel obstruction with transition point in the left  lower quadrant. The etiology may be band adhesion or stricture. There  is no obvious soft tissue mass at this site.      Small volume of free pelvic fluid posteriorly on the right.      Small fat containing umbilical hernia.      12 x 6 mm liver hypodensity remained stable from 05/04/2023 favoring  benign etiology such as a small cyst.      General surgical consultation is recommended.              Critical Finding:  See findings. Notification was initiated on  3/7/2024 at 2:04 pm by  Phill Linares.  (**-OCF-**)  Instructions:                  MACRO:  None      Signed by: Phill Linares 3/7/2024 2:05 PM  Dictation workstation:   [id number]                  Medical Problems         Problem List         * (Principal) Small bowel obstruction (CMS/HCC)     Allergic rhinitis     Anemia due to blood loss     GERD (gastroesophageal reflux disease)     Cervical disc disease     Cervical disc disorder with radiculopathy, cervicothoracic region     Cervical myofascial pain syndrome     Chronic low back pain     Chronic neck pain     Diarrhea     Chronic eczema     Chronic migraine with aura without status migrainosus, not intractable     Overview Addendum 1/24/2024  5:40 PM by REDD Yan-CNP       Has tried and failed multiple prophylactic migraine meds  Listed allergy to amitriptyline  s/p Depakote  s/p topiramate--stopped due to polypharmacy  s/p Aimovig, helping but still having HA and migraine and wanted back to Emgality  Was well controlled on Emgality SC monthly and sumatriptan 100mg prn  Switched to Ajovy monthly and rizatriptan PRN per insurance preferences               Cigarette smoker     COPD (chronic obstructive pulmonary disease) with emphysema (CMS/HCC)     Degeneration of intervertebral disc of lumbar region     Degenerative joint disease of knee     Osteoarthritis     Dysphagia     Elevated sed rate     Fibromyalgia, primary     Food allergy     Gait abnormality     Gastric AVM     Chronic fatigue syndrome     Hyperlipidemia     Hypersomnia     Insomnia     Chronic constipation     Left hand paresthesia     Left-sided thoracic back pain     Neurogenic claudication due to lumbar spinal stenosis     Leukocytosis     Displacement of lumbar disc with radiculopathy     Lumbar radiculopathy     Lumbar scoliosis     Lumbar spondylosis     Malaise and fatigue     Muscle cramping     Nausea     Neuropathic pain     Nicotine dependence     Osteoporosis     Rash, skin     Hemoptysis     Spinal stenosis of lumbosacral region     Transient amnesia     Overview Signed 1/24/2024  8:57 AM by AYAZ Yan       Having episodes of transient ammesia/AMS--previously thought to be due to polypharmacy  EEG wnl           Vaginal atrophy     Vitamin B12 deficiency     Vitamin D deficiency     Weakness of both lower extremities     Pain in female genitalia on intercourse     Frequency-urgency syndrome     Dysuria     Bladder wall thickening     Anxiety     IBS (irritable bowel syndrome) (Chronic)     Bipolar disorder (CMS/HCC)     History of hallucinations     Overview Signed 1/24/2024  5:38 PM by AYAZ Yan       Follows with psychiatry at [facility]  Reports longstanding history of intermittent hallucinations with visual, auditory and tactile components           Generalized abdominal pain                  Above medical problems may be reflective of historical medical problems that may have resolved and may not related to acute clinical condition/medical problems.     Clinical impression/plan:     1.  Small bowel obstruction     Continue conservative measures with  nasogastric tube placement.,  Continue antiemetics, continue fluid hydration.     2.  Hypokalemia  Supplement potassium  Supplement surrogate magnesium.  Monitor for electrolyte imbalance patient at risk for electrolyte imbalance.     3/11............Hypokalemia persists, supplement potassium     3.  Hyperlipidemia     Continue statin therapy once NG tube  removed, chronic longitudinal therapy.     4.  Secondary leukocytosis  Continue to monitor leukocytosis, continue monitor fever curve, suspicion of likely stress-induced neutrophilia.     5.  GI prophylaxis   PPI     6.  DVT prophylaxis low molecular weight heparin               Disposition/Additional care plan/interventions:3/12/2024    Patient with small bowel obstruction, regulated to conservative management with nasogastric decompression.  Anticipate removed today by surgical services.    Dietary advancement as per surgical service    Resume chronic oral home medications after verification.    No clinical signs of discontinuation syndrome from any held oral medications apparent at this juncture.    Hypokalemia resolved.      Upward trending of leukocytosis today, clinical suspicion of stress-induced neutrophilia will monitor closely.       The patient was informed of differential diagnosis , work up , plan of care and possible sequelae of clinical disposition.Patient in agreement with plan of care. Further recommendations forthcoming in accordance with patient's clinical disposition and response to care.    Care time: >  55 minutes.Discharge timing as per surgical service.              Dictation performed with assistance of voice recognition device therefore transcription errors are possible.

## 2024-03-12 NOTE — PROGRESS NOTES
"GENERAL SURGERY PROGRESS NOTE    Kelli Ortega   1965   [id number]     Kelli Ortega is a 58 y.o. female on day 4 of admission presenting with Small bowel obstruction (CMS/HCC).      Subjective  Pt MARY at bedside. Patient feeling better this AM, has minimal abdominal pain. Passing flatus and BM. Tolerating FLD after NGT removal.    Review of Systems   Constitutional:  Negative for chills and fever.   HENT:  Negative for congestion and sore throat.    Eyes:  Negative for discharge and redness.   Respiratory:  Negative for cough and shortness of breath.    Gastrointestinal:  Positive for abdominal pain (minimal). Negative for constipation, diarrhea, nausea and vomiting.   Endocrine: Negative for cold intolerance and heat intolerance.   Genitourinary:  Negative for dysuria and frequency.   Musculoskeletal:  Negative for arthralgias and back pain.   Skin:  Negative for color change and rash.   Neurological:  Negative for light-headedness and headaches.       Objective    Last Recorded Vitals  Blood pressure 155/86, pulse (!) 120, temperature 36.5 °C (97.7 °F), resp. rate 16, height 1.6 m (5' 2.99\"), weight 65.3 kg (144 lb), SpO2 94 %.    Intake/Output last 3 Shifts:  I/O last 3 completed shifts:  In: 2671.5 (40.9 mL/kg) [P.O.:354; NG/GT:60; IV Piggyback:2257.5]  Out: 1800 (27.6 mL/kg) [Emesis/NG output:1800]  Weight: 65.3 kg     Gen: NAD.  A&Ox3  HEENT: NC/AT.  Moist mucous membranes.  Neck: Normal range of motion.  CV: Regular rate.  Chest: Normal chest rise.  Normal respiratory effort.  Abdomen: Soft.  Minimal tenderness to  palpation in the RLQ.. ND.  No rigidity or guarding.  Extremities: No edema.  Moving all extremities.    Relevant Results  Results for orders placed or performed during the hospital encounter of 03/07/24 (from the past 24 hour(s))   Magnesium   Result Value Ref Range    Magnesium 1.82 1.60 - 2.40 mg/dL   Basic Metabolic Panel   Result Value Ref Range    Glucose 107 (H) 74 - 99 mg/dL    " Sodium 139 136 - 145 mmol/L    Potassium 3.5 3.5 - 5.3 mmol/L    Chloride 107 98 - 107 mmol/L    Bicarbonate 23 21 - 32 mmol/L    Anion Gap 13 10 - 20 mmol/L    Urea Nitrogen 7 6 - 23 mg/dL    Creatinine 0.76 0.50 - 1.05 mg/dL    eGFR 90 >60 mL/min/1.73m*2    Calcium 8.5 (L) 8.6 - 10.3 mg/dL   CBC and Auto Differential   Result Value Ref Range    WBC 12.4 (H) 4.4 - 11.3 x10*3/uL    nRBC 0.0 0.0 - 0.0 /100 WBCs    RBC 4.16 4.00 - 5.20 x10*6/uL    Hemoglobin 11.9 (L) 12.0 - 16.0 g/dL    Hematocrit 35.3 (L) 36.0 - 46.0 %    MCV 85 80 - 100 fL    MCH 28.6 26.0 - 34.0 pg    MCHC 33.7 32.0 - 36.0 g/dL    RDW 19.9 (H) 11.5 - 14.5 %    Platelets 273 150 - 450 x10*3/uL    Neutrophils % 57.9 40.0 - 80.0 %    Immature Granulocytes %, Automated 0.4 0.0 - 0.9 %    Lymphocytes % 30.7 13.0 - 44.0 %    Monocytes % 9.0 2.0 - 10.0 %    Eosinophils % 1.8 0.0 - 6.0 %    Basophils % 0.2 0.0 - 2.0 %    Neutrophils Absolute 7.19 1.20 - 7.70 x10*3/uL    Immature Granulocytes Absolute, Automated 0.05 0.00 - 0.70 x10*3/uL    Lymphocytes Absolute 3.80 1.20 - 4.80 x10*3/uL    Monocytes Absolute 1.11 (H) 0.10 - 1.00 x10*3/uL    Eosinophils Absolute 0.22 0.00 - 0.70 x10*3/uL    Basophils Absolute 0.02 0.00 - 0.10 x10*3/uL       CT abdomen pelvis w IV contrast    Result Date: 3/12/2024  Interpreted By:  Tita Harding, STUDY: CT ABDOMEN PELVIS W IV CONTRAST; ;  3/11/2024 6:47 pm   INDICATION: Signs/Symptoms:SBO.   COMPARISON: 03/07/2024   ACCESSION NUMBER(S): [id number]   ORDERING CLINICIAN: ELEAZAR WARNER   TECHNIQUE: Imaging was performed from dome of the diaphragm through ischial tuberosities with axial, coronal and sagittal images reconstructed. Patient received 75 mL Omnipaque 350 intravenously. Patient drank 500 mL oral Omnipaque solution prior to exam.   FINDINGS: Mild atelectasis in the left lower lobe is new. No pleural effusions are noted. The visualized heart appears normal in size.   No mass is noted in the liver. There is no intra  or extrahepatic biliary dilatation. Gallbladder contains hyperdense bile related to vicarious excretion of contrast from the prior exam.   No mass or inflammation is noted involving the pancreas.   Spleen is normal in size with no focal mass noted.   Adrenal glands appear normal. Kidneys enhance symmetrically with no hydronephrosis noted.   Small-bowel dilatation has decreased compared to the prior study. Contrast is present in the colon, which is nondilated. Mid small bowel loops are mildly dilated, but decreased compared to the prior study. Distal small bowel is small caliber and contains contrast material. Small bowel caliber changes in the central abdomen. No associated mass or mesenteric inflammation is noted. There is no free intraperitoneal air or fluid. NG tube is present in the stomach.   Aorta and vena cava are normal in size. Mild patchy atherosclerosis is present. There are no pathologically enlarged retroperitoneal, iliac or inguinal lymph nodes identified.   Urinary bladder is normal in appearance with no wall thickening.   Uterus is absent.   No abdominal wall hernia is identified.   Osseous structures show no acute abnormality.       Small-bowel dilatation is decreased compared to the prior study but not completely resolved and there is transition between mildly dilated mid small bowel and decompressed distal small bowel in the central abdomen with no associated mass or inflammation identified.     MACRO: None.   Signed by: Tita Harding 3/12/2024 1:15 PM Dictation workstation:   [id number]    XR abdomen 2 views w chest 1 view    Result Date: 3/11/2024  Interpreted By:  Tita Harding, STUDY: XR ABDOMEN 2 VIEWS WITH CHEST 1 VIEW;  3/10/2024 10:46 am   INDICATION: Signs/Symptoms:SBO.   COMPARISON: 03/08/2024   ACCESSION NUMBER(S): [id number]   ORDERING CLINICIAN: NOE ROMERO   TECHNIQUE: Abdomen supine and upright views   Chest PA view   FINDINGS: NG tube tip projects in the proximal  portion of the stomach.   ABDOMEN: Minimal colonic air is present. Air is noted in the dilated small bowel loop in the upper abdomen. Several small bowel air-fluid levels are identified, with a small bowel predominantly fluid-filled.   No evidence of pneumoperitoneum.   Osseous structures demonstrate no acute bony abnormalities.   CHEST: Cardiomediastinal silhouette in normal in size and configuration.   Lungs are clear. Left costophrenic angle is blunted consistent with minimal left pleural fluid.   No acute osseous changes.       1.  Small bowel is predominantly fluid-filled. A dilated partially air-filled small bowel loop is noted in the upper abdomen and there are multiple air-fluid levels consistent with a small-bowel obstruction 2. Small left pleural effusion 3.  No evidence of acute cardiopulmonary process.     MACRO: None   Signed by: Tita Harding 3/11/2024 9:22 AM Dictation workstation:   [id number]    XR abdomen 1 view    Result Date: 3/8/2024  Interpreted By:  Marky Lucero, STUDY: XR ABDOMEN 1 VIEW;  3/8/2024 2:56 am   INDICATION: Signs/Symptoms:SBO.   COMPARISON: CT 3/7/2024   ACCESSION NUMBER(S): [id number]   ORDERING CLINICIAN: ELEAZAR WARNER   FINDINGS: Nasogastric tube tip terminates in the midline upper abdomen at level of the stomach. Dilated small bowel loops compatible patient's known bowel obstruction are better assessed on 3/7/2024 CT examination.   No evidence of significant free abdominal air.       Nasogastric tube tip terminates in the midline upper abdomen at level of the stomach.   MACRO: None   Signed by: Marky Lucero 3/8/2024 3:22 AM Dictation workstation:   [id number]    XR chest 1 view    Result Date: 3/7/2024  Interpreted By:  Calvin Guevara, STUDY: XR CHEST 1 VIEW;  3/7/2024 8:42 pm   INDICATION: Signs/Symptoms:NG placement verification.   COMPARISON: Radiographs of the chest dated 05/22/2023; CT of the abdomen and pelvis dated 03/07/2024.   ACCESSION NUMBER(S):  [id number]   ORDERING CLINICIAN: THAI GALVEZ   FINDINGS: AP radiograph of the chest was provided.   Enteric tube courses inferior to the diaphragm, with the tip overlying the expected location of the gastric body in the left upper quadrant.   CARDIOMEDIASTINAL SILHOUETTE: Cardiomediastinal silhouette is normal in size and configuration.   LUNGS: Atelectatic changes are present in the left lung base.   ABDOMEN: There is paucity of gas in the upper abdomen.   BONES: No acute osseous changes.       1.  Enteric tube courses inferior to the diaphragm, with the tip overlying the expected location of the gastric body in the left upper quadrant. 2. Paucity of gas in the upper abdomen. 3. Mild atelectatic changes are present in the left lung base.       MACRO: None   Signed by: Calvin Guevara 3/7/2024 9:08 PM Dictation workstation:   [id number]    CT abdomen pelvis w IV contrast    Result Date: 3/7/2024  Interpreted By:  Phill Linares, STUDY: CT ABDOMEN PELVIS W IV CONTRAST; ;  3/7/2024 1:44 pm   INDICATION: Signs/Symptoms:abd pain. mid upper abdominal pain. Popping sensation and swelling.   COMPARISON: 05/04/2023   ACCESSION NUMBER(S): [id number]   ORDERING CLINICIAN: HEMA VILLATORO   TECHNIQUE: Contiguous axial CT sections are performed from the lung bases to the lesser trochanters following the uneventful administration of 75 cc of intravenous Omnipaque 350. The study is supplemented with coronal and sagittal reformatted images.   FINDINGS: There is subsegmental atelectasis in the inferior lingula in medial right middle lobe as well as the left lower lobe.   There are multilevel discogenic degenerative changes of the lower lumbar spine with levo convexity. There are mild osteoarthritic changes of both hips. The osseous structures are intact.   There is a 12 x 6 mm hypodensity in the central right hepatic lobe which is unchanged from 05/04/2023 favoring benign etiology such as small cysts. The  liver otherwise uniformly enhances. The medial margin of the inferior right lower lobe has a lobular contour. There is no surrounding fluid. There is no intrahepatic or extrahepatic bile duct dilatation.   The gallbladder, spleen, pancreas, and adrenal glands are of normal CT appearance.   The kidneys are symmetric in size and enhance symmetrically. No space-occupying renal mass is identified. There is no hydronephrosis or renal calculus. The perinephric fat is preserved. There is no hydroureter or obstructing ureteral calculus. The urinary bladder is not opacified and poorly distended though otherwise unremarkable. There is no bladder calculus or wall thickening.   The abdominal aorta is of normal and uniform caliber and normal enhancement. There is no periaortic mass or fluid collection. The IVC is unremarkable.   There are dilated small bowel loops within the mid and lower abdomen on both sides of midline. The dilated small bowel is fluid filled, reaching 4.5 cm in diameter in the left lower quadrant. There is some fecalization of dilated small bowel in the left lower quadrant. There is no associated wall thickening. There is abrupt caliber change in the left lower quadrant though no sizable soft tissue mass at this site. This could be related to an area of stricture band adhesion. The distal small bowel is decompressed. The colon is unremarkable. The appendix is not clearly visualized. There is no evidence of appendicitis.   There is a small volume of free pelvic fluid posteriorly on the right. There is no free air collection in the abdomen or pelvis. There is a small fat containing umbilical hernia. There is no herniated bowel.       Mechanical small-bowel obstruction with transition point in the left lower quadrant. The etiology may be band adhesion or stricture. There is no obvious soft tissue mass at this site.   Small volume of free pelvic fluid posteriorly on the right.   Small fat containing umbilical  hernia.   12 x 6 mm liver hypodensity remained stable from 05/04/2023 favoring benign etiology such as a small cyst.   General surgical consultation is recommended.       Critical Finding:  See findings. Notification was initiated on 3/7/2024 at 2:04 pm by  Phill Linares.  (**-OCF-**) Instructions:         MACRO: None   Signed by: Phill Linares 3/7/2024 2:05 PM Dictation workstation:   [id number]    XR DEXA bone density    Result Date: 2/13/2024  Interpreted By:  Ayad Navarro, STUDY: DEXA BONE DENSITY2/13/2024 2:37 pm   INDICATION: Signs/Symptoms:menopause. The patient is a 57 y/o  year old F.   COMPARISON: 09/10/2020   ACCESSION NUMBER(S): [id number]   ORDERING CLINICIAN: JOSÉ MANUEL CHOPRA   TECHNIQUE: DEXA BONE DENSITY   FINDINGS: SPINE L1-L4 Bone Mineral Density: 0.886 T-Score -1.5  Z-Score 0.0 Bone Mineral Density change vs baseline (%):  -8.6 Bone Mineral Density change vs previous (%): -8.6   LEFT FEMUR -TOTAL Bone Mineral Density: 0.727 T-Score -1.8   Z-Score  -0.8 Bone Mineral Density change vs baseline (%): -2.5 Bone Mineral Density change vs previous (%): -2.5   LEFT FEMUR -NECK Bone Mineral Density: 0.578 T-Score -2.4  Z-Score -1.2     World Health Organization (WHO) criteria for post-menopausal,  Women: Normal:         T-score at or above -1 SD Osteopenia:   T-score between -1 and -2.5 SD Osteoporosis: T-score at or below -2.5 SD     10-year Fracture Risk (%): Major Osteoporotic Fracture  12 Hip Fracture                        3.1   Note:  If no FRAX score is reported, it is because: Some T-score for Spine Total or Hip Total or Femoral Neck at or below -2.5   This exam was performed at [facility] on a [employer] [facility] Dexa Unit.       DEXA:  According to World Health Organization criteria, classification is low bone mass (osteopenia)   Followup recommended in two years or sooner as clinically warranted.   All images and detailed analysis are available on the   Radiology PACS.   MACRO: None   Signed by: Ayad Navarro 2/13/2024 5:45 PM Dictation workstation:   [id number]      Assessment and Plan  Principal Problem:    Small bowel obstruction (CMS/HCC)  Active Problems:    Generalized abdominal pain    SBO (small bowel obstruction) (CMS/HCC)    58 y.o. female with small bowel obstruction  - Patient feeling clinically better this AM. NGT removed  - Started on FLD, tolerating well  - Will add bowel reg, miralax BID  - Encourage ambulation    Discussed with Dr. Tom Cheney DO PGY2  General Surgery    Roman Cheney DO

## 2024-03-12 NOTE — TELEPHONE ENCOUNTER
Patient called to inform us she has been in the hospital since 3-7. Will call when she is discharged [place]

## 2024-03-12 NOTE — CARE PLAN
The patient's goals for the shift include      The clinical goals for the shift include Patient will be free from nausea throughout this shift      Problem: Pain  Goal: My pain/discomfort is manageable  Outcome: Progressing     Problem: Pain - Adult  Goal: Verbalizes/displays adequate comfort level or baseline comfort level  Outcome: Progressing     Problem: Safety - Adult  Goal: Free from fall injury  Outcome: Progressing     Problem: Discharge Planning  Goal: Discharge to home or other facility with appropriate resources  Outcome: Progressing     Problem: Chronic Conditions and Co-morbidities  Goal: Patient's chronic conditions and co-morbidity symptoms are monitored and maintained or improved  Outcome: Progressing     Problem: Fall/Injury  Goal: Not fall by end of shift  Outcome: Progressing  Goal: Be free from injury by end of the shift  Outcome: Progressing  Goal: Verbalize understanding of personal risk factors for fall in the hospital  Outcome: Progressing  Goal: Verbalize understanding of risk factor reduction measures to prevent injury from fall in the home  Outcome: Progressing  Goal: Use assistive devices by end of the shift  Outcome: Progressing  Goal: Pace activities to prevent fatigue by end of the shift  Outcome: Progressing     Problem: Pain  Goal: Takes deep breaths with improved pain control throughout the shift  Outcome: Progressing  Goal: Turns in bed with improved pain control throughout the shift  Outcome: Progressing  Goal: Walks with improved pain control throughout the shift  Outcome: Progressing  Goal: Performs ADL's with improved pain control throughout shift  Outcome: Progressing  Goal: Participates in PT with improved pain control throughout the shift  Outcome: Progressing  Goal: Free from opioid side effects throughout the shift  Outcome: Progressing  Goal: Free from acute confusion related to pain meds throughout the shift  Outcome: Progressing     Problem: Nutrition  Goal: Less than 5  days NPO/clear liquids  Outcome: Progressing

## 2024-03-13 VITALS
RESPIRATION RATE: 18 BRPM | TEMPERATURE: 97.5 F | BODY MASS INDEX: 25.52 KG/M2 | SYSTOLIC BLOOD PRESSURE: 120 MMHG | HEART RATE: 94 BPM | OXYGEN SATURATION: 96 % | DIASTOLIC BLOOD PRESSURE: 86 MMHG | HEIGHT: 63 IN | WEIGHT: 144 LBS

## 2024-03-13 LAB
ANION GAP SERPL CALC-SCNC: 13 MMOL/L (ref 10–20)
BASOPHILS # BLD AUTO: 0.04 X10*3/UL (ref 0–0.1)
BASOPHILS NFR BLD AUTO: 0.3 %
BUN SERPL-MCNC: 7 MG/DL (ref 6–23)
CALCIUM SERPL-MCNC: 9.5 MG/DL (ref 8.6–10.3)
CHLORIDE SERPL-SCNC: 109 MMOL/L (ref 98–107)
CO2 SERPL-SCNC: 25 MMOL/L (ref 21–32)
CREAT SERPL-MCNC: 0.99 MG/DL (ref 0.5–1.05)
EGFRCR SERPLBLD CKD-EPI 2021: 65 ML/MIN/1.73M*2
EOSINOPHIL # BLD AUTO: 0.17 X10*3/UL (ref 0–0.7)
EOSINOPHIL NFR BLD AUTO: 1.4 %
ERYTHROCYTE [DISTWIDTH] IN BLOOD BY AUTOMATED COUNT: 19.9 % (ref 11.5–14.5)
GLUCOSE SERPL-MCNC: 94 MG/DL (ref 74–99)
HCT VFR BLD AUTO: 39.2 % (ref 36–46)
HGB BLD-MCNC: 13 G/DL (ref 12–16)
IMM GRANULOCYTES # BLD AUTO: 0.04 X10*3/UL (ref 0–0.7)
IMM GRANULOCYTES NFR BLD AUTO: 0.3 % (ref 0–0.9)
LYMPHOCYTES # BLD AUTO: 4.77 X10*3/UL (ref 1.2–4.8)
LYMPHOCYTES NFR BLD AUTO: 40 %
MCH RBC QN AUTO: 28.3 PG (ref 26–34)
MCHC RBC AUTO-ENTMCNC: 33.2 G/DL (ref 32–36)
MCV RBC AUTO: 85 FL (ref 80–100)
MONOCYTES # BLD AUTO: 1.11 X10*3/UL (ref 0.1–1)
MONOCYTES NFR BLD AUTO: 9.3 %
NEUTROPHILS # BLD AUTO: 5.8 X10*3/UL (ref 1.2–7.7)
NEUTROPHILS NFR BLD AUTO: 48.7 %
PLATELET # BLD AUTO: 314 X10*3/UL (ref 150–450)
PMV BLD AUTO: 10.9 FL (ref 7.5–11.5)
POTASSIUM SERPL-SCNC: 3.7 MMOL/L (ref 3.5–5.3)
RBC # BLD AUTO: 4.59 X10*6/UL (ref 4–5.2)
SODIUM SERPL-SCNC: 143 MMOL/L (ref 136–145)
WBC # BLD AUTO: 11.9 X10*3/UL (ref 4.4–11.3)

## 2024-03-13 PROCEDURE — C9113 INJ PANTOPRAZOLE SODIUM, VIA: HCPCS | Performed by: SURGERY

## 2024-03-13 PROCEDURE — 99233 SBSQ HOSP IP/OBS HIGH 50: CPT | Performed by: INTERNAL MEDICINE

## 2024-03-13 PROCEDURE — 36415 COLL VENOUS BLD VENIPUNCTURE: CPT

## 2024-03-13 PROCEDURE — 2500000002 HC RX 250 W HCPCS SELF ADMINISTERED DRUGS (ALT 637 FOR MEDICARE OP, ALT 636 FOR OP/ED): Performed by: STUDENT IN AN ORGANIZED HEALTH CARE EDUCATION/TRAINING PROGRAM

## 2024-03-13 PROCEDURE — 85025 COMPLETE CBC W/AUTO DIFF WBC: CPT

## 2024-03-13 PROCEDURE — 2500000001 HC RX 250 WO HCPCS SELF ADMINISTERED DRUGS (ALT 637 FOR MEDICARE OP): Performed by: SURGERY

## 2024-03-13 PROCEDURE — 2500000004 HC RX 250 GENERAL PHARMACY W/ HCPCS (ALT 636 FOR OP/ED): Performed by: SURGERY

## 2024-03-13 PROCEDURE — 2500000004 HC RX 250 GENERAL PHARMACY W/ HCPCS (ALT 636 FOR OP/ED)

## 2024-03-13 PROCEDURE — 80051 ELECTROLYTE PANEL: CPT | Performed by: HOSPITALIST

## 2024-03-13 PROCEDURE — 2500000001 HC RX 250 WO HCPCS SELF ADMINISTERED DRUGS (ALT 637 FOR MEDICARE OP): Performed by: STUDENT IN AN ORGANIZED HEALTH CARE EDUCATION/TRAINING PROGRAM

## 2024-03-13 RX ORDER — BISACODYL 10 MG/1
10 SUPPOSITORY RECTAL ONCE
Status: COMPLETED | OUTPATIENT
Start: 2024-03-13 | End: 2024-03-13

## 2024-03-13 RX ADMIN — ZOLPIDEM TARTRATE 10 MG: 5 TABLET ORAL at 00:19

## 2024-03-13 RX ADMIN — BUSPIRONE HYDROCHLORIDE 15 MG: 15 TABLET ORAL at 14:01

## 2024-03-13 RX ADMIN — POLYETHYLENE GLYCOL 3350 17 G: 17 POWDER, FOR SOLUTION ORAL at 08:37

## 2024-03-13 RX ADMIN — PANTOPRAZOLE SODIUM 40 MG: 40 INJECTION, POWDER, FOR SOLUTION INTRAVENOUS at 08:37

## 2024-03-13 RX ADMIN — BUSPIRONE HYDROCHLORIDE 15 MG: 15 TABLET ORAL at 08:36

## 2024-03-13 RX ADMIN — PREGABALIN 150 MG: 75 CAPSULE ORAL at 08:36

## 2024-03-13 RX ADMIN — ACYCLOVIR 200 MG: 200 CAPSULE ORAL at 08:35

## 2024-03-13 RX ADMIN — TIOTROPIUM BROMIDE INHALATION SPRAY 2 PUFF: 3.12 SPRAY, METERED RESPIRATORY (INHALATION) at 08:37

## 2024-03-13 RX ADMIN — PREGABALIN 150 MG: 75 CAPSULE ORAL at 14:01

## 2024-03-13 RX ADMIN — TOPIRAMATE 25 MG: 25 TABLET, FILM COATED ORAL at 08:36

## 2024-03-13 RX ADMIN — ENOXAPARIN SODIUM 40 MG: 40 INJECTION SUBCUTANEOUS at 08:36

## 2024-03-13 RX ADMIN — MORPHINE SULFATE 2 MG: 2 INJECTION, SOLUTION INTRAMUSCULAR; INTRAVENOUS at 17:46

## 2024-03-13 RX ADMIN — DIAZEPAM 5 MG: 5 TABLET ORAL at 08:36

## 2024-03-13 RX ADMIN — FLUTICASONE FUROATE AND VILANTEROL TRIFENATATE 1 PUFF: 200; 25 POWDER RESPIRATORY (INHALATION) at 08:37

## 2024-03-13 RX ADMIN — MORPHINE SULFATE 2 MG: 2 INJECTION, SOLUTION INTRAMUSCULAR; INTRAVENOUS at 08:35

## 2024-03-13 RX ADMIN — BISACODYL 10 MG: 10 SUPPOSITORY RECTAL at 13:50

## 2024-03-13 ASSESSMENT — COGNITIVE AND FUNCTIONAL STATUS - GENERAL
DAILY ACTIVITIY SCORE: 24
MOBILITY SCORE: 24

## 2024-03-13 ASSESSMENT — PAIN SCALES - GENERAL
PAINLEVEL_OUTOF10: 6
PAINLEVEL_OUTOF10: 0 - NO PAIN
PAINLEVEL_OUTOF10: 6

## 2024-03-13 ASSESSMENT — PAIN - FUNCTIONAL ASSESSMENT
PAIN_FUNCTIONAL_ASSESSMENT: 0-10
PAIN_FUNCTIONAL_ASSESSMENT: 0-10

## 2024-03-13 ASSESSMENT — PAIN DESCRIPTION - ORIENTATION: ORIENTATION: LEFT

## 2024-03-13 ASSESSMENT — PAIN DESCRIPTION - LOCATION: LOCATION: ABDOMEN

## 2024-03-13 NOTE — PROGRESS NOTES
Spoke with patient to deliver IMM, she plans to return home upon discharge, she denies any home going needs at this time.

## 2024-03-13 NOTE — DISCHARGE SUMMARY
Discharge Diagnosis  Small bowel obstruction (CMS/HCC)    Issues Requiring Follow-Up  S/p small bowel obstruction    Hospital Course  Patient is a 58-year-old female who presented to the hospital secondary to abdominal pain and nausea.  Patient was found to have a small bowel obstruction and was admitted to the hospital with an NG tube in place.  After return of bowel function, NG tube was removed and patient was started on clear liquids which was advanced to low fiber diet prior to discharge.  Patient was having bowel function, pain was resolved and she was ambulatory and tolerating diet prior to discharge.  Patient was instructed to call her surgeon or come to the ED if having chest pain, trouble breathing, intractable nausea/vomiting, recurrent abdominal pain or any other concerns.  Patient understood and was in agreement with the plan.    Discharge diet: Low fiber  Discharge activity: Recommend ambulating hourly.  Activity as tolerated  Discharge follow-up: Dr. Santos to be seen in 1-2 weeks.  PCP to be seen as needed    Pertinent Physical Exam At Time of Discharge  Physical Exam  Gen: NAD.  A&Ox3  HEENT: NC/AT.  Moist mucous membranes.  Neck: Normal range of motion.  CV: Regular rate.  Chest: Normal chest rise.  Normal respiratory effort.  Abdomen: Soft.  NT/ND.  No rigidity or guarding.  Extremities: No edema.  Moving all extremities.      Home Medications     Medication List      CONTINUE taking these medications     acyclovir 200 mg capsule; Commonly known as: Zovirax   Ajovy Autoinjector 225 mg/1.5 mL auto-injector; Generic drug:   fremanezumab; Inject 1 Pen (225 mg) under the skin every 28 (twenty-eight)   days.   albuterol 90 mcg/actuation inhaler; Inhale 2 puffs every 4 hours if   needed for wheezing or shortness of breath.   B complex-vitamin C tablet; Take 1 tablet by mouth once daily.   busPIRone 15 mg tablet; Commonly known as: Buspar   cholecalciferol 50,000 unit capsule; Commonly known as:  Vitamin D-3;   TAKE ONE (1) CAPSULE BY MOUTH EVERY OTHER WEEK.   cyclobenzaprine 10 mg tablet; Commonly known as: Flexeril; Take 1 tablet   (10 mg) by mouth 3 times a day as needed for muscle spasms.   diazePAM 5 mg tablet; Commonly known as: Valium   dicyclomine 20 mg tablet; Commonly known as: Bentyl; TAKE ONE TO TWO   TABLETS BY MOUTH THREE (3) TIMES PER DAY OR AS NEEDED FOR PAIN. **(BENTYL   20 MG TAB EQUIV)**   Gas Relief Extra Strength 125 mg capsule; Generic drug: simethicone   ipratropium-albuteroL 0.5-2.5 mg/3 mL nebulizer solution; Commonly known   as: Duo-Neb; USE 1 VIAL IN NEBULIZER 4 TIMES DAILY   lactobacillus acidophilus capsule; Take 1 capsule by mouth once daily.   loperamide 2 mg tablet; Commonly known as: Imodium A-D; Take 1 tablet (2   mg) by mouth 4 times a day as needed for diarrhea.   loratadine 10 mg tablet; Commonly known as: Claritin; TAKE ONE (1)   TABLET BY MOUTH ONCE DAILY AT BEDTIME. **(CLARITIN 10 MG TAB EQUIV)**   Metamucil Sugar-Free (aspart) 3.4 gram/5.8 gram powder; Generic drug:   psyllium husk (aspartame); Dissolve 2 scoops into 8 ounces of water and   drink daily.   mupirocin 2 % ointment; Commonly known as: Bactroban   nebulizer accessories kit; 1 each once daily. Needs a replacement mask-   hers is not working   nystatin cream; Commonly known as: Mycostatin   omeprazole 40 mg DR capsule; Commonly known as: PriLOSEC; Take 1 capsule   (40 mg) by mouth 2 times a day before meals. Do not crush or chew.   ondansetron ODT 8 mg disintegrating tablet; Commonly known as:   Zofran-ODT; Take 1 tablet (8 mg) by mouth every 8 hours if needed for   nausea or vomiting.   polyethylene glycol 17 gram/dose powder; Commonly known as: Miralax; MIX   1 CAPFUL IN 8 OUNCES OF WATER AND DRINK AT BEDTIME AS NEEDED FOR   CONSTIPATION.   pravastatin 20 mg tablet; Commonly known as: Pravachol   pregabalin 150 mg capsule; Commonly known as: Lyrica; Take 1 capsule   (150 mg) by mouth 3 times a day.    rizatriptan 10 mg tablet; Commonly known as: Maxalt; TAKE 1 TABLET BY   MOUTH 1 TIME IF NEEDED FOR MIGRAINE (MAY REPEAT X1) FOR UP TO 9 DOSES. MAY   REPEAT IN 2 HOURS IF UNRESOLVED. DO NOT EXCEED 3/24HOURS   topiramate 25 mg tablet; Commonly known as: Topamax   Trelegy Ellipta 200-62.5-25 mcg blister with device; Generic drug:   fluticasone-umeclidin-vilanter; INHALE 1 PUFF BY MOUTH AND INTO THE LUNGS   DAILY RINSE MOUTH AFTER EACH USE   zolpidem 10 mg tablet; Commonly known as: Ambien     STOP taking these medications     lidocaine 5 % ointment; Commonly known as: Xylocaine       Luigi Holly, PGY4  General Surgery

## 2024-03-13 NOTE — PROGRESS NOTES
Kelli Ortega is a 58 y.o. female on day 5 of admission presenting with Small bowel obstruction (CMS/HCC).      Subjective   Patient is doing better, tolerating regular diet, passing gas and had small bowel movement this morning.       Objective     Last Recorded Vitals  /88 (BP Location: Right arm, Patient Position: Lying)   Pulse 78   Temp 36.1 °C (97 °F) (Temporal)   Resp 18   Wt 65.3 kg (144 lb)   SpO2 94%   Intake/Output last 3 Shifts:    Intake/Output Summary (Last 24 hours) at 3/13/2024 1357  Last data filed at 3/13/2024 1100  Gross per 24 hour   Intake 750 ml   Output 1900 ml   Net -1150 ml       Admission Weight  Weight: 65.3 kg (144 lb) (03/07/24 1020)    Daily Weight  03/08/24 : 65.3 kg (144 lb)    Image Results  CT abdomen pelvis w IV contrast  Narrative: Interpreted By:  Tita Harding,   STUDY:  CT ABDOMEN PELVIS W IV CONTRAST; ;  3/11/2024 6:47 pm      INDICATION:  Signs/Symptoms:SBO.      COMPARISON:  03/07/2024      ACCESSION NUMBER(S):  [id number]      ORDERING CLINICIAN:  ELEAZAR WARNER      TECHNIQUE:  Imaging was performed from dome of the diaphragm through ischial  tuberosities with axial, coronal and sagittal images reconstructed.  Patient received 75 mL Omnipaque 350 intravenously. Patient drank 500  mL oral Omnipaque solution prior to exam.      FINDINGS:  Mild atelectasis in the left lower lobe is new. No pleural effusions  are noted. The visualized heart appears normal in size.      No mass is noted in the liver. There is no intra or extrahepatic  biliary dilatation. Gallbladder contains hyperdense bile related to  vicarious excretion of contrast from the prior exam.      No mass or inflammation is noted involving the pancreas.      Spleen is normal in size with no focal mass noted.      Adrenal glands appear normal. Kidneys enhance symmetrically with no  hydronephrosis noted.      Small-bowel dilatation has decreased compared to the prior study.  Contrast is present in the  colon, which is nondilated. Mid small  bowel loops are mildly dilated, but decreased compared to the prior  study. Distal small bowel is small caliber and contains contrast  material. Small bowel caliber changes in the central abdomen. No  associated mass or mesenteric inflammation is noted. There is no free  intraperitoneal air or fluid. NG tube is present in the stomach.      Aorta and vena cava are normal in size. Mild patchy atherosclerosis  is present. There are no pathologically enlarged retroperitoneal,  iliac or inguinal lymph nodes identified.      Urinary bladder is normal in appearance with no wall thickening.      Uterus is absent.      No abdominal wall hernia is identified.      Osseous structures show no acute abnormality.      Impression: Small-bowel dilatation is decreased compared to the prior study but  not completely resolved and there is transition between mildly  dilated mid small bowel and decompressed distal small bowel in the  central abdomen with no associated mass or inflammation identified.          MACRO:  None.      Signed by: Tita Harding 3/12/2024 1:15 PM  Dictation workstation:   [id number]      Physical Exam  Patient is awake and orient, not in apparent distress  Eyes: PERRLA, no conjunctival congestion  Chest: Bilateral Air entry, no crackles or wheezing  Heart: s1S2 regular, no murmur  Abdomen: Soft, non tender, BS present  Ext:    Relevant Results               Assessment/Plan      1.  Acute small bowel obstruction  Patient was managed conservatively with n.p.o., NG decompression as well as IV fluid.  Condition gradually improved.  NG tube removed and started on clear liquid diet-patient tolerated advance to regular diet.  Patient is tolerating regular diet  Further management as per primary service    2.  Hypokalemia  Replace and monitor    3.  Dyslipidemia  Can resume on statin therapy    4.  Leukocytosis  Possibly secondary to stress  Improving    Víctor Coyle,  MD

## 2024-03-14 ENCOUNTER — PATIENT OUTREACH (OUTPATIENT)
Dept: CARE COORDINATION | Facility: CLINIC | Age: 59
End: 2024-03-14
Payer: MEDICARE

## 2024-03-14 ENCOUNTER — TELEPHONE (OUTPATIENT)
Dept: PRIMARY CARE | Facility: CLINIC | Age: 59
End: 2024-03-14
Payer: MEDICARE

## 2024-03-14 NOTE — PROGRESS NOTES
Discharge Facility: [facility]   Discharge Diagnosis: Small bowel obstruction (CMS/HCC)   Admission Date: 3-7-24   Discharge Date: 3-13-24     PCP Appointment Date: Message routed to office for scheduling    Specialist Appointment Date: MAR 18 Infusion 11:30 AM [facility] Encounter and Summary: Linked   See discharge assessment below for further details  Engagement  Call Start Time: 1056 (3/14/2024 11:00 AM)    Medications  Medications reviewed with patient/caregiver?: Yes (3/14/2024 11:00 AM)  Is the patient having any side effects they believe may be caused by any medication additions or changes?: No (3/14/2024 11:00 AM)  Does the patient have all medications ordered at discharge?: Yes (3/14/2024 11:00 AM)  Care Management Interventions: No intervention needed (3/14/2024 11:00 AM)    Appointments  Does the patient have a primary care provider?: Yes (3/14/2024 11:00 AM)  Care Management Interventions: Advised patient to make appointment (3/14/2024 11:00 AM)  Has the patient kept scheduled appointments due by today?: Yes (3/14/2024 11:00 AM)  Care Management Interventions: Advised patient to keep appointment (3/14/2024 11:00 AM)    Self Management  Has home health visited the patient within 72 hours of discharge?: Not applicable (3/14/2024 11:00 AM)    Patient Teaching  Does the patient have access to their discharge instructions?: Yes (3/14/2024 11:00 AM)  Care Management Interventions: Reviewed instructions with patient (3/14/2024 11:00 AM)  What is the patient's perception of their health status since discharge?: Improving (3/14/2024 11:00 AM)  Is the patient/caregiver able to teach back the hierarchy of who to call/visit for symptoms/problems? PCP, Specialist, Home Health nurse, Urgent Care, ED, 911: Yes (3/14/2024 11:00 AM)  Patient/Caregiver Education Comments: Pt. does feel as if she is getting a cold (3/14/2024 11:00 AM)      Hawa Lu LPN

## 2024-03-14 NOTE — DOCUMENTATION CLARIFICATION NOTE
"    PATIENT:               LEA IZQUIERDO  ACCT #:                  [id number]  MRN:                       [id number]  :                       1965  ADMIT DATE:       3/7/2024 10:49 AM  DISCH DATE:        3/13/2024 5:55 PM  RESPONDING PROVIDER #:        [id number]          PROVIDER RESPONSE TEXT:    Mild Protein Calorie Malnutrition    CDI QUERY TEXT:    [facility]_Nutrition Diagnosis        Instruction:    Based on your assessment of the patient and the clinical information, please provide the requested documentation by clicking on the appropriate radio button and enter any additional information if prompted.    Question: Please further clarify this patient nutritional status as    When answering this query, please exercise your independent professional judgment. The fact that a question is being asked, does not imply that any particular answer is desired or expected.    The patient's clinical indicators include:  Clinical Information: 58 yr old admitted with small bowel obstruction,    Clinical Indicators:  3/11/24 Nutrition consult: \"Malnutrition Diagnosis  Patient has Malnutrition Diagnosis: Yes  Diagnosis Status: New  Malnutrition Diagnosis: Mild malnutrition related to acute disease or injury  As Evidenced by: <50% of estimated needs x 5 days\"      Treatment: 3/11/24: Nutrition Prescription:  ? Individualized Nutrition Prescription Provided for : \" Advance diet to goal GI soft, fiber controlled. If unable to advance diet or tolerate diet advancement within the next 72 hours, consider PN. Add ensure plus with meals when on appropriate diet. Obtain weekly weights\"        Risk Factors: SBO  Options provided:  -- Mild Protein Calorie Malnutrition  -- Protein Calorie Malnutrition, Please specify severity  -- Other - I will add my own diagnosis  -- Refer to Clinical Documentation Reviewer    Query created by: Cris Burton on 3/13/2024 9:40 AM      Electronically signed by:  YAEL MORENO MD 3/14/2024 12:57 PM          "

## 2024-03-15 ENCOUNTER — TELEPHONE (OUTPATIENT)
Dept: PULMONOLOGY | Facility: HOSPITAL | Age: 59
End: 2024-03-15

## 2024-03-15 ENCOUNTER — APPOINTMENT (OUTPATIENT)
Dept: PRIMARY CARE | Facility: CLINIC | Age: 59
End: 2024-03-15
Payer: MEDICARE

## 2024-03-15 PROCEDURE — RXMED WILLOW AMBULATORY MEDICATION CHARGE

## 2024-03-15 NOTE — TELEPHONE ENCOUNTER
Called central scheduling. Order is active. Called and left VM for patient.    ----- Message from AYAZ Fatima sent at 3/14/2024  3:47 PM EDT -----  The order is already in can you call central scheduling there is no reason for a new order.  ----- Message -----  From: Ilda Pruitt RN  Sent: 3/14/2024   3:33 PM EDT  To: AYAZ Fatima    Patient had to cancel her CT because she was in the hospital. When she called central scheduling back they told her she needs a new order.

## 2024-03-18 ENCOUNTER — PHARMACY VISIT (OUTPATIENT)
Dept: PHARMACY | Facility: CLINIC | Age: 59
End: 2024-03-18
Payer: MEDICARE

## 2024-03-18 ENCOUNTER — APPOINTMENT (OUTPATIENT)
Dept: INFUSION THERAPY | Facility: HOSPITAL | Age: 59
End: 2024-03-18
Payer: MEDICARE

## 2024-03-18 ENCOUNTER — CLINICAL SUPPORT (OUTPATIENT)
Dept: PRIMARY CARE | Facility: CLINIC | Age: 59
End: 2024-03-18
Payer: MEDICARE

## 2024-03-18 DIAGNOSIS — Z23 NEED FOR PNEUMOCOCCAL VACCINATION: ICD-10-CM

## 2024-03-18 PROCEDURE — 90732 PPSV23 VACC 2 YRS+ SUBQ/IM: CPT | Performed by: FAMILY MEDICINE

## 2024-03-18 PROCEDURE — G0009 ADMIN PNEUMOCOCCAL VACCINE: HCPCS | Performed by: FAMILY MEDICINE

## 2024-03-20 ENCOUNTER — TELEPHONE (OUTPATIENT)
Dept: GASTROENTEROLOGY | Facility: CLINIC | Age: 59
End: 2024-03-20
Payer: MEDICARE

## 2024-03-20 NOTE — TELEPHONE ENCOUNTER
----- Message from Amy Barrera MA sent at 3/20/2024 11:55 AM EDT -----  Regarding: RE: Pulmonary Clearance  Patient states she wants to wait until after her appointment tomorrow with her Surgeon before scheduling EGD  ----- Message -----  From: Amy Barrera MA  Sent: 3/14/2024   3:20 PM EDT  To: Amy Barrera MA  Subject: RE: Pulmonary Clearance                          FROM [facility]:  Rodrigoy your office sent me another clearance for her, it doesn't look like she had a surgery done in the hospital but she is optimized from a pulm standpoint, she doesn't come in to see me until April but I wont change anything for her. Let me know if you need anything!      ----- Message -----  From: Amy Barrera MA  Sent: 3/14/2024   9:44 AM EDT  To: Sheba Arciniega PA-C; Amy Barrera MA  Subject: RE: Pulmonary Clearance                          PATIENT WAS DISCHARGE. NO PROCEDURES WERE DONE WHILE ADMITTED. WILL REFAX CLEARANCE TO [facility]  ----- Message -----  From: Sheba Arciniega PA-C  Sent: 3/12/2024  12:53 PM EDT  To: Amy Barrera MA  Subject: RE: Pulmonary Clearance                          Recommend she call when discharged for further examination of her chart to determine timing  ----- Message -----  From: Amy Barrera MA  Sent: 3/11/2024   9:09 AM EDT  To: Sheba Arciniega PA-C  Subject: RE: Pulmonary Clearance                          Patient called the office Saturday and is currently admitted to the hospital for small bowel obstruction  ----- Message -----  From: Amy Barrera MA  Sent: 3/1/2024   9:37 AM EDT  To: Amy Barrera MA  Subject: RE: Pulmonary Clearance                          REQUEST FAXED  ----- Message -----  From: AYAZ Fatima  Sent: 3/1/2024   8:48 AM EST  To: Amy Barrera MA  Subject: RE: Pulmonary Clearance                          Please fax all requests to office for clearances.   ----- Message -----  From: Amy Barrera MA  Sent: 2/29/2024   3:25 PM EST  To: Poncho GABRIEL  Aly, APRN-CNP  Subject: Pulmonary Clearance                                Dr. Jennifer last,      Your patient, Kelli Ortega (: 1965), is being scheduled for an endoscopic procedure (EGD and/or colonoscopy). They are currently being managed by your office. Prior to scheduling the procedure we need to know if it is okay for the patient to proceed with Procedure.              Please Respond to this Message at your Earliest Convenience (for ease you may copy and paste one of the following responses into your reply)      _______  It is CURRENTLY OKAY (safe) for this patient to have this Procedure          _______  If it is NOT currently safe for the patient to have this Procedure                If you have any questions or need additional information please reply to this message or call our office at [phone].      Thank you.        Amy Barrera  Medical Assistant      /[facility]    [address] [address]    Phone: [phone]  Fax: [phone]

## 2024-03-20 NOTE — TELEPHONE ENCOUNTER
----- Message from Amy Barrera MA sent at 3/20/2024  3:36 PM EDT -----  Regarding: RE: Pulmonary Clearance  PATIENT IS GOING TO WAIT UNTIL SHE TALKS TO HER SURGEON BEFORE SCHEDULING.  ----- Message -----  From: Amy Barrera MA  Sent: 3/14/2024   3:20 PM EDT  To: Amy Barrera MA  Subject: RE: Pulmonary Clearance                          FROM PONCHO:  Hey your office sent me another clearance for her, it doesn't look like she had a surgery done in the hospital but she is optimized from a pulm standpoint, she doesn't come in to see me until April but I wont change anything for her. Let me know if you need anything!      ----- Message -----  From: Amy Barrera MA  Sent: 3/14/2024   9:44 AM EDT  To: Sheba Arciniega PA-C; Amy Barrera MA  Subject: RE: Pulmonary Clearance                          PATIENT WAS DISCHARGE. NO PROCEDURES WERE DONE WHILE ADMITTED. WILL REFAX CLEARANCE TO [facility]  ----- Message -----  From: Sheba Arciniega PA-C  Sent: 3/12/2024  12:53 PM EDT  To: Amy Barrera MA  Subject: RE: Pulmonary Clearance                          Recommend she call when discharged for further examination of her chart to determine timing  ----- Message -----  From: Amy Barrera MA  Sent: 3/11/2024   9:09 AM EDT  To: Sheba Arciniega PA-C  Subject: RE: Pulmonary Clearance                          Patient called the office Saturday and is currently admitted to the hospital for small bowel obstruction  ----- Message -----  From: Amy Barrera MA  Sent: 3/1/2024   9:37 AM EDT  To: Amy Barrera MA  Subject: RE: Pulmonary Clearance                          REQUEST FAXED  ----- Message -----  From: AYAZ Fatima  Sent: 3/1/2024   8:48 AM EST  To: Amy Barrera MA  Subject: RE: Pulmonary Clearance                          Please fax all requests to office for clearances.   ----- Message -----  From: Amy Barrera MA  Sent: 2/29/2024   3:25 PM EST  To: REDD Fatima-REY  Subject:  Pulmonary Clearance                                Dr. Jennifer last,      Your patient, Kelli Ortega (: 1965), is being scheduled for an endoscopic procedure (EGD and/or colonoscopy). They are currently being managed by your office. Prior to scheduling the procedure we need to know if it is okay for the patient to proceed with Procedure.              Please Respond to this Message at your Earliest Convenience (for ease you may copy and paste one of the following responses into your reply)      _______  It is CURRENTLY OKAY (safe) for this patient to have this Procedure          _______  If it is NOT currently safe for the patient to have this Procedure                If you have any questions or need additional information please reply to this message or call our office at [phone].      Thank you.        Amy Barrera  Medical Assistant      /[facility]    [address] [address]    Phone: [phone]  Fax: [phone]

## 2024-04-01 ENCOUNTER — PATIENT OUTREACH (OUTPATIENT)
Dept: CARE COORDINATION | Facility: CLINIC | Age: 59
End: 2024-04-01
Payer: MEDICARE

## 2024-04-01 DIAGNOSIS — J43.9 PULMONARY EMPHYSEMA, UNSPECIFIED EMPHYSEMA TYPE (MULTI): Primary | ICD-10-CM

## 2024-04-01 NOTE — PROGRESS NOTES
Unable to reach patient for call back after patient's follow up appointment with PCP.   LVM with call back number for patient to call if needed   If no voicemail available call attempts x 2 were made to contact the patient to assist with any questions or concerns patient may have.    Hawa Lu LPN

## 2024-04-02 ENCOUNTER — HOSPITAL ENCOUNTER (OUTPATIENT)
Dept: RADIOLOGY | Facility: CLINIC | Age: 59
Discharge: HOME | End: 2024-04-02
Payer: MEDICARE

## 2024-04-02 ENCOUNTER — LAB (OUTPATIENT)
Dept: LAB | Facility: LAB | Age: 59
End: 2024-04-02
Payer: MEDICARE

## 2024-04-02 ENCOUNTER — APPOINTMENT (OUTPATIENT)
Dept: SPEECH THERAPY | Facility: HOSPITAL | Age: 59
End: 2024-04-02
Payer: MEDICARE

## 2024-04-02 DIAGNOSIS — F17.210 CIGARETTE SMOKER: ICD-10-CM

## 2024-04-02 DIAGNOSIS — M81.0 AGE-RELATED OSTEOPOROSIS WITHOUT CURRENT PATHOLOGICAL FRACTURE: Primary | ICD-10-CM

## 2024-04-02 DIAGNOSIS — D80.1 NONFAMILIAL HYPOGAMMAGLOBULINEMIA (MULTI): ICD-10-CM

## 2024-04-02 LAB — CA-I BLD-SCNC: 1.18 MMOL/L (ref 1.1–1.33)

## 2024-04-02 PROCEDURE — 36415 COLL VENOUS BLD VENIPUNCTURE: CPT

## 2024-04-02 PROCEDURE — 82330 ASSAY OF CALCIUM: CPT

## 2024-04-02 PROCEDURE — 71250 CT THORAX DX C-: CPT | Performed by: RADIOLOGY

## 2024-04-02 PROCEDURE — 71250 CT THORAX DX C-: CPT

## 2024-04-04 ENCOUNTER — TELEPHONE (OUTPATIENT)
Dept: PRIMARY CARE | Facility: CLINIC | Age: 59
End: 2024-04-04
Payer: MEDICARE

## 2024-04-04 DIAGNOSIS — M48.062 NEUROGENIC CLAUDICATION DUE TO LUMBAR SPINAL STENOSIS: Primary | ICD-10-CM

## 2024-04-04 NOTE — TELEPHONE ENCOUNTER
Mary will be sending a script for incontinence supplies. She is using them and is asking you to fax a script.  Please call with any questions

## 2024-04-05 RX ORDER — HEPARIN 100 UNIT/ML
500 SYRINGE INTRAVENOUS AS NEEDED
OUTPATIENT
Start: 2024-04-11

## 2024-04-08 ENCOUNTER — APPOINTMENT (OUTPATIENT)
Dept: PHARMACY | Facility: HOSPITAL | Age: 59
End: 2024-04-08
Payer: MEDICARE

## 2024-04-08 PROBLEM — N39.46 MIXED STRESS AND URGE URINARY INCONTINENCE: Status: ACTIVE | Noted: 2024-04-08

## 2024-04-09 ENCOUNTER — LAB (OUTPATIENT)
Dept: LAB | Facility: LAB | Age: 59
End: 2024-04-09
Payer: MEDICARE

## 2024-04-09 ENCOUNTER — OFFICE VISIT (OUTPATIENT)
Dept: PULMONOLOGY | Facility: HOSPITAL | Age: 59
End: 2024-04-09
Payer: MEDICARE

## 2024-04-09 VITALS
OXYGEN SATURATION: 93 % | SYSTOLIC BLOOD PRESSURE: 101 MMHG | BODY MASS INDEX: 25.1 KG/M2 | HEIGHT: 64 IN | WEIGHT: 147 LBS | DIASTOLIC BLOOD PRESSURE: 52 MMHG | HEART RATE: 114 BPM | RESPIRATION RATE: 16 BRPM | TEMPERATURE: 98 F

## 2024-04-09 DIAGNOSIS — J30.9 ALLERGIC RHINITIS, UNSPECIFIED SEASONALITY, UNSPECIFIED TRIGGER: ICD-10-CM

## 2024-04-09 DIAGNOSIS — F17.210 CIGARETTE SMOKER: ICD-10-CM

## 2024-04-09 DIAGNOSIS — J44.9 CHRONIC OBSTRUCTIVE PULMONARY DISEASE, UNSPECIFIED COPD TYPE (MULTI): Primary | ICD-10-CM

## 2024-04-09 DIAGNOSIS — D80.1 NONFAMILIAL HYPOGAMMAGLOBULINEMIA (MULTI): Primary | ICD-10-CM

## 2024-04-09 PROCEDURE — 99213 OFFICE O/P EST LOW 20 MIN: CPT | Mod: ZK | Performed by: NURSE PRACTITIONER

## 2024-04-09 PROCEDURE — 36415 COLL VENOUS BLD VENIPUNCTURE: CPT

## 2024-04-09 PROCEDURE — 86317 IMMUNOASSAY INFECTIOUS AGENT: CPT

## 2024-04-09 PROCEDURE — 99213 OFFICE O/P EST LOW 20 MIN: CPT | Performed by: NURSE PRACTITIONER

## 2024-04-09 ASSESSMENT — ENCOUNTER SYMPTOMS
CHILLS: 0
UNEXPECTED WEIGHT CHANGE: 0
WHEEZING: 0
COUGH: 1
FEVER: 0
RHINORRHEA: 0
FATIGUE: 0
SHORTNESS OF BREATH: 1

## 2024-04-09 NOTE — PROGRESS NOTES
Subjective   Patient ID: Kelli Ortega is a 58 y.o. female who presents for follow up COPD.     HPI: Patient has PMH of COPD, nicotine dependence, hypothyroidism, fibromyalgia, and anxiety. She was referred for COPD management. She is currently on Anoro once a day and has an albuterol inhaler, she is also using nebulizers about 3 times per day. She states that she is getting short of breath on pretty much any exertion. She has a daily productive cough, with white/gray sputum. She reports her sinus drainage and congestion is currently controlled with Claritin daily. She hears herself wheezing frequently. She reports waking up at night choking as well as hypersomnia. She denies any fever, chills, chest pain, leg swelling, or hemoptysis. Patient currently smokes at 1/2 ppd since age 13 mostly at 2-3 ppd. 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 She denies growing up on a farm or having barn animals.      Today she is here for follow up. She states that breathing has been stable on Trelegy. She is not needing albuterol often. She is smoking at 1/2 ppd. She has no other concerns.     Review of Systems   Constitutional:  Negative for chills, fatigue, fever and unexpected weight change.   HENT:  Negative for congestion, postnasal drip and rhinorrhea.    Respiratory:  Positive for cough (denies hemoptysis.) and shortness of breath. Negative for wheezing.    Cardiovascular:  Negative for chest pain and leg swelling.   All other systems reviewed and are negative.      Objective   Physical Exam  Vitals reviewed.   Constitutional:       Appearance: Normal appearance.   HENT:      Head: Normocephalic.   Cardiovascular:      Rate and Rhythm: Normal rate and regular rhythm.   Pulmonary:      Effort: Pulmonary effort is normal.      Breath sounds: Decreased breath sounds present.   Skin:     General: Skin is warm and dry.   Neurological:      Mental Status: She is alert.         Assessment/Plan   1. COPD  2. Nicotine  dependence  3. Allergic rhinitis  4. Multiple lung nodules, 3 mm  5. Anxiety  6. GERD  7. Hypersomnia     Plan:     -PFTs with FEV1/FVC 60, FEV1 56-64, significant BDR suspect she could have asthma overlap. AAT normal. Continue Trelegy but increase to 200. Continue albuterol and nebulizer.      -She is a current 1/2 ppd smoker, mostly smoked at 2-3 ppd and started at age 13. She had LDCT done in November 2021 with few scattered noncalcified nodules. Repeat done November 2022 with scattered mucous plugging and emphysema. LDCT was done 4/2/24 and prior 3 mm nodule is now 5 mm and recommended follow up in 3 months, order placed for July. Discussed results.      -Continue Claritin for seasonal allergies. IGE, RAST normal.     -She is currently on buspar for anxiety and following up with PCP.      -GERD is currently controlled with Nexium.     -Sleep study done and patient does not have NELSON.      Overall her breathing remains stable on current regimen. I will repeat LDCT in July for follow up. I will continue with yearly screening for her. I instructed patient to call sooner if needed.

## 2024-04-09 NOTE — PATIENT INSTRUCTIONS
Continue on Trelegy one puff once a day.  Continue on albuterol and nebulizer as needed.  Continue on Claritin as needed.   Please get CT scan of your chest in July.  Call with any questions or concerns.   Follow up with me in one year.

## 2024-04-11 ENCOUNTER — INFUSION (OUTPATIENT)
Dept: INFUSION THERAPY | Facility: HOSPITAL | Age: 59
End: 2024-04-11
Payer: MEDICARE

## 2024-04-11 VITALS
DIASTOLIC BLOOD PRESSURE: 79 MMHG | WEIGHT: 150.57 LBS | SYSTOLIC BLOOD PRESSURE: 127 MMHG | OXYGEN SATURATION: 97 % | HEART RATE: 92 BPM | BODY MASS INDEX: 26.68 KG/M2 | RESPIRATION RATE: 20 BRPM | HEIGHT: 63 IN

## 2024-04-11 DIAGNOSIS — M81.0 OSTEOPOROSIS, UNSPECIFIED OSTEOPOROSIS TYPE, UNSPECIFIED PATHOLOGICAL FRACTURE PRESENCE: Primary | ICD-10-CM

## 2024-04-11 LAB
S PN DA SERO 19F IGG SER-MCNC: 2.64 UG/ML
S PNEUM DA 1 IGG SER-MCNC: 2.4 UG/ML
S PNEUM DA 12F IGG SER-MCNC: 0.41 UG/ML
S PNEUM DA 14 IGG SER-MCNC: 4.1 UG/ML
S PNEUM DA 18C IGG SER-MCNC: 0.74 UG/ML
S PNEUM DA 23F IGG SER-MCNC: 2.71 UG/ML
S PNEUM DA 3 IGG SER-MCNC: 0.57 UG/ML
S PNEUM DA 4 IGG SER-MCNC: 0.65 UG/ML
S PNEUM DA 5 IGG SER-MCNC: 3.31 UG/ML
S PNEUM DA 6B IGG SER-MCNC: 1.04 UG/ML
S PNEUM DA 7F IGG SER-MCNC: 0.54 UG/ML
S PNEUM DA 8 IGG SER-MCNC: 0.47 UG/ML
S PNEUM DA 9N IGG SER-MCNC: 0.87 UG/ML
S PNEUM DA 9V IGG SER-MCNC: 5.63 UG/ML
S PNEUM SEROTYPE IGG SER-IMP: NORMAL

## 2024-04-11 PROCEDURE — 96365 THER/PROPH/DIAG IV INF INIT: CPT | Mod: INF

## 2024-04-11 PROCEDURE — 2500000004 HC RX 250 GENERAL PHARMACY W/ HCPCS (ALT 636 FOR OP/ED): Performed by: FAMILY MEDICINE

## 2024-04-11 RX ORDER — ZOLEDRONIC ACID 5 MG/100ML
5 INJECTION, SOLUTION INTRAVENOUS ONCE
Status: COMPLETED | OUTPATIENT
Start: 2024-04-11 | End: 2024-04-11

## 2024-04-11 RX ORDER — ZOLEDRONIC ACID 5 MG/100ML
5 INJECTION, SOLUTION INTRAVENOUS ONCE
Status: CANCELLED | OUTPATIENT
Start: 2024-04-11

## 2024-04-11 RX ORDER — HEPARIN 100 UNIT/ML
500 SYRINGE INTRAVENOUS AS NEEDED
OUTPATIENT
Start: 2024-04-11

## 2024-04-11 RX ADMIN — ZOLEDRONIC ACID 5 MG: 5 INJECTION, SOLUTION INTRAVENOUS at 11:53

## 2024-04-11 ASSESSMENT — COLUMBIA-SUICIDE SEVERITY RATING SCALE - C-SSRS
2. HAVE YOU ACTUALLY HAD ANY THOUGHTS OF KILLING YOURSELF?: NO
6. HAVE YOU EVER DONE ANYTHING, STARTED TO DO ANYTHING, OR PREPARED TO DO ANYTHING TO END YOUR LIFE?: NO
1. IN THE PAST MONTH, HAVE YOU WISHED YOU WERE DEAD OR WISHED YOU COULD GO TO SLEEP AND NOT WAKE UP?: NO

## 2024-04-11 ASSESSMENT — ENCOUNTER SYMPTOMS
LOSS OF SENSATION IN FEET: 0
OCCASIONAL FEELINGS OF UNSTEADINESS: 0
DEPRESSION: 0

## 2024-04-11 ASSESSMENT — PATIENT HEALTH QUESTIONNAIRE - PHQ9
SUM OF ALL RESPONSES TO PHQ9 QUESTIONS 1 AND 2: 2
2. FEELING DOWN, DEPRESSED OR HOPELESS: SEVERAL DAYS
1. LITTLE INTEREST OR PLEASURE IN DOING THINGS: SEVERAL DAYS

## 2024-04-11 ASSESSMENT — PAIN SCALES - GENERAL: PAINLEVEL: 7

## 2024-04-12 ENCOUNTER — OFFICE VISIT (OUTPATIENT)
Dept: PAIN MEDICINE | Facility: CLINIC | Age: 59
End: 2024-04-12
Payer: MEDICARE

## 2024-04-12 VITALS
DIASTOLIC BLOOD PRESSURE: 75 MMHG | SYSTOLIC BLOOD PRESSURE: 113 MMHG | WEIGHT: 144 LBS | HEART RATE: 95 BPM | BODY MASS INDEX: 25.52 KG/M2 | RESPIRATION RATE: 16 BRPM | HEIGHT: 63 IN | OXYGEN SATURATION: 94 %

## 2024-04-12 DIAGNOSIS — M54.16 LUMBAR RADICULOPATHY: Primary | ICD-10-CM

## 2024-04-12 PROCEDURE — 99214 OFFICE O/P EST MOD 30 MIN: CPT | Performed by: NURSE PRACTITIONER

## 2024-04-12 PROCEDURE — RXMED WILLOW AMBULATORY MEDICATION CHARGE

## 2024-04-12 RX ORDER — PREGABALIN 150 MG/1
150 CAPSULE ORAL 3 TIMES DAILY
Qty: 90 CAPSULE | Refills: 5 | Status: SHIPPED | OUTPATIENT
Start: 2024-04-12

## 2024-04-12 ASSESSMENT — PAIN SCALES - GENERAL
PAINLEVEL_OUTOF10: 8
PAINLEVEL: 8

## 2024-04-12 ASSESSMENT — ENCOUNTER SYMPTOMS
HEADACHES: 0
MYALGIAS: 1
WEAKNESS: 1
TREMORS: 0
FACIAL ASYMMETRY: 0
JOINT SWELLING: 0
NECK STIFFNESS: 0
HEMATOLOGIC/LYMPHATIC NEGATIVE: 1
PSYCHIATRIC NEGATIVE: 1
NECK PAIN: 1
RESPIRATORY NEGATIVE: 1
LIGHT-HEADEDNESS: 0
ARTHRALGIAS: 1
SEIZURES: 0
NUMBNESS: 1
EYES NEGATIVE: 1
ENDOCRINE NEGATIVE: 1
DIZZINESS: 0
GASTROINTESTINAL NEGATIVE: 1
CONSTITUTIONAL NEGATIVE: 1
SPEECH DIFFICULTY: 0
CARDIOVASCULAR NEGATIVE: 1
ALLERGIC/IMMUNOLOGIC NEGATIVE: 1
ROS GI COMMENTS: SEE HISTORY OF PRESENT ILLNESS.
BACK PAIN: 1

## 2024-04-12 ASSESSMENT — PAIN DESCRIPTION - DESCRIPTORS: DESCRIPTORS: POUNDING

## 2024-04-12 ASSESSMENT — PAIN - FUNCTIONAL ASSESSMENT: PAIN_FUNCTIONAL_ASSESSMENT: 0-10

## 2024-04-12 NOTE — PROGRESS NOTES
Subjective   Patient ID: Kelli Ortega is a 58 y.o. female who presents for Back Pain.  HPI    Kelli follows up for interval reevaluation for chronic neck pain from cervical disc degeneration and left upper extremity radiculitis.  She is also with low back pain, degenerative disc causing lumbar stenosis that and radiculitis to the  left lower extremity in L4 and L5 dermatomal distribution.     At the L4-L5 level she is with severe central spinal canal and lateral recess stenosis secondary to central disc extrusion, facet hypertrophy and ligamentous thickening. She is also with severe arthritis of the joints on the right and the left.     Kelli is with chronic sharp burning pain to the low back radiates to the posterior lateral hip, thigh and calf. It is also on the plantar surface of the left foot. The right side remains unaffected. Standing and walking can increase this to a 7-8 out of a 10. Denies any numbness. Does have weakness with standing and walking. No falls.    History of left-sided L4 and L5 transforaminal lumbar epidural steroid injection January 25, 2024 did help to reduce pain and improve function up to 100% in the leg and greater than 50% in the back until pain had returned several weeks ago.    Discussed benefits and risks of injection therapy under radiculitis.  Would like to proceed.  Once we have the okay from the insurance we will call and place her on the schedule only after she has the GI bowel series imaging complete and is without any nausea or vomiting and condition resolved.    Maryjo was hospitalized for a small bowel obstruction March 2023.  No surgery deemed during that hospitalization.  Continues to follow GI and scheduled to have GI imaging.    Maryjo is in a comprehensive pain management program.    Chiropractory therapy several times a week for several months. This chiropractory therapy worsened the pain into the back and the leg.     History of physical therapy to the low back and  the leg has not helped much reduce pain or improve function.  Has failed physical therapy.    Continues with activity modification and application of heat or ice to low back to help reduce pain and improve function.    Continues with her pregabalin 150 mg 3 times daily which does help up to 50% pain relief with improvement of function without negative side effects.  Renewed contract today April 12, 2024.    Noted on pharmacy report diazepam 5 mg up to 3 times daily, Ambien at night and pregabalin 150 mg 3 times a day.  Understands not to concurrently take pregabalin with diazepam or Ambien as it is a risk of sedation, respiratory depression risk of death.       Review of Systems   Constitutional: Negative.    HENT: Negative.     Eyes: Negative.    Respiratory: Negative.     Cardiovascular: Negative.    Gastrointestinal: Negative.         See history of present illness.   Endocrine: Negative.    Genitourinary: Negative.    Musculoskeletal:  Positive for arthralgias, back pain, gait problem, myalgias and neck pain. Negative for joint swelling and neck stiffness.   Skin: Negative.    Allergic/Immunologic: Negative.    Neurological:  Positive for weakness and numbness. Negative for dizziness, tremors, seizures, syncope, facial asymmetry, speech difficulty, light-headedness and headaches.   Hematological: Negative.    Psychiatric/Behavioral: Negative.         Objective   Physical Exam  Vitals and nursing note reviewed.   Constitutional:       Appearance: Normal appearance.   HENT:      Head: Normocephalic and atraumatic.   Eyes:      Conjunctiva/sclera: Conjunctivae normal.   Cardiovascular:      Rate and Rhythm: Normal rate and regular rhythm.      Pulses: Normal pulses.      Heart sounds: Normal heart sounds.   Pulmonary:      Effort: Pulmonary effort is normal. No respiratory distress.      Breath sounds: Normal breath sounds.   Abdominal:      General: Abdomen is flat. Bowel sounds are normal. There is no distension.       Palpations: Abdomen is soft.      Tenderness: There is no abdominal tenderness. There is no guarding.   Musculoskeletal:      Cervical back: Neck supple.      Right lower leg: No edema.      Left lower leg: No edema.   Skin:     General: Skin is warm and dry.      Coloration: Skin is pale.   Neurological:      Mental Status: She is alert and oriented to person, place, and time.      Cranial Nerves: No cranial nerve deficit.      Sensory: Sensory deficit present.      Motor: Weakness present.      Gait: Gait abnormal.      Deep Tendon Reflexes: Reflexes abnormal.      Comments: Tenderness to palpation over to the left of L4 and L5.  Flexion and extension increases back pain.    Allodynia to light touch in L4 and L5 dermatomal distribution.    Positive straight leg raise.  No clonus.    Weakness with resisted hip flexion and ankle dorsiflexion 4 out of 5.    Ambulates with mildly antalgic gait.   Psychiatric:         Behavior: Behavior normal.         Assessment/Plan   Problem List Items Addressed This Visit             ICD-10-CM    Lumbar radiculopathy - Primary M54.16    Relevant Medications    pregabalin (Lyrica) 150 mg capsule    Other Relevant Orders    Transforaminal        Once bowel condition resolves, will place you on the injection schedule under left-sided L4 and L5 transforaminal lumbar epidural steroid injection for radiculitis.  Continue with your medication prescribed of pregabalin 150 mg 3 times daily.  Follow-up in 12 months for medication visit and as needed throughout the year.    AYAZ Tate 04/12/24 10:22 AM

## 2024-04-15 ENCOUNTER — TELEPHONE (OUTPATIENT)
Dept: PRIMARY CARE | Facility: CLINIC | Age: 59
End: 2024-04-15
Payer: MEDICARE

## 2024-04-15 ENCOUNTER — PHARMACY VISIT (OUTPATIENT)
Dept: PHARMACY | Facility: CLINIC | Age: 59
End: 2024-04-15
Payer: MEDICARE

## 2024-04-15 ENCOUNTER — PATIENT OUTREACH (OUTPATIENT)
Dept: CARE COORDINATION | Facility: CLINIC | Age: 59
End: 2024-04-15
Payer: MEDICARE

## 2024-04-15 NOTE — PROGRESS NOTES
Successful outreach to patient regarding hospitalization as patient continues TCM program.   At time of outreach call the patient feels as if their condition has improved since initial visit with PCP or specialist.  Questions or concerns addressed at this time with patient.   Provided contact information to patient if any further non-emergent needs arise.     Hawa Lu LPN

## 2024-04-17 ENCOUNTER — EVALUATION (OUTPATIENT)
Dept: SPEECH THERAPY | Facility: HOSPITAL | Age: 59
End: 2024-04-17
Payer: MEDICARE

## 2024-04-17 DIAGNOSIS — R13.12 OROPHARYNGEAL DYSPHAGIA: ICD-10-CM

## 2024-04-17 PROCEDURE — 92526 ORAL FUNCTION THERAPY: CPT | Mod: GN | Performed by: SPEECH-LANGUAGE PATHOLOGIST

## 2024-04-17 PROCEDURE — 92610 EVALUATE SWALLOWING FUNCTION: CPT | Mod: GN | Performed by: SPEECH-LANGUAGE PATHOLOGIST

## 2024-04-17 NOTE — PROGRESS NOTES
Speech-Language Pathology    [facility] Adult Outpatient Speech-Language Pathology Clinical Swallow Evaluation    Patient Name: Kelli Ortega  MRN: [id number]  Today's Date: 4/19/2024      Time Calculation  Start Time: 0910  Stop Time: 1030  Time Calculation (min): 80 min      Current Problem:   1. Oropharyngeal dysphagia  Referral to Speech Therapy    Follow Up In Speech Therapy            PLAN  Recommendations:  Solid consistency: Regular (as tolerated with dentition)  Liquid consistency: Thin  Medication administration: Whole via mouth     Recommended frequency/duration:  Skilled SLP services recommended: Yes  Frequency: 1x/week  Duration: 12 weeks     Goals:  1. Pt will consume prescribed diet (current diet is regular solids and thin liquids) without overt s/sx aspiration >95% of the time.  2. Pt will complete oral/pharyngeal strengthening exercises as directed with 80% acc independently.  3. Pt will demonstrate understanding of all education related to dysphagia independently.        Recommendations:  Risk for Aspiration: Yes  Additional Recommendations: Dysphagia treatment, MBS Study if symptoms worsen  Solid Diet Recommendations: Regular solids   Liquid Diet Recommendations: Thin  Compensatory Swallowing Strategies: Upright positioning during meals, slow rate of intake, small bites/sips, alternate each bite with a sip, effortful swallows, multiple swallows  per bolus, remain upright for 30 minutes after meals.         SUBJECTIVE  Patient Class: Outpatient  Living Environment: Home  Arrival: Independent  Reason for Referral: Dysphagia  Referred By: Sheba Arciniega PA-C  Past Medical History Relevant to Rehab: GERD  Prior Level of Function: WFL  Patient Seen During This Visit: Yes  Certification Period Start Date: 2/29/2024  Certification Period End Date: 2/28/25  Total Number of Visits : 1  Dysphagia Diagnosis: Mild-Moderate oropharyngeal dysphagia     Pain Assessment  Pain Assessment: 0-10  Pain Score: 0      Respiratory Status: Room air  Behavior/Cognition: Alert, Cooperative, Pleasant mood  Hearing: Within Functional Limits  Patient Positioning: Upright in Chair  Baseline Vocal Quality: Within Functional Limits  Volitional Cough: Adequate  Volitional Swallow: Adequate        OBJECTIVE  Clinical swallow evaluation completed and consisted of interview, oral motor assessment, and PO trials (thin liquids via cup and pureed solids).  ORAL PHASE: Oral mucosa were pink and moist. Lingual strength and ROM were diminished bilaterally. Labial seal was adequate. A-P transfer of thin liquids and solids were slow, but Pt appeared to clear oral residuals following multiple swallows and lingual sweep.  PHARYNGEAL PHASE: Laryngeal elevation was visualized in all trials, however adequacy of hyolaryngeal elevation/excursion cannot be determined during clinical swallow evaluation. Esophagram completed 3/22/2023 noted no epiglottic penetration or aspiration; however, this procedure does not look at the oropharyngeal phase as closely as a Modified Barium Swallow Study does. Per Patient report, she has had recent pneumonia, but it does not appear associated with swallowing deficits.     Oral/Motor Assessment:  Oral/Motor Assessment  Dentition: Dentures (Lower Full), Dentures (Upper Full)  Oral Motor: Impaired Function  Facial Symmetry: Within Functional Limits  Labial Agility: Within Functional Limits  Labial ROM: Within Functional Limits  Labial Strength: Within Functional Limits  Lingual Agility: Reduced  Lingual ROM: Reduced right, Reduced left  Lingual Strength: Reduced  Vocal Quality: Within Functional Limits  Intelligibility: Within Functional Limits  Breath Support: Adequate for speech  Hearing: Within Functional Limits        ASSESSMENT  Impressions:  Pt presents with moderate oropharyngeal phase dysphagia characterized by decreased mastication with hard solids (nuts, seeds, etc), decreased bolus formation, slow A-P transfer, and  "occasional pharyngeal stasis per Pt report. Pt also notes intermittent feeling of food and liquid getting \"stuck\" and points to upper chest, which would be more indicative of esophageal dysphagia or GERD related symptoms. Pt would benefit from skilled speech therapy targeting dysphagia management to maximize swallow safety with current diet.  Prognosis: Good     Treatment/Education:  Results and recommendations were relayed to: Patient  Education provided: Yes              Learner: Patient              Barriers to learning: None              Method of teaching: Verbal              Topic: Role of ST, results of assessment, recommendations from MBS Study, recommended safe swallow strategies, and oropharyngeal exercises.              Outcome of teaching: Pt demonstrated good understanding  Treatment provided: No  Next Treatment Priority: Oropharyngeal exercises, upgraded trials utilizing compensatory strategies             "

## 2024-04-22 ENCOUNTER — APPOINTMENT (OUTPATIENT)
Dept: PHARMACY | Facility: HOSPITAL | Age: 59
End: 2024-04-22
Payer: MEDICARE

## 2024-04-25 ENCOUNTER — TELEPHONE (OUTPATIENT)
Dept: NEUROLOGY | Facility: HOSPITAL | Age: 59
End: 2024-04-25
Payer: MEDICARE

## 2024-04-25 DIAGNOSIS — G43.E09 CHRONIC MIGRAINE WITH AURA WITHOUT STATUS MIGRAINOSUS, NOT INTRACTABLE: Primary | ICD-10-CM

## 2024-04-25 NOTE — TELEPHONE ENCOUNTER
RECEIVED FAX STATING [employer] INSURANCE APPROVED EMGALITY  WAS APPROVED FROM 1/1/24-12/31/24 IN PLACE OF AJOVY AUTOINJECTOR . CAN YOU PLEASE ORDER AND SEND IN TO HER PHARMACY . THANK YOU

## 2024-04-29 ENCOUNTER — LAB (OUTPATIENT)
Dept: LAB | Facility: LAB | Age: 59
End: 2024-04-29
Payer: MEDICARE

## 2024-04-29 ENCOUNTER — HOSPITAL ENCOUNTER (OUTPATIENT)
Dept: RADIOLOGY | Facility: HOSPITAL | Age: 59
Discharge: HOME | End: 2024-04-29
Payer: MEDICARE

## 2024-04-29 DIAGNOSIS — E78.2 MIXED HYPERLIPIDEMIA: ICD-10-CM

## 2024-04-29 DIAGNOSIS — E55.9 VITAMIN D DEFICIENCY: ICD-10-CM

## 2024-04-29 DIAGNOSIS — E53.8 VITAMIN B12 DEFICIENCY: ICD-10-CM

## 2024-04-29 DIAGNOSIS — J43.9 PULMONARY EMPHYSEMA, UNSPECIFIED EMPHYSEMA TYPE (MULTI): ICD-10-CM

## 2024-04-29 DIAGNOSIS — K56.609 SMALL BOWEL OBSTRUCTION (MULTI): ICD-10-CM

## 2024-04-29 DIAGNOSIS — M79.7 FIBROMYALGIA, PRIMARY: ICD-10-CM

## 2024-04-29 DIAGNOSIS — D80.1 NONFAMILIAL HYPOGAMMAGLOBULINEMIA (MULTI): ICD-10-CM

## 2024-04-29 DIAGNOSIS — K21.9 GASTROESOPHAGEAL REFLUX DISEASE WITHOUT ESOPHAGITIS: ICD-10-CM

## 2024-04-29 DIAGNOSIS — R73.9 HYPERGLYCEMIA: ICD-10-CM

## 2024-04-29 DIAGNOSIS — D80.1 NONFAMILIAL HYPOGAMMAGLOBULINEMIA (MULTI): Primary | ICD-10-CM

## 2024-04-29 DIAGNOSIS — D50.0 ANEMIA DUE TO BLOOD LOSS: ICD-10-CM

## 2024-04-29 LAB
25(OH)D3 SERPL-MCNC: 58 NG/ML (ref 30–100)
ALBUMIN SERPL BCP-MCNC: 4 G/DL (ref 3.4–5)
ALP SERPL-CCNC: 33 U/L (ref 33–136)
ALT SERPL W P-5'-P-CCNC: 9 U/L (ref 7–45)
ANION GAP SERPL CALC-SCNC: 14 MMOL/L (ref 10–20)
AST SERPL W P-5'-P-CCNC: 14 U/L (ref 9–39)
BASOPHILS # BLD AUTO: 0.01 X10*3/UL (ref 0–0.1)
BASOPHILS NFR BLD AUTO: 0.1 %
BILIRUB SERPL-MCNC: 0.3 MG/DL (ref 0–1.2)
BUN SERPL-MCNC: 8 MG/DL (ref 6–23)
CALCIUM SERPL-MCNC: 8.4 MG/DL (ref 8.6–10.3)
CHLORIDE SERPL-SCNC: 112 MMOL/L (ref 98–107)
CHOLEST SERPL-MCNC: 205 MG/DL (ref 0–199)
CHOLESTEROL/HDL RATIO: 3.6
CO2 SERPL-SCNC: 20 MMOL/L (ref 21–32)
CREAT SERPL-MCNC: 0.92 MG/DL (ref 0.5–1.05)
EGFRCR SERPLBLD CKD-EPI 2021: 71 ML/MIN/1.73M*2
EOSINOPHIL # BLD AUTO: 0.13 X10*3/UL (ref 0–0.7)
EOSINOPHIL NFR BLD AUTO: 1.7 %
ERYTHROCYTE [DISTWIDTH] IN BLOOD BY AUTOMATED COUNT: 19 % (ref 11.5–14.5)
ERYTHROCYTE [SEDIMENTATION RATE] IN BLOOD BY WESTERGREN METHOD: 36 MM/H (ref 0–30)
EST. AVERAGE GLUCOSE BLD GHB EST-MCNC: 123 MG/DL
GLUCOSE SERPL-MCNC: 92 MG/DL (ref 74–99)
HBA1C MFR BLD: 5.9 %
HCT VFR BLD AUTO: 35.6 % (ref 36–46)
HDLC SERPL-MCNC: 56.7 MG/DL
HGB BLD-MCNC: 11.1 G/DL (ref 12–16)
IGG SERPL-MCNC: 590 MG/DL (ref 700–1600)
IGG1 SER-MCNC: 458 MG/DL (ref 490–1140)
IGG2 SER-MCNC: 76 MG/DL (ref 150–640)
IGG3 SER-MCNC: 15 MG/DL (ref 11–85)
IGG4 SER-MCNC: 9 MG/DL (ref 3–200)
IMM GRANULOCYTES # BLD AUTO: 0.01 X10*3/UL (ref 0–0.7)
IMM GRANULOCYTES NFR BLD AUTO: 0.1 % (ref 0–0.9)
LDLC SERPL CALC-MCNC: 122 MG/DL
LYMPHOCYTES # BLD AUTO: 2.57 X10*3/UL (ref 1.2–4.8)
LYMPHOCYTES NFR BLD AUTO: 32.9 %
MCH RBC QN AUTO: 27.2 PG (ref 26–34)
MCHC RBC AUTO-ENTMCNC: 31.2 G/DL (ref 32–36)
MCV RBC AUTO: 87 FL (ref 80–100)
MONOCYTES # BLD AUTO: 0.54 X10*3/UL (ref 0.1–1)
MONOCYTES NFR BLD AUTO: 6.9 %
NEUTROPHILS # BLD AUTO: 4.56 X10*3/UL (ref 1.2–7.7)
NEUTROPHILS NFR BLD AUTO: 58.3 %
NON HDL CHOLESTEROL: 148 MG/DL (ref 0–149)
NRBC BLD-RTO: 0 /100 WBCS (ref 0–0)
PLATELET # BLD AUTO: 292 X10*3/UL (ref 150–450)
POTASSIUM SERPL-SCNC: 4 MMOL/L (ref 3.5–5.3)
PROT SERPL-MCNC: 6.3 G/DL (ref 6.4–8.2)
PROT SERPL-MCNC: 6.5 G/DL (ref 6.4–8.2)
RBC # BLD AUTO: 4.08 X10*6/UL (ref 4–5.2)
SODIUM SERPL-SCNC: 142 MMOL/L (ref 136–145)
TRIGL SERPL-MCNC: 132 MG/DL (ref 0–149)
TSH SERPL-ACNC: 2.74 MIU/L (ref 0.44–3.98)
VIT B12 SERPL-MCNC: 454 PG/ML (ref 211–911)
VLDL: 26 MG/DL (ref 0–40)
WBC # BLD AUTO: 7.8 X10*3/UL (ref 4.4–11.3)

## 2024-04-29 PROCEDURE — 84155 ASSAY OF PROTEIN SERUM: CPT

## 2024-04-29 PROCEDURE — 82607 VITAMIN B-12: CPT

## 2024-04-29 PROCEDURE — 74250 X-RAY XM SM INT 1CNTRST STD: CPT | Performed by: RADIOLOGY

## 2024-04-29 PROCEDURE — 82306 VITAMIN D 25 HYDROXY: CPT

## 2024-04-29 PROCEDURE — 80061 LIPID PANEL: CPT

## 2024-04-29 PROCEDURE — 84165 PROTEIN E-PHORESIS SERUM: CPT | Performed by: INTERNAL MEDICINE

## 2024-04-29 PROCEDURE — 74250 X-RAY XM SM INT 1CNTRST STD: CPT

## 2024-04-29 PROCEDURE — 85025 COMPLETE CBC W/AUTO DIFF WBC: CPT

## 2024-04-29 PROCEDURE — 86334 IMMUNOFIX E-PHORESIS SERUM: CPT

## 2024-04-29 PROCEDURE — 80053 COMPREHEN METABOLIC PANEL: CPT

## 2024-04-29 PROCEDURE — 86320 SERUM IMMUNOELECTROPHORESIS: CPT | Performed by: INTERNAL MEDICINE

## 2024-04-29 PROCEDURE — 36415 COLL VENOUS BLD VENIPUNCTURE: CPT

## 2024-04-29 PROCEDURE — 86317 IMMUNOASSAY INFECTIOUS AGENT: CPT

## 2024-04-29 PROCEDURE — 82784 ASSAY IGA/IGD/IGG/IGM EACH: CPT

## 2024-04-29 PROCEDURE — 85652 RBC SED RATE AUTOMATED: CPT

## 2024-04-29 PROCEDURE — 84165 PROTEIN E-PHORESIS SERUM: CPT

## 2024-04-29 PROCEDURE — 83036 HEMOGLOBIN GLYCOSYLATED A1C: CPT

## 2024-04-29 PROCEDURE — 84443 ASSAY THYROID STIM HORMONE: CPT

## 2024-04-29 RX ORDER — CYCLOBENZAPRINE HCL 10 MG
10 TABLET ORAL 3 TIMES DAILY PRN
Qty: 270 TABLET | Refills: 3 | Status: SHIPPED | OUTPATIENT
Start: 2024-04-29 | End: 2025-04-24

## 2024-05-01 ENCOUNTER — TELEPHONE (OUTPATIENT)
Dept: PRIMARY CARE | Facility: CLINIC | Age: 59
End: 2024-05-01
Payer: MEDICARE

## 2024-05-01 DIAGNOSIS — R05.3 CHRONIC COUGH: Primary | ICD-10-CM

## 2024-05-01 LAB
S PN DA SERO 19F IGG SER-MCNC: 21.14 UG/ML
S PNEUM DA 1 IGG SER-MCNC: 1.45 UG/ML
S PNEUM DA 12F IGG SER-MCNC: 0.31 UG/ML
S PNEUM DA 14 IGG SER-MCNC: 1.86 UG/ML
S PNEUM DA 18C IGG SER-MCNC: 0.42 UG/ML
S PNEUM DA 23F IGG SER-MCNC: 0.1 UG/ML
S PNEUM DA 3 IGG SER-MCNC: 0.42 UG/ML
S PNEUM DA 4 IGG SER-MCNC: 0.59 UG/ML
S PNEUM DA 5 IGG SER-MCNC: 1.77 UG/ML
S PNEUM DA 6B IGG SER-MCNC: 0.5 UG/ML
S PNEUM DA 7F IGG SER-MCNC: 0.36 UG/ML
S PNEUM DA 8 IGG SER-MCNC: 0.27 UG/ML
S PNEUM DA 9N IGG SER-MCNC: 0.48 UG/ML
S PNEUM DA 9V IGG SER-MCNC: 2.07 UG/ML
S PNEUM SEROTYPE IGG SER-IMP: NORMAL

## 2024-05-01 NOTE — TELEPHONE ENCOUNTER
Lidocaine creme is not helping arthritic pain (multiple areas) .  She is asking if there is anything else she can try ?    She also has cough x 2 weeks -productive (white/clear) ,used OTC Tussin DM with no relief.  Asking if cough medication can be sent in ?    [facility]

## 2024-05-01 NOTE — PROGRESS NOTES
Patient ID: Kelli Ortega is a 58 y.o. female.    Transforaminal epidural steroid injection under fluoroscopy    Date/Time: 5/2/2024 9:51 AM    Performed by: Ricki Davis MD  Authorized by: Ricki Davis MD    Consent:     Consent obtained:  Written    Consent given by:  Patient    Risks, benefits, and alternatives were discussed: yes      Risks discussed:  Bleeding, infection, pain and nerve damage  Universal protocol:     Procedure explained and questions answered to patient or proxy's satisfaction: yes      Relevant documents present and verified: yes      Test results available: yes      Imaging studies available: yes      Required blood products, implants, devices, and special equipment available: yes      Site/side marked: yes      Immediately prior to procedure, a time out was called: yes      Patient identity confirmed:  Verbally with patient, arm band and hospital-assigned identification number  Indications:     Indications:  Back pain  Pre-procedure details:     Skin preparation:  Chlorhexidine    Preparation: Patient was prepped and draped in the usual sterile fashion    Sedation:     Sedation type:  None  Anesthesia:     Anesthesia method:  Local infiltration    Local anesthetic:  Sodium bicarbonate (lidocaine 0.5%)  Procedure specific details:      History reviewed patient interviewed and examined.  Risks and benefits of procedures discussed and patient agreed to proceed and consent was signed.  With the patient in the prone position the lower back was prepped left and draped in sterile fashion.  Under AP guidance L4 and L5 vertebral bodies were identified.  In a left  lateral oblique view the L4 and L5 foramen were identified and used as a trajectory view.  A 22-gauge 3.5 spinal needle was introduced into L4 and L5 foramen.  Position was identified in AP and lateral.  IV contrast showed inadequate foraminal spread needles were repositioned and IV contrast showed adequate epidural spread  without any vascular or intrathecal uptake.  Lidocaine 0.5% mixed with Depo-Medrol 40 mg a total of 1.5 cc was injected in each foramen.  Patient tolerated the procedure without any problems and was transferred to recovery room in stable condition.     Post-procedure details:     Procedure completion:  Tolerated well, no immediate complications

## 2024-05-02 ENCOUNTER — HOSPITAL ENCOUNTER (OUTPATIENT)
Dept: RADIOLOGY | Facility: HOSPITAL | Age: 59
Discharge: HOME | End: 2024-05-02
Payer: MEDICARE

## 2024-05-02 ENCOUNTER — SPECIALTY PHARMACY (OUTPATIENT)
Dept: PHARMACY | Facility: CLINIC | Age: 59
End: 2024-05-02

## 2024-05-02 ENCOUNTER — OFFICE VISIT (OUTPATIENT)
Dept: PAIN MEDICINE | Facility: CLINIC | Age: 59
End: 2024-05-02
Payer: MEDICARE

## 2024-05-02 ENCOUNTER — TELEPHONE (OUTPATIENT)
Dept: PRIMARY CARE | Facility: CLINIC | Age: 59
End: 2024-05-02

## 2024-05-02 VITALS — DIASTOLIC BLOOD PRESSURE: 75 MMHG | HEART RATE: 89 BPM | SYSTOLIC BLOOD PRESSURE: 127 MMHG | RESPIRATION RATE: 16 BRPM

## 2024-05-02 DIAGNOSIS — M54.16 LUMBAR RADICULOPATHY: Primary | ICD-10-CM

## 2024-05-02 DIAGNOSIS — M54.16 LUMBAR RADICULOPATHY: ICD-10-CM

## 2024-05-02 PROCEDURE — 2500000004 HC RX 250 GENERAL PHARMACY W/ HCPCS (ALT 636 FOR OP/ED)

## 2024-05-02 PROCEDURE — 64484 NJX AA&/STRD TFRM EPI L/S EA: CPT | Performed by: ANESTHESIOLOGY

## 2024-05-02 PROCEDURE — 64483 NJX AA&/STRD TFRM EPI L/S 1: CPT | Performed by: ANESTHESIOLOGY

## 2024-05-02 PROCEDURE — 2500000005 HC RX 250 GENERAL PHARMACY W/O HCPCS

## 2024-05-02 PROCEDURE — RXMED WILLOW AMBULATORY MEDICATION CHARGE

## 2024-05-02 PROCEDURE — 2550000001 HC RX 255 CONTRASTS: Performed by: ANESTHESIOLOGY

## 2024-05-02 RX ADMIN — IOHEXOL 5 ML: 240 INJECTION, SOLUTION INTRATHECAL; INTRAVASCULAR; INTRAVENOUS; ORAL at 10:42

## 2024-05-02 ASSESSMENT — ENCOUNTER SYMPTOMS
DEPRESSION: 0
LOSS OF SENSATION IN FEET: 0
OCCASIONAL FEELINGS OF UNSTEADINESS: 0

## 2024-05-02 ASSESSMENT — PAIN DESCRIPTION - DESCRIPTORS: DESCRIPTORS: ACHING

## 2024-05-02 ASSESSMENT — PAIN SCALES - GENERAL
PAINLEVEL_OUTOF10: 6
PAINLEVEL_OUTOF10: 8

## 2024-05-02 ASSESSMENT — PAIN - FUNCTIONAL ASSESSMENT: PAIN_FUNCTIONAL_ASSESSMENT: 0-10

## 2024-05-02 NOTE — PROGRESS NOTES
History Of Present Illness  Kelli Ortega is a 58 y.o. female presenting with low back pain radiating to the anterior lateral aspect of the left lower extremity.  Patient scheduled for a left L4 and L5 transforaminal epidural steroid injection.     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, unspecified joint (Multi), Bacterial vaginitis (05/22/2023), Bipolar disorder, unspecified (Multi), Candida esophagitis (Multi) (05/22/2023), Change in bowel habits (05/22/2023), Change in bowel habits (05/22/2023), Contact with and (suspected) exposure to covid-19 (12/01/2021), Cough (05/22/2023), Enterocolitis due to Clostridium difficile, not specified as recurrent, Folliculitis (05/22/2023), Full incontinence of feces (05/22/2023), Functional dyspepsia (05/22/2023), Other conditions influencing health status, Other conditions influencing health status (09/15/2021), Panic disorder (episodic paroxysmal anxiety), Personal history of other diseases of the digestive system, Personal history of other diseases of the musculoskeletal system and connective tissue, Personal history of other diseases of the musculoskeletal system and connective tissue, Personal history of other diseases of the musculoskeletal system and connective tissue, Personal history of other diseases of the musculoskeletal system and connective tissue, Personal history of other diseases of the nervous system and sense organs, Personal history of other diseases of the respiratory system, Personal history of other diseases of the respiratory system, Personal history of other infectious and parasitic diseases, Personal history of other infectious and parasitic  diseases (10/03/2017), Personal history of other mental and behavioral disorders, Personal history of other specified conditions (08/22/2019), Personal history of pneumonia (recurrent), Respiratory illness (05/22/2023), and Vaginal discharge (05/22/2023).    Surgical History  She has a past surgical history that includes Appendectomy (08/24/2016); Hysterectomy (08/24/2016); Esophagogastroduodenoscopy (10/30/2017); Colon surgery (10/03/2017); Bladder surgery (10/03/2017); and Small intestine surgery (2012 Oct).     Social History  She reports that she has been smoking cigarettes. She has a 21 pack-year smoking history. She has never used smokeless tobacco. She reports that she does not currently use alcohol. She reports that she does not currently use drugs.    Family History  Family History   Problem Relation Name Age of Onset    Diabetes Mother      Hypertension Mother      Breast cancer Sister      Asthma Son      Cancer Other uncle         Allergies  Amitriptyline, Bupropion, Bupropion hcl, Cefazolin, Diclofenac, Diphenhydramine, Divalproex, Divalproex sodium, Doxycycline, Guaifenesin, Hydrocodone, Hydrocodone-acetaminophen, Hydroxyzine, Ibuprofen, Ketorolac, Lepirudin, Meloxicam, Milnacipran, Mirtazapine, Morphine, Nabumetone, Ondansetron hcl, Paroxetine, Tramadol, and Tramadol-acetaminophen    Review of systems:   13-point review of systems done and negative except as noted in HPI      Physical Exam   Vital signs: Reviewed  Constitutional: No acute distress, well appearing and well nourished. Patient appears stated age.   Eyes: Conjunctiva non-icteric and eye lids are without obvious rash or drooping. Pupils are symmetric.   Ears, Nose, Mouth, and Throat: External ears and nose appear to be without deformity or rash. No lesions or masses noted. Hearing is grossly intact.   Neck:. No JVD noted, tracheal position is midline.   Head and Face: Examination of the head and face revealed no abnormalities.    Respiratory: No gasping or shortness of breath noted, no use of accessory muscles noted.   Cardiovascular: Examination for edema is normal.   GI: Abdomen nontender to palpation.   Skin: No rashes or open lesions/ulcers identified on skin.   Musk: Digits/nails show no clubbing or cyanosis. No asymmetry or masses noted of the musculature. Examination of the muscles/joints/bones show normal range of motion. Gait is grossly [].  [] to walk on toes and heels.   Neurologic: Cranial nerves II-XII intact, motor strength 5/5 muscle strength of the lower extremities bilaterally and equal. 5/5 muscle strength of the upper extremities bilaterally and equal.   Reflexes: normal.   Sensation: []  Provocative tests:       Last Recorded Vitals  /78   Pulse 58   Resp 20     Relevant Results            Last OARRS Review: No data recorded    I have personally reviewed the OARRS report for Kelli Ortega I have considered the risks of abuse, dependence, addiction and diversion       Assessment/Plan   Diagnoses and all orders for this visit:  Lumbar radiculopathy  -     FL fluoro images no charge; Future  -     Transforaminal  Other orders  -     Transforaminal epidural steroid injection under fluoroscopy             Ricki Davis MD

## 2024-05-03 LAB
ALBUMIN: 3.9 G/DL (ref 3.4–5)
ALPHA 1 GLOBULIN: 0.3 G/DL (ref 0.2–0.6)
ALPHA 2 GLOBULIN: 0.9 G/DL (ref 0.4–1.1)
BETA GLOBULIN: 0.8 G/DL (ref 0.5–1.2)
GAMMA GLOBULIN: 0.6 G/DL (ref 0.5–1.4)
IMMUNOFIXATION COMMENT: NORMAL
PATH REVIEW - SERUM IMMUNOFIXATION: NORMAL
PATH REVIEW-SERUM PROTEIN ELECTROPHORESIS: NORMAL
PROTEIN ELECTROPHORESIS COMMENT: NORMAL

## 2024-05-05 NOTE — RESULT ENCOUNTER NOTE
Will discuss labs at follow-up appointment, but everything was fairly stable.  Cholesterol was improved but still elevated.  You were slightly anemic this year, but everything else was essentially within normal limits.

## 2024-05-06 PROCEDURE — RXMED WILLOW AMBULATORY MEDICATION CHARGE

## 2024-05-08 ENCOUNTER — PHARMACY VISIT (OUTPATIENT)
Dept: PHARMACY | Facility: CLINIC | Age: 59
End: 2024-05-08
Payer: MEDICARE

## 2024-05-09 ENCOUNTER — APPOINTMENT (OUTPATIENT)
Dept: PAIN MEDICINE | Facility: CLINIC | Age: 59
End: 2024-05-09
Payer: MEDICARE

## 2024-05-09 RX ORDER — PROMETHAZINE HYDROCHLORIDE AND DEXTROMETHORPHAN HYDROBROMIDE 6.25; 15 MG/5ML; MG/5ML
5 SYRUP ORAL EVERY 4 HOURS PRN
Qty: 120 ML | Refills: 1 | Status: SHIPPED | OUTPATIENT
Start: 2024-05-09 | End: 2024-06-08

## 2024-05-15 ENCOUNTER — PHARMACY VISIT (OUTPATIENT)
Dept: PHARMACY | Facility: CLINIC | Age: 59
End: 2024-05-15
Payer: MEDICARE

## 2024-05-17 ENCOUNTER — SPECIALTY PHARMACY (OUTPATIENT)
Dept: PHARMACY | Facility: CLINIC | Age: 59
End: 2024-05-17

## 2024-05-17 NOTE — PROGRESS NOTES
[facility] Clinical Note    Kelli Ortega is a 58 y.o. female, who is on the specialty pharmacy service of: [facility].  Kelli Ortega is taking: Emgality.     Kelli was contacted on 5/17/2024.    Refer to the encounter summary report for documentation details about patient counseling and education.      Impression/Plan  Is patient high risk? (potential patients:  pregnancy, geriatric, pediatric)   no  Is laboratory follow-up needed? no  Is a clinical intervention needed?  no  Next assessment date?  3 months  Additional comments: Ajovy flowsheet filled out in error. Updated flowsheet to Emgality as Emgality medication education was conducted.    Medication Adherence  The importance of adherence was discussed with the patient and they were advised to take the medication as prescribed by their provider. Kelli was encouraged to call her physician's office if they have a question regarding a missed dose.     QOL/Patient Satisfaction  Rate your quality of life on scale of 1-10: -- (5 to 7)  Rate your satisfaction with  Specialty Pharmacy on scale of 1-10: 10 - Completely satisfied      Patient advised to contact the pharmacy if there are any changes to her medication list, including prescriptions, OTC medications, herbal products, or supplements. Patient was advised of [facility]’s dispensing process, refill timeline, contact information ([phone]), and patient management follow up. Patient confirmed understanding of education conducted during assessment. All patient questions and concerns were addressed to the best of my ability. Patient was encouraged to contact the specialty pharmacy with any questions or concerns.    Confirmed follow-up outreaches are properly scheduled. Reviewed goals of therapy in the program targets.    Harinder Narvaez, PharmD

## 2024-05-21 ENCOUNTER — APPOINTMENT (OUTPATIENT)
Dept: SPEECH THERAPY | Facility: HOSPITAL | Age: 59
End: 2024-05-21
Payer: MEDICARE

## 2024-05-21 ENCOUNTER — TELEPHONE (OUTPATIENT)
Dept: PRIMARY CARE | Facility: CLINIC | Age: 59
End: 2024-05-21
Payer: MEDICARE

## 2024-05-22 ENCOUNTER — OFFICE VISIT (OUTPATIENT)
Dept: PRIMARY CARE | Facility: CLINIC | Age: 59
End: 2024-05-22
Payer: MEDICARE

## 2024-05-22 VITALS
WEIGHT: 145.4 LBS | HEIGHT: 63 IN | SYSTOLIC BLOOD PRESSURE: 122 MMHG | DIASTOLIC BLOOD PRESSURE: 80 MMHG | HEART RATE: 105 BPM | OXYGEN SATURATION: 96 % | BODY MASS INDEX: 25.76 KG/M2 | RESPIRATION RATE: 17 BRPM

## 2024-05-22 DIAGNOSIS — M79.7 FIBROMYALGIA, PRIMARY: ICD-10-CM

## 2024-05-22 DIAGNOSIS — E78.2 MIXED HYPERLIPIDEMIA: ICD-10-CM

## 2024-05-22 DIAGNOSIS — R19.7 DIARRHEA, UNSPECIFIED TYPE: ICD-10-CM

## 2024-05-22 DIAGNOSIS — J43.9 PULMONARY EMPHYSEMA, UNSPECIFIED EMPHYSEMA TYPE (MULTI): ICD-10-CM

## 2024-05-22 DIAGNOSIS — R10.84 GENERALIZED ABDOMINAL PAIN: ICD-10-CM

## 2024-05-22 DIAGNOSIS — B96.5: ICD-10-CM

## 2024-05-22 DIAGNOSIS — G89.29 CHRONIC NECK PAIN: ICD-10-CM

## 2024-05-22 DIAGNOSIS — F31.32 BIPOLAR AFFECTIVE DISORDER, CURRENTLY DEPRESSED, MODERATE (MULTI): ICD-10-CM

## 2024-05-22 DIAGNOSIS — M54.50 CHRONIC LOW BACK PAIN, UNSPECIFIED BACK PAIN LATERALITY, UNSPECIFIED WHETHER SCIATICA PRESENT: ICD-10-CM

## 2024-05-22 DIAGNOSIS — K59.09 CHRONIC CONSTIPATION: ICD-10-CM

## 2024-05-22 DIAGNOSIS — Z00.00 ROUTINE GENERAL MEDICAL EXAMINATION AT HEALTH CARE FACILITY: Primary | ICD-10-CM

## 2024-05-22 DIAGNOSIS — D80.1 HYPOGAMMAGLOBULINEMIA (MULTI): ICD-10-CM

## 2024-05-22 DIAGNOSIS — K58.2 IRRITABLE BOWEL SYNDROME WITH BOTH CONSTIPATION AND DIARRHEA: Chronic | ICD-10-CM

## 2024-05-22 DIAGNOSIS — F17.210 CIGARETTE NICOTINE DEPENDENCE WITHOUT COMPLICATION: ICD-10-CM

## 2024-05-22 DIAGNOSIS — E55.9 VITAMIN D DEFICIENCY: ICD-10-CM

## 2024-05-22 DIAGNOSIS — G89.29 CHRONIC LOW BACK PAIN, UNSPECIFIED BACK PAIN LATERALITY, UNSPECIFIED WHETHER SCIATICA PRESENT: ICD-10-CM

## 2024-05-22 DIAGNOSIS — M00.80: ICD-10-CM

## 2024-05-22 DIAGNOSIS — M48.07 SPINAL STENOSIS OF LUMBOSACRAL REGION: ICD-10-CM

## 2024-05-22 DIAGNOSIS — E11.9 DIET-CONTROLLED TYPE 2 DIABETES MELLITUS (MULTI): ICD-10-CM

## 2024-05-22 DIAGNOSIS — N39.46 MIXED STRESS AND URGE URINARY INCONTINENCE: ICD-10-CM

## 2024-05-22 DIAGNOSIS — Z12.31 VISIT FOR SCREENING MAMMOGRAM: ICD-10-CM

## 2024-05-22 DIAGNOSIS — F51.01 PRIMARY INSOMNIA: ICD-10-CM

## 2024-05-22 DIAGNOSIS — E53.8 VITAMIN B12 DEFICIENCY: ICD-10-CM

## 2024-05-22 DIAGNOSIS — M79.18 CERVICAL MYOFASCIAL PAIN SYNDROME: ICD-10-CM

## 2024-05-22 DIAGNOSIS — M54.2 CHRONIC NECK PAIN: ICD-10-CM

## 2024-05-22 PROCEDURE — 99213 OFFICE O/P EST LOW 20 MIN: CPT | Performed by: FAMILY MEDICINE

## 2024-05-22 PROCEDURE — 3044F HG A1C LEVEL LT 7.0%: CPT | Performed by: FAMILY MEDICINE

## 2024-05-22 PROCEDURE — G0439 PPPS, SUBSEQ VISIT: HCPCS | Performed by: FAMILY MEDICINE

## 2024-05-22 PROCEDURE — 3074F SYST BP LT 130 MM HG: CPT | Performed by: FAMILY MEDICINE

## 2024-05-22 PROCEDURE — 3079F DIAST BP 80-89 MM HG: CPT | Performed by: FAMILY MEDICINE

## 2024-05-22 PROCEDURE — 3049F LDL-C 100-129 MG/DL: CPT | Performed by: FAMILY MEDICINE

## 2024-05-22 ASSESSMENT — ENCOUNTER SYMPTOMS
ABDOMINAL PAIN: 1
ARTHRALGIAS: 1
OCCASIONAL FEELINGS OF UNSTEADINESS: 0
BACK PAIN: 1
LOSS OF SENSATION IN FEET: 0
DIARRHEA: 1
DYSPHORIC MOOD: 1
DEPRESSION: 0

## 2024-05-22 ASSESSMENT — PATIENT HEALTH QUESTIONNAIRE - PHQ9
2. FEELING DOWN, DEPRESSED OR HOPELESS: NOT AT ALL
SUM OF ALL RESPONSES TO PHQ9 QUESTIONS 1 AND 2: 0
1. LITTLE INTEREST OR PLEASURE IN DOING THINGS: NOT AT ALL

## 2024-05-22 ASSESSMENT — ACTIVITIES OF DAILY LIVING (ADL)
BATHING: INDEPENDENT
GROCERY_SHOPPING: INDEPENDENT
DRESSING: INDEPENDENT
DOING_HOUSEWORK: INDEPENDENT
MANAGING_FINANCES: INDEPENDENT
TAKING_MEDICATION: INDEPENDENT

## 2024-05-22 ASSESSMENT — COLUMBIA-SUICIDE SEVERITY RATING SCALE - C-SSRS
2. HAVE YOU ACTUALLY HAD ANY THOUGHTS OF KILLING YOURSELF?: NO
1. IN THE PAST MONTH, HAVE YOU WISHED YOU WERE DEAD OR WISHED YOU COULD GO TO SLEEP AND NOT WAKE UP?: NO
6. HAVE YOU EVER DONE ANYTHING, STARTED TO DO ANYTHING, OR PREPARED TO DO ANYTHING TO END YOUR LIFE?: NO

## 2024-05-22 NOTE — PATIENT INSTRUCTIONS
Latest Reference Range & Units 04/29/24   SODIUM 136 - 145 mmol/L 142   POTASSIUM 3.5 - 5.3 mmol/L 4.0   CHLORIDE 98 - 107 mmol/L 112 (H)   Bicarbonate 21 - 32 mmol/L 20 (L)   Blood Urea Nitrogen 6 - 23 mg/dL 8   Creatinine 0.50 - 1.05 mg/dL 0.92   GLUCOSE 74 - 99 mg/dL 92   Calcium 8.6 - 10.3 mg/dL 8.4 (L)   Hemoglobin A1C see below % 5.9 (H)   Thyroid Stimulating Hormone 0.44 - 3.98 mIU/L 2.74   Alkaline Phosphatase 33 - 136 U/L 33   Albumin 3.4 - 5.0 g/dL 4.0   Total Protein 6.4 - 8.2 g/dL  6.4 - 8.2 g/dL 6.5  6.3 (L)   AST 9 - 39 U/L 14   ALT 7 - 45 U/L 9 [1]   Bilirubin Total 0.0 - 1.2 mg/dL 0.3   LEUKOCYTES (10*3/UL) IN BLOOD BY AUTOMATED COUNT, Croatian 4.4 - 11.3 x10*3/uL 7.8   ERYTHROCYTES (10*6/UL) IN BLOOD BY AUTOMATED COUNT, Croatian 4.00 - 5.20 x10*6/uL 4.08   HEMOGLOBIN 12.0 - 16.0 g/dL 11.1 (L)   HEMATOCRIT 36.0 - 46.0 % 35.6 (L)   MCV 80 - 100 fL 87   MCH 26.0 - 34.0 pg 27.2   MCHC 32.0 - 36.0 g/dL 31.2 (L)   RED CELL DISTRIBUTION WIDTH 11.5 - 14.5 % 19.0 (H)   PLATELETS (10*3/UL) IN BLOOD AUTOMATED COUNT, Croatian 150 - 450 x10*3/uL 292   NEUTROPHILS (10*3/UL) IN BLOOD BY AUTOMATED COUNT, Croatian 1.20 - 7.70 x10*3/uL 4.56 [2]   NEUTROPHILS/100 LEUKOCYTES IN BLOOD BY AUTOMATED COUNT, Croatian 40.0 - 80.0 % 58.3   Lymphocytes Absolute 1.20 - 4.80 x10*3/uL 2.57   Lymphocytes % 13.0 - 44.0 % 32.9   Monocytes Absolute 0.10 - 1.00 x10*3/uL 0.54   Monocytes % 2.0 - 10.0 % 6.9   Eosinophils Absolute 0.00 - 0.70 x10*3/uL 0.13   Eosinophils % 0.0 - 6.0 % 1.7   Basophils Absolute 0.00 - 0.10 x10*3/uL 0.01   Basophils % 0.0 - 2.0 % 0.1   CHOLESTEROL 0 - 199 mg/dL 205 (H) [3]   HDL CHOLESTEROL mg/dL 56.7 [4]   Cholesterol/HDL Ratio  3.6 [5]   LDL Calculated <=99 mg/dL 122 (H) [6]   VLDL 0 - 40 mg/dL 26   TRIGLYCERIDES 0 - 149 mg/dL 132 [7]   (H): Data is abnormally high  (L): Data is abnormally low  [1] Patients treated with Sulfasalazine may generate falsely decreased results for ALT.  [2] Percent  differential counts (%) should be interpreted in the context of the absolute cell counts (cells/uL).  [3]       Age      Desirable   Borderline High   High     0-19 Y     0 - 169       170 - 199     >/= 200    20-24 Y     0 - 189       190 - 224     >/= 225         >24 Y     0 - 199       200 - 239     >/= 240   **All ranges are based on fasting samples. Specific   therapeutic targets will vary based on patient-specific   cardiac risk.    Pediatric guidelines reference:Pediatrics 2011, 128(S5).Adult guidelines reference: NCEP ATPIII Guidelines,MEENAKSHI 2001, 258:2486-97    Venipuncture immediately after or during the administration of Metamizole may lead to falsely low results. Testing should be performed immediately prior to Metamizole dosing.  [4]   Age       Very Low   Low     Normal    High    0-19 Y    < 35      < 40     40-45     ----  20-24 Y    ----     < 40      >45      ----        >24 Y      ----     < 40     40-60      >60    [5]   Ref Values  Desirable  < 3.4  High Risk  > 5.0  [6]                             Near   Borderline      AGE      Desirable  Optimal    High     High     Very High     0-19 Y     0 - 109     ---    110-129   >/= 130     ----    20-24 Y     0 - 119     ---    120-159   >/= 160     ----      >24 Y     0 -  99   100-129  130-159   160-189     >/=190    [7]    Age         Desirable   Borderline High   High     Very High   0 D-90 D    19 - 174         ----         ----        ----  91 D- 9 Y     0 -  74        75 -  99     >/= 100      ----    10-19 Y     0 -  89        90 - 129     >/= 130      ----    20-24 Y     0 - 114       115 - 149     >/= 150      ----         >24 Y     0 - 149       150 - 199    200- 499    >/= 500    Venipuncture immediately after or during the administration of Metamizole may lead to falsely low results. Testing should be performed immediately prior to Metamizole dosing.

## 2024-05-22 NOTE — PROGRESS NOTES
"Subjective   Reason for Visit: Kelli Ortega is an 58 y.o. female here for a Medicare Wellness visit.   Patient is seeing neurology, she was put on Ajovi,and then switched to Emgality. Was able to get PA as she could not use.   Past Medical, Surgical, and Family History reviewed and updated in chart.  Weight stable. Bowels with constipation and diarrhea. Now taking Magnesium, Potassium. Tried to eat Low FODMAP but likes to have fiber. Uses diapers iin extreme days that she may not make it to bathroom with bowels. In addition, has urinary incontinence due to both stress and overflow.   Reviewed all medications by prescribing practitioner or clinical pharmacist (such as prescriptions, OTCs, herbal therapies and supplements) and documented in the medical record.  Labwork reviewed from 4/2024 and looked good, anemia, which is stable.   Dog passed away this months, which was her decreased son's this was hard  HPI  Medicare wellness  Patient Care Team:  Mariaelena Dyer MD as PCP - General (Family Medicine)  Mariaelena Dyer MD as PCP - [facility] PCP  Mariaelena Dyer MD as PCP - [employer] PCP  Hawa Lu LPN as Care Manager (Case Management)     Review of Systems   Gastrointestinal:  Positive for abdominal pain and diarrhea.   Musculoskeletal:  Positive for arthralgias and back pain.   Psychiatric/Behavioral:  Positive for dysphoric mood.    All other systems reviewed and are negative.      Objective   Vitals:  /80   Pulse 105   Resp 17   Ht 1.6 m (5' 3\")   Wt 66 kg (145 lb 6.4 oz)   SpO2 96%   BMI 25.76 kg/m²       Physical Exam  Vitals reviewed.   Constitutional:       Appearance: Normal appearance.   HENT:      Head: Normocephalic and atraumatic.      Right Ear: Tympanic membrane normal.      Left Ear: Tympanic membrane normal.      Nose: Nose normal.      Mouth/Throat:      Mouth: Mucous membranes are moist.      Pharynx: Oropharynx is clear.   Eyes:      Extraocular " Movements: Extraocular movements intact.      Conjunctiva/sclera: Conjunctivae normal.      Pupils: Pupils are equal, round, and reactive to light.   Cardiovascular:      Rate and Rhythm: Normal rate and regular rhythm.      Pulses: Normal pulses.      Heart sounds: Normal heart sounds.   Pulmonary:      Effort: Pulmonary effort is normal.      Breath sounds: Normal breath sounds.   Abdominal:      General: Abdomen is flat. Bowel sounds are normal.      Palpations: Abdomen is soft.   Musculoskeletal:         General: Normal range of motion.      Cervical back: Normal range of motion and neck supple.   Skin:     General: Skin is warm and dry.      Capillary Refill: Capillary refill takes less than 2 seconds.   Neurological:      General: No focal deficit present.      Mental Status: She is alert and oriented to person, place, and time.   Psychiatric:         Mood and Affect: Mood normal.         Behavior: Behavior normal.       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 Valerio tomorrow regarding this.  May need another pneumonia vaccine. (PPSV23)   Meds reconciled.  For COPD,  patient uses nebulizer at home as needed up to four times daily.

## 2024-05-24 ENCOUNTER — TELEPHONE (OUTPATIENT)
Dept: GASTROENTEROLOGY | Facility: CLINIC | Age: 59
End: 2024-05-24
Payer: MEDICARE

## 2024-05-28 ENCOUNTER — SPECIALTY PHARMACY (OUTPATIENT)
Dept: PHARMACY | Facility: CLINIC | Age: 59
End: 2024-05-28

## 2024-05-28 ENCOUNTER — APPOINTMENT (OUTPATIENT)
Dept: LAB | Facility: LAB | Age: 59
End: 2024-05-28
Payer: MEDICARE

## 2024-05-28 ENCOUNTER — TREATMENT (OUTPATIENT)
Dept: SPEECH THERAPY | Facility: HOSPITAL | Age: 59
End: 2024-05-28
Payer: MEDICARE

## 2024-05-28 DIAGNOSIS — R13.12 OROPHARYNGEAL DYSPHAGIA: ICD-10-CM

## 2024-05-28 DIAGNOSIS — D80.3 SELECTIVE DEFICIENCY OF IMMUNOGLOBULIN G (IGG) SUBCLASSES (MULTI): Primary | ICD-10-CM

## 2024-05-28 DIAGNOSIS — R70.0 ELEVATED ERYTHROCYTE SEDIMENTATION RATE: ICD-10-CM

## 2024-05-28 PROCEDURE — 92526 ORAL FUNCTION THERAPY: CPT | Mod: GN | Performed by: SPEECH-LANGUAGE PATHOLOGIST

## 2024-05-28 ASSESSMENT — PAIN SCALES - GENERAL: PAINLEVEL_OUTOF10: 7

## 2024-05-28 ASSESSMENT — PAIN - FUNCTIONAL ASSESSMENT: PAIN_FUNCTIONAL_ASSESSMENT: 0-10

## 2024-05-28 NOTE — PROGRESS NOTES
Speech-Language Pathology    SLP Adult Outpatient Speech-Language Pathology Treatment     Patient Name: Kelli Ortega  MRN: [id number]  Today's Date: 5/28/2024     Time Calculation  Start Time: 1300  Stop Time: 1345  Time Calculation (min): 45 min      Current Problem:   1. Oropharyngeal dysphagia  Follow Up In Speech Therapy            SLP Assessment:  SLP TX Intervention Outcome: Making Progress Towards Goals  Prognosis: Good  Treatment Provided: Yes   Treatment Tolerance: Patient tolerated treatment well  Strengths: Cognition, Motivation       Plan:  SLP TX Plan: Continue Plan of Care  SLP Plan: Skilled SLP  SLP Frequency: 1x per week  Duration: 12 weeks  Discussed POC: Patient  Discussed Risks/Benefits: Yes  Patient/Caregiver Agreeable: Yes      Subjective   Current Problem:  Pt seen this date for speech language therapy targeting dysphagia management.    Most Recent Visit:  SLP Received On: 05/28/24    General Visit Information:   Patient Seen During This Visit: Yes  Arrival: Independent  Certification Period Start Date: 04/17/24  Certification Period End Date: 04/17/25  Number of Authorized Treatments : 20  Total Number of Visits : 1      Pain Assessment:   Pain Assessment: 0-10  Pain Score: 7  Pain Type: Chronic pain  Pain Location:  (All over body)      Objective     Goals:  1. Pt will consume prescribed diet (current diet is regular solids and thin liquids) without overt s/sx aspiration >95% of the time.  2. Pt will complete oral/pharyngeal strengthening exercises as directed with 80% acc independently.  3. Pt will demonstrate understanding of all education related to dysphagia independently.    Therapeutic Swallow:   Therapeutic Swallow:  Pt reviewed diet and compensatory strategies to increase swallow safety and efficacy. Pt demonstrated and understanding of pharyngeal exercises targeting increasing timeliness of swallow onset and epiglottic inversion.  Pt was able to demonstrate all exercises (i.e.  effortful swallow) to be completed daily as a part of a home program, along with reporting difficulty with any recommended diet.       Outpatient Education:  Adult Outpatient Education  Individual(s) Educated: Patient  Risk and Benefits Discussed with Patient/Caregiver/Other: yes  Patient/Caregiver Demonstrated Understanding: yes  Plan of Care Discussed and Agreed Upon: yes      Consultations/Referrals/Coordination of Services: N/A

## 2024-05-29 DIAGNOSIS — J43.9 PULMONARY EMPHYSEMA, UNSPECIFIED EMPHYSEMA TYPE (MULTI): ICD-10-CM

## 2024-05-29 RX ORDER — FLUTICASONE FUROATE, UMECLIDINIUM BROMIDE AND VILANTEROL TRIFENATATE 200; 62.5; 25 UG/1; UG/1; UG/1
POWDER RESPIRATORY (INHALATION)
Qty: 1 EACH | Refills: 5 | Status: SHIPPED | OUTPATIENT
Start: 2024-05-29

## 2024-06-03 DIAGNOSIS — K58.2 IRRITABLE BOWEL SYNDROME WITH BOTH CONSTIPATION AND DIARRHEA: Primary | ICD-10-CM

## 2024-06-03 RX ORDER — SIMETHICONE 125 MG/1
CAPSULE, LIQUID FILLED ORAL
Qty: 120 CAPSULE | Refills: 10 | Status: SHIPPED | OUTPATIENT
Start: 2024-06-03

## 2024-06-04 ENCOUNTER — TREATMENT (OUTPATIENT)
Dept: SPEECH THERAPY | Facility: HOSPITAL | Age: 59
End: 2024-06-04
Payer: MEDICARE

## 2024-06-04 ENCOUNTER — LAB (OUTPATIENT)
Dept: LAB | Facility: LAB | Age: 59
End: 2024-06-04
Payer: MEDICARE

## 2024-06-04 DIAGNOSIS — R70.0 ELEVATED ERYTHROCYTE SEDIMENTATION RATE: ICD-10-CM

## 2024-06-04 DIAGNOSIS — R13.12 OROPHARYNGEAL DYSPHAGIA: ICD-10-CM

## 2024-06-04 DIAGNOSIS — D80.3 SELECTIVE DEFICIENCY OF IMMUNOGLOBULIN G (IGG) SUBCLASSES (MULTI): ICD-10-CM

## 2024-06-04 LAB
ERYTHROCYTE [SEDIMENTATION RATE] IN BLOOD BY WESTERGREN METHOD: 47 MM/H (ref 0–30)
IGG SERPL-MCNC: 631 MG/DL (ref 700–1600)
IGG1 SER-MCNC: 481 MG/DL (ref 490–1140)
IGG2 SER-MCNC: 79 MG/DL (ref 150–640)
IGG3 SER-MCNC: 15 MG/DL (ref 11–85)
IGG4 SER-MCNC: 13 MG/DL (ref 3–200)
PROT SERPL-MCNC: 7.1 G/DL (ref 6.4–8.2)
PROT UR-ACNC: 6 MG/DL (ref 5–25)

## 2024-06-04 PROCEDURE — 84166 PROTEIN E-PHORESIS/URINE/CSF: CPT

## 2024-06-04 PROCEDURE — 84156 ASSAY OF PROTEIN URINE: CPT

## 2024-06-04 PROCEDURE — 84155 ASSAY OF PROTEIN SERUM: CPT

## 2024-06-04 PROCEDURE — 84165 PROTEIN E-PHORESIS SERUM: CPT

## 2024-06-04 PROCEDURE — 92526 ORAL FUNCTION THERAPY: CPT | Mod: GN | Performed by: SPEECH-LANGUAGE PATHOLOGIST

## 2024-06-04 PROCEDURE — 36415 COLL VENOUS BLD VENIPUNCTURE: CPT

## 2024-06-04 PROCEDURE — 85652 RBC SED RATE AUTOMATED: CPT

## 2024-06-04 PROCEDURE — 82784 ASSAY IGA/IGD/IGG/IGM EACH: CPT

## 2024-06-04 ASSESSMENT — PAIN - FUNCTIONAL ASSESSMENT: PAIN_FUNCTIONAL_ASSESSMENT: 0-10

## 2024-06-04 ASSESSMENT — PAIN SCALES - GENERAL: PAINLEVEL_OUTOF10: 7

## 2024-06-04 NOTE — PROGRESS NOTES
Speech-Language Pathology    SLP Adult Outpatient Speech-Language Pathology Treatment     Patient Name: Kelli Ortega  MRN: [id number]  Today's Date: 6/4/2024     Time Calculation  Start Time: 1300  Stop Time: 1355  Time Calculation (min): 55 min      Current Problem:   1. Oropharyngeal dysphagia  Follow Up In Speech Therapy            SLP Assessment:  SLP TX Intervention Outcome: Making Progress Towards Goals  Prognosis: Good  Treatment Provided: Yes   Treatment Tolerance: Patient tolerated treatment well  Strengths: Cognition, Motivation       Plan:  SLP TX Plan: Continue Plan of Care  SLP Plan: Skilled SLP  SLP Frequency: 1x per week  Duration: 12 weeks  Discussed POC: Patient  Discussed Risks/Benefits: Yes  Patient/Caregiver Agreeable: Yes      Subjective   Current Problem:  Pt seen this date for speech language therapy targeting dysphagia management.    Most Recent Visit:  SLP Received On: 06/04/24    General Visit Information:   Patient Seen During This Visit: Yes  Arrival: Independent  Certification Period Start Date: 04/17/24  Certification Period End Date: 04/17/25  Number of Authorized Treatments : 20  Total Number of Visits : 2      Pain Assessment:   Pain Assessment: 0-10  Pain Score: 7  Pain Type: Chronic pain      Objective     Goals:  1. Pt will consume prescribed diet (current diet is regular solids and thin liquids) without overt s/sx aspiration >95% of the time.  2. Pt will complete oral/pharyngeal strengthening exercises as directed with 80% acc independently.  3. Pt will demonstrate understanding of all education related to dysphagia independently.    Therapeutic Swallow:  Pt reviewed diet and compensatory strategies to increase swallow safety and efficacy. Pt demonstrated and understanding of pharyngeal exercises targeting increasing timeliness of swallow onset and epiglottic inversion.  Pt was able to demonstrate all exercises (i.e. effortful swallow) to be completed daily as a part of a home  program, along with reporting difficulty with any recommended diet. Next session Pt will bring trial of item that is increasing s/s of dysphagia.      Outpatient Education:  Adult Outpatient Education  Individual(s) Educated: Patient  Risk and Benefits Discussed with Patient/Caregiver/Other: yes  Patient/Caregiver Demonstrated Understanding: yes  Plan of Care Discussed and Agreed Upon: yes      Consultations/Referrals/Coordination of Services: N/A

## 2024-06-05 LAB
ALBUMIN MFR UR ELPH: 29 %
ALPHA1 GLOB MFR UR ELPH: 16.3 %
ALPHA2 GLOB MFR UR ELPH: 10.5 %
B-GLOBULIN MFR UR ELPH: 25.2 %
GAMMA GLOB MFR UR ELPH: 19 %
PATH REVIEW-URINE PROTEIN ELECTROPHORESIS: NORMAL
URINE ELECTROPHORESIS COMMENT: NORMAL

## 2024-06-06 LAB
ALBUMIN: 4 G/DL (ref 3.4–5)
ALPHA 1 GLOBULIN: 0.4 G/DL (ref 0.2–0.6)
ALPHA 2 GLOBULIN: 1.1 G/DL (ref 0.4–1.1)
BETA GLOBULIN: 0.9 G/DL (ref 0.5–1.2)
GAMMA GLOBULIN: 0.7 G/DL (ref 0.5–1.4)
PATH REVIEW-SERUM PROTEIN ELECTROPHORESIS: NORMAL
PROTEIN ELECTROPHORESIS COMMENT: NORMAL

## 2024-06-06 NOTE — TELEPHONE ENCOUNTER
[employer] urology form signed. Recommended dicyclomine for abdominal pain or ER if symptoms ongoing/worsening.  
Form faxed to [facility]. Pt. Informed and verbalized understanding.  
Woke up yesterday with intestinal pain on the left side non-stop.  She said she having fecal incontinence often. Can you recommend something for this? She said she ate 4 days in a row and this is what happens.    She is also asking if you can get [employer]of[employer] pads approved.  She is having forms faxed here for this.    
Home

## 2024-06-10 ENCOUNTER — APPOINTMENT (OUTPATIENT)
Dept: RADIOLOGY | Facility: HOSPITAL | Age: 59
End: 2024-06-10
Payer: MEDICARE

## 2024-06-11 ENCOUNTER — DOCUMENTATION (OUTPATIENT)
Dept: SPEECH THERAPY | Facility: HOSPITAL | Age: 59
End: 2024-06-11
Payer: MEDICARE

## 2024-06-11 ENCOUNTER — APPOINTMENT (OUTPATIENT)
Dept: SPEECH THERAPY | Facility: HOSPITAL | Age: 59
End: 2024-06-11
Payer: MEDICARE

## 2024-06-11 PROBLEM — D80.3 SELECTIVE DEFICIENCY OF IGG (MULTI): Status: ACTIVE | Noted: 2024-06-11

## 2024-06-11 NOTE — PROGRESS NOTES
Speech-Language Pathology                 Therapy Communication Note    Patient Name: Kelli Ortega  MRN: [id number]  Today's Date: 6/11/2024     Discipline: Speech Language Pathology    Missed Visit Reason:  Patient ill.    Missed Time: Cancel    Comment:

## 2024-06-12 ENCOUNTER — TELEPHONE (OUTPATIENT)
Dept: PRIMARY CARE | Facility: CLINIC | Age: 59
End: 2024-06-12
Payer: MEDICARE

## 2024-06-12 ENCOUNTER — PATIENT OUTREACH (OUTPATIENT)
Dept: CARE COORDINATION | Facility: CLINIC | Age: 59
End: 2024-06-12
Payer: MEDICARE

## 2024-06-12 DIAGNOSIS — J30.1 SEASONAL ALLERGIC RHINITIS DUE TO POLLEN: ICD-10-CM

## 2024-06-12 DIAGNOSIS — E78.2 MIXED HYPERLIPIDEMIA: ICD-10-CM

## 2024-06-12 RX ORDER — LORATADINE 10 MG/1
10 TABLET ORAL DAILY
Qty: 30 TABLET | Refills: 0 | Status: SHIPPED | OUTPATIENT
Start: 2024-06-12

## 2024-06-12 RX ORDER — PRAVASTATIN SODIUM 20 MG/1
TABLET ORAL
Qty: 30 TABLET | Refills: 0 | Status: SHIPPED | OUTPATIENT
Start: 2024-06-12

## 2024-06-12 NOTE — TELEPHONE ENCOUNTER
Med Refill   loratadine (Claritin) 10 mg tablet [[id number]]   pravastatin (Pravachol) 20 mg tablet [[id number]]     [facility] - [place] [address] [address]  [address] [facility] [address]  Phone: [phone]  Fax: [phone]  JAMES #: [phone]     LOV: 5/22/24    NOV: 11/20/24

## 2024-06-12 NOTE — PROGRESS NOTES
TCM 90 day follow up  Voicemail left with contact information for patient to call back with any non-emergent questions or concerns.  Hawa Lu LPN

## 2024-06-13 ENCOUNTER — INFUSION (OUTPATIENT)
Dept: INFUSION THERAPY | Facility: HOSPITAL | Age: 59
End: 2024-06-13
Payer: MEDICARE

## 2024-06-13 VITALS
OXYGEN SATURATION: 96 % | WEIGHT: 143.2 LBS | DIASTOLIC BLOOD PRESSURE: 79 MMHG | RESPIRATION RATE: 16 BRPM | SYSTOLIC BLOOD PRESSURE: 118 MMHG | BODY MASS INDEX: 25.37 KG/M2 | HEART RATE: 81 BPM | TEMPERATURE: 97.4 F

## 2024-06-13 DIAGNOSIS — D80.3 SELECTIVE DEFICIENCY OF IGG (MULTI): ICD-10-CM

## 2024-06-13 DIAGNOSIS — M81.0 OSTEOPOROSIS, UNSPECIFIED OSTEOPOROSIS TYPE, UNSPECIFIED PATHOLOGICAL FRACTURE PRESENCE: ICD-10-CM

## 2024-06-13 PROCEDURE — 96366 THER/PROPH/DIAG IV INF ADDON: CPT | Mod: INF

## 2024-06-13 PROCEDURE — 2500000004 HC RX 250 GENERAL PHARMACY W/ HCPCS (ALT 636 FOR OP/ED): Mod: JZ | Performed by: FAMILY MEDICINE

## 2024-06-13 PROCEDURE — 96365 THER/PROPH/DIAG IV INF INIT: CPT | Mod: INF

## 2024-06-13 PROCEDURE — 2500000001 HC RX 250 WO HCPCS SELF ADMINISTERED DRUGS (ALT 637 FOR MEDICARE OP): Performed by: FAMILY MEDICINE

## 2024-06-13 RX ORDER — DIPHENHYDRAMINE HCL 25 MG
25 CAPSULE ORAL ONCE
OUTPATIENT
Start: 2024-07-11

## 2024-06-13 RX ORDER — HEPARIN 100 UNIT/ML
500 SYRINGE INTRAVENOUS AS NEEDED
OUTPATIENT
Start: 2024-06-13

## 2024-06-13 RX ORDER — ACETAMINOPHEN 325 MG/1
650 TABLET ORAL ONCE
OUTPATIENT
Start: 2024-07-11

## 2024-06-13 RX ORDER — DIPHENHYDRAMINE HCL 25 MG
25 CAPSULE ORAL ONCE
Status: COMPLETED | OUTPATIENT
Start: 2024-06-13 | End: 2024-06-13

## 2024-06-13 RX ORDER — ACETAMINOPHEN 325 MG/1
650 TABLET ORAL ONCE
Status: COMPLETED | OUTPATIENT
Start: 2024-06-13 | End: 2024-06-13

## 2024-06-13 ASSESSMENT — ENCOUNTER SYMPTOMS
OCCASIONAL FEELINGS OF UNSTEADINESS: 1
DEPRESSION: 1
LOSS OF SENSATION IN FEET: 1

## 2024-06-13 ASSESSMENT — COLUMBIA-SUICIDE SEVERITY RATING SCALE - C-SSRS
2. HAVE YOU ACTUALLY HAD ANY THOUGHTS OF KILLING YOURSELF?: NO
6. HAVE YOU EVER DONE ANYTHING, STARTED TO DO ANYTHING, OR PREPARED TO DO ANYTHING TO END YOUR LIFE?: YES
1. IN THE PAST MONTH, HAVE YOU WISHED YOU WERE DEAD OR WISHED YOU COULD GO TO SLEEP AND NOT WAKE UP?: NO

## 2024-06-13 ASSESSMENT — PATIENT HEALTH QUESTIONNAIRE - PHQ9
2. FEELING DOWN, DEPRESSED OR HOPELESS: MORE THAN HALF THE DAYS
1. LITTLE INTEREST OR PLEASURE IN DOING THINGS: NOT AT ALL
SUM OF ALL RESPONSES TO PHQ9 QUESTIONS 1 AND 2: 2
10. IF YOU CHECKED OFF ANY PROBLEMS, HOW DIFFICULT HAVE THESE PROBLEMS MADE IT FOR YOU TO DO YOUR WORK, TAKE CARE OF THINGS AT HOME, OR GET ALONG WITH OTHER PEOPLE: SOMEWHAT DIFFICULT

## 2024-06-13 ASSESSMENT — PAIN SCALES - GENERAL: PAINLEVEL: 7

## 2024-06-18 ENCOUNTER — APPOINTMENT (OUTPATIENT)
Dept: SPEECH THERAPY | Facility: HOSPITAL | Age: 59
End: 2024-06-18
Payer: MEDICARE

## 2024-06-21 PROCEDURE — RXMED WILLOW AMBULATORY MEDICATION CHARGE

## 2024-06-22 ENCOUNTER — PHARMACY VISIT (OUTPATIENT)
Dept: PHARMACY | Facility: CLINIC | Age: 59
End: 2024-06-22
Payer: MEDICARE

## 2024-06-24 DIAGNOSIS — G43.E09 CHRONIC MIGRAINE WITH AURA WITHOUT STATUS MIGRAINOSUS, NOT INTRACTABLE: ICD-10-CM

## 2024-06-24 RX ORDER — GALCANEZUMAB 120 MG/ML
240 INJECTION, SOLUTION SUBCUTANEOUS ONCE
Qty: 2 EACH | Refills: 0 | OUTPATIENT
Start: 2024-06-24 | End: 2024-06-24

## 2024-06-25 ENCOUNTER — TREATMENT (OUTPATIENT)
Dept: SPEECH THERAPY | Facility: HOSPITAL | Age: 59
End: 2024-06-25
Payer: MEDICARE

## 2024-06-25 ENCOUNTER — SPECIALTY PHARMACY (OUTPATIENT)
Dept: PHARMACY | Facility: CLINIC | Age: 59
End: 2024-06-25

## 2024-06-25 DIAGNOSIS — R13.12 OROPHARYNGEAL DYSPHAGIA: ICD-10-CM

## 2024-06-25 PROCEDURE — 92526 ORAL FUNCTION THERAPY: CPT | Mod: GN | Performed by: SPEECH-LANGUAGE PATHOLOGIST

## 2024-06-25 ASSESSMENT — PAIN - FUNCTIONAL ASSESSMENT: PAIN_FUNCTIONAL_ASSESSMENT: 0-10

## 2024-06-25 ASSESSMENT — PAIN SCALES - GENERAL: PAINLEVEL_OUTOF10: 7

## 2024-06-25 NOTE — PROGRESS NOTES
Speech-Language Pathology    SLP Adult Outpatient Speech-Language Pathology Treatment     Patient Name: Kelli Ortega  MRN: [id number]  Today's Date: 6/25/2024     Time Calculation  Start Time: 1300  Stop Time: 1345  Time Calculation (min): 45 min      Current Problem:   1. Oropharyngeal dysphagia  Follow Up In Speech Therapy            SLP Assessment:  SLP TX Intervention Outcome: Making Progress Towards Goals  Prognosis: Good  Treatment Provided: Yes   Treatment Tolerance: Patient tolerated treatment well  Strengths: Cognition, Motivation       Plan:  SLP TX Plan: Continue Plan of Care  SLP Plan: Skilled SLP  SLP Frequency: 1x per week  Duration: 12 weeks  Discussed POC: Patient  Discussed Risks/Benefits: Yes  Patient/Caregiver Agreeable: Yes      Subjective   Current Problem:  Pt seen this date for speech language therapy targeting dysphagia management.    Most Recent Visit:  SLP Received On: 06/25/24    General Visit Information:   Patient Seen During This Visit: Yes  Arrival: Independent  Certification Period Start Date: 04/17/24  Certification Period End Date: 04/17/25  Number of Authorized Treatments : 20  Total Number of Visits : 3      Pain Assessment:   Pain Assessment: 0-10  0-10 (Numeric) Pain Score: 7  Pain Type: Chronic pain      Objective     Goals:  1. Pt will consume prescribed diet (current diet is regular solids and thin liquids) without overt s/sx aspiration >95% of the time.  2. Pt will complete oral/pharyngeal strengthening exercises as directed with 80% acc independently.  3. Pt will demonstrate understanding of all education related to dysphagia independently.    Therapeutic Swallow:  Pt reviewed diet and compensatory strategies to increase swallow safety and efficacy. Pt demonstrated and understanding of pharyngeal exercises targeting increasing timeliness of swallow onset and epiglottic inversion.  Pt was able to demonstrate all exercises (i.e. effortful swallow) to be completed daily as a  part of a home program, along with reporting difficulty with any recommended diet. Pt will brought trial of item (grapes) that has been increasing s/s of dysphagia. Pt consumed trial with no s/s of oropharyngeal difficulty.      Outpatient Education:  Adult Outpatient Education  Individual(s) Educated: Patient  Risk and Benefits Discussed with Patient/Caregiver/Other: yes  Patient/Caregiver Demonstrated Understanding: yes  Plan of Care Discussed and Agreed Upon: yes      Consultations/Referrals/Coordination of Services: N/A

## 2024-06-26 DIAGNOSIS — B00.9 HERPES: ICD-10-CM

## 2024-06-27 RX ORDER — ACYCLOVIR 800 MG/1
TABLET ORAL
Qty: 10 TABLET | Refills: 0 | Status: SHIPPED | OUTPATIENT
Start: 2024-06-27 | End: 2025-06-27

## 2024-07-01 ENCOUNTER — APPOINTMENT (OUTPATIENT)
Dept: RADIOLOGY | Facility: HOSPITAL | Age: 59
End: 2024-07-01
Payer: MEDICARE

## 2024-07-01 ENCOUNTER — APPOINTMENT (OUTPATIENT)
Dept: CARDIOLOGY | Facility: HOSPITAL | Age: 59
End: 2024-07-01
Payer: MEDICARE

## 2024-07-01 ENCOUNTER — HOSPITAL ENCOUNTER (INPATIENT)
Facility: HOSPITAL | Age: 59
LOS: 1 days | Discharge: HOME | End: 2024-07-04
Attending: EMERGENCY MEDICINE | Admitting: INTERNAL MEDICINE
Payer: MEDICARE

## 2024-07-01 ENCOUNTER — TELEPHONE (OUTPATIENT)
Dept: GASTROENTEROLOGY | Facility: CLINIC | Age: 59
End: 2024-07-01
Payer: MEDICARE

## 2024-07-01 DIAGNOSIS — K92.1 GASTROINTESTINAL HEMORRHAGE WITH MELENA: Primary | ICD-10-CM

## 2024-07-01 LAB
ABO GROUP (TYPE) IN BLOOD: NORMAL
ALBUMIN SERPL BCP-MCNC: 4.2 G/DL (ref 3.4–5)
ALP SERPL-CCNC: 28 U/L (ref 33–136)
ALT SERPL W P-5'-P-CCNC: 11 U/L (ref 7–45)
ANION GAP BLDV CALCULATED.4IONS-SCNC: 13 MMOL/L (ref 10–25)
ANION GAP SERPL CALC-SCNC: 13 MMOL/L (ref 10–20)
ANTIBODY SCREEN: NORMAL
APPEARANCE UR: CLEAR
AST SERPL W P-5'-P-CCNC: 18 U/L (ref 9–39)
ATRIAL RATE: 91 BPM
BASE EXCESS BLDV CALC-SCNC: -4.1 MMOL/L (ref -2–3)
BASOPHILS # BLD AUTO: 0.03 X10*3/UL (ref 0–0.1)
BASOPHILS NFR BLD AUTO: 0.2 %
BILIRUB DIRECT SERPL-MCNC: 0 MG/DL (ref 0–0.3)
BILIRUB SERPL-MCNC: 0.2 MG/DL (ref 0–1.2)
BILIRUB UR STRIP.AUTO-MCNC: NEGATIVE MG/DL
BODY TEMPERATURE: 37 DEGREES CELSIUS
BUN SERPL-MCNC: 9 MG/DL (ref 6–23)
CA-I BLDV-SCNC: 1.22 MMOL/L (ref 1.1–1.33)
CALCIUM SERPL-MCNC: 9 MG/DL (ref 8.6–10.3)
CHLORIDE BLDV-SCNC: 110 MMOL/L (ref 98–107)
CHLORIDE SERPL-SCNC: 111 MMOL/L (ref 98–107)
CO2 SERPL-SCNC: 19 MMOL/L (ref 21–32)
COLOR UR: COLORLESS
CREAT SERPL-MCNC: 0.99 MG/DL (ref 0.5–1.05)
EGFRCR SERPLBLD CKD-EPI 2021: 65 ML/MIN/1.73M*2
EOSINOPHIL # BLD AUTO: 0.1 X10*3/UL (ref 0–0.7)
EOSINOPHIL NFR BLD AUTO: 0.5 %
ERYTHROCYTE [DISTWIDTH] IN BLOOD BY AUTOMATED COUNT: 19.9 % (ref 11.5–14.5)
GLUCOSE BLDV-MCNC: 101 MG/DL (ref 74–99)
GLUCOSE SERPL-MCNC: 95 MG/DL (ref 74–99)
GLUCOSE UR STRIP.AUTO-MCNC: NORMAL MG/DL
HCO3 BLDV-SCNC: 19.8 MMOL/L (ref 22–26)
HCT VFR BLD AUTO: 36.6 % (ref 36–46)
HCT VFR BLD AUTO: 37.1 % (ref 36–46)
HCT VFR BLD EST: 37 % (ref 36–46)
HGB BLD-MCNC: 12 G/DL (ref 12–16)
HGB BLD-MCNC: 12 G/DL (ref 12–16)
HGB BLDV-MCNC: 12.2 G/DL (ref 12–16)
IMM GRANULOCYTES # BLD AUTO: 0.07 X10*3/UL (ref 0–0.7)
IMM GRANULOCYTES NFR BLD AUTO: 0.4 % (ref 0–0.9)
INHALED O2 CONCENTRATION: 21 %
KETONES UR STRIP.AUTO-MCNC: NEGATIVE MG/DL
LACTATE BLDV-SCNC: 1.6 MMOL/L (ref 0.4–2)
LEUKOCYTE ESTERASE UR QL STRIP.AUTO: NEGATIVE
LYMPHOCYTES # BLD AUTO: 3.45 X10*3/UL (ref 1.2–4.8)
LYMPHOCYTES NFR BLD AUTO: 18 %
MAGNESIUM SERPL-MCNC: 1.54 MG/DL (ref 1.6–2.4)
MCH RBC QN AUTO: 27.6 PG (ref 26–34)
MCHC RBC AUTO-ENTMCNC: 32.8 G/DL (ref 32–36)
MCV RBC AUTO: 84 FL (ref 80–100)
MONOCYTES # BLD AUTO: 0.95 X10*3/UL (ref 0.1–1)
MONOCYTES NFR BLD AUTO: 5 %
NEUTROPHILS # BLD AUTO: 14.59 X10*3/UL (ref 1.2–7.7)
NEUTROPHILS NFR BLD AUTO: 75.9 %
NITRITE UR QL STRIP.AUTO: NEGATIVE
NRBC BLD-RTO: 0 /100 WBCS (ref 0–0)
OXYHGB MFR BLDV: 76.3 % (ref 45–75)
P AXIS: 69 DEGREES
PCO2 BLDV: 32 MM HG (ref 41–51)
PH BLDV: 7.4 PH (ref 7.33–7.43)
PH UR STRIP.AUTO: 5.5 [PH]
PLATELET # BLD AUTO: 270 X10*3/UL (ref 150–450)
PO2 BLDV: 48 MM HG (ref 35–45)
POTASSIUM BLDV-SCNC: 3.7 MMOL/L (ref 3.5–5.3)
POTASSIUM SERPL-SCNC: 3.5 MMOL/L (ref 3.5–5.3)
PR INTERVAL: 145 MS
PROT SERPL-MCNC: 7.3 G/DL (ref 6.4–8.2)
PROT UR STRIP.AUTO-MCNC: NEGATIVE MG/DL
Q ONSET: 253 MS
QRS COUNT: 15 BEATS
QRS DURATION: 78 MS
QT INTERVAL: 369 MS
QTC CALCULATION(BAZETT): 457 MS
QTC FREDERICIA: 425 MS
R AXIS: 28 DEGREES
RBC # BLD AUTO: 4.34 X10*6/UL (ref 4–5.2)
RBC # UR STRIP.AUTO: NEGATIVE /UL
RH FACTOR (ANTIGEN D): NORMAL
SAO2 % BLDV: 82 % (ref 45–75)
SODIUM BLDV-SCNC: 139 MMOL/L (ref 136–145)
SODIUM SERPL-SCNC: 139 MMOL/L (ref 136–145)
SP GR UR STRIP.AUTO: 1
T AXIS: 24 DEGREES
T OFFSET: 438 MS
UROBILINOGEN UR STRIP.AUTO-MCNC: NORMAL MG/DL
VENTRICULAR RATE: 92 BPM
WBC # BLD AUTO: 19.2 X10*3/UL (ref 4.4–11.3)

## 2024-07-01 PROCEDURE — 86850 RBC ANTIBODY SCREEN: CPT | Performed by: EMERGENCY MEDICINE

## 2024-07-01 PROCEDURE — 96365 THER/PROPH/DIAG IV INF INIT: CPT

## 2024-07-01 PROCEDURE — 96366 THER/PROPH/DIAG IV INF ADDON: CPT

## 2024-07-01 PROCEDURE — 2500000001 HC RX 250 WO HCPCS SELF ADMINISTERED DRUGS (ALT 637 FOR MEDICARE OP): Performed by: NURSE PRACTITIONER

## 2024-07-01 PROCEDURE — 84132 ASSAY OF SERUM POTASSIUM: CPT | Performed by: EMERGENCY MEDICINE

## 2024-07-01 PROCEDURE — 85014 HEMATOCRIT: CPT | Performed by: EMERGENCY MEDICINE

## 2024-07-01 PROCEDURE — 93005 ELECTROCARDIOGRAM TRACING: CPT

## 2024-07-01 PROCEDURE — 96376 TX/PRO/DX INJ SAME DRUG ADON: CPT

## 2024-07-01 PROCEDURE — 96375 TX/PRO/DX INJ NEW DRUG ADDON: CPT

## 2024-07-01 PROCEDURE — 81003 URINALYSIS AUTO W/O SCOPE: CPT | Performed by: EMERGENCY MEDICINE

## 2024-07-01 PROCEDURE — 2500000004 HC RX 250 GENERAL PHARMACY W/ HCPCS (ALT 636 FOR OP/ED): Performed by: STUDENT IN AN ORGANIZED HEALTH CARE EDUCATION/TRAINING PROGRAM

## 2024-07-01 PROCEDURE — 85025 COMPLETE CBC W/AUTO DIFF WBC: CPT | Performed by: EMERGENCY MEDICINE

## 2024-07-01 PROCEDURE — 74174 CTA ABD&PLVS W/CONTRAST: CPT

## 2024-07-01 PROCEDURE — 73060 X-RAY EXAM OF HUMERUS: CPT | Mod: LEFT SIDE | Performed by: RADIOLOGY

## 2024-07-01 PROCEDURE — 36415 COLL VENOUS BLD VENIPUNCTURE: CPT | Performed by: EMERGENCY MEDICINE

## 2024-07-01 PROCEDURE — 82248 BILIRUBIN DIRECT: CPT | Performed by: EMERGENCY MEDICINE

## 2024-07-01 PROCEDURE — 73060 X-RAY EXAM OF HUMERUS: CPT | Mod: LT

## 2024-07-01 PROCEDURE — 83735 ASSAY OF MAGNESIUM: CPT | Performed by: EMERGENCY MEDICINE

## 2024-07-01 PROCEDURE — C9113 INJ PANTOPRAZOLE SODIUM, VIA: HCPCS | Performed by: EMERGENCY MEDICINE

## 2024-07-01 PROCEDURE — 74174 CTA ABD&PLVS W/CONTRAST: CPT | Mod: FOREIGN READ | Performed by: RADIOLOGY

## 2024-07-01 PROCEDURE — 99285 EMERGENCY DEPT VISIT HI MDM: CPT | Mod: 25

## 2024-07-01 PROCEDURE — G0378 HOSPITAL OBSERVATION PER HR: HCPCS

## 2024-07-01 PROCEDURE — 99222 1ST HOSP IP/OBS MODERATE 55: CPT | Performed by: NURSE PRACTITIONER

## 2024-07-01 PROCEDURE — 2550000001 HC RX 255 CONTRASTS: Performed by: EMERGENCY MEDICINE

## 2024-07-01 PROCEDURE — 2500000004 HC RX 250 GENERAL PHARMACY W/ HCPCS (ALT 636 FOR OP/ED): Performed by: EMERGENCY MEDICINE

## 2024-07-01 RX ORDER — LIDOCAINE 50 MG/G
OINTMENT TOPICAL AS NEEDED
COMMUNITY
End: 2024-10-24 | Stop reason: SDUPTHER

## 2024-07-01 RX ORDER — PANTOPRAZOLE SODIUM 40 MG/10ML
40 INJECTION, POWDER, LYOPHILIZED, FOR SOLUTION INTRAVENOUS DAILY
Status: DISCONTINUED | OUTPATIENT
Start: 2024-07-01 | End: 2024-07-04 | Stop reason: HOSPADM

## 2024-07-01 RX ORDER — CALCIUM CARB, CITRATE, MALATE 250 MG
1 CAPSULE ORAL EVERY OTHER DAY
COMMUNITY
End: 2024-11-20 | Stop reason: WASHOUT

## 2024-07-01 RX ORDER — CALCIUM CARBONATE 300MG(750)
400 TABLET,CHEWABLE ORAL EVERY OTHER DAY
COMMUNITY
End: 2024-11-20 | Stop reason: WASHOUT

## 2024-07-01 RX ORDER — ACETAMINOPHEN 325 MG/1
650 TABLET ORAL EVERY 4 HOURS PRN
Status: DISCONTINUED | OUTPATIENT
Start: 2024-07-01 | End: 2024-07-04 | Stop reason: HOSPADM

## 2024-07-01 RX ORDER — MAGNESIUM SULFATE HEPTAHYDRATE 40 MG/ML
2 INJECTION, SOLUTION INTRAVENOUS ONCE
Status: COMPLETED | OUTPATIENT
Start: 2024-07-01 | End: 2024-07-01

## 2024-07-01 RX ORDER — BUSPIRONE HYDROCHLORIDE 15 MG/1
30 TABLET ORAL 3 TIMES DAILY
Status: DISCONTINUED | OUTPATIENT
Start: 2024-07-01 | End: 2024-07-04 | Stop reason: HOSPADM

## 2024-07-01 RX ORDER — FLUTICASONE FUROATE AND VILANTEROL 200; 25 UG/1; UG/1
1 POWDER RESPIRATORY (INHALATION)
Status: DISCONTINUED | OUTPATIENT
Start: 2024-07-02 | End: 2024-07-04 | Stop reason: HOSPADM

## 2024-07-01 RX ORDER — DIAZEPAM 5 MG/1
5 TABLET ORAL EVERY 8 HOURS PRN
Status: DISCONTINUED | OUTPATIENT
Start: 2024-07-01 | End: 2024-07-04 | Stop reason: HOSPADM

## 2024-07-01 RX ORDER — HYDROMORPHONE HYDROCHLORIDE 0.2 MG/ML
0.2 INJECTION INTRAMUSCULAR; INTRAVENOUS; SUBCUTANEOUS
Status: DISCONTINUED | OUTPATIENT
Start: 2024-07-01 | End: 2024-07-04 | Stop reason: HOSPADM

## 2024-07-01 RX ORDER — ENOXAPARIN SODIUM 100 MG/ML
40 INJECTION SUBCUTANEOUS EVERY 24 HOURS
Status: DISCONTINUED | OUTPATIENT
Start: 2024-07-01 | End: 2024-07-04 | Stop reason: HOSPADM

## 2024-07-01 RX ORDER — AMOXICILLIN AND CLAVULANATE POTASSIUM 500; 125 MG/1; MG/1
TABLET, FILM COATED ORAL 2 TIMES DAILY
COMMUNITY
End: 2024-07-04 | Stop reason: HOSPADM

## 2024-07-01 RX ORDER — PANTOPRAZOLE SODIUM 40 MG/10ML
80 INJECTION, POWDER, LYOPHILIZED, FOR SOLUTION INTRAVENOUS ONCE
Status: COMPLETED | OUTPATIENT
Start: 2024-07-01 | End: 2024-07-01

## 2024-07-01 RX ORDER — PRAVASTATIN SODIUM 20 MG/1
20 TABLET ORAL NIGHTLY
Status: DISCONTINUED | OUTPATIENT
Start: 2024-07-01 | End: 2024-07-04 | Stop reason: HOSPADM

## 2024-07-01 RX ORDER — TOPIRAMATE 25 MG/1
25 TABLET ORAL 2 TIMES DAILY
Status: DISCONTINUED | OUTPATIENT
Start: 2024-07-01 | End: 2024-07-04 | Stop reason: HOSPADM

## 2024-07-01 RX ORDER — DICYCLOMINE HYDROCHLORIDE 20 MG/1
20 TABLET ORAL 3 TIMES DAILY PRN
Status: DISCONTINUED | OUTPATIENT
Start: 2024-07-01 | End: 2024-07-04 | Stop reason: HOSPADM

## 2024-07-01 RX ORDER — PROCHLORPERAZINE EDISYLATE 5 MG/ML
10 INJECTION INTRAMUSCULAR; INTRAVENOUS ONCE
Status: COMPLETED | OUTPATIENT
Start: 2024-07-01 | End: 2024-07-01

## 2024-07-01 RX ADMIN — BUSPIRONE HYDROCHLORIDE 30 MG: 15 TABLET ORAL at 21:27

## 2024-07-01 RX ADMIN — HYDROMORPHONE HYDROCHLORIDE 0.5 MG: 0.5 INJECTION, SOLUTION INTRAMUSCULAR; INTRAVENOUS; SUBCUTANEOUS at 12:52

## 2024-07-01 RX ADMIN — PROCHLORPERAZINE EDISYLATE 10 MG: 5 INJECTION INTRAMUSCULAR; INTRAVENOUS at 12:52

## 2024-07-01 RX ADMIN — TOPIRAMATE 25 MG: 25 TABLET, FILM COATED ORAL at 21:27

## 2024-07-01 RX ADMIN — HYDROMORPHONE HYDROCHLORIDE 0.2 MG: 0.2 INJECTION, SOLUTION INTRAMUSCULAR; INTRAVENOUS; SUBCUTANEOUS at 21:27

## 2024-07-01 RX ADMIN — IOHEXOL 100 ML: 350 INJECTION, SOLUTION INTRAVENOUS at 13:54

## 2024-07-01 RX ADMIN — MAGNESIUM SULFATE HEPTAHYDRATE 2 G: 40 INJECTION, SOLUTION INTRAVENOUS at 13:51

## 2024-07-01 RX ADMIN — PANTOPRAZOLE SODIUM 80 MG: 40 INJECTION, POWDER, FOR SOLUTION INTRAVENOUS at 13:26

## 2024-07-01 RX ADMIN — PRAVASTATIN SODIUM 20 MG: 20 TABLET ORAL at 21:28

## 2024-07-01 RX ADMIN — HYDROMORPHONE HYDROCHLORIDE 0.5 MG: 0.5 INJECTION, SOLUTION INTRAMUSCULAR; INTRAVENOUS; SUBCUTANEOUS at 16:05

## 2024-07-01 RX ADMIN — SODIUM CHLORIDE 1000 ML: 9 INJECTION, SOLUTION INTRAVENOUS at 12:43

## 2024-07-01 SDOH — SOCIAL STABILITY: SOCIAL INSECURITY: HAVE YOU HAD ANY THOUGHTS OF HARMING ANYONE ELSE?: NO

## 2024-07-01 SDOH — SOCIAL STABILITY: SOCIAL INSECURITY: WERE YOU ABLE TO COMPLETE ALL THE BEHAVIORAL HEALTH SCREENINGS?: YES

## 2024-07-01 SDOH — SOCIAL STABILITY: SOCIAL INSECURITY: ABUSE: ADULT

## 2024-07-01 SDOH — HEALTH STABILITY: MENTAL HEALTH: HOW OFTEN DO YOU HAVE SIX OR MORE DRINKS ON ONE OCCASION?: NEVER

## 2024-07-01 SDOH — HEALTH STABILITY: MENTAL HEALTH: HOW MANY STANDARD DRINKS CONTAINING ALCOHOL DO YOU HAVE ON A TYPICAL DAY?: PATIENT DOES NOT DRINK

## 2024-07-01 SDOH — SOCIAL STABILITY: SOCIAL INSECURITY: HAS ANYONE EVER THREATENED TO HURT YOUR FAMILY OR YOUR PETS?: NO

## 2024-07-01 SDOH — SOCIAL STABILITY: SOCIAL INSECURITY: DO YOU FEEL UNSAFE GOING BACK TO THE PLACE WHERE YOU ARE LIVING?: NO

## 2024-07-01 SDOH — HEALTH STABILITY: MENTAL HEALTH: HOW OFTEN DO YOU HAVE A DRINK CONTAINING ALCOHOL?: NEVER

## 2024-07-01 SDOH — SOCIAL STABILITY: SOCIAL INSECURITY: ARE THERE ANY APPARENT SIGNS OF INJURIES/BEHAVIORS THAT COULD BE RELATED TO ABUSE/NEGLECT?: NO

## 2024-07-01 SDOH — HEALTH STABILITY: MENTAL HEALTH: HOW MANY DRINKS CONTAINING ALCOHOL DO YOU HAVE ON A TYPICAL DAY WHEN YOU ARE DRINKING?: PATIENT DOES NOT DRINK

## 2024-07-01 SDOH — SOCIAL STABILITY: SOCIAL INSECURITY: ARE YOU OR HAVE YOU BEEN THREATENED OR ABUSED PHYSICALLY, EMOTIONALLY, OR SEXUALLY BY ANYONE?: NO

## 2024-07-01 SDOH — SOCIAL STABILITY: SOCIAL INSECURITY: HAVE YOU HAD THOUGHTS OF HARMING ANYONE ELSE?: NO

## 2024-07-01 SDOH — SOCIAL STABILITY: SOCIAL INSECURITY: DOES ANYONE TRY TO KEEP YOU FROM HAVING/CONTACTING OTHER FRIENDS OR DOING THINGS OUTSIDE YOUR HOME?: NO

## 2024-07-01 SDOH — HEALTH STABILITY: MENTAL HEALTH: HOW OFTEN DO YOU HAVE 6 OR MORE DRINKS ON ONE OCCASION?: NEVER

## 2024-07-01 SDOH — SOCIAL STABILITY: SOCIAL INSECURITY: DO YOU FEEL ANYONE HAS EXPLOITED OR TAKEN ADVANTAGE OF YOU FINANCIALLY OR OF YOUR PERSONAL PROPERTY?: NO

## 2024-07-01 ASSESSMENT — LIFESTYLE VARIABLES
SKIP TO QUESTIONS 9-10: 1
AUDIT-C TOTAL SCORE: 0
HOW OFTEN DO YOU HAVE 6 OR MORE DRINKS ON ONE OCCASION: NEVER
AUDIT-C TOTAL SCORE: 0
HOW OFTEN DO YOU HAVE A DRINK CONTAINING ALCOHOL: NEVER
HOW MANY STANDARD DRINKS CONTAINING ALCOHOL DO YOU HAVE ON A TYPICAL DAY: PATIENT DOES NOT DRINK
AUDIT-C TOTAL SCORE: 0
SKIP TO QUESTIONS 9-10: 1

## 2024-07-01 ASSESSMENT — PAIN DESCRIPTION - DESCRIPTORS: DESCRIPTORS: ACHING

## 2024-07-01 ASSESSMENT — COGNITIVE AND FUNCTIONAL STATUS - GENERAL
STANDING UP FROM CHAIR USING ARMS: A LITTLE
PATIENT BASELINE BEDBOUND: NO
WALKING IN HOSPITAL ROOM: A LITTLE
CLIMB 3 TO 5 STEPS WITH RAILING: A LITTLE
MOVING TO AND FROM BED TO CHAIR: A LITTLE
MOBILITY SCORE: 20
DAILY ACTIVITIY SCORE: 24

## 2024-07-01 ASSESSMENT — PAIN DESCRIPTION - LOCATION: LOCATION: ABDOMEN

## 2024-07-01 ASSESSMENT — PAIN SCALES - GENERAL
PAINLEVEL_OUTOF10: 6
PAINLEVEL_OUTOF10: 7
PAINLEVEL_OUTOF10: 7

## 2024-07-01 ASSESSMENT — ACTIVITIES OF DAILY LIVING (ADL)
GROOMING: INDEPENDENT
TOILETING: INDEPENDENT
WALKS IN HOME: NEEDS ASSISTANCE
ASSISTIVE_DEVICE: EYEGLASSES;WALKER
FEEDING YOURSELF: INDEPENDENT
HEARING - RIGHT EAR: FUNCTIONAL
JUDGMENT_ADEQUATE_SAFELY_COMPLETE_DAILY_ACTIVITIES: YES
ADEQUATE_TO_COMPLETE_ADL: YES
PATIENT'S MEMORY ADEQUATE TO SAFELY COMPLETE DAILY ACTIVITIES?: YES
LACK_OF_TRANSPORTATION: NO
DRESSING YOURSELF: INDEPENDENT
BATHING: INDEPENDENT
HEARING - LEFT EAR: FUNCTIONAL

## 2024-07-01 ASSESSMENT — PAIN - FUNCTIONAL ASSESSMENT
PAIN_FUNCTIONAL_ASSESSMENT: 0-10
PAIN_FUNCTIONAL_ASSESSMENT: 0-10

## 2024-07-01 ASSESSMENT — PAIN DESCRIPTION - PAIN TYPE: TYPE: ACUTE PAIN

## 2024-07-01 NOTE — PROGRESS NOTES
Kelli Ortega is a 58 y.o. female admitted for Gastrointestinal hemorrhage with melena. Pharmacy reviewed the patient's [id number] medications and allergies for accuracy.    The list below reflects the PTA list prior to pharmacy medication history. A summary a changes to the PTA medication list has been listed below. Please review each medication in order reconciliation for additional clarification and justification.    Source of information:  PATIENT  Medications added:  POTASSIUM CITRATE 99 1 CAP every other day  MAGNESIUM 400MG 1 every other day  AUGMENTIN 500-125 1 BID X 10 DAYS START DATE 6/22/24  LIDOCAINE 5% OINTMENT USE UP TO 5 TIMES A DAY PRN      Medications modified:  ACYCLOVIR 200MG 1 every day -------->ON HOLD UNTIL FINISHED WITH 800MG.  MUPIROCIN 2% APPLY TID PRN    Medications to be removed:  METAMUCIL  Medications of concern:      Prior to Admission Medications   Prescriptions Last Dose Informant Patient Reported? Taking?   Gas Relief Extra Strength 125 mg capsule   No No   Sig: TAKE ONE (1) CAPSULE BY MOUTH AFTER MEALS AND AT BEDTIME   NON FORMULARY   Yes No   Sig: every other day. Potassium OTC   NON FORMULARY   Yes No   Sig: every 28 (twenty-eight) days. Magnesium OTC  unknown dose   Trelegy Ellipta 200-62.5-25 mcg blister with device   No No   Sig: INHALE 1 PUFF BY MOUTH AND INTO THE LUNGS DAILY RINSE MOUTH AFTER EACH USE   acyclovir (Zovirax) 200 mg capsule   Yes No   Sig: Take 1 capsule (200 mg) by mouth once daily.   acyclovir (Zovirax) 800 mg tablet   No No   Sig: Take 1 tablet (800 mg) by mouth twice a day for 5 days.   albuterol 90 mcg/actuation inhaler   No No   Sig: Inhale 2 puffs every 4 hours if needed for wheezing or shortness of breath.   b complex-vitamin c (multivitamin, stress formula) tablet   No No   Sig: Take 1 tablet by mouth once daily.   busPIRone (Buspar) 15 mg tablet   Yes No   Sig: TAKE 2 TABLETS 3 TIMES DAILY.   cholecalciferol (Vitamin D-3) 50,000 unit capsule    No No   Sig: TAKE ONE (1) CAPSULE BY MOUTH EVERY OTHER WEEK.   cyclobenzaprine (Flexeril) 10 mg tablet   No No   Sig: TAKE 1 TABLET (10 MG) BY MOUTH 3 TIMES A DAY AS NEEDED FOR MUSCLE SPASMS.   diazePAM (Valium) 5 mg tablet   Yes No   Sig: TAKE AS DIRECTED. TAKE 1 TABLET ORALLY TWICE DAILY AND 1 TABLET ORALLY DAILY IF NEEDED FOR ANXIETY.   dicyclomine (Bentyl) 20 mg tablet   No No   Sig: TAKE ONE TO TWO TABLETS BY MOUTH THREE (3) TIMES PER DAY OR AS NEEDED FOR PAIN. **(BENTYL 20 MG TAB EQUIV)**   galcanezumab (Emgality) 120 mg/mL auto-injector   No No   Sig: Inject 2 pens (240 mg) under the skin 1 time for 1 dose.   galcanezumab (Emgality) 120 mg/mL auto-injector   No No   Sig: Inject 1 pen (120 mg) under the skin every 28 (twenty-eight) days.   immune globulin, human,, IgG, (Gamunex-C) 10% solution infusion   Yes No   Sig: Infuse into a venous catheter every 28 (twenty-eight) days. Gamunex C  20 gm   ipratropium-albuteroL (Duo-Neb) 0.5-2.5 mg/3 mL nebulizer solution   No No   Sig: USE 1 VIAL IN NEBULIZER 4 TIMES DAILY   loratadine (Claritin) 10 mg tablet   No No   Sig: Take 1 tablet (10 mg) by mouth once daily.   mupirocin (Bactroban) 2 % ointment   Yes No   Sig: Apply topically 3 times a day.   nebulizer accessories kit   No No   Si each once daily. Needs a replacement mask- hers is not working   nystatin (Mycostatin) cream   Yes No   Sig: Apply topically. APPLY  TOPICALLY TO MOUTH AND LIPS PRN   omeprazole (PriLOSEC) 40 mg DR capsule   No No   Sig: Take 1 capsule (40 mg) by mouth 2 times a day before meals. Do not crush or chew.   ondansetron ODT (Zofran-ODT) 8 mg disintegrating tablet   No No   Sig: Take 1 tablet (8 mg) by mouth every 8 hours if needed for nausea or vomiting.   polyethylene glycol (Miralax) 17 gram/dose powder   No No   Sig: MIX 1 CAPFUL IN 8 OUNCES OF WATER AND DRINK AT BEDTIME AS NEEDED FOR CONSTIPATION.   pravastatin (Pravachol) 20 mg tablet   No No   Sig: TAKE ONE (1) TABLET BY MOUTH  EVERY EVENING AT BEDTIME   pregabalin (Lyrica) 150 mg capsule   No No   Sig: Take 1 capsule (150 mg) by mouth 3 times a day.   psyllium husk, aspartame, (Metamucil Sugar-Free, aspart,) 3.4 gram/5.8 gram powder   No No   Sig: Dissolve 2 scoops into 8 ounces of water and drink daily.   rizatriptan (Maxalt) 10 mg tablet   No No   Sig: TAKE 1 TABLET BY MOUTH 1 TIME IF NEEDED FOR MIGRAINE (MAY REPEAT X1) FOR UP TO 9 DOSES. MAY REPEAT IN 2 HOURS IF UNRESOLVED. DO NOT EXCEED 3/24HOURS   topiramate (Topamax) 25 mg tablet   Yes No   Sig: Take 1 tablet (25 mg) by mouth 2 times a day.   zolpidem (Ambien) 10 mg tablet   Yes No   Sig: TAKE 1/2 - 1 TABLET BY MOUTH AT BEDTIME AS NEEDED FOR SLEEP. DX: INSOMNIA RELATED TO BIPOLAR D/O: F51.09/F31.81.      Facility-Administered Medications: None       Huma Ellison

## 2024-07-01 NOTE — ED TRIAGE NOTES
Pt presenting to ED with tarry stools for x1 week. Pt says she has had episodes of diarrhea and cannot remember her last normal stool. Pt not on thinners. Pt c/o 6/10 lower abdominal pain. Pt has hx of gerd, cdiff, & diverticultis

## 2024-07-01 NOTE — TELEPHONE ENCOUNTER
"Patient called in with symptoms of abdominal pain with black tarry stools for the past 4-5 days. She was \"in fear for her life.\" She was requesting recommendations regarding her concerns.     Recommended that with her given symptoms she should go to the ER for evaluation. She indicated she did not have transportation and that she would be calling the ambulance to assist.     Patient was agreeable to the ER recommendations and thankful for the help.       "

## 2024-07-01 NOTE — ED PROVIDER NOTES
HPI   Chief Complaint   Patient presents with    Black or Bloody Stool       HPI:  58-year-old female presents emergency department with complaint of black stool.  She states she has a history of GI bleed in the past requiring prior blood transfusions she has a history of irritable bowel syndrome and diverticulosis as well as anxiety and depression she is not on any blood thinning medications she says her symptoms started on Monday and she is been having watery black stools multiple episodes a day this morning she was incontinent of black liquid stool in her depends and also has been feeling lightheaded and dizzy she actually fell yesterday and struck her left arm into her dresser due to her dizziness    Limitations to history: None  Independent Historians: None  External Records Reviewed: EMR records  ------------------------------------------------------------------------------------------------------------------------------------------  ROS: a ten point review of systems was performed and negative except as per HPI.  ------------------------------------------------------------------------------------------------------------------------------------------  PMH / PSH: as per HPI, reviewed in EMR and discussed with the patient []  MEDS:  reviewed in EMR and discussed with the patient []  ALLERGIES: reviewed in EMR[] patient can tolerate Dilaudid according to her  SocH:  as per HPI, otherwise reviewed in EMR []  :  as per HPI, otherwise reviewed in EMR []  ------------------------------------------------------------------------------------------------------------------------------------------  Physical Exam:  VS: As documented in the triage note and EMR flowsheet from this visit was reviewed  General: Well appearing. No acute distress.   Eyes:  Extraocular movements grossly intact. No scleral icterus.   HEENT: Atraumatic. Normocephalic.    Neck: Supple. No gross masses  CV: RRR, audible S1/S2, 2+ symmetric  peripheral pulses  Resp: Clear to auscultation bilaterally. No respiratory distress.  Non-labored respirations  GI: Soft, non-tender, non-distended, no rebound or gaurding  MSK: Symmetric muscle bulk. No gross step offs or deformities.  Skin: Warm, dry, no obvious rash.  Neuro: Speech fluent. Awake. Alert. Appropriate conversation.  Psych: Appropriate mood and affect for situation  ------------------------------------------------------------------------------------------------------------------------------------------  Hospital Course / Medical Decision Making:  Independent Interpretations:  EKG -   Twelve-lead EKG performed and independently interpreted by me shows sinus rhythm at a ventricular rate of 92 with low voltage in the precordial leads ST segments are flat not elevated axis is upright and normal T waves are flat QRS duration of 78 no signs of acute ischemia    58-year-old female presenting with abdominal pain dizziness black liquid stool since Monday.  She is noted to be slightly tachycardic with soft blood pressures but given a liter of IV fluids and blood pressure improved she was hooked up to the cardiac monitor peripheral IV access obtained labs are drawn.  Patient given Dilaudid for pain control with improvement in her abdominal pain syndrome CT angio of the abdomen and pelvis was negative for any obvious bleed she was given 80 of Protonix for treatment of upper GI bleed she does have a history of GI bleed requiring blood transfusion in the past.  H&H was within normal limits her magnesium level was low this was repleted IV she tolerated well she remained hemodynamically stable throughout the ED course she was offered hospitalization versus discharge home with close follow-up she did not feel well enough to be discharged home and was requesting to be observed overnight to make sure that she is stable and safe for discharge.  She was discussed with IMS service who agreed with the plan for observation  for monitoring of her GI bleed    Patient care discussed with: [Admitting team,   Social Determinants affecting care: [Problems affording transportation, inability to afford prescriptions, lack of housing, lack of insurance]    Final diagnosis and disposition: [] GI bleed and dizziness            Cecilia Fontanez MD                                      Isabella Coma Scale Score: 15                     Patient History   Past Medical History:   Diagnosis Date    Abdominal pain 05/22/2023    Abdominal pain, acute, left lower quadrant 05/22/2023    Acute bronchitis 05/22/2023    Acute viral syndrome 05/22/2023    Allergic     Allergy status to unspecified drugs, medicaments and biological substances     H/O seasonal allergies    Anxiety     Anxiety disorder, unspecified     Anxiety    Arthritis     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, unspecified joint (Multi)     Pseudomonas arthritis    Asthma ([facility])     Bacterial vaginitis 05/22/2023    Bipolar disorder, unspecified (Multi)     Bipolar disease, chronic    Candida esophagitis (Multi) 05/22/2023    Change in bowel habits 05/22/2023    Change in bowel habits 05/22/2023    Contact with and (suspected) exposure to covid-19 12/01/2021    Close exposure to COVID-19 virus    COPD (chronic obstructive pulmonary disease) (Multi)     Cough 05/22/2023    Depression     Enterocolitis due to Clostridium difficile, not specified as recurrent     Clostridium difficile colitis    Folliculitis 05/22/2023    Full incontinence of feces 05/22/2023    Functional dyspepsia 05/22/2023    GERD (gastroesophageal reflux disease)     Hypercholesteremia     Osteoporosis     Other conditions influencing health status     Epicondylitis    Other conditions influencing health status 09/15/2021    History of cough    Panic disorder (episodic paroxysmal anxiety)     Panic attacks    Personal history of other diseases of the digestive system     History of irritable bowel syndrome    Personal history of  other diseases of the musculoskeletal system and connective tissue     History of fibromyalgia    Personal history of other diseases of the musculoskeletal system and connective tissue     History of osteopenia    Personal history of other diseases of the musculoskeletal system and connective tissue     History of degenerative disc disease    Personal history of other diseases of the musculoskeletal system and connective tissue     History of osteoporosis    Personal history of other diseases of the nervous system and sense organs     History of migraine    Personal history of other diseases of the respiratory system     History of pulmonary emphysema    Personal history of other diseases of the respiratory system     History of chronic obstructive lung disease    Personal history of other infectious and parasitic diseases     History of herpes genitalis    Personal history of other infectious and parasitic diseases 10/03/2017    History of Clostridioides difficile colitis    Personal history of other mental and behavioral disorders     History of depression    Personal history of other specified conditions 08/22/2019    History of abdominal pain    Personal history of pneumonia (recurrent)     History of pneumonia    Respiratory illness 05/22/2023    Vaginal discharge 05/22/2023     Past Surgical History:   Procedure Laterality Date    APPENDECTOMY  08/24/2016    Appendectomy    BLADDER SURGERY  10/03/2017    Bladder Surgery    COLON SURGERY  10/03/2017    Colon Surgery    COLONOSCOPY      ESOPHAGOGASTRODUODENOSCOPY  10/30/2017    Diagnostic Esophagogastroduodenoscopy    HYSTERECTOMY  08/24/2016    Hysterectomy    OTHER SURGICAL HISTORY      left elbow tendon repair    SMALL INTESTINE SURGERY  2012 Oct    TONSILLECTOMY      UPPER GASTROINTESTINAL ENDOSCOPY       Family History   Problem Relation Name Age of Onset    Diabetes Mother      Hypertension Mother      Breast cancer Sister      Asthma Son      Cancer Other  uncle      Social History     Tobacco Use    Smoking status: Every Day     Current packs/day: 0.50     Average packs/day: 0.5 packs/day for 42.0 years (21.0 ttl pk-yrs)     Types: Cigarettes    Smokeless tobacco: Never   Vaping Use    Vaping status: Every Day    Substances: Nicotine, Flavoring    Devices: Disposable   Substance Use Topics    Alcohol use: Not Currently    Drug use: Not Currently       Physical Exam   ED Triage Vitals [07/01/24 1221]   Temperature Heart Rate Respirations BP   36.3 °C (97.4 °F) (!) 111 10 119/80      Pulse Ox Temp Source Heart Rate Source Patient Position   98 % Temporal -- --      BP Location FiO2 (%)     -- --       Physical Exam    ED Course & MDM   Diagnoses as of 07/01/24 1719   Gastrointestinal hemorrhage with melena       Medical Decision Making      Procedure  Procedures     Cecilia Fontanez MD  07/01/24 1727

## 2024-07-01 NOTE — H&P
"History Of Present Illness  Kelli Ortega is a 58 y.o. female with PMHx of IBS, SBO in March (managed conservatively), bipolar disorder and chronic pain/fibromyalgia who presented with complaint of black stool, onset a week ago. She states she has a history of GI bleed in the past requiring prior blood transfusions but upon further questioning says she has been transfused once many years ago, after a hysterectomy. She c/o having watery black stools multiple times a day and feeling lightheaded and dizzy, with a fall yesterday. She denies any NSAID use; does not take iron. Prior to a week ago, stools were normal. She normally has 5-6 stools a day. She c/o mild cramping and occasional emesis, most recently 4 days ago. She c/o daily \"low-grade\" fevers to 99F at home. She is tolerating a regular diet at home and denies anorexia. ED workup was significant for Mg 1.54 (repleted) and WBC 19.2k. She had no fevers. She was slightly tachycardic with soft blood pressures which improved with a liter of IV fluid. CTA of the abdomen and pelvis was negative for any obvious bleed; she was given 80 mg IV of Protonix. Her H&H was within normal limits. Remainder of ROS reviewed and negative except as indicated in HPI.     Objective:  Past Medical History  She has a past medical history of Asthma ([facility]), Bipolar disorder, unspecified (Multi), Chronic pain, COPD (chronic obstructive pulmonary disease) (Multi), Diabetes (Multi), GERD (gastroesophageal reflux disease), Hypercholesteremia, Hypogammaglobulinemia (Multi), IBS (irritable bowel syndrome), Migraines, Osteoporosis, Panic disorder (episodic paroxysmal anxiety), and SBO (small bowel obstruction) (Multi) (03/2024).    Surgical History  She has a past surgical history that includes Appendectomy; Hysterectomy; Esophagogastroduodenoscopy; Bladder surgery; Small intestine surgery; Upper gastrointestinal endoscopy; Colonoscopy; Tonsillectomy; and Other surgical history.    Social " History     Tobacco Use    Smoking status: Every Day     Current packs/day: 0.50     Average packs/day: 0.5 packs/day for 42.0 years (21.0 ttl pk-yrs)     Types: Cigarettes    Smokeless tobacco: Never   Vaping Use    Vaping status: Every Day    Substances: Nicotine, Flavoring    Devices: Disposable   Substance Use Topics    Alcohol use: Not Currently    Drug use: Not Currently       Family History  Family History   Problem Relation Name Age of Onset    Diabetes Mother      Hypertension Mother      Breast cancer Sister      Asthma Son      Cancer Other uncle        Allergies  Amitriptyline, Bupropion, Cefazolin, Diclofenac, Divalproex sodium, Doxycycline, Guaifenesin, Hydrocodone-acetaminophen, Hydroxyzine, Ibuprofen, Ketorolac, Lepirudin, Loperamide, Meloxicam, Milnacipran, Mirtazapine, Morphine, Nabumetone, Ondansetron hcl, Paroxetine, and Tramadol    Vitals:    07/01/24 1603   BP: 127/86   Pulse: 87   Resp: (!) 24   Temp:    SpO2: 94%       Vitals:    07/01/24 1221   Weight: 65.5 kg (144 lb 6.4 oz)       Scheduled medications  HYDROmorphone, 0.5 mg, intravenous, Once      Continuous medications     PRN medications      Results for orders placed or performed during the hospital encounter of 07/01/24 (from the past 24 hour(s))   CBC and Auto Differential   Result Value Ref Range    WBC 19.2 (H) 4.4 - 11.3 x10*3/uL    nRBC 0.0 0.0 - 0.0 /100 WBCs    RBC 4.34 4.00 - 5.20 x10*6/uL    Hemoglobin 12.0 12.0 - 16.0 g/dL    Hematocrit 36.6 36.0 - 46.0 %    MCV 84 80 - 100 fL    MCH 27.6 26.0 - 34.0 pg    MCHC 32.8 32.0 - 36.0 g/dL    RDW 19.9 (H) 11.5 - 14.5 %    Platelets 270 150 - 450 x10*3/uL    Neutrophils % 75.9 40.0 - 80.0 %    Immature Granulocytes %, Automated 0.4 0.0 - 0.9 %    Lymphocytes % 18.0 13.0 - 44.0 %    Monocytes % 5.0 2.0 - 10.0 %    Eosinophils % 0.5 0.0 - 6.0 %    Basophils % 0.2 0.0 - 2.0 %    Neutrophils Absolute 14.59 (H) 1.20 - 7.70 x10*3/uL    Immature Granulocytes Absolute, Automated 0.07 0.00 -  0.70 x10*3/uL    Lymphocytes Absolute 3.45 1.20 - 4.80 x10*3/uL    Monocytes Absolute 0.95 0.10 - 1.00 x10*3/uL    Eosinophils Absolute 0.10 0.00 - 0.70 x10*3/uL    Basophils Absolute 0.03 0.00 - 0.10 x10*3/uL   Basic metabolic panel   Result Value Ref Range    Glucose 95 74 - 99 mg/dL    Sodium 139 136 - 145 mmol/L    Potassium 3.5 3.5 - 5.3 mmol/L    Chloride 111 (H) 98 - 107 mmol/L    Bicarbonate 19 (L) 21 - 32 mmol/L    Anion Gap 13 10 - 20 mmol/L    Urea Nitrogen 9 6 - 23 mg/dL    Creatinine 0.99 0.50 - 1.05 mg/dL    eGFR 65 >60 mL/min/1.73m*2    Calcium 9.0 8.6 - 10.3 mg/dL   Magnesium   Result Value Ref Range    Magnesium 1.54 (L) 1.60 - 2.40 mg/dL   Hepatic function panel   Result Value Ref Range    Albumin 4.2 3.4 - 5.0 g/dL    Bilirubin, Total 0.2 0.0 - 1.2 mg/dL    Bilirubin, Direct 0.0 0.0 - 0.3 mg/dL    Alkaline Phosphatase 28 (L) 33 - 136 U/L    ALT 11 7 - 45 U/L    AST 18 9 - 39 U/L    Total Protein 7.3 6.4 - 8.2 g/dL   Blood Gas Venous Full Panel   Result Value Ref Range    POCT pH, Venous 7.40 7.33 - 7.43 pH    POCT pCO2, Venous 32 (L) 41 - 51 mm Hg    POCT pO2, Venous 48 (H) 35 - 45 mm Hg    POCT SO2, Venous 82 (H) 45 - 75 %    POCT Oxy Hemoglobin, Venous 76.3 (H) 45.0 - 75.0 %    POCT Hematocrit Calculated, Venous 37.0 36.0 - 46.0 %    POCT Sodium, Venous 139 136 - 145 mmol/L    POCT Potassium, Venous 3.7 3.5 - 5.3 mmol/L    POCT Chloride, Venous 110 (H) 98 - 107 mmol/L    POCT Ionized Calicum, Venous 1.22 1.10 - 1.33 mmol/L    POCT Glucose, Venous 101 (H) 74 - 99 mg/dL    POCT Lactate, Venous 1.6 0.4 - 2.0 mmol/L    POCT Base Excess, Venous -4.1 (L) -2.0 - 3.0 mmol/L    POCT HCO3 Calculated, Venous 19.8 (L) 22.0 - 26.0 mmol/L    POCT Hemoglobin, Venous 12.2 12.0 - 16.0 g/dL    POCT Anion Gap, Venous 13.0 10.0 - 25.0 mmol/L    Patient Temperature 37.0 degrees Celsius    FiO2 21 %   Type And Screen   Result Value Ref Range    ABO TYPE O     Rh TYPE POS     ANTIBODY SCREEN NEG    ECG 12 lead    Result Value Ref Range    Ventricular Rate 92 BPM    Atrial Rate 91 BPM    TN Interval 145 ms    QRS Duration 78 ms    QT Interval 369 ms    QTC Calculation(Bazett) 457 ms    P Axis 69 degrees    R Axis 28 degrees    T Axis 24 degrees    QRS Count 15 beats    Q Onset 253 ms    T Offset 438 ms    QTC Fredericia 425 ms   Urinalysis with Reflex Culture and Microscopic   Result Value Ref Range    Color, Urine Colorless (N) Light-Yellow, Yellow, Dark-Yellow    Appearance, Urine Clear Clear    Specific Gravity, Urine 1.002 (N) 1.005 - 1.035    pH, Urine 5.5 5.0, 5.5, 6.0, 6.5, 7.0, 7.5, 8.0    Protein, Urine NEGATIVE NEGATIVE, 10 (TRACE), 20 (TRACE) mg/dL    Glucose, Urine Normal Normal mg/dL    Blood, Urine NEGATIVE NEGATIVE    Ketones, Urine NEGATIVE NEGATIVE mg/dL    Bilirubin, Urine NEGATIVE NEGATIVE    Urobilinogen, Urine Normal Normal mg/dL    Nitrite, Urine NEGATIVE NEGATIVE    Leukocyte Esterase, Urine NEGATIVE NEGATIVE       I personally reviewed all pertinent labwork, imaging and vital signs, as well as medications, nursing, therapy, discharge planning and consult notes.     Constitutional: Well developed, awake, alert, calm, oriented x4, no acute distress, cooperative   Eyes: EOMI, clear sclera   ENMT: mucous membranes moist, no lesions seen   Head/Neck: Neck supple, no apparent injury, head atraumatic   Respiratory/Thorax: CTAB, good chest expansion, respirations even and unlabored   Cardiovascular: Regular rate and rhythm, no murmurs/rubs/gallops, normal S1 and S 2   Gastrointestinal: Abdomen nondistended, soft, nontender to deep palpation, hyperactive BS, no bruits   Musculoskeletal: ROM intact, no joint swelling, normal  strength   Extremities: no cyanosis, edema, contusions or clubbing   Neurological: no focal deficit, pt alert and oriented x4   Psychological: Appropriate affect and behavior, pleasant   Skin: Warm and dry, no lesions, no rashes       Assessment/Plan  Dark stools and abdominal pain, hx  IBS and recent SBO  Pt is tolerating a regular diet at home, will continue here  H+H are within normal limits, CBC in am, pt denies NSAID use, does not take iron, continue IV Protonix for now  CTA of the abdomen and pelvis is nonacute, pt denies nausea and emesis today  Continue home Bentyl PRN    2. Hx bipolar disorder, chronic pain and fibromyalgia   Continue home psych meds    3. Hypomagnesemia  Repleted in ED, will repeat in am, this is not chronic    4. Leukocytosis/elevated neutrophils  No fevers, likely reactive, CBC in am    5. Mild tachycardia and hypotension  VS improved with mild fluid resuscitation, monitor closely    6. Diet-controlled diabetes  A1c 5.9-6.0, pt was not hyperglycemic in ED, defer SSI    7. COPD  Pt denies dyspnea, continue home inhalers    8. DVT ppx: Lovenox    9. Discharge disposition  Home tomorrow        Amber Lyles, CNP  Hospital Medicine

## 2024-07-01 NOTE — CARE PLAN
Problem: Fall/Injury  Goal: Not fall by end of shift  Outcome: Progressing  Goal: Be free from injury by end of the shift  Outcome: Progressing  Goal: Verbalize understanding of personal risk factors for fall in the hospital  Outcome: Progressing  Goal: Verbalize understanding of risk factor reduction measures to prevent injury from fall in the home  Outcome: Progressing  Goal: Use assistive devices by end of the shift  Outcome: Progressing  Goal: Pace activities to prevent fatigue by end of the shift  Outcome: Progressing   The patient's goals for the shift include  Pt will remain safe by end of shift    The clinical goals for the shift include  Pt will remain safe by end of shift

## 2024-07-02 ENCOUNTER — APPOINTMENT (OUTPATIENT)
Dept: SPEECH THERAPY | Facility: HOSPITAL | Age: 59
End: 2024-07-02
Payer: MEDICARE

## 2024-07-02 LAB
ANION GAP SERPL CALC-SCNC: 12 MMOL/L (ref 10–20)
BASOPHILS # BLD AUTO: 0.03 X10*3/UL (ref 0–0.1)
BASOPHILS NFR BLD AUTO: 0.3 %
BUN SERPL-MCNC: 12 MG/DL (ref 6–23)
CALCIUM SERPL-MCNC: 8 MG/DL (ref 8.6–10.3)
CHLORIDE SERPL-SCNC: 112 MMOL/L (ref 98–107)
CO2 SERPL-SCNC: 21 MMOL/L (ref 21–32)
CREAT SERPL-MCNC: 0.92 MG/DL (ref 0.5–1.05)
EGFRCR SERPLBLD CKD-EPI 2021: 71 ML/MIN/1.73M*2
EOSINOPHIL # BLD AUTO: 0.16 X10*3/UL (ref 0–0.7)
EOSINOPHIL NFR BLD AUTO: 1.8 %
ERYTHROCYTE [DISTWIDTH] IN BLOOD BY AUTOMATED COUNT: 19.9 % (ref 11.5–14.5)
GLUCOSE SERPL-MCNC: 89 MG/DL (ref 74–99)
HCT VFR BLD AUTO: 33.2 % (ref 36–46)
HGB BLD-MCNC: 10.6 G/DL (ref 12–16)
HOLD SPECIMEN: NORMAL
IMM GRANULOCYTES # BLD AUTO: 0.02 X10*3/UL (ref 0–0.7)
IMM GRANULOCYTES NFR BLD AUTO: 0.2 % (ref 0–0.9)
LYMPHOCYTES # BLD AUTO: 3.66 X10*3/UL (ref 1.2–4.8)
LYMPHOCYTES NFR BLD AUTO: 41.3 %
MAGNESIUM SERPL-MCNC: 2.12 MG/DL (ref 1.6–2.4)
MCH RBC QN AUTO: 27.3 PG (ref 26–34)
MCHC RBC AUTO-ENTMCNC: 31.9 G/DL (ref 32–36)
MCV RBC AUTO: 86 FL (ref 80–100)
MONOCYTES # BLD AUTO: 0.63 X10*3/UL (ref 0.1–1)
MONOCYTES NFR BLD AUTO: 7.1 %
NEUTROPHILS # BLD AUTO: 4.37 X10*3/UL (ref 1.2–7.7)
NEUTROPHILS NFR BLD AUTO: 49.3 %
NRBC BLD-RTO: 0 /100 WBCS (ref 0–0)
PLATELET # BLD AUTO: 200 X10*3/UL (ref 150–450)
POTASSIUM SERPL-SCNC: 3.5 MMOL/L (ref 3.5–5.3)
RBC # BLD AUTO: 3.88 X10*6/UL (ref 4–5.2)
SODIUM SERPL-SCNC: 141 MMOL/L (ref 136–145)
WBC # BLD AUTO: 8.9 X10*3/UL (ref 4.4–11.3)

## 2024-07-02 PROCEDURE — 2500000004 HC RX 250 GENERAL PHARMACY W/ HCPCS (ALT 636 FOR OP/ED): Performed by: NURSE PRACTITIONER

## 2024-07-02 PROCEDURE — C9113 INJ PANTOPRAZOLE SODIUM, VIA: HCPCS | Performed by: NURSE PRACTITIONER

## 2024-07-02 PROCEDURE — 83735 ASSAY OF MAGNESIUM: CPT | Performed by: NURSE PRACTITIONER

## 2024-07-02 PROCEDURE — 2500000004 HC RX 250 GENERAL PHARMACY W/ HCPCS (ALT 636 FOR OP/ED): Performed by: STUDENT IN AN ORGANIZED HEALTH CARE EDUCATION/TRAINING PROGRAM

## 2024-07-02 PROCEDURE — 36415 COLL VENOUS BLD VENIPUNCTURE: CPT | Performed by: NURSE PRACTITIONER

## 2024-07-02 PROCEDURE — 85025 COMPLETE CBC W/AUTO DIFF WBC: CPT | Performed by: NURSE PRACTITIONER

## 2024-07-02 PROCEDURE — 2500000001 HC RX 250 WO HCPCS SELF ADMINISTERED DRUGS (ALT 637 FOR MEDICARE OP): Performed by: NURSE PRACTITIONER

## 2024-07-02 PROCEDURE — 99233 SBSQ HOSP IP/OBS HIGH 50: CPT | Performed by: INTERNAL MEDICINE

## 2024-07-02 PROCEDURE — 80048 BASIC METABOLIC PNL TOTAL CA: CPT | Performed by: NURSE PRACTITIONER

## 2024-07-02 PROCEDURE — G0378 HOSPITAL OBSERVATION PER HR: HCPCS

## 2024-07-02 RX ORDER — ONDANSETRON HYDROCHLORIDE 2 MG/ML
4 INJECTION, SOLUTION INTRAVENOUS EVERY 8 HOURS PRN
Status: DISCONTINUED | OUTPATIENT
Start: 2024-07-02 | End: 2024-07-04 | Stop reason: HOSPADM

## 2024-07-02 RX ORDER — ONDANSETRON 4 MG/1
4 TABLET, ORALLY DISINTEGRATING ORAL EVERY 8 HOURS PRN
Status: DISCONTINUED | OUTPATIENT
Start: 2024-07-02 | End: 2024-07-04 | Stop reason: HOSPADM

## 2024-07-02 RX ADMIN — HYDROMORPHONE HYDROCHLORIDE 0.2 MG: 0.2 INJECTION, SOLUTION INTRAMUSCULAR; INTRAVENOUS; SUBCUTANEOUS at 20:50

## 2024-07-02 RX ADMIN — TOPIRAMATE 25 MG: 25 TABLET, FILM COATED ORAL at 20:50

## 2024-07-02 RX ADMIN — TOPIRAMATE 25 MG: 25 TABLET, FILM COATED ORAL at 08:23

## 2024-07-02 RX ADMIN — HYDROMORPHONE HYDROCHLORIDE 0.2 MG: 0.2 INJECTION, SOLUTION INTRAMUSCULAR; INTRAVENOUS; SUBCUTANEOUS at 15:11

## 2024-07-02 RX ADMIN — ONDANSETRON 4 MG: 2 INJECTION INTRAMUSCULAR; INTRAVENOUS at 20:50

## 2024-07-02 RX ADMIN — FLUTICASONE FUROATE AND VILANTEROL TRIFENATATE 1 PUFF: 200; 25 POWDER RESPIRATORY (INHALATION) at 06:31

## 2024-07-02 RX ADMIN — BUSPIRONE HYDROCHLORIDE 30 MG: 15 TABLET ORAL at 20:50

## 2024-07-02 RX ADMIN — PRAVASTATIN SODIUM 20 MG: 20 TABLET ORAL at 20:49

## 2024-07-02 RX ADMIN — HYDROMORPHONE HYDROCHLORIDE 0.2 MG: 0.2 INJECTION, SOLUTION INTRAMUSCULAR; INTRAVENOUS; SUBCUTANEOUS at 10:45

## 2024-07-02 RX ADMIN — BUSPIRONE HYDROCHLORIDE 30 MG: 15 TABLET ORAL at 14:55

## 2024-07-02 RX ADMIN — BUSPIRONE HYDROCHLORIDE 30 MG: 15 TABLET ORAL at 08:23

## 2024-07-02 RX ADMIN — TIOTROPIUM BROMIDE INHALATION SPRAY 2 PUFF: 3.12 SPRAY, METERED RESPIRATORY (INHALATION) at 06:31

## 2024-07-02 RX ADMIN — PANTOPRAZOLE SODIUM 40 MG: 40 INJECTION, POWDER, FOR SOLUTION INTRAVENOUS at 08:23

## 2024-07-02 ASSESSMENT — PAIN SCALES - GENERAL
PAINLEVEL_OUTOF10: 3
PAINLEVEL_OUTOF10: 7
PAINLEVEL_OUTOF10: 7
PAINLEVEL_OUTOF10: 0 - NO PAIN
PAINLEVEL_OUTOF10: 7
PAINLEVEL_OUTOF10: 6
PAINLEVEL_OUTOF10: 5 - MODERATE PAIN

## 2024-07-02 ASSESSMENT — COGNITIVE AND FUNCTIONAL STATUS - GENERAL
DAILY ACTIVITIY SCORE: 24
MOBILITY SCORE: 24

## 2024-07-02 ASSESSMENT — PAIN - FUNCTIONAL ASSESSMENT
PAIN_FUNCTIONAL_ASSESSMENT: 0-10

## 2024-07-02 ASSESSMENT — PAIN DESCRIPTION - LOCATION
LOCATION: ABDOMEN
LOCATION: ABDOMEN

## 2024-07-02 ASSESSMENT — PAIN DESCRIPTION - ORIENTATION
ORIENTATION: RIGHT;LEFT;LOWER
ORIENTATION: RIGHT;LEFT;LOWER

## 2024-07-02 ASSESSMENT — PAIN DESCRIPTION - DESCRIPTORS: DESCRIPTORS: ACHING

## 2024-07-02 ASSESSMENT — ACTIVITIES OF DAILY LIVING (ADL): LACK_OF_TRANSPORTATION: NO

## 2024-07-02 NOTE — PROGRESS NOTES
07/02/24 1000   Discharge Planning   Living Arrangements Children   Support Systems Children   Assistance Needed Fww/cane/ scooter   Type of Residence Private residence   Who is requesting discharge planning? Provider   Home or Post Acute Services None   Patient expects to be discharged to: home   Financial Resource Strain   How hard is it for you to pay for the very basics like food, housing, medical care, and heating? Not very   Housing Stability   In the last 12 months, was there a time when you were not able to pay the mortgage or rent on time? N   In the last 12 months, how many places have you lived? 1   In the last 12 months, was there a time when you did not have a steady place to sleep or slept in a shelter (including now)? N   Transportation Needs   In the past 12 months, has lack of transportation kept you from medical appointments or from getting medications? no   In the past 12 months, has lack of transportation kept you from meetings, work, or from getting things needed for daily living? No     Remote Coverage- Called in to patients room to assess discharge plans.  Introduced self and role.  Pt lives at home with son.  Pt uses a FWW, cane, and scooter chair for transfers.  Pt drives herself to appointments, son also assist with transportation if appointment is far.  Pt denies [facility] services.  Pt confirms her PCP is Mariaelena Dyer and has been seen by her within the past year.  Plan is to discharge home with son when medically clear.  TCC team to follow.

## 2024-07-02 NOTE — CARE PLAN
Problem: Fall/Injury  Goal: Not fall by end of shift  Outcome: Progressing  Goal: Be free from injury by end of the shift  Outcome: Progressing  Goal: Verbalize understanding of personal risk factors for fall in the hospital  Outcome: Progressing  Goal: Verbalize understanding of risk factor reduction measures to prevent injury from fall in the home  Outcome: Progressing  Goal: Use assistive devices by end of the shift  Outcome: Progressing  Goal: Pace activities to prevent fatigue by end of the shift  Outcome: Progressing     Problem: Pain - Adult  Goal: Verbalizes/displays adequate comfort level or baseline comfort level  Outcome: Progressing     Problem: Safety - Adult  Goal: Free from fall injury  Outcome: Progressing     Problem: Discharge Planning  Goal: Discharge to home or other facility with appropriate resources  Outcome: Progressing     Problem: Chronic Conditions and Co-morbidities  Goal: Patient's chronic conditions and co-morbidity symptoms are monitored and maintained or improved  Outcome: Progressing     Problem: Pain  Goal: Takes deep breaths with improved pain control throughout the shift  Outcome: Progressing  Goal: Turns in bed with improved pain control throughout the shift  Outcome: Progressing  Goal: Walks with improved pain control throughout the shift  Outcome: Progressing  Goal: Performs ADL's with improved pain control throughout shift  Outcome: Progressing  Goal: Participates in PT with improved pain control throughout the shift  Outcome: Progressing  Goal: Free from opioid side effects throughout the shift  Outcome: Progressing  Goal: Free from acute confusion related to pain meds throughout the shift  Outcome: Progressing

## 2024-07-02 NOTE — PROGRESS NOTES
Kelli Ortega is a 58 y.o. female on day 0 of admission presenting with Gastrointestinal hemorrhage with melena.  58 y.o. female with PMHx of IBS, SBO in March (managed conservatively), bipolar disorder and chronic pain/fibromyalgia who presented with complaint of black stool, onset a week ago.      Subjective   Patient is lying on the bed, alert and oriented x 3 answering my questions appropriately.  Initially patient got admitted to hospital because of black stools.  Patient had 1 bowel movement since admission, still complaining of dark stools, slightly better compared to yesterday.  Denies any abdominal pain nausea or vomitings  No fever chills or rigors  No other significant overnight issues    Objective   Range of Vitals (last 24 hours)  Heart Rate:  [82-88]   Temp:  [35.9 °C (96.6 °F)-36.6 °C (97.9 °F)]   Resp:  [15-24]   BP: (100-127)/(66-86)   SpO2:  [93 %-95 %]   Daily Weight  07/01/24 : 65.5 kg (144 lb 6.4 oz)    Body mass index is 25.58 kg/m².    Physical Exam  General: No distress  Neck: Supple.  HEENT: Normocephalic atraumatic pupils equal reacting to light  Heart: S1-S2 heard no murmurs gallops regurgitation.  Abdomen: Nondistended nontender bowel sounds are present  Neuro: No focal assessment      Antibiotics  HYDROmorphone (Dilaudid) injection 0.5 mg  prochlorperazine (Compazine) injection 10 mg  sodium chloride 0.9 % bolus 1,000 mL  magnesium sulfate IV 2 g  pantoprazole (ProtoNix) injection 80 mg  iohexol (OMNIPaque) 350 mg iodine/mL solution 75 mL  iohexol (OMNIPaque) 350 mg iodine/mL solution 100 mL  HYDROmorphone (Dilaudid) injection 0.5 mg  HYDROmorphone (Dilaudid) injection 0.5 mg  enoxaparin (Lovenox) syringe 40 mg  acetaminophen (Tylenol) tablet 650 mg  pantoprazole (ProtoNix) injection 40 mg  busPIRone (Buspar) tablet 30 mg  diazePAM (Valium) tablet 5 mg  dicyclomine (Bentyl) tablet 20 mg  pravastatin (Pravachol) tablet 20 mg  topiramate (Topamax) tablet 25 mg  tiotropium (Spiriva  Respimat) 2.5 mcg/actuation inhaler 2 puff  fluticasone furoate-vilanteroL (Breo Ellipta) 200-25 mcg/dose inhaler 1 puff          HYDROmorphone PF (Dilaudid) injection 0.2 mg      Relevant Results  Labs  Results from last 72 hours   Lab Units 07/02/24  0506 07/01/24  1837 07/01/24  1234   WBC AUTO x10*3/uL 8.9  --  19.2*   HEMOGLOBIN g/dL 10.6* 12.0 12.0   HEMATOCRIT % 33.2* 37.1 36.6   PLATELETS AUTO x10*3/uL 200  --  270   NEUTROS PCT AUTO % 49.3  --  75.9   LYMPHS PCT AUTO % 41.3  --  18.0   MONOS PCT AUTO % 7.1  --  5.0   EOS PCT AUTO % 1.8  --  0.5     Results from last 72 hours   Lab Units 07/02/24  0506 07/01/24  1234   SODIUM mmol/L 141 139   POTASSIUM mmol/L 3.5 3.5   CHLORIDE mmol/L 112* 111*   CO2 mmol/L 21 19*   BUN mg/dL 12 9   CREATININE mg/dL 0.92 0.99   GLUCOSE mg/dL 89 95   CALCIUM mg/dL 8.0* 9.0   ANION GAP mmol/L 12 13   EGFR mL/min/1.73m*2 71 65     Results from last 72 hours   Lab Units 07/01/24  1234   ALK PHOS U/L 28*   BILIRUBIN TOTAL mg/dL 0.2   BILIRUBIN DIRECT mg/dL 0.0   PROTEIN TOTAL g/dL 7.3   ALT U/L 11   AST U/L 18   ALBUMIN g/dL 4.2     Estimated Creatinine Clearance: 59.1 mL/min (by C-G formula based on SCr of 0.92 mg/dL).  C-Reactive Protein   Date Value Ref Range Status   01/24/2024 0.83 <1.00 mg/dL Final     CRP   Date Value Ref Range Status   05/23/2023 0.89 mg/dL Final     Comment:     REF VALUE  < 1.00   01/17/2023 0.16 mg/dL Final     Comment:     REF VALUE  < 1.00         Imaging  CT angio abdomen pelvis w and or wo IV IV contrast    Result Date: 7/1/2024  STUDY: CT Angiogram of the Abdomen and Pelvis; 7/1/2024 1:57 PM INDICATION: Black stool, lower abdominal pain. COMPARISON: None Available. ACCESSION NUMBER(S): [id number] ORDERING CLINICIAN: LEWIS RODRIGUEZ TECHNIQUE:  Helical CT is performed from the aortic diaphragmatic hiatus through the symphysis pubis after bolus administration of intravenous contrast.  Images are reviewed and processed at a workstation according  to the CT angiogram protocol with 3-D and/or MIP post processing imaging generated. Automated mA/kV exposure control was utilized and patient examination was performed in strict accordance with principles of ALARA. FINDINGS: Vascular: Mesenteric: The celiac, SMA, and LINDA demonstrate no significant stenosis.  Right renal: Single vessel demonstrates no significant stenosis.  Left renal: Single vessel demonstrates no significant stenosis. Abdominal aorta: The abdominal aorta is not aneurysmal and demonstrates no significant occlusive disease. Iliacs: The common, external, and internal iliac vessels demonstrate no aneurysmal disease or significant stenosis. No contrast seen within the bowel lumina during any phase of the study. Abdomen: The lung bases are clear.  The liver is unremarkable.  The pancreas, spleen, adrenal glands, gallbladder, and kidneys demonstrate no acute pathology. There is no free air or lymph node enlargement. Pelvis: There is no bowel wall thickening or obstruction.  There is no free fluid.  Lymph nodes are not enlarged.  Urinary bladder is unremarkable. The appendix is not clearly identified, but there is no fat stranding or wall thickening in the right lower quadrant to suggest appendicitis. Skeleton:  There are no acute fractures.  No suspicious bony lesions.    1. No contrast seen within the bowel lumina during any phase of the study. No evidence for active GI bleed. 2. No acute pathology in the abdomen or pelvis. Signed by Valdo Roberts MD    XR humerus left    Result Date: 7/1/2024  STUDY: Humerus Radiographs; 7/1/2024, 13:16 INDICATION: Left arm injury with bruise. COMPARISON: None Available. ACCESSION NUMBER(S): [id number] ORDERING CLINICIAN: LEWIS RODRIGUEZ TECHNIQUE:  2 view(s) of the left humerus. FINDINGS:  There is no acute fracture.  Visualized joint spaces are unremarkable.  No soft tissue abnormality, or radiopaque foreign body.      No acute fracture or dislocation of the  left humerus. Signed by Tello Coulter MD    ECG 12 lead    Result Date: 7/1/2024  Sinus rhythm Low voltage, precordial leads     Assessment/Plan   Intractable abdominal pain.  Dark/black stools.  Hypomagnesemia.  Leukocytosis.  Hypotension episodes.  Tachycardia.    History of:  COPD-no signs of exacerbation.  Bipolar.  Chronic pain syndrome.  Fibromyalgia.  Irritable bowel syndrome.  Recent small bowel obstruction.    Plan:  Patient's abdominal pain is improving.  Had 1 bowel movement this morning, dark stools are also improving.  Slight drop in hemoglobin from 12-10, no signs of bleeding noticed.  CT abdomen results reviewed-unremarkable.  Patient denies any history of using NSAIDs or iron supplements.  Continue Protonix.  Monitoring H&H, if continues to drop we will consider gastroenterology consult.  Leukocytosis resolved, no signs of obvious infection follow-up with a CBC.  Magnesium was replaced in the emergency room, follow-up magnesium is at 2.1, hypomagnesemia resolved.  Blood pressure also stabilized, hemodynamically stable.  Tachycardia resolved.  Home medications reviewed and resumed appropriately.    Disposition:  Monitoring hemoglobin levels, if remains stable possible discharge home in next 1 to 2 days.      I spent 25 minutes in the professional and overall care of this patient.      Eliud Son MD

## 2024-07-03 LAB
ANION GAP SERPL CALC-SCNC: 10 MMOL/L (ref 10–20)
BUN SERPL-MCNC: 9 MG/DL (ref 6–23)
CALCIUM SERPL-MCNC: 8.4 MG/DL (ref 8.6–10.3)
CHLORIDE SERPL-SCNC: 114 MMOL/L (ref 98–107)
CO2 SERPL-SCNC: 20 MMOL/L (ref 21–32)
CREAT SERPL-MCNC: 0.73 MG/DL (ref 0.5–1.05)
EGFRCR SERPLBLD CKD-EPI 2021: >90 ML/MIN/1.73M*2
ERYTHROCYTE [DISTWIDTH] IN BLOOD BY AUTOMATED COUNT: 19.3 % (ref 11.5–14.5)
GLUCOSE SERPL-MCNC: 89 MG/DL (ref 74–99)
HCT VFR BLD AUTO: 32 % (ref 36–46)
HGB BLD-MCNC: 10.7 G/DL (ref 12–16)
MAGNESIUM SERPL-MCNC: 1.92 MG/DL (ref 1.6–2.4)
MCH RBC QN AUTO: 27.6 PG (ref 26–34)
MCHC RBC AUTO-ENTMCNC: 33.4 G/DL (ref 32–36)
MCV RBC AUTO: 83 FL (ref 80–100)
NRBC BLD-RTO: 0 /100 WBCS (ref 0–0)
PLATELET # BLD AUTO: 239 X10*3/UL (ref 150–450)
POTASSIUM SERPL-SCNC: 3.4 MMOL/L (ref 3.5–5.3)
RBC # BLD AUTO: 3.87 X10*6/UL (ref 4–5.2)
SODIUM SERPL-SCNC: 141 MMOL/L (ref 136–145)
WBC # BLD AUTO: 7.8 X10*3/UL (ref 4.4–11.3)

## 2024-07-03 PROCEDURE — 83735 ASSAY OF MAGNESIUM: CPT | Performed by: INTERNAL MEDICINE

## 2024-07-03 PROCEDURE — 80048 BASIC METABOLIC PNL TOTAL CA: CPT | Performed by: INTERNAL MEDICINE

## 2024-07-03 PROCEDURE — 1210000001 HC SEMI-PRIVATE ROOM DAILY

## 2024-07-03 PROCEDURE — 85027 COMPLETE CBC AUTOMATED: CPT | Performed by: INTERNAL MEDICINE

## 2024-07-03 PROCEDURE — G0378 HOSPITAL OBSERVATION PER HR: HCPCS

## 2024-07-03 PROCEDURE — 2500000005 HC RX 250 GENERAL PHARMACY W/O HCPCS: Performed by: NURSE PRACTITIONER

## 2024-07-03 PROCEDURE — 87506 IADNA-DNA/RNA PROBE TQ 6-11: CPT | Mod: PORLAB | Performed by: INTERNAL MEDICINE

## 2024-07-03 PROCEDURE — 2500000004 HC RX 250 GENERAL PHARMACY W/ HCPCS (ALT 636 FOR OP/ED): Performed by: STUDENT IN AN ORGANIZED HEALTH CARE EDUCATION/TRAINING PROGRAM

## 2024-07-03 PROCEDURE — 2500000002 HC RX 250 W HCPCS SELF ADMINISTERED DRUGS (ALT 637 FOR MEDICARE OP, ALT 636 FOR OP/ED): Performed by: INTERNAL MEDICINE

## 2024-07-03 PROCEDURE — 36415 COLL VENOUS BLD VENIPUNCTURE: CPT | Performed by: INTERNAL MEDICINE

## 2024-07-03 PROCEDURE — 99233 SBSQ HOSP IP/OBS HIGH 50: CPT | Performed by: INTERNAL MEDICINE

## 2024-07-03 PROCEDURE — C9113 INJ PANTOPRAZOLE SODIUM, VIA: HCPCS | Performed by: NURSE PRACTITIONER

## 2024-07-03 PROCEDURE — 2500000001 HC RX 250 WO HCPCS SELF ADMINISTERED DRUGS (ALT 637 FOR MEDICARE OP): Performed by: NURSE PRACTITIONER

## 2024-07-03 PROCEDURE — 2500000004 HC RX 250 GENERAL PHARMACY W/ HCPCS (ALT 636 FOR OP/ED): Performed by: NURSE PRACTITIONER

## 2024-07-03 RX ORDER — POTASSIUM CHLORIDE 20 MEQ/1
40 TABLET, EXTENDED RELEASE ORAL ONCE
Status: COMPLETED | OUTPATIENT
Start: 2024-07-03 | End: 2024-07-03

## 2024-07-03 RX ADMIN — BUSPIRONE HYDROCHLORIDE 30 MG: 15 TABLET ORAL at 16:41

## 2024-07-03 RX ADMIN — HYDROMORPHONE HYDROCHLORIDE 0.2 MG: 0.2 INJECTION, SOLUTION INTRAMUSCULAR; INTRAVENOUS; SUBCUTANEOUS at 01:47

## 2024-07-03 RX ADMIN — HYDROMORPHONE HYDROCHLORIDE 0.2 MG: 0.2 INJECTION, SOLUTION INTRAMUSCULAR; INTRAVENOUS; SUBCUTANEOUS at 08:57

## 2024-07-03 RX ADMIN — TOPIRAMATE 25 MG: 25 TABLET, FILM COATED ORAL at 21:07

## 2024-07-03 RX ADMIN — PRAVASTATIN SODIUM 20 MG: 20 TABLET ORAL at 21:07

## 2024-07-03 RX ADMIN — TIOTROPIUM BROMIDE INHALATION SPRAY 2 PUFF: 3.12 SPRAY, METERED RESPIRATORY (INHALATION) at 06:56

## 2024-07-03 RX ADMIN — BUSPIRONE HYDROCHLORIDE 30 MG: 15 TABLET ORAL at 08:46

## 2024-07-03 RX ADMIN — FLUTICASONE FUROATE AND VILANTEROL TRIFENATATE 1 PUFF: 200; 25 POWDER RESPIRATORY (INHALATION) at 06:56

## 2024-07-03 RX ADMIN — PANTOPRAZOLE SODIUM 40 MG: 40 INJECTION, POWDER, FOR SOLUTION INTRAVENOUS at 08:46

## 2024-07-03 RX ADMIN — BUSPIRONE HYDROCHLORIDE 30 MG: 15 TABLET ORAL at 21:07

## 2024-07-03 RX ADMIN — ONDANSETRON 4 MG: 4 TABLET, ORALLY DISINTEGRATING ORAL at 08:57

## 2024-07-03 RX ADMIN — TOPIRAMATE 25 MG: 25 TABLET, FILM COATED ORAL at 08:46

## 2024-07-03 RX ADMIN — HYDROMORPHONE HYDROCHLORIDE 0.2 MG: 0.2 INJECTION, SOLUTION INTRAMUSCULAR; INTRAVENOUS; SUBCUTANEOUS at 14:01

## 2024-07-03 RX ADMIN — HYDROMORPHONE HYDROCHLORIDE 0.2 MG: 0.2 INJECTION, SOLUTION INTRAMUSCULAR; INTRAVENOUS; SUBCUTANEOUS at 18:59

## 2024-07-03 RX ADMIN — POTASSIUM CHLORIDE 40 MEQ: 1500 TABLET, EXTENDED RELEASE ORAL at 08:46

## 2024-07-03 ASSESSMENT — PAIN SCALES - GENERAL
PAINLEVEL_OUTOF10: 4
PAINLEVEL_OUTOF10: 3
PAINLEVEL_OUTOF10: 7
PAINLEVEL_OUTOF10: 8
PAINLEVEL_OUTOF10: 5 - MODERATE PAIN
PAINLEVEL_OUTOF10: 2

## 2024-07-03 ASSESSMENT — COGNITIVE AND FUNCTIONAL STATUS - GENERAL
MOBILITY SCORE: 24
DAILY ACTIVITIY SCORE: 24

## 2024-07-03 ASSESSMENT — PAIN - FUNCTIONAL ASSESSMENT
PAIN_FUNCTIONAL_ASSESSMENT: 0-10

## 2024-07-03 NOTE — PROGRESS NOTES
Kelli Ortega is a 58 y.o. female on day 0 of admission presenting with Gastrointestinal hemorrhage with melena.  58 y.o. female with PMHx of IBS, SBO in March (managed conservatively), bipolar disorder and chronic pain/fibromyalgia who presented with complaint of black stool, onset a week ago.      Subjective   Patient is sitting on the bed comfortably, complaining of diarrhea had 3 episodes since morning.  Still complaining of dark stools.  Denies any abdominal pain nausea or vomitings  No other significant overnight issues.    Objective   Range of Vitals (last 24 hours)  Heart Rate:  [75-89]   Temp:  [36.1 °C (97 °F)-37 °C (98.6 °F)]   Resp:  [17-18]   BP: (102-130)/(66-80)   SpO2:  [95 %-100 %]   Daily Weight  07/01/24 : 65.5 kg (144 lb 6.4 oz)    Body mass index is 25.58 kg/m².    Physical Exam  General: No distress  Neck: Supple.  HEENT: Normocephalic atraumatic pupils equal reacting to light  Heart: S1-S2 heard no murmurs gallops regurgitation.  Abdomen: Nondistended nontender bowel sounds are present  Neuro: No focal assessment      Antibiotics  HYDROmorphone (Dilaudid) injection 0.5 mg  prochlorperazine (Compazine) injection 10 mg  sodium chloride 0.9 % bolus 1,000 mL  magnesium sulfate IV 2 g  pantoprazole (ProtoNix) injection 80 mg  iohexol (OMNIPaque) 350 mg iodine/mL solution 75 mL  iohexol (OMNIPaque) 350 mg iodine/mL solution 100 mL  HYDROmorphone (Dilaudid) injection 0.5 mg  HYDROmorphone (Dilaudid) injection 0.5 mg  enoxaparin (Lovenox) syringe 40 mg  acetaminophen (Tylenol) tablet 650 mg  pantoprazole (ProtoNix) injection 40 mg  busPIRone (Buspar) tablet 30 mg  diazePAM (Valium) tablet 5 mg  dicyclomine (Bentyl) tablet 20 mg  pravastatin (Pravachol) tablet 20 mg  topiramate (Topamax) tablet 25 mg  tiotropium (Spiriva Respimat) 2.5 mcg/actuation inhaler 2 puff  fluticasone furoate-vilanteroL (Breo Ellipta) 200-25 mcg/dose inhaler 1 puff          HYDROmorphone PF (Dilaudid) injection 0.2  mg      Relevant Results  Labs  Results from last 72 hours   Lab Units 07/03/24  0453 07/02/24  0506 07/01/24  1837 07/01/24  1234   WBC AUTO x10*3/uL 7.8 8.9  --  19.2*   HEMOGLOBIN g/dL 10.7* 10.6* 12.0 12.0   HEMATOCRIT % 32.0* 33.2* 37.1 36.6   PLATELETS AUTO x10*3/uL 239 200  --  270   NEUTROS PCT AUTO %  --  49.3  --  75.9   LYMPHS PCT AUTO %  --  41.3  --  18.0   MONOS PCT AUTO %  --  7.1  --  5.0   EOS PCT AUTO %  --  1.8  --  0.5     Results from last 72 hours   Lab Units 07/03/24  0453 07/02/24  0506 07/01/24  1234   SODIUM mmol/L 141 141 139   POTASSIUM mmol/L 3.4* 3.5 3.5   CHLORIDE mmol/L 114* 112* 111*   CO2 mmol/L 20* 21 19*   BUN mg/dL 9 12 9   CREATININE mg/dL 0.73 0.92 0.99   GLUCOSE mg/dL 89 89 95   CALCIUM mg/dL 8.4* 8.0* 9.0   ANION GAP mmol/L 10 12 13   EGFR mL/min/1.73m*2 >90 71 65     Results from last 72 hours   Lab Units 07/01/24  1234   ALK PHOS U/L 28*   BILIRUBIN TOTAL mg/dL 0.2   BILIRUBIN DIRECT mg/dL 0.0   PROTEIN TOTAL g/dL 7.3   ALT U/L 11   AST U/L 18   ALBUMIN g/dL 4.2     Estimated Creatinine Clearance: 74.5 mL/min (by C-G formula based on SCr of 0.73 mg/dL).  C-Reactive Protein   Date Value Ref Range Status   01/24/2024 0.83 <1.00 mg/dL Final     CRP   Date Value Ref Range Status   05/23/2023 0.89 mg/dL Final     Comment:     REF VALUE  < 1.00   01/17/2023 0.16 mg/dL Final     Comment:     REF VALUE  < 1.00         Imaging  CT angio abdomen pelvis w and or wo IV IV contrast    Result Date: 7/1/2024  STUDY: CT Angiogram of the Abdomen and Pelvis; 7/1/2024 1:57 PM INDICATION: Black stool, lower abdominal pain. COMPARISON: None Available. ACCESSION NUMBER(S): [id number] ORDERING CLINICIAN: LEWIS RODRIGUEZ TECHNIQUE:  Helical CT is performed from the aortic diaphragmatic hiatus through the symphysis pubis after bolus administration of intravenous contrast.  Images are reviewed and processed at a workstation according to the CT angiogram protocol with 3-D and/or MIP post  processing imaging generated. Automated mA/kV exposure control was utilized and patient examination was performed in strict accordance with principles of ALARA. FINDINGS: Vascular: Mesenteric: The celiac, SMA, and LINDA demonstrate no significant stenosis.  Right renal: Single vessel demonstrates no significant stenosis.  Left renal: Single vessel demonstrates no significant stenosis. Abdominal aorta: The abdominal aorta is not aneurysmal and demonstrates no significant occlusive disease. Iliacs: The common, external, and internal iliac vessels demonstrate no aneurysmal disease or significant stenosis. No contrast seen within the bowel lumina during any phase of the study. Abdomen: The lung bases are clear.  The liver is unremarkable.  The pancreas, spleen, adrenal glands, gallbladder, and kidneys demonstrate no acute pathology. There is no free air or lymph node enlargement. Pelvis: There is no bowel wall thickening or obstruction.  There is no free fluid.  Lymph nodes are not enlarged.  Urinary bladder is unremarkable. The appendix is not clearly identified, but there is no fat stranding or wall thickening in the right lower quadrant to suggest appendicitis. Skeleton:  There are no acute fractures.  No suspicious bony lesions.    1. No contrast seen within the bowel lumina during any phase of the study. No evidence for active GI bleed. 2. No acute pathology in the abdomen or pelvis. Signed by Valdo Roberts MD    XR humerus left    Result Date: 7/1/2024  STUDY: Humerus Radiographs; 7/1/2024, 13:16 INDICATION: Left arm injury with bruise. COMPARISON: None Available. ACCESSION NUMBER(S): [id number] ORDERING CLINICIAN: LEWIS RODRIGUEZ TECHNIQUE:  2 view(s) of the left humerus. FINDINGS:  There is no acute fracture.  Visualized joint spaces are unremarkable.  No soft tissue abnormality, or radiopaque foreign body.      No acute fracture or dislocation of the left humerus. Signed by Tello Coulter MD    ECG 12  lead    Result Date: 7/1/2024  Sinus rhythm Low voltage, precordial leads     Assessment/Plan   Intractable abdominal pain.  Dark/black stools.  Hypomagnesemia.  Leukocytosis.  Hypotension episodes.  Tachycardia.    History of:  Patient had both EGD and colonoscopy within 6 months-unremarkable  COPD-no signs of exacerbation.  Bipolar.  Chronic pain syndrome.  Fibromyalgia.  Irritable bowel syndrome.  Recent small bowel obstruction.    Plan:  Patient's abdominal pain is improving.  Patient had 3 episodes of loose bowel movement since morning, still complaining of dark stools.  As patient continued to complain of diarrhea, ordered GI PCR panel follow-up on results  Slight improvement in hemoglobin, no signs of obvious bleeding.  Patient recently had EGD and colonoscopy-unremarkable.  Patient also has a GI appointment on 7/9/2024.  CT abdomen results reviewed-unremarkable.  Patient denies any history of using NSAIDs or iron supplements.  Continue Protonix.  Monitoring H&H, if continues to drop we will consider gastroenterology consult.  Leukocytosis resolved, no signs of obvious infection follow-up with a CBC.  Magnesium was replaced in the emergency room, follow-up magnesium is at 2.1, hypomagnesemia resolved.  Blood pressure also stabilized, hemodynamically stable.  Tachycardia resolved.  Home medications reviewed and resumed appropriately.    Disposition:  GI PCR panel is pending.  Hemoglobin is stable.  Okay for discharge after infectious workup ruled out infection.  Possible discharge home tomorrow      I spent 25 minutes in the professional and overall care of this patient.      Eliud Son MD

## 2024-07-03 NOTE — CARE PLAN
Problem: Fall/Injury  Goal: Not fall by end of shift  Outcome: Progressing  Goal: Be free from injury by end of the shift  Outcome: Progressing  Goal: Verbalize understanding of personal risk factors for fall in the hospital  Outcome: Progressing  Goal: Verbalize understanding of risk factor reduction measures to prevent injury from fall in the home  Outcome: Progressing  Goal: Use assistive devices by end of the shift  Outcome: Progressing  Goal: Pace activities to prevent fatigue by end of the shift  Outcome: Progressing   The patient's goals for the shift include  pt will remain free of injury    The clinical goals for the shift include pt will hemodynamically stable during shift

## 2024-07-04 VITALS
RESPIRATION RATE: 18 BRPM | HEART RATE: 86 BPM | OXYGEN SATURATION: 98 % | DIASTOLIC BLOOD PRESSURE: 73 MMHG | BODY MASS INDEX: 25.59 KG/M2 | WEIGHT: 144.4 LBS | SYSTOLIC BLOOD PRESSURE: 141 MMHG | TEMPERATURE: 97 F | HEIGHT: 63 IN

## 2024-07-04 LAB
ANION GAP SERPL CALC-SCNC: 15 MMOL/L (ref 10–20)
BUN SERPL-MCNC: 9 MG/DL (ref 6–23)
CALCIUM SERPL-MCNC: 9 MG/DL (ref 8.6–10.3)
CHLORIDE SERPL-SCNC: 112 MMOL/L (ref 98–107)
CO2 SERPL-SCNC: 18 MMOL/L (ref 21–32)
CREAT SERPL-MCNC: 0.84 MG/DL (ref 0.5–1.05)
EGFRCR SERPLBLD CKD-EPI 2021: 80 ML/MIN/1.73M*2
ERYTHROCYTE [DISTWIDTH] IN BLOOD BY AUTOMATED COUNT: 19.3 % (ref 11.5–14.5)
GLUCOSE SERPL-MCNC: 104 MG/DL (ref 74–99)
HCT VFR BLD AUTO: 36.7 % (ref 36–46)
HGB BLD-MCNC: 12 G/DL (ref 12–16)
MCH RBC QN AUTO: 27 PG (ref 26–34)
MCHC RBC AUTO-ENTMCNC: 32.7 G/DL (ref 32–36)
MCV RBC AUTO: 83 FL (ref 80–100)
NRBC BLD-RTO: 0 /100 WBCS (ref 0–0)
PLATELET # BLD AUTO: 267 X10*3/UL (ref 150–450)
POTASSIUM SERPL-SCNC: 3.7 MMOL/L (ref 3.5–5.3)
RBC # BLD AUTO: 4.45 X10*6/UL (ref 4–5.2)
SODIUM SERPL-SCNC: 141 MMOL/L (ref 136–145)
WBC # BLD AUTO: 10.5 X10*3/UL (ref 4.4–11.3)

## 2024-07-04 PROCEDURE — 2500000004 HC RX 250 GENERAL PHARMACY W/ HCPCS (ALT 636 FOR OP/ED): Performed by: STUDENT IN AN ORGANIZED HEALTH CARE EDUCATION/TRAINING PROGRAM

## 2024-07-04 PROCEDURE — 99239 HOSP IP/OBS DSCHRG MGMT >30: CPT | Performed by: INTERNAL MEDICINE

## 2024-07-04 PROCEDURE — 2500000001 HC RX 250 WO HCPCS SELF ADMINISTERED DRUGS (ALT 637 FOR MEDICARE OP): Performed by: NURSE PRACTITIONER

## 2024-07-04 PROCEDURE — 85027 COMPLETE CBC AUTOMATED: CPT | Performed by: INTERNAL MEDICINE

## 2024-07-04 PROCEDURE — 2500000005 HC RX 250 GENERAL PHARMACY W/O HCPCS: Performed by: NURSE PRACTITIONER

## 2024-07-04 PROCEDURE — 36415 COLL VENOUS BLD VENIPUNCTURE: CPT | Performed by: INTERNAL MEDICINE

## 2024-07-04 PROCEDURE — G0378 HOSPITAL OBSERVATION PER HR: HCPCS

## 2024-07-04 PROCEDURE — 80048 BASIC METABOLIC PNL TOTAL CA: CPT | Performed by: INTERNAL MEDICINE

## 2024-07-04 RX ADMIN — HYDROMORPHONE HYDROCHLORIDE 0.2 MG: 0.2 INJECTION, SOLUTION INTRAMUSCULAR; INTRAVENOUS; SUBCUTANEOUS at 09:20

## 2024-07-04 RX ADMIN — ONDANSETRON 4 MG: 4 TABLET, ORALLY DISINTEGRATING ORAL at 09:20

## 2024-07-04 RX ADMIN — TIOTROPIUM BROMIDE INHALATION SPRAY 2 PUFF: 3.12 SPRAY, METERED RESPIRATORY (INHALATION) at 06:39

## 2024-07-04 RX ADMIN — FLUTICASONE FUROATE AND VILANTEROL TRIFENATATE 1 PUFF: 200; 25 POWDER RESPIRATORY (INHALATION) at 06:38

## 2024-07-04 RX ADMIN — TOPIRAMATE 25 MG: 25 TABLET, FILM COATED ORAL at 09:19

## 2024-07-04 RX ADMIN — BUSPIRONE HYDROCHLORIDE 30 MG: 15 TABLET ORAL at 09:19

## 2024-07-04 ASSESSMENT — COGNITIVE AND FUNCTIONAL STATUS - GENERAL
MOBILITY SCORE: 24
DAILY ACTIVITIY SCORE: 24

## 2024-07-04 ASSESSMENT — PAIN SCALES - GENERAL: PAINLEVEL_OUTOF10: 8

## 2024-07-04 NOTE — DISCHARGE SUMMARY
Discharge Diagnosis  Gastrointestinal hemorrhage with melena    Issues Requiring Follow-Up  Follow-up with GI on the ninth as scheduled.  Follow-up with PCP in 3 days.  If blood in stools noted again return to the hospital.    Discharge Meds     Your medication list        CHANGE how you take these medications        Instructions Last Dose Given Next Dose Due   Emgality Pen 120 mg/mL auto-injector  Generic drug: galcanezumab  What changed: Another medication with the same name was removed. Continue taking this medication, and follow the directions you see here.      Inject 1 pen (120 mg) under the skin every 28 (twenty-eight) days.              CONTINUE taking these medications        Instructions Last Dose Given Next Dose Due   acyclovir 200 mg capsule  Commonly known as: Zovirax           acyclovir 800 mg tablet  Commonly known as: Zovirax      Take 1 tablet (800 mg) by mouth twice a day for 5 days.       albuterol 90 mcg/actuation inhaler      Inhale 2 puffs every 4 hours if needed for wheezing or shortness of breath.       B complex-vitamin C tablet      Take 1 tablet by mouth once daily.       busPIRone 15 mg tablet  Commonly known as: Buspar           cholecalciferol 50,000 unit capsule  Commonly known as: Vitamin D-3      TAKE ONE (1) CAPSULE BY MOUTH EVERY OTHER WEEK.       cyclobenzaprine 10 mg tablet  Commonly known as: Flexeril      TAKE 1 TABLET (10 MG) BY MOUTH 3 TIMES A DAY AS NEEDED FOR MUSCLE SPASMS.       diazePAM 5 mg tablet  Commonly known as: Valium           dicyclomine 20 mg tablet  Commonly known as: Bentyl      TAKE ONE TO TWO TABLETS BY MOUTH THREE (3) TIMES PER DAY OR AS NEEDED FOR PAIN. **(BENTYL 20 MG TAB EQUIV)**       immune globulin (human) (IgG) 10% solution infusion  Commonly known as: Gamunex-C           ipratropium-albuteroL 0.5-2.5 mg/3 mL nebulizer solution  Commonly known as: Duo-Neb      USE 1 VIAL IN NEBULIZER 4 TIMES DAILY       lidocaine 5 % ointment  Commonly known as:  Xylocaine           loratadine 10 mg tablet  Commonly known as: Claritin      Take 1 tablet (10 mg) by mouth once daily.       magnesium oxide 400 mg tablet  Commonly known as: Mag-Ox           nebulizer accessories kit      1 each once daily. Needs a replacement mask- hers is not working       nystatin cream  Commonly known as: Mycostatin           omeprazole 40 mg DR capsule  Commonly known as: PriLOSEC      Take 1 capsule (40 mg) by mouth 2 times a day before meals. Do not crush or chew.       ondansetron ODT 8 mg disintegrating tablet  Commonly known as: Zofran-ODT      Take 1 tablet (8 mg) by mouth every 8 hours if needed for nausea or vomiting.       polyethylene glycol 17 gram/dose powder  Commonly known as: Miralax      MIX 1 CAPFUL IN 8 OUNCES OF WATER AND DRINK AT BEDTIME AS NEEDED FOR CONSTIPATION.       potassium citrate 99 mg capsule           pravastatin 20 mg tablet  Commonly known as: Pravachol      TAKE ONE (1) TABLET BY MOUTH EVERY EVENING AT BEDTIME       pregabalin 150 mg capsule  Commonly known as: Lyrica      Take 1 capsule (150 mg) by mouth 3 times a day.       rizatriptan 10 mg tablet  Commonly known as: Maxalt      TAKE 1 TABLET BY MOUTH 1 TIME IF NEEDED FOR MIGRAINE (MAY REPEAT X1) FOR UP TO 9 DOSES. MAY REPEAT IN 2 HOURS IF UNRESOLVED. DO NOT EXCEED 3/24HOURS       topiramate 25 mg tablet  Commonly known as: Topamax           Trelegy Ellipta 200-62.5-25 mcg blister with device  Generic drug: fluticasone-umeclidin-vilanter      INHALE 1 PUFF BY MOUTH AND INTO THE LUNGS DAILY RINSE MOUTH AFTER EACH USE              STOP taking these medications      amoxicillin-pot clavulanate 500-125 mg tablet  Commonly known as: Augmentin        Gas Relief Extra Strength 125 mg capsule  Generic drug: simethicone        Metamucil Sugar-Free (aspart) 3.4 gram/5.8 gram powder  Generic drug: psyllium husk (aspartame)        mupirocin 2 % ointment  Commonly known as: Bactroban        zolpidem 10 mg  tablet  Commonly known as: Ambien                 Test Results Pending At Discharge  Pending Labs       Order Current Status    Stool Pathogen Panel, PCR In process            Hospital Course   Kelli Ortega is a 58 y.o. female with PMHx of IBS, SBO in March (managed conservatively), bipolar disorder and chronic pain/fibromyalgia who presented with complaint of black stool, onset a week ago. She states she has a history of GI bleed in the past requiring prior blood transfusion.  Patient had both EGD and colonoscopy within 6 months that were unremarkable.  Patient had diarrhea that improved during the admission.  Hemoglobin remained stable.  CTA of the abdomen pelvis no evidence of bleeding and unremarkable.  Patient has an appointment with GI on 7/9/2024.  Blood in stools improved patient tolerated the diet abdominal pain has resolved.  The patient requested discharge.  I saw the patient for the first time on the discharge day GI did not see the patient this admission since she has an appointment on 7/9/2024 and there is no blood in stools, no need to consult at this point just prior to the patient is requesting discharge.  Will go forward with the discharge, the patient the patient states she will go to the appointment on 7/9/2024.    Pertinent Physical Exam At Time of Discharge  Physical Exam  Constitutional:       Appearance: Normal appearance.   HENT:      Head: Normocephalic and atraumatic.      Nose: Nose normal.      Mouth/Throat:      Mouth: Mucous membranes are dry.   Eyes:      Extraocular Movements: Extraocular movements intact.      Conjunctiva/sclera: Conjunctivae normal.   Cardiovascular:      Rate and Rhythm: Normal rate and regular rhythm.      Pulses: Normal pulses.      Heart sounds: Normal heart sounds.   Pulmonary:      Effort: Pulmonary effort is normal.      Breath sounds: Normal breath sounds.   Abdominal:      General: Bowel sounds are normal.      Palpations: Abdomen is soft.    Musculoskeletal:         General: Normal range of motion.      Cervical back: Normal range of motion and neck supple.   Skin:     General: Skin is warm and dry.   Neurological:      General: No focal deficit present.      Mental Status: She is alert. Mental status is at baseline.   Psychiatric:         Mood and Affect: Mood normal.         Outpatient Follow-Up  Future Appointments   Date Time Provider Department Center   7/9/2024  1:00 PM CHUN Weaver [id number] [facility]   7/10/2024 11:15 AM POR MAMMO 2 PORMAM [facility]   7/11/2024  8:30 AM POR  INFUSION [id number] [facility]   7/16/2024  1:00 PM CHUN Weaver [id number] [facility]   7/20/2024 10:45 AM POR CT 1 PORCT [facility]   7/23/2024  1:00 PM CHUN Weaver [id number] [facility]   7/24/2024 11:00 AM Amber Resendez, APRN-CNP [id number] [facility]   7/30/2024  1:00 PM CHUN Weaver [id number] [facility]   10/2/2024 11:00 AM Debra Torres, APRN-CNP [id number] [facility]   11/20/2024  2:20 PM Mariaelena Dyer MD [id number] [facility]   4/8/2025  1:40 PM Jennifer Lu, APRN-CNP [id number]         Renan Gil MD

## 2024-07-04 NOTE — CARE PLAN
Problem: Fall/Injury  Goal: Not fall by end of shift  Outcome: Progressing  Goal: Be free from injury by end of the shift  Outcome: Progressing  Goal: Verbalize understanding of personal risk factors for fall in the hospital  Outcome: Progressing  Goal: Verbalize understanding of risk factor reduction measures to prevent injury from fall in the home  Outcome: Progressing  Goal: Use assistive devices by end of the shift  Outcome: Progressing  Goal: Pace activities to prevent fatigue by end of the shift  Outcome: Progressing   The patient's goals for the shift include  pt will remain free of injury    The clinical goals for the shift include Patient safety will be maintained this shift

## 2024-07-04 NOTE — NURSING NOTE
Patient discharged home. No questions at this time. IV removed prior to DC, instructions reviewed with the patient and no changes to her medications. Belongings sent with the patient and will be transported home by family.

## 2024-07-05 ENCOUNTER — PATIENT OUTREACH (OUTPATIENT)
Dept: CARE COORDINATION | Facility: CLINIC | Age: 59
End: 2024-07-05
Payer: MEDICARE

## 2024-07-05 NOTE — PROGRESS NOTES
Discharge Facility: [facility]   Discharge Diagnosis: Gastrointestinal hemorrhage with melena   Admission Date: 7-1-24   Discharge Date: 7-4-24     PCP Appointment Date: Message routed to office for scheduling     Specialist Appointment Date:    JUL 10 Breast Imaging Screening:   Bilateral Wednesday Jul 10, 2024 11:15 AM (Arrive by 11:00 AM)  Hospital Encounter and Summary Linked: Yes/No  See discharge assessment below for further details  Medications  Medications reviewed with patient/caregiver?: Yes (7/5/2024  9:10 AM)  Is the patient having any side effects they believe may be caused by any medication additions or changes?: No (7/5/2024  9:10 AM)  Does the patient have all medications ordered at discharge?: Yes (7/5/2024  9:10 AM)  Care Management Interventions: No intervention needed (7/5/2024  9:10 AM)  Prescription Comments: CHANGE how you take:  Emgality Pen (galcanezumab)    STOP taking:  amoxicillin-pot clavulanate 500-125 mg tablet (Augmentin)   Gas Relief Extra Strength 125 mg capsule (simethicone)   Metamucil Sugar-Free (aspart) 3.4 gram/5.8 gram powder (psyllium husk (aspartame))   mupirocin 2 % ointment (Bactroban)   zolpidem 10 mg tablet (Ambien) (7/5/2024  9:10 AM)  Is the patient taking all medications as directed (includes completed medication regime)?: Yes (7/5/2024  9:10 AM)  Care Management Interventions: Provided patient education (7/5/2024  9:10 AM)    Appointments  Does the patient have a primary care provider?: Yes (7/5/2024  9:10 AM)  Care Management Interventions: Advised patient to make appointment (7/5/2024  9:10 AM)  Has the patient kept scheduled appointments due by today?: Yes (7/5/2024  9:10 AM)  Care Management Interventions: Advised to schedule with specialist (7/5/2024  9:10 AM)    Self Management  Has home health visited the patient within 72 hours of discharge?: Not applicable (7/5/2024  9:10 AM)    Patient Teaching  Does the patient have access to their discharge instructions?:  Yes (7/5/2024  9:10 AM)  Care Management Interventions: Reviewed instructions with patient (7/5/2024  9:10 AM)  What is the patient's perception of their health status since discharge?: Same (7/5/2024  9:10 AM)  Is the patient/caregiver able to teach back the hierarchy of who to call/visit for symptoms/problems? PCP, Specialist, Home Health nurse, Urgent Care, ED, 911: Yes (7/5/2024  9:10 AM)  Patient/Caregiver Education Comments: Pt. is wondering if it is possible for mold to be the cause of her sickness. (7/5/2024  9:10 AM)      Hawa Lu LPN

## 2024-07-06 LAB

## 2024-07-08 ENCOUNTER — TELEPHONE (OUTPATIENT)
Dept: PRIMARY CARE | Facility: CLINIC | Age: 59
End: 2024-07-08
Payer: MEDICARE

## 2024-07-08 DIAGNOSIS — E78.2 MIXED HYPERLIPIDEMIA: ICD-10-CM

## 2024-07-08 DIAGNOSIS — J43.9 PULMONARY EMPHYSEMA, UNSPECIFIED EMPHYSEMA TYPE (MULTI): ICD-10-CM

## 2024-07-08 DIAGNOSIS — J30.1 SEASONAL ALLERGIC RHINITIS DUE TO POLLEN: ICD-10-CM

## 2024-07-08 RX ORDER — FLUTICASONE FUROATE, UMECLIDINIUM BROMIDE AND VILANTEROL TRIFENATATE 200; 62.5; 25 UG/1; UG/1; UG/1
POWDER RESPIRATORY (INHALATION)
Qty: 1 EACH | Refills: 0 | Status: SHIPPED | OUTPATIENT
Start: 2024-07-08

## 2024-07-08 RX ORDER — LORATADINE 10 MG/1
10 TABLET ORAL DAILY
Qty: 30 TABLET | Refills: 0 | Status: SHIPPED | OUTPATIENT
Start: 2024-07-08

## 2024-07-08 RX ORDER — PRAVASTATIN SODIUM 20 MG/1
TABLET ORAL
Qty: 30 TABLET | Refills: 0 | Status: SHIPPED | OUTPATIENT
Start: 2024-07-08

## 2024-07-08 NOTE — TELEPHONE ENCOUNTER
Андрей Vargas 200-62.25 mcg Sig: INHALE 1 PUFF BY MOUTH AND INTO THE LUNGS DAILY RINSE MOUTH AFTER EACH USE     Loratadine 10 mg tablet Sig: Take 1 tablet (10 mg) by mouth once daily     Pravastatin 20 mg tablet TAKE ONE (1) TABLET BY MOUTH EVERY EVENING AT BEDTIME       [facility]     LOV: 5/22/24  NOV: 7/15/24

## 2024-07-09 ENCOUNTER — TELEPHONE (OUTPATIENT)
Dept: OBSTETRICS AND GYNECOLOGY | Facility: CLINIC | Age: 59
End: 2024-07-09
Payer: MEDICARE

## 2024-07-09 ENCOUNTER — TREATMENT (OUTPATIENT)
Dept: SPEECH THERAPY | Facility: HOSPITAL | Age: 59
End: 2024-07-09
Payer: MEDICARE

## 2024-07-09 DIAGNOSIS — R13.12 OROPHARYNGEAL DYSPHAGIA: ICD-10-CM

## 2024-07-09 PROCEDURE — 92526 ORAL FUNCTION THERAPY: CPT | Mod: GN | Performed by: SPEECH-LANGUAGE PATHOLOGIST

## 2024-07-09 ASSESSMENT — PAIN - FUNCTIONAL ASSESSMENT: PAIN_FUNCTIONAL_ASSESSMENT: 0-10

## 2024-07-09 ASSESSMENT — PAIN SCALES - GENERAL: PAINLEVEL_OUTOF10: 5 - MODERATE PAIN

## 2024-07-09 NOTE — PROGRESS NOTES
Speech-Language Pathology    SLP Adult Outpatient Speech-Language Pathology Treatment     Patient Name: Kelli Ortega  MRN: [id number]  Today's Date: 7/9/2024     Time Calculation  Start Time: 1306  Stop Time: 1350  Time Calculation (min): 44 min      Current Problem:   1. Oropharyngeal dysphagia  Follow Up In Speech Therapy            SLP Assessment:  SLP TX Intervention Outcome: Making Progress Towards Goals  Prognosis: Good  Treatment Provided: Yes   Treatment Tolerance: Patient tolerated treatment well  Strengths: Cognition, Motivation       Plan:  SLP TX Plan: Continue Plan of Care  SLP Plan: Skilled SLP  SLP Frequency: 1x per week  Duration: 12 weeks  Discussed POC: Patient  Discussed Risks/Benefits: Yes  Patient/Caregiver Agreeable: Yes      Subjective   Current Problem:  Pt seen this date for speech language therapy targeting dysphagia management.    Most Recent Visit:  SLP Received On: 07/09/24    General Visit Information:   Patient Seen During This Visit: Yes  Arrival: Independent  Certification Period Start Date: 04/17/24  Certification Period End Date: 04/17/25  Number of Authorized Treatments : 20  Total Number of Visits : 4      Pain Assessment:   Pain Assessment: 0-10  0-10 (Numeric) Pain Score: 5 - Moderate pain  Pain Type: Chronic pain      Objective     Goals:  1. Pt will consume prescribed diet (current diet is regular solids and thin liquids) without overt s/sx aspiration >95% of the time.  2. Pt will complete oral/pharyngeal strengthening exercises as directed with 80% acc independently.  3. Pt will demonstrate understanding of all education related to dysphagia independently.    Therapeutic Swallow:  Pt reviewed diet and compensatory strategies to increase swallow safety and efficacy. Pt demonstrated and understanding of pharyngeal exercises targeting increasing timeliness of swallow onset and epiglottic inversion.  Pt was able to demonstrate all exercises (i.e. effortful swallow) to be  completed daily as a part of a home program, along with reporting difficulty with any recommended diet. Pt will brought trial of item (grapes) that has been increasing s/s of dysphagia. Pt consumed trial with no s/s of oropharyngeal difficulty.      Outpatient Education:  Adult Outpatient Education  Individual(s) Educated: Patient  Risk and Benefits Discussed with Patient/Caregiver/Other: yes  Patient/Caregiver Demonstrated Understanding: yes  Plan of Care Discussed and Agreed Upon: yes  Patient Response to Education: Patient/Caregiver Verbalized Understanding of Information      Consultations/Referrals/Coordination of Services: N/A

## 2024-07-09 NOTE — TELEPHONE ENCOUNTER
Reviewing  EMR  Phone message from 06/27/2024 pt called c/o outbreak- Florinda advised to take higher dose Acyclovir for 5 days.     Patient took most of the higher doses then was in the hospital took remaining when she got home.    Patient states vaginal rawness is better - but still has rash  Appointment scheduled

## 2024-07-10 ENCOUNTER — HOSPITAL ENCOUNTER (OUTPATIENT)
Dept: RADIOLOGY | Facility: HOSPITAL | Age: 59
Discharge: HOME | End: 2024-07-10
Payer: MEDICARE

## 2024-07-10 VITALS — HEIGHT: 63 IN | BODY MASS INDEX: 23.92 KG/M2 | WEIGHT: 135 LBS

## 2024-07-10 DIAGNOSIS — Z12.31 VISIT FOR SCREENING MAMMOGRAM: ICD-10-CM

## 2024-07-10 LAB
ATRIAL RATE: 91 BPM
P AXIS: 69 DEGREES
PR INTERVAL: 145 MS
Q ONSET: 253 MS
QRS COUNT: 15 BEATS
QRS DURATION: 78 MS
QT INTERVAL: 369 MS
QTC CALCULATION(BAZETT): 457 MS
QTC FREDERICIA: 425 MS
R AXIS: 28 DEGREES
T AXIS: 24 DEGREES
T OFFSET: 438 MS
VENTRICULAR RATE: 92 BPM

## 2024-07-10 PROCEDURE — 77067 SCR MAMMO BI INCL CAD: CPT

## 2024-07-10 PROCEDURE — 77063 BREAST TOMOSYNTHESIS BI: CPT | Performed by: RADIOLOGY

## 2024-07-10 PROCEDURE — 77067 SCR MAMMO BI INCL CAD: CPT | Performed by: RADIOLOGY

## 2024-07-11 ENCOUNTER — INFUSION (OUTPATIENT)
Dept: INFUSION THERAPY | Facility: HOSPITAL | Age: 59
End: 2024-07-11
Payer: MEDICARE

## 2024-07-11 VITALS
RESPIRATION RATE: 16 BRPM | OXYGEN SATURATION: 93 % | DIASTOLIC BLOOD PRESSURE: 64 MMHG | WEIGHT: 140.65 LBS | HEART RATE: 94 BPM | SYSTOLIC BLOOD PRESSURE: 104 MMHG | BODY MASS INDEX: 24.92 KG/M2 | TEMPERATURE: 97.2 F

## 2024-07-11 DIAGNOSIS — M81.0 OSTEOPOROSIS, UNSPECIFIED OSTEOPOROSIS TYPE, UNSPECIFIED PATHOLOGICAL FRACTURE PRESENCE: ICD-10-CM

## 2024-07-11 DIAGNOSIS — D80.3 SELECTIVE DEFICIENCY OF IGG (MULTI): ICD-10-CM

## 2024-07-11 PROCEDURE — 2500000001 HC RX 250 WO HCPCS SELF ADMINISTERED DRUGS (ALT 637 FOR MEDICARE OP): Performed by: FAMILY MEDICINE

## 2024-07-11 PROCEDURE — 96366 THER/PROPH/DIAG IV INF ADDON: CPT | Mod: INF

## 2024-07-11 PROCEDURE — 96365 THER/PROPH/DIAG IV INF INIT: CPT | Mod: INF

## 2024-07-11 RX ORDER — HEPARIN 100 UNIT/ML
500 SYRINGE INTRAVENOUS AS NEEDED
OUTPATIENT
Start: 2024-07-11

## 2024-07-11 RX ORDER — ZOLPIDEM TARTRATE 10 MG/1
TABLET ORAL NIGHTLY PRN
COMMUNITY

## 2024-07-11 RX ORDER — ACETAMINOPHEN 325 MG/1
650 TABLET ORAL ONCE
Status: COMPLETED | OUTPATIENT
Start: 2024-07-11 | End: 2024-07-11

## 2024-07-11 RX ORDER — SIMETHICONE 125 MG
TABLET,CHEWABLE ORAL
COMMUNITY

## 2024-07-11 RX ORDER — DIPHENHYDRAMINE HCL 25 MG
25 CAPSULE ORAL ONCE
Status: COMPLETED | OUTPATIENT
Start: 2024-07-11 | End: 2024-07-11

## 2024-07-11 RX ORDER — DIPHENHYDRAMINE HCL 25 MG
25 CAPSULE ORAL ONCE
OUTPATIENT
Start: 2024-08-08

## 2024-07-11 RX ORDER — ACETAMINOPHEN 325 MG/1
650 TABLET ORAL ONCE
OUTPATIENT
Start: 2024-08-08

## 2024-07-11 RX ORDER — MUPIROCIN 20 MG/G
OINTMENT TOPICAL 3 TIMES DAILY PRN
COMMUNITY

## 2024-07-11 ASSESSMENT — ENCOUNTER SYMPTOMS
OCCASIONAL FEELINGS OF UNSTEADINESS: 1
DEPRESSION: 1
LOSS OF SENSATION IN FEET: 1

## 2024-07-11 ASSESSMENT — PATIENT HEALTH QUESTIONNAIRE - PHQ9
SUM OF ALL RESPONSES TO PHQ9 QUESTIONS 1 AND 2: 2
2. FEELING DOWN, DEPRESSED OR HOPELESS: SEVERAL DAYS
1. LITTLE INTEREST OR PLEASURE IN DOING THINGS: SEVERAL DAYS
10. IF YOU CHECKED OFF ANY PROBLEMS, HOW DIFFICULT HAVE THESE PROBLEMS MADE IT FOR YOU TO DO YOUR WORK, TAKE CARE OF THINGS AT HOME, OR GET ALONG WITH OTHER PEOPLE: SOMEWHAT DIFFICULT

## 2024-07-11 ASSESSMENT — PAIN SCALES - GENERAL: PAINLEVEL: 8

## 2024-07-11 ASSESSMENT — COLUMBIA-SUICIDE SEVERITY RATING SCALE - C-SSRS
1. IN THE PAST MONTH, HAVE YOU WISHED YOU WERE DEAD OR WISHED YOU COULD GO TO SLEEP AND NOT WAKE UP?: NO
6. HAVE YOU EVER DONE ANYTHING, STARTED TO DO ANYTHING, OR PREPARED TO DO ANYTHING TO END YOUR LIFE?: NO
2. HAVE YOU ACTUALLY HAD ANY THOUGHTS OF KILLING YOURSELF?: NO

## 2024-07-12 ENCOUNTER — OFFICE VISIT (OUTPATIENT)
Dept: OBSTETRICS AND GYNECOLOGY | Facility: CLINIC | Age: 59
End: 2024-07-12
Payer: MEDICARE

## 2024-07-12 VITALS — DIASTOLIC BLOOD PRESSURE: 60 MMHG | SYSTOLIC BLOOD PRESSURE: 110 MMHG | BODY MASS INDEX: 24.8 KG/M2 | WEIGHT: 140 LBS

## 2024-07-12 DIAGNOSIS — N89.8 VAGINAL LESION: Primary | ICD-10-CM

## 2024-07-12 PROCEDURE — 3049F LDL-C 100-129 MG/DL: CPT | Performed by: NURSE PRACTITIONER

## 2024-07-12 PROCEDURE — 3044F HG A1C LEVEL LT 7.0%: CPT | Performed by: NURSE PRACTITIONER

## 2024-07-12 PROCEDURE — 3074F SYST BP LT 130 MM HG: CPT | Performed by: NURSE PRACTITIONER

## 2024-07-12 PROCEDURE — 99213 OFFICE O/P EST LOW 20 MIN: CPT | Performed by: NURSE PRACTITIONER

## 2024-07-12 PROCEDURE — 3078F DIAST BP <80 MM HG: CPT | Performed by: NURSE PRACTITIONER

## 2024-07-12 NOTE — PROGRESS NOTES
Subjective   Patient ID: Kelli Ortega is a 58 y.o. female who presents for Vaginal lesions (- patient declined a chaperone- /).    HPI  Has lesions in the vaginal area that started when she was on vacation after she had been sitting in the sand. Lesions healed. Then about one week ago, the lesions returned. 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   She had a positive herpes blood test; but reports no outbreak before.   C/o joint pain. No fevers or chills.     Review of Systems   Skin:  Positive for rash.   All other systems reviewed and are negative.      Objective   Physical Exam  Constitutional:       Appearance: Normal appearance.   Cardiovascular:      Pulses: Normal pulses.      Heart sounds: Normal heart sounds.   Pulmonary:      Effort: Pulmonary effort is normal.      Breath sounds: Normal breath sounds.   Genitourinary:     General: Normal vulva.      Vagina: Normal.      Comments: Hx of hysterectomy.   Neurological:      Mental Status: She is alert.   Psychiatric:         Mood and Affect: Mood normal.         Behavior: Behavior normal.         Assessment/Plan   Diagnoses and all orders for this visit:  Vaginal lesion  Here with c/o vaginal lesion. On exam, no lesions are present she will monitor for any return of symptoms. Will treat with temovate for vaginal irritation.   Follow-up with any worsening symptoms.        REDD Santos-CNP 07/12/24 3:52 PM

## 2024-07-15 ENCOUNTER — APPOINTMENT (OUTPATIENT)
Dept: PRIMARY CARE | Facility: CLINIC | Age: 59
End: 2024-07-15
Payer: MEDICARE

## 2024-07-15 VITALS
HEIGHT: 63 IN | HEART RATE: 108 BPM | WEIGHT: 137 LBS | SYSTOLIC BLOOD PRESSURE: 92 MMHG | OXYGEN SATURATION: 98 % | DIASTOLIC BLOOD PRESSURE: 62 MMHG | BODY MASS INDEX: 24.27 KG/M2

## 2024-07-15 DIAGNOSIS — Z87.19 HISTORY OF GI BLEED: Primary | ICD-10-CM

## 2024-07-15 PROCEDURE — 3074F SYST BP LT 130 MM HG: CPT

## 2024-07-15 PROCEDURE — 3044F HG A1C LEVEL LT 7.0%: CPT

## 2024-07-15 PROCEDURE — 3049F LDL-C 100-129 MG/DL: CPT

## 2024-07-15 PROCEDURE — 3078F DIAST BP <80 MM HG: CPT

## 2024-07-15 PROCEDURE — 99495 TRANSJ CARE MGMT MOD F2F 14D: CPT

## 2024-07-15 PROCEDURE — RXMED WILLOW AMBULATORY MEDICATION CHARGE

## 2024-07-15 ASSESSMENT — ENCOUNTER SYMPTOMS
HEMATOLOGIC/LYMPHATIC NEGATIVE: 1
EYES NEGATIVE: 1
DEPRESSION: 0
ALLERGIC/IMMUNOLOGIC NEGATIVE: 1
OCCASIONAL FEELINGS OF UNSTEADINESS: 0
PSYCHIATRIC NEGATIVE: 1
RESPIRATORY NEGATIVE: 1
CARDIOVASCULAR NEGATIVE: 1
LOSS OF SENSATION IN FEET: 0
GASTROINTESTINAL NEGATIVE: 1
CONSTITUTIONAL NEGATIVE: 1
BLOOD IN STOOL: 0
ABDOMINAL PAIN: 0
ENDOCRINE NEGATIVE: 1
NEUROLOGICAL NEGATIVE: 1
MUSCULOSKELETAL NEGATIVE: 1

## 2024-07-15 ASSESSMENT — PATIENT HEALTH QUESTIONNAIRE - PHQ9
SUM OF ALL RESPONSES TO PHQ9 QUESTIONS 1 AND 2: 0
2. FEELING DOWN, DEPRESSED OR HOPELESS: NOT AT ALL
1. LITTLE INTEREST OR PLEASURE IN DOING THINGS: NOT AT ALL

## 2024-07-15 NOTE — PROGRESS NOTES
"Patient: Kelli Ortega  : 1965  PCP: Mariaelena Dyer MD  MRN: [id number]  Program: [facility]  Status: Enrolled  Effective Dates: 7/3/2019 - present  Responsible Staff: No information to display  Social Determinants to be Addressed: No information to display    Transitional Care Management  Status: Enrolled  Effective Dates: 2024 - present  Responsible Staff: Hawa Lu LPN  Social Determinants to be Addressed: Physical Activity, Social Connections, Stress, Tobacco Use         Kelli Ortega is a 58 y.o. female presenting today for follow-up after being discharged from the hospital 11 days ago. The main problem requiring admission was GI bleed. The discharge summary and/or Transitional Care Management documentation was reviewed. Medication reconciliation was performed as indicated via the \"D[place] as Reviewed\" timestamp.     Kelli Ortega was contacted by Transitional Care Management services two days after her discharge. This encounter and supporting documentation was reviewed.    Patient in office follow hospital stay for GI bleed. Since been home   Denies BRB, dark tarry stools, or abdominal pain.  Had follow up with  and will follow up as needed. Had colonoscopy and EGD done in  and stated  does not feel needs another one at this time.       Review of Systems   Constitutional: Negative.    HENT: Negative.     Eyes: Negative.    Respiratory: Negative.     Cardiovascular: Negative.    Gastrointestinal: Negative.  Negative for abdominal pain and blood in stool.   Endocrine: Negative.    Genitourinary: Negative.    Musculoskeletal: Negative.    Skin: Negative.    Allergic/Immunologic: Negative.    Neurological: Negative.    Hematological: Negative.    Psychiatric/Behavioral: Negative.     All other systems reviewed and are negative.      BP 92/62   Pulse 108   Ht 1.6 m (5' 2.99\")   Wt 62.1 kg (137 lb)   SpO2 98%   BMI 24.27 kg/m²     Physical " Exam  Constitutional:       Appearance: Normal appearance.   HENT:      Head: Normocephalic and atraumatic.   Cardiovascular:      Rate and Rhythm: Normal rate and regular rhythm.      Pulses: Normal pulses.      Heart sounds: Normal heart sounds.   Pulmonary:      Effort: Pulmonary effort is normal.      Breath sounds: Normal breath sounds.   Abdominal:      General: Bowel sounds are normal.      Palpations: Abdomen is soft.   Musculoskeletal:         General: Normal range of motion.      Cervical back: Normal range of motion.   Skin:     General: Skin is warm and dry.   Neurological:      General: No focal deficit present.      Mental Status: She is alert and oriented to person, place, and time.   Psychiatric:         Mood and Affect: Mood normal.         Behavior: Behavior normal.         Thought Content: Thought content normal.         Judgment: Judgment normal.         The complexity of medical decision making for this patient's transitional care is moderate.    Assessment/Plan   Problem List Items Addressed This Visit    None  Visit Diagnoses         Codes    History of GI bleed    -  Primary Z87.19          Avoid NSAID's they will increase your risk of bleeding.    Continue to follow up with  as needed, If anymore bleeding occurs make Tom or our office aware.

## 2024-07-15 NOTE — PATIENT INSTRUCTIONS
Avoid NSAID's they will increase your risk of bleeding.    Continue to follow up with  as needed, If anymore bleeding occurs make Tom or our office aware.    Assessment/Plan   Problem List Items Addressed This Visit    None  Visit Diagnoses         Codes    History of GI bleed    -  Primary Z87.19

## 2024-07-16 ENCOUNTER — TELEPHONE (OUTPATIENT)
Dept: PRIMARY CARE | Facility: CLINIC | Age: 59
End: 2024-07-16
Payer: MEDICARE

## 2024-07-16 ENCOUNTER — PHARMACY VISIT (OUTPATIENT)
Dept: PHARMACY | Facility: CLINIC | Age: 59
End: 2024-07-16
Payer: MEDICARE

## 2024-07-16 ENCOUNTER — APPOINTMENT (OUTPATIENT)
Dept: SPEECH THERAPY | Facility: HOSPITAL | Age: 59
End: 2024-07-16
Payer: MEDICARE

## 2024-07-16 DIAGNOSIS — E78.2 MIXED HYPERLIPIDEMIA: ICD-10-CM

## 2024-07-16 RX ORDER — PRAVASTATIN SODIUM 20 MG/1
TABLET ORAL
Qty: 90 TABLET | Refills: 0 | Status: SHIPPED | OUTPATIENT
Start: 2024-07-16

## 2024-07-16 NOTE — TELEPHONE ENCOUNTER
Med Refill   pravastatin (Pravachol) 20 mg tablet [[id number]]     [facility] - [place], [place] - [address]  [address] [facility] [address]  Phone: [phone]  Fax: [phone]  JAMES #: --     LOV: [birth year]    NOV: 12/20/24

## 2024-07-17 ENCOUNTER — TELEPHONE (OUTPATIENT)
Dept: OBSTETRICS AND GYNECOLOGY | Facility: CLINIC | Age: 59
End: 2024-07-17
Payer: MEDICARE

## 2024-07-17 NOTE — TELEPHONE ENCOUNTER
PT called stating she thought a cream was going to be called in to [facility] for the rash and when she called they said nothing was called in.

## 2024-07-17 NOTE — TELEPHONE ENCOUNTER
Patient was seen in office 7/12 with Florinda for vaginal lesions. I do not see a prescription for a cream, please advise?

## 2024-07-18 ENCOUNTER — TELEPHONE (OUTPATIENT)
Dept: PRIMARY CARE | Facility: CLINIC | Age: 59
End: 2024-07-18
Payer: MEDICARE

## 2024-07-18 DIAGNOSIS — L73.9 FOLLICULITIS: Primary | ICD-10-CM

## 2024-07-18 DIAGNOSIS — R21 RASH, SKIN: Primary | ICD-10-CM

## 2024-07-18 RX ORDER — MUPIROCIN CALCIUM 20 MG/G
CREAM TOPICAL 3 TIMES DAILY
Qty: 30 G | Refills: 2 | Status: SHIPPED | OUTPATIENT
Start: 2024-07-18 | End: 2024-07-28

## 2024-07-18 RX ORDER — CLOBETASOL PROPIONATE 0.5 MG/G
OINTMENT TOPICAL 2 TIMES DAILY
Qty: 15 G | Refills: 0 | Status: SHIPPED | OUTPATIENT
Start: 2024-07-18

## 2024-07-18 NOTE — TELEPHONE ENCOUNTER
Patient states she has a rash on the back of her left leg, around to front and down her leg  Comes and goes for a few months  Bumpy, looks like white heads/ pimple , once the hair comes through the skin it goes away, not itchy or painful , irritating just because you can see it   Asking for a cream to help with it   [facility]

## 2024-07-19 ENCOUNTER — PATIENT OUTREACH (OUTPATIENT)
Dept: CARE COORDINATION | Facility: CLINIC | Age: 59
End: 2024-07-19
Payer: MEDICARE

## 2024-07-20 ENCOUNTER — HOSPITAL ENCOUNTER (OUTPATIENT)
Dept: RADIOLOGY | Facility: HOSPITAL | Age: 59
Discharge: HOME | End: 2024-07-20
Payer: MEDICARE

## 2024-07-20 DIAGNOSIS — F17.210 CIGARETTE SMOKER: ICD-10-CM

## 2024-07-20 PROCEDURE — 71250 CT THORAX DX C-: CPT | Performed by: RADIOLOGY

## 2024-07-20 PROCEDURE — 71250 CT THORAX DX C-: CPT

## 2024-07-22 ENCOUNTER — TELEPHONE (OUTPATIENT)
Dept: PULMONOLOGY | Facility: HOSPITAL | Age: 59
End: 2024-07-22
Payer: MEDICARE

## 2024-07-22 NOTE — TELEPHONE ENCOUNTER
Patient acknowledged understanding. All questions answered at this time.     ----- Message from Jennifer Lu sent at 7/22/2024 10:15 AM EDT -----  Please call the patient and let her know that her CT chest is stable and we will continue with yearly screening again for her. I will order it at her follow up.

## 2024-07-23 ENCOUNTER — APPOINTMENT (OUTPATIENT)
Dept: SPEECH THERAPY | Facility: HOSPITAL | Age: 59
End: 2024-07-23
Payer: MEDICARE

## 2024-07-24 ENCOUNTER — OFFICE VISIT (OUTPATIENT)
Dept: NEUROLOGY | Facility: HOSPITAL | Age: 59
End: 2024-07-24
Payer: MEDICARE

## 2024-07-24 VITALS
HEART RATE: 116 BPM | SYSTOLIC BLOOD PRESSURE: 104 MMHG | BODY MASS INDEX: 24.24 KG/M2 | DIASTOLIC BLOOD PRESSURE: 72 MMHG | WEIGHT: 136.8 LBS

## 2024-07-24 DIAGNOSIS — G43.E09 CHRONIC MIGRAINE WITH AURA WITHOUT STATUS MIGRAINOSUS, NOT INTRACTABLE: Primary | ICD-10-CM

## 2024-07-24 DIAGNOSIS — R41.3 TRANSIENT AMNESIA: ICD-10-CM

## 2024-07-24 PROBLEM — Z87.898 HISTORY OF HALLUCINATIONS: Status: RESOLVED | Noted: 2024-01-24 | Resolved: 2024-07-24

## 2024-07-24 PROCEDURE — 3049F LDL-C 100-129 MG/DL: CPT | Performed by: NURSE PRACTITIONER

## 2024-07-24 PROCEDURE — 3074F SYST BP LT 130 MM HG: CPT | Performed by: NURSE PRACTITIONER

## 2024-07-24 PROCEDURE — 99214 OFFICE O/P EST MOD 30 MIN: CPT | Performed by: NURSE PRACTITIONER

## 2024-07-24 PROCEDURE — 3044F HG A1C LEVEL LT 7.0%: CPT | Performed by: NURSE PRACTITIONER

## 2024-07-24 PROCEDURE — 3078F DIAST BP <80 MM HG: CPT | Performed by: NURSE PRACTITIONER

## 2024-07-24 ASSESSMENT — PAIN SCALES - GENERAL: PAINLEVEL: 7

## 2024-07-24 ASSESSMENT — ENCOUNTER SYMPTOMS
DEPRESSION: 1
OCCASIONAL FEELINGS OF UNSTEADINESS: 1
LOSS OF SENSATION IN FEET: 1

## 2024-07-24 ASSESSMENT — PATIENT HEALTH QUESTIONNAIRE - PHQ9
SUM OF ALL RESPONSES TO PHQ9 QUESTIONS 1 AND 2: 1
10. IF YOU CHECKED OFF ANY PROBLEMS, HOW DIFFICULT HAVE THESE PROBLEMS MADE IT FOR YOU TO DO YOUR WORK, TAKE CARE OF THINGS AT HOME, OR GET ALONG WITH OTHER PEOPLE: VERY DIFFICULT
1. LITTLE INTEREST OR PLEASURE IN DOING THINGS: NOT AT ALL
2. FEELING DOWN, DEPRESSED OR HOPELESS: SEVERAL DAYS
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
## 2024-07-24 NOTE — PATIENT INSTRUCTIONS
Try to pre-medicate with your rizatriptan prior to your infusions to help prevent the migraine, continue for the next couple of days.   Continue Emgality monthly    Fu in 6 months.     Migraines:  Suggestions for preventing or controlling migraines:  - Riboflavin (vitamin B2) 200 mg twice a day may be helpful. Side effects can include diarrhea, increased urination and bright yellow urine. This should not be taken by kids.   - CoQ10 100 mg daily up to three times per day may also be helpful. Side effects can include nausea, diarrhea, and dyspepsia.   - Magnesium (oxelate) 400- 600 mg daily for prevention. Magnesium can also help with menstrual migraines. Side effects can include diarrhea and flushing.   - According to the American Academy of Neurology, there is not sufficient evidence that melatonin helps prevent or treat migraines, so it is not currently recommended for this use. Butterbur is also not currently recommended for migraine prevention as it can cause liver toxicity.   - Avoid triggers that can cause or worsen migraines (food, lack of sleep, stress, etc.)  - Headache frequency is noted to be increased in those with low physical activity, poor sleep, high BMI, smoking, and caffeine overuse. Managing stress with relaxation techniques and getting 20-30 minutes of aerobic exercise at least 4 times per week can help decrease headache frequency and intensity.   - Keep a diary of your headaches to note triggers, how often you get them, how long they last, associated symptoms, what medicines you took and if they helped, and any other helpful information   - Avoid taking rescue medication (NOT preventative medication) more than 3 days a week (includes both prescription and over the counter meds)  - Take your preventative medication as directed. Let me know if you have side effects or problems with the medication. Do not suddenly stop the medication.

## 2024-07-30 ENCOUNTER — TREATMENT (OUTPATIENT)
Dept: SPEECH THERAPY | Facility: HOSPITAL | Age: 59
End: 2024-07-30
Payer: MEDICARE

## 2024-07-30 DIAGNOSIS — R13.12 OROPHARYNGEAL DYSPHAGIA: ICD-10-CM

## 2024-07-30 PROCEDURE — 92526 ORAL FUNCTION THERAPY: CPT | Mod: GN | Performed by: SPEECH-LANGUAGE PATHOLOGIST

## 2024-07-30 ASSESSMENT — PAIN - FUNCTIONAL ASSESSMENT: PAIN_FUNCTIONAL_ASSESSMENT: 0-10

## 2024-07-30 ASSESSMENT — PAIN SCALES - GENERAL: PAINLEVEL_OUTOF10: 5 - MODERATE PAIN

## 2024-07-30 NOTE — PROGRESS NOTES
Speech-Language Pathology    SLP Adult Outpatient Speech-Language Pathology Treatment     Patient Name: Kelli Ortega  MRN: [id number]  Today's Date: 7/30/2024     Time Calculation  Start Time: 1305  Stop Time: 1350  Time Calculation (min): 45 min      Current Problem:   1. Oropharyngeal dysphagia  Follow Up In Speech Therapy            SLP Assessment:  SLP TX Intervention Outcome: Making Progress Towards Goals  Prognosis: Good  Treatment Provided: Yes   Treatment Tolerance: Patient tolerated treatment well  Strengths: Cognition, Motivation       Plan:  SLP TX Plan: Continue Plan of Care  SLP Plan: Skilled SLP  SLP Frequency: 1x per week  Duration: 12 weeks  Discussed POC: Patient  Discussed Risks/Benefits: Yes  Patient/Caregiver Agreeable: Yes      Subjective   Current Problem:  Pt seen this date for speech language therapy targeting dysphagia management.    Most Recent Visit:  SLP Received On: 07/30/24    General Visit Information:   Patient Seen During This Visit: Yes  Arrival: Independent  Certification Period Start Date: 04/17/24  Certification Period End Date: 04/17/25  Number of Authorized Treatments : 20  Total Number of Visits : 5      Pain Assessment:   Pain Assessment: 0-10  0-10 (Numeric) Pain Score: 5 - Moderate pain  Pain Type: Chronic pain      Objective     Goals:  1. Pt will consume prescribed diet (current diet is regular solids and thin liquids) without overt s/sx aspiration >95% of the time. -ONGOING  2. Pt will complete oral/pharyngeal strengthening exercises as directed with 80% acc independently. -ONGOING  3. Pt will demonstrate understanding of all education related to dysphagia independently. -ONGOING    Therapeutic Swallow:  Pt reviewed diet and compensatory strategies to increase swallow safety and efficacy. Pt demonstrated and understanding of pharyngeal exercises targeting increasing timeliness of swallow onset and epiglottic inversion.  Pt was able to demonstrate all exercises (i.e.  effortful swallow) to be completed daily as a part of a home program, along with reporting difficulty with any recommended diet. Pt trialed regular item (cosme crackers) that has been increasing s/s of dysphagia. Pt consumed trial with no s/s of oropharyngeal difficulty.      Outpatient Education:  Adult Outpatient Education  Individual(s) Educated: Patient  Risk and Benefits Discussed with Patient/Caregiver/Other: yes  Patient/Caregiver Demonstrated Understanding: yes  Plan of Care Discussed and Agreed Upon: yes  Patient Response to Education: Patient/Caregiver Verbalized Understanding of Information      Consultations/Referrals/Coordination of Services: N/A

## 2024-08-01 ENCOUNTER — APPOINTMENT (OUTPATIENT)
Dept: OBSTETRICS AND GYNECOLOGY | Facility: CLINIC | Age: 59
End: 2024-08-01
Payer: MEDICARE

## 2024-08-01 VITALS — DIASTOLIC BLOOD PRESSURE: 60 MMHG | WEIGHT: 139 LBS | SYSTOLIC BLOOD PRESSURE: 100 MMHG | BODY MASS INDEX: 24.63 KG/M2

## 2024-08-01 DIAGNOSIS — N89.8 VAGINAL LESION: Primary | ICD-10-CM

## 2024-08-01 PROCEDURE — 3074F SYST BP LT 130 MM HG: CPT | Performed by: NURSE PRACTITIONER

## 2024-08-01 PROCEDURE — 3049F LDL-C 100-129 MG/DL: CPT | Performed by: NURSE PRACTITIONER

## 2024-08-01 PROCEDURE — 3078F DIAST BP <80 MM HG: CPT | Performed by: NURSE PRACTITIONER

## 2024-08-01 PROCEDURE — 99212 OFFICE O/P EST SF 10 MIN: CPT | Performed by: NURSE PRACTITIONER

## 2024-08-01 PROCEDURE — 3044F HG A1C LEVEL LT 7.0%: CPT | Performed by: NURSE PRACTITIONER

## 2024-08-01 NOTE — PROGRESS NOTES
"Subjective   Patient ID: Kelli Ortega is a 58 y.o. female who presents for Follow-up (Vaginal irritation /- patient declined a chaperone-/).  HPI  Here for follow-up for vaginal lesion/ vaginal irritation.   States she has been using the sterid cream at home and her symptoms are much improved. She is no longer having itching or urning. Her skin \"looks normal again\". She is very happy with her results. She would like to know what she needs to do for maintenance.     Review of Systems   All other systems reviewed and are negative.      Objective   Physical Exam  Constitutional:       Appearance: Normal appearance.   Pulmonary:      Effort: Pulmonary effort is normal.   Genitourinary:     General: Normal vulva.      Vagina: Normal.      Uterus: Absent.    Neurological:      Mental Status: She is alert.   Psychiatric:         Mood and Affect: Mood normal.         Behavior: Behavior normal.         Assessment/Plan   Diagnoses and all orders for this visit:  Vaginal lesion  On follow-up exam, her symptoms are completely resolved. She no longer has a lesion. Her mucosa is normal, no signs of irritation. She was encouraged to use Clobetasol daily as needed. If she has no symptoms she can discontinue the cream.   Follow-up annually; sooner if needed.        AYAZ Santos 08/01/24 5:01 PM   "

## 2024-08-02 ENCOUNTER — TELEPHONE (OUTPATIENT)
Dept: OBSTETRICS AND GYNECOLOGY | Facility: CLINIC | Age: 59
End: 2024-08-02

## 2024-08-02 ENCOUNTER — TELEPHONE (OUTPATIENT)
Dept: PRIMARY CARE | Facility: CLINIC | Age: 59
End: 2024-08-02
Payer: MEDICARE

## 2024-08-02 ENCOUNTER — TREATMENT (OUTPATIENT)
Dept: SPEECH THERAPY | Facility: HOSPITAL | Age: 59
End: 2024-08-02
Payer: MEDICARE

## 2024-08-02 DIAGNOSIS — R13.12 OROPHARYNGEAL DYSPHAGIA: ICD-10-CM

## 2024-08-02 DIAGNOSIS — J06.9 UPPER RESPIRATORY TRACT INFECTION, UNSPECIFIED TYPE: Primary | ICD-10-CM

## 2024-08-02 DIAGNOSIS — R21 RASH, SKIN: ICD-10-CM

## 2024-08-02 PROCEDURE — 92526 ORAL FUNCTION THERAPY: CPT | Mod: GN | Performed by: SPEECH-LANGUAGE PATHOLOGIST

## 2024-08-02 RX ORDER — METHYLPREDNISOLONE 4 MG/1
TABLET ORAL
Qty: 21 TABLET | Refills: 0 | Status: SHIPPED | OUTPATIENT
Start: 2024-08-02 | End: 2024-08-09

## 2024-08-02 ASSESSMENT — PAIN SCALES - GENERAL: PAINLEVEL_OUTOF10: 5 - MODERATE PAIN

## 2024-08-02 ASSESSMENT — PAIN - FUNCTIONAL ASSESSMENT: PAIN_FUNCTIONAL_ASSESSMENT: 0-10

## 2024-08-02 NOTE — TELEPHONE ENCOUNTER
Patient asking for something for her cough started Monday   Coughing up a white mucus, lungs hurts when she coughs in breathes and just in general, wore out, threw up a couple of times, forehead feels hot, body is cold , no fever   OTC ? Robitussin DM, feels like it does not work. Willing to try something else     Had a lung CT scan recently , wondering if anything can be seen from that ?

## 2024-08-02 NOTE — PROGRESS NOTES
Speech-Language Pathology    SLP Adult Outpatient Speech-Language Pathology Treatment     Patient Name: Kelli Ortega  MRN: [id number]  Today's Date: 8/2/2024     Time Calculation  Start Time: 1300  Stop Time: 1345  Time Calculation (min): 45 min      Current Problem:   1. Oropharyngeal dysphagia  Follow Up In Speech Therapy            SLP Assessment:  SLP TX Intervention Outcome: Making Progress Towards Goals  Prognosis: Good  Treatment Provided: Yes   Treatment Tolerance: Patient tolerated treatment well  Strengths: Cognition, Motivation       Plan:  SLP TX Plan: Continue Plan of Care  SLP Plan: Skilled SLP  SLP Frequency: 1x per week  Duration: 12 weeks  Discussed POC: Patient  Discussed Risks/Benefits: Yes  Patient/Caregiver Agreeable: Yes      Subjective   Current Problem:  Pt seen this date for speech language therapy targeting dysphagia management.    Most Recent Visit:  SLP Received On: 08/02/24    General Visit Information:   Patient Seen During This Visit: Yes  Arrival: Independent  Certification Period Start Date: 04/17/24  Certification Period End Date: 04/17/25  Number of Authorized Treatments : 20  Total Number of Visits : 6      Pain Assessment:   Pain Assessment: 0-10  0-10 (Numeric) Pain Score: 5 - Moderate pain  Pain Type: Chronic pain      Objective     Goals:  1. Pt will consume prescribed diet (current diet is regular solids and thin liquids) without overt s/sx aspiration >95% of the time. -ONGOING  2. Pt will complete oral/pharyngeal strengthening exercises as directed with 80% acc independently. -ONGOING  3. Pt will demonstrate understanding of all education related to dysphagia independently. -ONGOING    Therapeutic Swallow:  Pt reviewed diet and compensatory strategies to increase swallow safety and efficacy. Pt demonstrated and understanding of pharyngeal exercises targeting increasing timeliness of swallow onset and epiglottic inversion.  Pt was able to demonstrate all exercises (i.e.  effortful swallow) to be completed daily as a part of a home program, along with reporting difficulty with any recommended diet. Pt trialed regular item (cosme crackers) that has been increasing s/s of dysphagia. Pt consumed trial with no s/s of oropharyngeal difficulty.      Outpatient Education:  Adult Outpatient Education  Individual(s) Educated: Patient  Risk and Benefits Discussed with Patient/Caregiver/Other: yes  Patient/Caregiver Demonstrated Understanding: yes  Plan of Care Discussed and Agreed Upon: yes  Patient Response to Education: Patient/Caregiver Verbalized Understanding of Information      Consultations/Referrals/Coordination of Services: N/A

## 2024-08-02 NOTE — TELEPHONE ENCOUNTER
"PT REQUESTING REFILLS ON CLOBETASOL (SHE DIDN'T KNOW THE NAME EXCEPT SHE SAID IT STARTS WITH A \"C\" AND SHE IS CALLING IT A CREAM) SHE DOES NOT WANT TO GET THE BAD RASH AGAIN.  "

## 2024-08-05 ENCOUNTER — PATIENT OUTREACH (OUTPATIENT)
Dept: CARE COORDINATION | Facility: CLINIC | Age: 59
End: 2024-08-05
Payer: MEDICARE

## 2024-08-05 RX ORDER — CLOBETASOL PROPIONATE 0.5 MG/G
OINTMENT TOPICAL 2 TIMES DAILY
Qty: 15 G | Refills: 0 | Status: SHIPPED | OUTPATIENT
Start: 2024-08-05

## 2024-08-06 ENCOUNTER — TREATMENT (OUTPATIENT)
Dept: SPEECH THERAPY | Facility: HOSPITAL | Age: 59
End: 2024-08-06
Payer: MEDICARE

## 2024-08-06 DIAGNOSIS — R13.12 OROPHARYNGEAL DYSPHAGIA: ICD-10-CM

## 2024-08-06 PROCEDURE — 92526 ORAL FUNCTION THERAPY: CPT | Mod: GN | Performed by: SPEECH-LANGUAGE PATHOLOGIST

## 2024-08-06 ASSESSMENT — PAIN - FUNCTIONAL ASSESSMENT: PAIN_FUNCTIONAL_ASSESSMENT: 0-10

## 2024-08-06 ASSESSMENT — PAIN SCALES - GENERAL: PAINLEVEL_OUTOF10: 5 - MODERATE PAIN

## 2024-08-06 NOTE — PROGRESS NOTES
Speech-Language Pathology    SLP Adult Outpatient Speech-Language Pathology Treatment     Patient Name: Kelli Ortega  MRN: [id number]  Today's Date: 8/6/2024     Time Calculation  Start Time: 0945  Stop Time: 1030  Time Calculation (min): 45 min      Current Problem:   1. Oropharyngeal dysphagia  Follow Up In Speech Therapy            SLP Assessment:  SLP TX Intervention Outcome: Making Progress Towards Goals  Prognosis: Good  Treatment Provided: Yes   Treatment Tolerance: Patient tolerated treatment well  Strengths: Cognition, Motivation       Plan:  SLP TX Plan: Continue Plan of Care  SLP Plan: Skilled SLP  SLP Frequency: 1x per week  Duration: 12 weeks  Discussed POC: Patient  Discussed Risks/Benefits: Yes  Patient/Caregiver Agreeable: Yes      Subjective   Current Problem:  Pt seen this date for speech language therapy targeting dysphagia management.    Most Recent Visit:  SLP Received On: 08/06/24    General Visit Information:   Patient Seen During This Visit: Yes  Arrival: Independent  Certification Period Start Date: 04/17/24  Certification Period End Date: 04/17/25  Number of Authorized Treatments : 20  Total Number of Visits : 7      Pain Assessment:   Pain Assessment: 0-10  0-10 (Numeric) Pain Score: 5 - Moderate pain  Pain Type: Chronic pain      Objective     Goals:  1. Pt will consume prescribed diet (current diet is regular solids and thin liquids) without overt s/sx aspiration >95% of the time. -ONGOING  2. Pt will complete oral/pharyngeal strengthening exercises as directed with 80% acc independently. -ONGOING  3. Pt will demonstrate understanding of all education related to dysphagia independently. -ONGOING    Therapeutic Swallow:  Pt reviewed diet and compensatory strategies to increase swallow safety and efficacy. Pt demonstrated and understanding of pharyngeal exercises targeting increasing timeliness of swallow onset and epiglottic inversion.  Pt was able to demonstrate all exercises (i.e.  effortful swallow) to be completed daily as a part of a home program, along with reporting difficulty with any recommended diet. Pt trialed regular item (cosme crackers) that has been increasing s/s of dysphagia. Pt consumed trial with no s/s of oropharyngeal difficulty.      Outpatient Education:  Adult Outpatient Education  Individual(s) Educated: Patient  Risk and Benefits Discussed with Patient/Caregiver/Other: yes  Patient/Caregiver Demonstrated Understanding: yes  Plan of Care Discussed and Agreed Upon: yes  Patient Response to Education: Patient/Caregiver Verbalized Understanding of Information      Consultations/Referrals/Coordination of Services: N/A

## 2024-08-08 ENCOUNTER — INFUSION (OUTPATIENT)
Dept: INFUSION THERAPY | Facility: HOSPITAL | Age: 59
End: 2024-08-08
Payer: MEDICARE

## 2024-08-08 VITALS
TEMPERATURE: 97.3 F | DIASTOLIC BLOOD PRESSURE: 85 MMHG | SYSTOLIC BLOOD PRESSURE: 104 MMHG | WEIGHT: 138.67 LBS | HEART RATE: 92 BPM | OXYGEN SATURATION: 98 % | BODY MASS INDEX: 24.57 KG/M2 | RESPIRATION RATE: 16 BRPM

## 2024-08-08 DIAGNOSIS — D80.3 SELECTIVE DEFICIENCY OF IGG (MULTI): ICD-10-CM

## 2024-08-08 DIAGNOSIS — M81.0 OSTEOPOROSIS, UNSPECIFIED OSTEOPOROSIS TYPE, UNSPECIFIED PATHOLOGICAL FRACTURE PRESENCE: ICD-10-CM

## 2024-08-08 PROCEDURE — 96365 THER/PROPH/DIAG IV INF INIT: CPT | Mod: INF

## 2024-08-08 PROCEDURE — 96366 THER/PROPH/DIAG IV INF ADDON: CPT | Mod: INF

## 2024-08-08 PROCEDURE — 2500000001 HC RX 250 WO HCPCS SELF ADMINISTERED DRUGS (ALT 637 FOR MEDICARE OP): Performed by: FAMILY MEDICINE

## 2024-08-08 PROCEDURE — 2500000004 HC RX 250 GENERAL PHARMACY W/ HCPCS (ALT 636 FOR OP/ED): Mod: JZ

## 2024-08-08 RX ORDER — DIPHENHYDRAMINE HCL 25 MG
25 CAPSULE ORAL ONCE
OUTPATIENT
Start: 2024-09-05

## 2024-08-08 RX ORDER — HEPARIN 100 UNIT/ML
500 SYRINGE INTRAVENOUS AS NEEDED
OUTPATIENT
Start: 2024-08-08

## 2024-08-08 RX ORDER — ACETAMINOPHEN 325 MG/1
650 TABLET ORAL ONCE
Status: COMPLETED | OUTPATIENT
Start: 2024-08-08 | End: 2024-08-08

## 2024-08-08 RX ORDER — DIPHENHYDRAMINE HCL 25 MG
25 CAPSULE ORAL ONCE
Status: COMPLETED | OUTPATIENT
Start: 2024-08-08 | End: 2024-08-08

## 2024-08-08 RX ORDER — ACETAMINOPHEN 325 MG/1
650 TABLET ORAL ONCE
OUTPATIENT
Start: 2024-09-05

## 2024-08-08 ASSESSMENT — PATIENT HEALTH QUESTIONNAIRE - PHQ9
1. LITTLE INTEREST OR PLEASURE IN DOING THINGS: NOT AT ALL
SUM OF ALL RESPONSES TO PHQ9 QUESTIONS 1 AND 2: 0
2. FEELING DOWN, DEPRESSED OR HOPELESS: NOT AT ALL
2. FEELING DOWN, DEPRESSED OR HOPELESS: SEVERAL DAYS
10. IF YOU CHECKED OFF ANY PROBLEMS, HOW DIFFICULT HAVE THESE PROBLEMS MADE IT FOR YOU TO DO YOUR WORK, TAKE CARE OF THINGS AT HOME, OR GET ALONG WITH OTHER PEOPLE: NOT DIFFICULT AT ALL
1. LITTLE INTEREST OR PLEASURE IN DOING THINGS: SEVERAL DAYS
10. IF YOU CHECKED OFF ANY PROBLEMS, HOW DIFFICULT HAVE THESE PROBLEMS MADE IT FOR YOU TO DO YOUR WORK, TAKE CARE OF THINGS AT HOME, OR GET ALONG WITH OTHER PEOPLE: NOT DIFFICULT AT ALL
SUM OF ALL RESPONSES TO PHQ9 QUESTIONS 1 AND 2: 2

## 2024-08-08 ASSESSMENT — PAIN SCALES - GENERAL: PAINLEVEL: 7

## 2024-08-08 ASSESSMENT — ENCOUNTER SYMPTOMS
OCCASIONAL FEELINGS OF UNSTEADINESS: 0
LOSS OF SENSATION IN FEET: 0
DEPRESSION: 0

## 2024-08-12 DIAGNOSIS — N89.8 VAGINAL LESION: Primary | ICD-10-CM

## 2024-08-12 PROCEDURE — RXMED WILLOW AMBULATORY MEDICATION CHARGE

## 2024-08-13 ENCOUNTER — DOCUMENTATION (OUTPATIENT)
Dept: SPEECH THERAPY | Facility: HOSPITAL | Age: 59
End: 2024-08-13
Payer: MEDICARE

## 2024-08-13 ENCOUNTER — APPOINTMENT (OUTPATIENT)
Dept: SPEECH THERAPY | Facility: HOSPITAL | Age: 59
End: 2024-08-13
Payer: MEDICARE

## 2024-08-13 ENCOUNTER — PHARMACY VISIT (OUTPATIENT)
Dept: PHARMACY | Facility: CLINIC | Age: 59
End: 2024-08-13
Payer: MEDICARE

## 2024-08-13 RX ORDER — ACYCLOVIR 200 MG/1
200 CAPSULE ORAL DAILY
Qty: 30 CAPSULE | Refills: 10 | Status: SHIPPED | OUTPATIENT
Start: 2024-08-13

## 2024-08-13 NOTE — PROGRESS NOTES
Speech-Language Pathology                 Therapy Communication Note    Patient Name: Kelli Ortega  MRN: [id number]  Today's Date: 8/13/2024     Discipline: Speech Language Pathology    Missed Visit Reason:  Transportation    Missed Time: Cancel    Comment:

## 2024-08-14 ENCOUNTER — TELEPHONE (OUTPATIENT)
Dept: PRIMARY CARE | Facility: CLINIC | Age: 59
End: 2024-08-14
Payer: MEDICARE

## 2024-08-14 DIAGNOSIS — J40 BRONCHITIS: Primary | ICD-10-CM

## 2024-08-14 RX ORDER — AZITHROMYCIN 250 MG/1
TABLET, FILM COATED ORAL
Qty: 6 TABLET | Refills: 0 | Status: SHIPPED | OUTPATIENT
Start: 2024-08-14 | End: 2024-08-19

## 2024-08-14 NOTE — TELEPHONE ENCOUNTER
Chief Complaint: Cough     Symptoms: Persistent Cough    Duration:  since 8/3    Treatments: TUssin    Patient Requesting:  Recommendations - patient asking for Something for her cough    Pharmacy:  [facility]    Please advise.

## 2024-08-20 ENCOUNTER — APPOINTMENT (OUTPATIENT)
Dept: SPEECH THERAPY | Facility: HOSPITAL | Age: 59
End: 2024-08-20
Payer: MEDICARE

## 2024-08-27 ENCOUNTER — TELEPHONE (OUTPATIENT)
Dept: PRIMARY CARE | Facility: CLINIC | Age: 59
End: 2024-08-27
Payer: MEDICARE

## 2024-08-27 ENCOUNTER — APPOINTMENT (OUTPATIENT)
Dept: SPEECH THERAPY | Facility: HOSPITAL | Age: 59
End: 2024-08-27
Payer: MEDICARE

## 2024-08-29 ENCOUNTER — TELEPHONE (OUTPATIENT)
Dept: PRIMARY CARE | Facility: CLINIC | Age: 59
End: 2024-08-29
Payer: MEDICARE

## 2024-08-29 DIAGNOSIS — E53.8 VITAMIN B12 DEFICIENCY: Primary | ICD-10-CM

## 2024-08-29 RX ORDER — PNV NO.95/FERROUS FUM/FOLIC AC 28MG-0.8MG
100 TABLET ORAL DAILY
Qty: 30 TABLET | Refills: 11 | Status: SHIPPED | OUTPATIENT
Start: 2024-08-29 | End: 2025-08-29

## 2024-08-29 NOTE — TELEPHONE ENCOUNTER
She said she has called in for refill on Vitamin B-12 100 mcg tablet & questioning why it hasn't been sent in & if she is still to take this ? She said she last filled it in July 2024.    (Medication shows that it was discontinued 11-7-2023)

## 2024-09-03 ENCOUNTER — TREATMENT (OUTPATIENT)
Dept: SPEECH THERAPY | Facility: HOSPITAL | Age: 59
End: 2024-09-03
Payer: MEDICARE

## 2024-09-03 DIAGNOSIS — R13.12 OROPHARYNGEAL DYSPHAGIA: ICD-10-CM

## 2024-09-03 PROCEDURE — 92526 ORAL FUNCTION THERAPY: CPT | Mod: GN | Performed by: SPEECH-LANGUAGE PATHOLOGIST

## 2024-09-03 ASSESSMENT — PAIN - FUNCTIONAL ASSESSMENT: PAIN_FUNCTIONAL_ASSESSMENT: 0-10

## 2024-09-03 ASSESSMENT — PAIN SCALES - GENERAL: PAINLEVEL_OUTOF10: 5 - MODERATE PAIN

## 2024-09-03 NOTE — PROGRESS NOTES
Speech-Language Pathology    SLP Adult Outpatient Speech-Language Pathology Treatment     Patient Name: Kelli Ortega  MRN: [id number]  Today's Date: 9/3/2024     Time Calculation  Start Time: 1300  Stop Time: 1345  Time Calculation (min): 45 min      Current Problem:   1. Oropharyngeal dysphagia  Follow Up In Speech Therapy            SLP Assessment:  SLP TX Intervention Outcome: Making Progress Towards Goals  Prognosis: Good  Treatment Provided: Yes   Treatment Tolerance: Patient tolerated treatment well  Strengths: Cognition, Motivation       Plan:  SLP TX Plan: Continue Plan of Care  SLP Plan: Skilled SLP  SLP Frequency: 1x per week  Duration: 12 weeks  Discussed POC: Patient  Discussed Risks/Benefits: Yes  Patient/Caregiver Agreeable: Yes      Subjective   Current Problem:  Pt seen this date for speech language therapy targeting dysphagia management.    Most Recent Visit:  SLP Received On: 09/03/24    General Visit Information:   Patient Seen During This Visit: Yes  Arrival: Independent  Certification Period Start Date: 04/17/24  Certification Period End Date: 04/17/25  Number of Authorized Treatments : 20  Total Number of Visits : 8      Pain Assessment:   Pain Assessment: 0-10  0-10 (Numeric) Pain Score: 5 - Moderate pain  Pain Type: Chronic pain      Objective     Goals:  1. Pt will consume prescribed diet (current diet is regular solids and thin liquids) without overt s/sx aspiration >95% of the time. -ONGOING  2. Pt will complete oral/pharyngeal strengthening exercises as directed with 80% acc independently. -ONGOING  3. Pt will demonstrate understanding of all education related to dysphagia independently. -ONGOING    Therapeutic Swallow:  Pt reviewed diet and compensatory strategies to increase swallow safety and efficacy. Pt demonstrated and understanding of pharyngeal exercises targeting increasing timeliness of swallow onset and epiglottic inversion.  Pt was able to demonstrate all exercises (i.e.  effortful swallow) to be completed daily as a part of a home program, along with reporting difficulty with any recommended diet. Pt trialed regular item (cosme crackers) that has been increasing s/s of dysphagia. Pt consumed trial with no s/s of oropharyngeal difficulty.      Outpatient Education:  Adult Outpatient Education  Individual(s) Educated: Patient  Risk and Benefits Discussed with Patient/Caregiver/Other: yes  Patient/Caregiver Demonstrated Understanding: yes  Plan of Care Discussed and Agreed Upon: yes  Patient Response to Education: Patient/Caregiver Verbalized Understanding of Information      Consultations/Referrals/Coordination of Services: N/A

## 2024-09-05 ENCOUNTER — INFUSION (OUTPATIENT)
Dept: INFUSION THERAPY | Facility: HOSPITAL | Age: 59
End: 2024-09-05
Payer: MEDICARE

## 2024-09-05 ENCOUNTER — TELEPHONE (OUTPATIENT)
Dept: PRIMARY CARE | Facility: CLINIC | Age: 59
End: 2024-09-05

## 2024-09-05 VITALS
RESPIRATION RATE: 20 BRPM | SYSTOLIC BLOOD PRESSURE: 113 MMHG | WEIGHT: 134.48 LBS | OXYGEN SATURATION: 96 % | DIASTOLIC BLOOD PRESSURE: 78 MMHG | HEART RATE: 88 BPM | BODY MASS INDEX: 23.83 KG/M2 | TEMPERATURE: 97.4 F

## 2024-09-05 DIAGNOSIS — D80.3 SELECTIVE DEFICIENCY OF IGG (MULTI): ICD-10-CM

## 2024-09-05 DIAGNOSIS — M81.0 OSTEOPOROSIS, UNSPECIFIED OSTEOPOROSIS TYPE, UNSPECIFIED PATHOLOGICAL FRACTURE PRESENCE: ICD-10-CM

## 2024-09-05 PROCEDURE — 2500000001 HC RX 250 WO HCPCS SELF ADMINISTERED DRUGS (ALT 637 FOR MEDICARE OP): Performed by: FAMILY MEDICINE

## 2024-09-05 PROCEDURE — 96365 THER/PROPH/DIAG IV INF INIT: CPT | Mod: INF

## 2024-09-05 PROCEDURE — 96366 THER/PROPH/DIAG IV INF ADDON: CPT | Mod: INF

## 2024-09-05 PROCEDURE — 2500000004 HC RX 250 GENERAL PHARMACY W/ HCPCS (ALT 636 FOR OP/ED): Mod: JZ

## 2024-09-05 RX ORDER — DIPHENHYDRAMINE HCL 25 MG
25 CAPSULE ORAL ONCE
Status: COMPLETED | OUTPATIENT
Start: 2024-09-05 | End: 2024-09-05

## 2024-09-05 RX ORDER — ACETAMINOPHEN 325 MG/1
650 TABLET ORAL ONCE
Status: COMPLETED | OUTPATIENT
Start: 2024-09-05 | End: 2024-09-05

## 2024-09-05 RX ORDER — HEPARIN 100 UNIT/ML
500 SYRINGE INTRAVENOUS AS NEEDED
OUTPATIENT
Start: 2024-09-05

## 2024-09-05 RX ORDER — ACETAMINOPHEN 325 MG/1
650 TABLET ORAL ONCE
OUTPATIENT
Start: 2024-10-03

## 2024-09-05 RX ORDER — DIPHENHYDRAMINE HCL 25 MG
25 CAPSULE ORAL ONCE
OUTPATIENT
Start: 2024-10-03

## 2024-09-05 SDOH — ECONOMIC STABILITY: FOOD INSECURITY: WITHIN THE PAST 12 MONTHS, YOU WORRIED THAT YOUR FOOD WOULD RUN OUT BEFORE YOU GOT MONEY TO BUY MORE.: NEVER TRUE

## 2024-09-05 SDOH — ECONOMIC STABILITY: FOOD INSECURITY: WITHIN THE PAST 12 MONTHS, THE FOOD YOU BOUGHT JUST DIDN'T LAST AND YOU DIDN'T HAVE MONEY TO GET MORE.: NEVER TRUE

## 2024-09-05 ASSESSMENT — PATIENT HEALTH QUESTIONNAIRE - PHQ9
SUM OF ALL RESPONSES TO PHQ9 QUESTIONS 1 AND 2: 0
1. LITTLE INTEREST OR PLEASURE IN DOING THINGS: NOT AT ALL
2. FEELING DOWN, DEPRESSED OR HOPELESS: NOT AT ALL

## 2024-09-05 ASSESSMENT — ENCOUNTER SYMPTOMS
LOSS OF SENSATION IN FEET: 1
DEPRESSION: 1
OCCASIONAL FEELINGS OF UNSTEADINESS: 1

## 2024-09-05 ASSESSMENT — PAIN SCALES - GENERAL: PAINLEVEL: 7

## 2024-09-05 ASSESSMENT — COLUMBIA-SUICIDE SEVERITY RATING SCALE - C-SSRS
6. HAVE YOU EVER DONE ANYTHING, STARTED TO DO ANYTHING, OR PREPARED TO DO ANYTHING TO END YOUR LIFE?: NO
1. IN THE PAST MONTH, HAVE YOU WISHED YOU WERE DEAD OR WISHED YOU COULD GO TO SLEEP AND NOT WAKE UP?: NO
2. HAVE YOU ACTUALLY HAD ANY THOUGHTS OF KILLING YOURSELF?: NO

## 2024-09-05 NOTE — TELEPHONE ENCOUNTER
FELICITA: she wanted you to know pharmacy did not refill her Vit A20--pftn her they thought it would be too much Vit B

## 2024-09-09 ENCOUNTER — TELEPHONE (OUTPATIENT)
Dept: PULMONOLOGY | Facility: HOSPITAL | Age: 59
End: 2024-09-09
Payer: MEDICARE

## 2024-09-09 DIAGNOSIS — J44.9 CHRONIC OBSTRUCTIVE PULMONARY DISEASE, UNSPECIFIED COPD TYPE (MULTI): Primary | ICD-10-CM

## 2024-09-09 DIAGNOSIS — J18.9 PNEUMONIA DUE TO INFECTIOUS ORGANISM, UNSPECIFIED LATERALITY, UNSPECIFIED PART OF LUNG: ICD-10-CM

## 2024-09-09 DIAGNOSIS — R05.1 ACUTE COUGH: ICD-10-CM

## 2024-09-09 PROCEDURE — RXMED WILLOW AMBULATORY MEDICATION CHARGE

## 2024-09-09 RX ORDER — PREDNISONE 10 MG/1
TABLET ORAL
Qty: 40 TABLET | Refills: 0 | Status: SHIPPED | OUTPATIENT
Start: 2024-09-09 | End: 2024-09-25

## 2024-09-09 NOTE — TELEPHONE ENCOUNTER
The pharmacist I spoke to said she is already getting b12 complex and he said that there is no need for both b12 because in all she would be receiving 8400 worth from both

## 2024-09-09 NOTE — TELEPHONE ENCOUNTER
Called and spoke with patient and let her know we sent a prednisone taper and ordered a CXR. Let her know to get this done ASAP at any  facility. Instructed her to call us back with any further questions or concerns. Patient was agreeable to treatment plan and acknowledged understanding.     ----- Message from Jennifer Lu sent at 9/9/2024 10:41 AM EDT -----  Regarding: RE: Sick  I sent her a prednisone taper, I ordered a CXR for her also.  ----- Message -----  From: Meena Valentin MA  Sent: 9/9/2024  10:18 AM EDT  To: AYAZ Fatima  Subject: RE: Sick                                         Patient is not able to come in, she just wants to try a medication.  ----- Message -----  From: AYAZ Fatima  Sent: 9/9/2024   9:31 AM EDT  To: Meena Valentin MA  Subject: RE: Sick                                         Can you see if she can come in at 2 pm with Dr. Ray for an acute visit?  ----- Message -----  From: Meena Valentin MA  Sent: 9/9/2024   9:04 AM EDT  To: AYAZ Fatima  Subject: Sick                                             Patient called in stating ever since she started the infusions for her immune system her cough has gotten progressively worse. She admits to the cough being productive and a lot of sinus and chest congestion. Patient says she is short of breath and has been on an antibiotic and prednisone taper about a month and a half ago.  PCP told her to use robitussin OTC but she says this is not helping.

## 2024-09-10 ENCOUNTER — HOSPITAL ENCOUNTER (OUTPATIENT)
Dept: RADIOLOGY | Facility: HOSPITAL | Age: 59
Discharge: HOME | End: 2024-09-10
Payer: MEDICARE

## 2024-09-10 ENCOUNTER — TELEPHONE (OUTPATIENT)
Dept: PRIMARY CARE | Facility: CLINIC | Age: 59
End: 2024-09-10
Payer: MEDICARE

## 2024-09-10 ENCOUNTER — PHARMACY VISIT (OUTPATIENT)
Dept: PHARMACY | Facility: CLINIC | Age: 59
End: 2024-09-10
Payer: MEDICARE

## 2024-09-10 ENCOUNTER — TELEPHONE (OUTPATIENT)
Dept: PULMONOLOGY | Facility: HOSPITAL | Age: 59
End: 2024-09-10
Payer: MEDICARE

## 2024-09-10 DIAGNOSIS — R05.1 ACUTE COUGH: ICD-10-CM

## 2024-09-10 PROCEDURE — 71046 X-RAY EXAM CHEST 2 VIEWS: CPT | Performed by: RADIOLOGY

## 2024-09-10 PROCEDURE — 71046 X-RAY EXAM CHEST 2 VIEWS: CPT

## 2024-09-10 RX ORDER — AMOXICILLIN AND CLAVULANATE POTASSIUM 875; 125 MG/1; MG/1
1 TABLET, FILM COATED ORAL 2 TIMES DAILY
Qty: 14 TABLET | Refills: 0 | Status: SHIPPED | OUTPATIENT
Start: 2024-09-10 | End: 2024-09-17

## 2024-09-10 NOTE — TELEPHONE ENCOUNTER
Patient was told by pulmonology that she had pneumonia, and she just wanted to let her know. Is asking if she is contagious?

## 2024-09-10 NOTE — TELEPHONE ENCOUNTER
Patient is agreeable to treatment. Patient instructed to go to the ER if she has worsening symptoms. Patient acknowledged understanding. All questions answered at this time.     ----- Message from Jennifer Lu sent at 9/10/2024  3:05 PM EDT -----  Please call the patient and let her know that her CXR shows that she has pneumonia, I ordered Augmentin twice a day for one week. She has had GI upset with Cefazolin which is similar so I recommend taking a probiotic while on this. She needs to seek ER care if any worsening of her symptoms.

## 2024-09-16 ENCOUNTER — TELEPHONE (OUTPATIENT)
Dept: PULMONOLOGY | Facility: HOSPITAL | Age: 59
End: 2024-09-16
Payer: MEDICARE

## 2024-09-16 ENCOUNTER — TELEPHONE (OUTPATIENT)
Dept: PRIMARY CARE | Facility: CLINIC | Age: 59
End: 2024-09-16
Payer: MEDICARE

## 2024-09-16 DIAGNOSIS — R19.7 DIARRHEA OF PRESUMED INFECTIOUS ORIGIN: Primary | ICD-10-CM

## 2024-09-16 NOTE — TELEPHONE ENCOUNTER
Patient called in asking multiple questions regarding her pneumonia. She asked if she is still contagious, how long she has had the pneumonia, how long its going to take to get over it, and if the pneumonia is bacterial or viral. I spoke with Jennifer after this and she said she should no longer be contagious at this point due to being on antibiotics, we are unsure how long she has had it and we are unable to tell how long it will take for her to get back to baseline. Also let the patient know that because we didn't get a sputum culture we are unable to tell if the pneumonia is bacterial or viral but we are treating for both just in case. I answered all questions at this time and instructed her to call us back with any further questions or concerns. Patient was agreeable to treatment plan and acknowledged understanding.

## 2024-09-16 NOTE — TELEPHONE ENCOUNTER
Patient has been sick off and on with pneumonia( viral and bacterial) being treated. Patient has sever diarrhea where it start every time she cough requesting medical advice. Unsure if she go to her other appointments.

## 2024-09-18 ENCOUNTER — TELEPHONE (OUTPATIENT)
Dept: PRIMARY CARE | Facility: CLINIC | Age: 59
End: 2024-09-18
Payer: MEDICARE

## 2024-09-18 ENCOUNTER — APPOINTMENT (OUTPATIENT)
Dept: SPEECH THERAPY | Facility: HOSPITAL | Age: 59
End: 2024-09-18
Payer: MEDICARE

## 2024-09-18 DIAGNOSIS — J30.1 SEASONAL ALLERGIC RHINITIS DUE TO POLLEN: ICD-10-CM

## 2024-09-18 RX ORDER — LORATADINE 10 MG/1
10 TABLET ORAL DAILY
Qty: 30 TABLET | Refills: 0 | Status: SHIPPED | OUTPATIENT
Start: 2024-09-18

## 2024-09-19 ENCOUNTER — LAB (OUTPATIENT)
Dept: LAB | Facility: LAB | Age: 59
End: 2024-09-19
Payer: MEDICARE

## 2024-09-19 DIAGNOSIS — R19.7 DIARRHEA OF PRESUMED INFECTIOUS ORIGIN: ICD-10-CM

## 2024-09-19 LAB — C DIF TOX TCDA+TCDB STL QL NAA+PROBE: DETECTED

## 2024-09-19 PROCEDURE — 87493 C DIFF AMPLIFIED PROBE: CPT

## 2024-09-20 ENCOUNTER — TELEPHONE (OUTPATIENT)
Dept: PRIMARY CARE | Facility: CLINIC | Age: 59
End: 2024-09-20
Payer: MEDICARE

## 2024-09-20 DIAGNOSIS — A04.72 INTESTINAL INFECTION DUE TO CLOSTRIDIUM DIFFICILE: Primary | ICD-10-CM

## 2024-09-20 RX ORDER — METRONIDAZOLE 500 MG/1
500 TABLET ORAL 4 TIMES DAILY
Qty: 40 TABLET | Refills: 0 | Status: SHIPPED | OUTPATIENT
Start: 2024-09-20 | End: 2024-09-30

## 2024-09-21 NOTE — RESULT ENCOUNTER NOTE
Marielena, your culture was POSITIVE for C. Diff, meaning you need to take antibiotic for ten days and then retest your stool. I sent to pharmacy,

## 2024-09-24 ENCOUNTER — APPOINTMENT (OUTPATIENT)
Dept: SPEECH THERAPY | Facility: HOSPITAL | Age: 59
End: 2024-09-24
Payer: MEDICARE

## 2024-09-27 DIAGNOSIS — J43.9 PULMONARY EMPHYSEMA, UNSPECIFIED EMPHYSEMA TYPE (MULTI): ICD-10-CM

## 2024-09-27 RX ORDER — ALBUTEROL SULFATE 90 UG/1
INHALANT RESPIRATORY (INHALATION)
Qty: 18 G | Refills: 5 | Status: SHIPPED | OUTPATIENT
Start: 2024-09-27

## 2024-09-27 NOTE — TELEPHONE ENCOUNTER
Patient notified and she says she has no energy from being sick for so long. She says she had pneumonia and c-diff recently. Patient was told by her care manager that she needed to go to the ED but patient did not seem open to going to ED I told patient that if it has been suggested she go to the ED that she please go to the ED.

## 2024-09-27 NOTE — TELEPHONE ENCOUNTER
I feel like this has been addressed in many tasks and lab results. SHE HAS C. DIFF FROM THE STOOL CULTURE SHE SUBMITTED. SHE IS CONTAGIOUS UNTIL SHE COMPLETES ANTIBIOTICS AND RETESTS STOOL AGAIN IN TWO WEEKS. PEOPLE CAN GET IT WHO YOU LIVE WITH AND U SE THE SAME BATHROOM, STRICT HANDWASHING.

## 2024-09-30 ENCOUNTER — TELEPHONE (OUTPATIENT)
Dept: PULMONOLOGY | Facility: HOSPITAL | Age: 59
End: 2024-09-30
Payer: MEDICARE

## 2024-09-30 ENCOUNTER — TELEPHONE (OUTPATIENT)
Dept: PRIMARY CARE | Facility: CLINIC | Age: 59
End: 2024-09-30
Payer: MEDICARE

## 2024-09-30 NOTE — TELEPHONE ENCOUNTER
Called and spoke with patient and she states she was just having pain and no other symptoms. I let her know this was probably due to her coughing. Advised treatment in the ER if pain persists or gets worse. Instructed her to call us back with any further questions or concerns. Patient was agreeable to treatment plan and acknowledged understanding.     ----- Message from Jennifer Lu sent at 9/30/2024  9:17 AM EDT -----  Regarding: RE: Left sided pain  She was treated adequately for pneumonia. Is she just having pain? It is likely her muscles from coughing. Is she having increased shortness of breath, increased cough , or fevers? If she is feeling worse in that regard then she needs to seek care in ER to get CT chest and possible admission.  ----- Message -----  From: Meena Valentin MA  Sent: 9/30/2024   8:35 AM EDT  To: REDD Fatima-CNP  Subject: Left sided pain                                  Patient called in wondering if we can order a CXR for her, she has been experiencing more left sided pain then she was before and she is worried that her pneumonia may be coming back

## 2024-09-30 NOTE — TELEPHONE ENCOUNTER
Kelli has an infusion appointment on 10-3-24--her Rheumatologist (Dr Valerio) told her as long as she did not have diarrhea , to have infusion done.  She is calling to get your recommendation ?

## 2024-10-01 PROCEDURE — RXMED WILLOW AMBULATORY MEDICATION CHARGE

## 2024-10-02 ENCOUNTER — PHARMACY VISIT (OUTPATIENT)
Dept: PHARMACY | Facility: CLINIC | Age: 59
End: 2024-10-02
Payer: MEDICARE

## 2024-10-02 ENCOUNTER — APPOINTMENT (OUTPATIENT)
Dept: PAIN MEDICINE | Facility: CLINIC | Age: 59
End: 2024-10-02
Payer: MEDICARE

## 2024-10-03 ENCOUNTER — INFUSION (OUTPATIENT)
Dept: INFUSION THERAPY | Facility: HOSPITAL | Age: 59
End: 2024-10-03
Payer: MEDICARE

## 2024-10-03 VITALS
BODY MASS INDEX: 24.14 KG/M2 | HEART RATE: 83 BPM | WEIGHT: 136.24 LBS | OXYGEN SATURATION: 98 % | DIASTOLIC BLOOD PRESSURE: 77 MMHG | TEMPERATURE: 98 F | RESPIRATION RATE: 20 BRPM | SYSTOLIC BLOOD PRESSURE: 118 MMHG

## 2024-10-03 DIAGNOSIS — M81.0 OSTEOPOROSIS, UNSPECIFIED OSTEOPOROSIS TYPE, UNSPECIFIED PATHOLOGICAL FRACTURE PRESENCE: ICD-10-CM

## 2024-10-03 DIAGNOSIS — D80.3 SELECTIVE DEFICIENCY OF IGG (MULTI): ICD-10-CM

## 2024-10-03 PROCEDURE — 2500000004 HC RX 250 GENERAL PHARMACY W/ HCPCS (ALT 636 FOR OP/ED): Mod: JZ

## 2024-10-03 PROCEDURE — 96365 THER/PROPH/DIAG IV INF INIT: CPT | Mod: INF

## 2024-10-03 PROCEDURE — 2500000001 HC RX 250 WO HCPCS SELF ADMINISTERED DRUGS (ALT 637 FOR MEDICARE OP): Performed by: FAMILY MEDICINE

## 2024-10-03 PROCEDURE — 96366 THER/PROPH/DIAG IV INF ADDON: CPT | Mod: INF

## 2024-10-03 RX ORDER — ACETAMINOPHEN 325 MG/1
650 TABLET ORAL ONCE
OUTPATIENT
Start: 2024-10-31

## 2024-10-03 RX ORDER — HEPARIN 100 UNIT/ML
500 SYRINGE INTRAVENOUS AS NEEDED
OUTPATIENT
Start: 2024-10-03

## 2024-10-03 RX ORDER — DIPHENHYDRAMINE HCL 25 MG
25 CAPSULE ORAL ONCE
Status: COMPLETED | OUTPATIENT
Start: 2024-10-03 | End: 2024-10-03

## 2024-10-03 RX ORDER — DIPHENHYDRAMINE HCL 25 MG
25 CAPSULE ORAL ONCE
OUTPATIENT
Start: 2024-10-31

## 2024-10-03 RX ORDER — ACETAMINOPHEN 325 MG/1
650 TABLET ORAL ONCE
Status: COMPLETED | OUTPATIENT
Start: 2024-10-03 | End: 2024-10-03

## 2024-10-03 ASSESSMENT — ENCOUNTER SYMPTOMS
LOSS OF SENSATION IN FEET: 0
OCCASIONAL FEELINGS OF UNSTEADINESS: 0
DEPRESSION: 0

## 2024-10-08 ENCOUNTER — PATIENT OUTREACH (OUTPATIENT)
Dept: PRIMARY CARE | Facility: CLINIC | Age: 59
End: 2024-10-08
Payer: MEDICARE

## 2024-10-08 ENCOUNTER — TELEPHONE (OUTPATIENT)
Dept: GASTROENTEROLOGY | Facility: CLINIC | Age: 59
End: 2024-10-08
Payer: MEDICARE

## 2024-10-08 DIAGNOSIS — K58.2 IRRITABLE BOWEL SYNDROME WITH BOTH CONSTIPATION AND DIARRHEA: ICD-10-CM

## 2024-10-08 RX ORDER — DICYCLOMINE HYDROCHLORIDE 20 MG/1
TABLET ORAL
Qty: 180 TABLET | Refills: 6 | Status: SHIPPED | OUTPATIENT
Start: 2024-10-08

## 2024-10-08 NOTE — TELEPHONE ENCOUNTER
Not everyday--comes & goes  The week she had infusion appointment , she didn't have any diarrhea & did go in for infusion

## 2024-10-11 ENCOUNTER — HOSPITAL ENCOUNTER (EMERGENCY)
Facility: HOSPITAL | Age: 59
Discharge: HOME | End: 2024-10-11
Attending: EMERGENCY MEDICINE
Payer: MEDICARE

## 2024-10-11 ENCOUNTER — APPOINTMENT (OUTPATIENT)
Dept: RADIOLOGY | Facility: HOSPITAL | Age: 59
End: 2024-10-11
Payer: MEDICARE

## 2024-10-11 ENCOUNTER — APPOINTMENT (OUTPATIENT)
Dept: CARDIOLOGY | Facility: HOSPITAL | Age: 59
End: 2024-10-11
Payer: MEDICARE

## 2024-10-11 VITALS
WEIGHT: 136 LBS | OXYGEN SATURATION: 98 % | SYSTOLIC BLOOD PRESSURE: 118 MMHG | HEIGHT: 63 IN | TEMPERATURE: 98.1 F | RESPIRATION RATE: 18 BRPM | BODY MASS INDEX: 24.1 KG/M2 | DIASTOLIC BLOOD PRESSURE: 82 MMHG | HEART RATE: 71 BPM

## 2024-10-11 DIAGNOSIS — R07.9 CHEST PAIN, UNSPECIFIED TYPE: Primary | ICD-10-CM

## 2024-10-11 LAB
ALBUMIN SERPL BCP-MCNC: 4.2 G/DL (ref 3.4–5)
ALP SERPL-CCNC: 27 U/L (ref 33–136)
ALT SERPL W P-5'-P-CCNC: 10 U/L (ref 7–45)
ANION GAP SERPL CALC-SCNC: 11 MMOL/L (ref 10–20)
AST SERPL W P-5'-P-CCNC: 18 U/L (ref 9–39)
BASOPHILS # BLD AUTO: 0.02 X10*3/UL (ref 0–0.1)
BASOPHILS NFR BLD AUTO: 0.2 %
BILIRUB SERPL-MCNC: 0.2 MG/DL (ref 0–1.2)
BUN SERPL-MCNC: 11 MG/DL (ref 6–23)
CALCIUM SERPL-MCNC: 9.4 MG/DL (ref 8.6–10.3)
CARDIAC TROPONIN I PNL SERPL HS: 3 NG/L (ref 0–13)
CARDIAC TROPONIN I PNL SERPL HS: <3 NG/L (ref 0–13)
CHLORIDE SERPL-SCNC: 109 MMOL/L (ref 98–107)
CO2 SERPL-SCNC: 23 MMOL/L (ref 21–32)
CREAT SERPL-MCNC: 0.96 MG/DL (ref 0.5–1.05)
EGFRCR SERPLBLD CKD-EPI 2021: 68 ML/MIN/1.73M*2
EOSINOPHIL # BLD AUTO: 0.06 X10*3/UL (ref 0–0.7)
EOSINOPHIL NFR BLD AUTO: 0.6 %
ERYTHROCYTE [DISTWIDTH] IN BLOOD BY AUTOMATED COUNT: 19.4 % (ref 11.5–14.5)
GLUCOSE SERPL-MCNC: 89 MG/DL (ref 74–99)
HCT VFR BLD AUTO: 37 % (ref 36–46)
HGB BLD-MCNC: 12 G/DL (ref 12–16)
IMM GRANULOCYTES # BLD AUTO: 0.02 X10*3/UL (ref 0–0.7)
IMM GRANULOCYTES NFR BLD AUTO: 0.2 % (ref 0–0.9)
LYMPHOCYTES # BLD AUTO: 2.82 X10*3/UL (ref 1.2–4.8)
LYMPHOCYTES NFR BLD AUTO: 26.1 %
MCH RBC QN AUTO: 27.5 PG (ref 26–34)
MCHC RBC AUTO-ENTMCNC: 32.4 G/DL (ref 32–36)
MCV RBC AUTO: 85 FL (ref 80–100)
MONOCYTES # BLD AUTO: 0.55 X10*3/UL (ref 0.1–1)
MONOCYTES NFR BLD AUTO: 5.1 %
NEUTROPHILS # BLD AUTO: 7.35 X10*3/UL (ref 1.2–7.7)
NEUTROPHILS NFR BLD AUTO: 67.8 %
NRBC BLD-RTO: 0 /100 WBCS (ref 0–0)
PLATELET # BLD AUTO: 263 X10*3/UL (ref 150–450)
POTASSIUM SERPL-SCNC: 3.7 MMOL/L (ref 3.5–5.3)
PROT SERPL-MCNC: 7.5 G/DL (ref 6.4–8.2)
RBC # BLD AUTO: 4.37 X10*6/UL (ref 4–5.2)
SODIUM SERPL-SCNC: 139 MMOL/L (ref 136–145)
WBC # BLD AUTO: 10.8 X10*3/UL (ref 4.4–11.3)

## 2024-10-11 PROCEDURE — 84484 ASSAY OF TROPONIN QUANT: CPT | Performed by: EMERGENCY MEDICINE

## 2024-10-11 PROCEDURE — 2500000001 HC RX 250 WO HCPCS SELF ADMINISTERED DRUGS (ALT 637 FOR MEDICARE OP): Performed by: EMERGENCY MEDICINE

## 2024-10-11 PROCEDURE — 71045 X-RAY EXAM CHEST 1 VIEW: CPT | Performed by: STUDENT IN AN ORGANIZED HEALTH CARE EDUCATION/TRAINING PROGRAM

## 2024-10-11 PROCEDURE — 36415 COLL VENOUS BLD VENIPUNCTURE: CPT | Performed by: EMERGENCY MEDICINE

## 2024-10-11 PROCEDURE — 2500000004 HC RX 250 GENERAL PHARMACY W/ HCPCS (ALT 636 FOR OP/ED): Performed by: EMERGENCY MEDICINE

## 2024-10-11 PROCEDURE — 71045 X-RAY EXAM CHEST 1 VIEW: CPT

## 2024-10-11 PROCEDURE — 96374 THER/PROPH/DIAG INJ IV PUSH: CPT

## 2024-10-11 PROCEDURE — 99284 EMERGENCY DEPT VISIT MOD MDM: CPT | Mod: 25

## 2024-10-11 PROCEDURE — 85025 COMPLETE CBC W/AUTO DIFF WBC: CPT | Performed by: EMERGENCY MEDICINE

## 2024-10-11 PROCEDURE — 84075 ASSAY ALKALINE PHOSPHATASE: CPT | Performed by: EMERGENCY MEDICINE

## 2024-10-11 PROCEDURE — 93005 ELECTROCARDIOGRAM TRACING: CPT

## 2024-10-11 RX ORDER — OXYCODONE AND ACETAMINOPHEN 5; 325 MG/1; MG/1
1 TABLET ORAL ONCE
Status: COMPLETED | OUTPATIENT
Start: 2024-10-11 | End: 2024-10-11

## 2024-10-11 ASSESSMENT — PAIN SCALES - GENERAL
PAINLEVEL_OUTOF10: 7
PAINLEVEL_OUTOF10: 7
PAINLEVEL_OUTOF10: 8
PAINLEVEL_OUTOF10: 7
PAINLEVEL_OUTOF10: 6
PAINLEVEL_OUTOF10: 5 - MODERATE PAIN

## 2024-10-11 ASSESSMENT — PAIN - FUNCTIONAL ASSESSMENT
PAIN_FUNCTIONAL_ASSESSMENT: 0-10
PAIN_FUNCTIONAL_ASSESSMENT: 0-10

## 2024-10-11 NOTE — ED PROVIDER NOTES
HPI   Chief Complaint   Patient presents with   • Chest Pain     Woken out of sleep this am by chest pain.  States recent pneumonia and c diff       Patient presents with chest pain.  It started this morning and woke her out of sleep.  She describes sharp pains in her left chest that radiates around into the back.  Does not go straight through.  She states now it feels like somebody had hit her on that left side.  It is worse with movement.  She has had a cough secondary to recent pneumonia diagnosis.  She denies any history of ACS in the past.  No leg swelling.  No calf pain.  She took nothing for the pain at home.  No recent fevers.                          Keiry Coma Scale Score: 15                  Patient History   Past Medical History:   Diagnosis Date   • Asthma ([facility])    • Bipolar disorder, unspecified (Multi)    • Chronic pain     fibromyalgia   • COPD (chronic obstructive pulmonary disease) (Multi)    • Diabetes (Multi)     diet-controlled   • GERD (gastroesophageal reflux disease)    • Hypercholesteremia    • Hypogammaglobulinemia (Multi)    • IBS (irritable bowel syndrome)    • Migraines    • Osteoporosis    • Panic disorder (episodic paroxysmal anxiety)    • SBO (small bowel obstruction) (Multi) 03/2024   • Transient amnesia 05/22/2023    Having episodes of transient ammesia/AMS--previously thought to be due to polypharmacy  EEG wnl       Past Surgical History:   Procedure Laterality Date   • APPENDECTOMY     • BLADDER SURGERY     • COLONOSCOPY     • ESOPHAGOGASTRODUODENOSCOPY     • HYSTERECTOMY     • OTHER SURGICAL HISTORY      left elbow tendon repair   • SMALL INTESTINE SURGERY     • TONSILLECTOMY     • UPPER GASTROINTESTINAL ENDOSCOPY       Family History   Problem Relation Name Age of Onset   • Diabetes Mother     • Hypertension Mother     • Breast cancer Sister     • Asthma Son     • Cancer Other uncle      Social History     Tobacco Use   • Smoking status: Former     Current packs/day: 0.50      Average packs/day: 0.5 packs/day for 42.0 years (21.0 ttl pk-yrs)     Types: Cigarettes   • Smokeless tobacco: Never   Vaping Use   • Vaping status: Every Day   • Substances: Nicotine, Flavoring   • Devices: Disposable   Substance Use Topics   • Alcohol use: Not Currently   • Drug use: Not Currently       Physical Exam   ED Triage Vitals [10/11/24 1128]   Temperature Heart Rate Respirations BP   36.7 °C (98.1 °F) (!) 109 16 118/60      Pulse Ox Temp src Heart Rate Source Patient Position   98 % -- -- Sitting      BP Location FiO2 (%)     Left arm --       Physical Exam  Vitals and nursing note reviewed.   Constitutional:       Appearance: Normal appearance.   HENT:      Head: Normocephalic and atraumatic.      Mouth/Throat:      Mouth: Mucous membranes are moist.   Eyes:      Extraocular Movements: Extraocular movements intact.      Pupils: Pupils are equal, round, and reactive to light.   Cardiovascular:      Rate and Rhythm: Normal rate and regular rhythm.      Heart sounds: No murmur heard.  Pulmonary:      Effort: Pulmonary effort is normal. No respiratory distress.      Breath sounds: Normal breath sounds.   Chest:      Chest wall: Tenderness (Left chest) present.   Abdominal:      General: There is no distension.      Palpations: Abdomen is soft.      Tenderness: There is no abdominal tenderness.   Musculoskeletal:         General: No tenderness or deformity. Normal range of motion.      Cervical back: Neck supple.      Right lower leg: No edema.      Left lower leg: No edema.   Skin:     General: Skin is warm and dry.      Findings: No lesion or rash.   Neurological:      General: No focal deficit present.      Mental Status: She is alert and oriented to person, place, and time.      Sensory: No sensory deficit.      Motor: No weakness.   Psychiatric:         Behavior: Behavior normal.       Labs Reviewed   TROPONIN SERIES- (INITIAL, 1 HR)    Narrative:     The following orders were created for panel  order Troponin I Series, High Sensitivity (0, 1 HR).  Procedure                               Abnormality         Status                     ---------                               -----------         ------                     Troponin I, High Sensiti...[[id number]]                                                   Please view results for these tests on the individual orders.   CBC WITH AUTO DIFFERENTIAL   COMPREHENSIVE METABOLIC PANEL   SERIAL TROPONIN-INITIAL     XR chest 1 view    (Results Pending)     ED Course & MDM   Diagnoses as of 10/11/24 1331   Chest pain, unspecified type       Medical Decision Making  Differentials include ACS, musculoskeletal, pneumonia, pneumothorax. Imaging studies, if performed, were independently reviewed and interpreted by myself and confirmed by radiologist. EKG(s), if performed, were interpreted by myself.The patient an EKG that was sinus rhythm at a rate of 77.  No acute ischemia.  QTc 441, MT interval 137.  Laboratory studies were unremarkable.  Troponins are negative.  Chest x-ray shows nothing acute.  This is likely musculoskeletal pain I gave her 1 Percocet here followed by Dilaudid as she has many allergies to NSAIDs and other narcotics.  At this time patient will be discharged home to take Tylenol for pain.  She will follow-up with her PCP.        Procedure  Procedures     Glenn Bedoya MD  10/11/24 1331

## 2024-10-15 ENCOUNTER — TELEPHONE (OUTPATIENT)
Dept: PRIMARY CARE | Facility: CLINIC | Age: 59
End: 2024-10-15
Payer: MEDICARE

## 2024-10-15 DIAGNOSIS — R05.3 CHRONIC COUGH: Primary | ICD-10-CM

## 2024-10-15 RX ORDER — PROMETHAZINE HYDROCHLORIDE AND DEXTROMETHORPHAN HYDROBROMIDE 6.25; 15 MG/5ML; MG/5ML
5 SYRUP ORAL EVERY 4 HOURS PRN
Qty: 180 ML | Refills: 1 | Status: SHIPPED | OUTPATIENT
Start: 2024-10-15 | End: 2024-11-14

## 2024-10-15 NOTE — TELEPHONE ENCOUNTER
She called to see if would send in something for a cough she was in the ER on Friday CP . Please Advise and her pharmacy is [facility] this cause she is coughing so much that it is making her heart muscle tight.

## 2024-10-16 LAB
ATRIAL RATE: 77 BPM
P AXIS: 79 DEGREES
PR INTERVAL: 137 MS
Q ONSET: 252 MS
QRS COUNT: 12 BEATS
QRS DURATION: 136 MS
QT INTERVAL: 389 MS
QTC CALCULATION(BAZETT): 441 MS
QTC FREDERICIA: 422 MS
R AXIS: 46 DEGREES
T AXIS: 40 DEGREES
T OFFSET: 447 MS
VENTRICULAR RATE: 77 BPM

## 2024-10-18 ENCOUNTER — APPOINTMENT (OUTPATIENT)
Dept: PAIN MEDICINE | Facility: CLINIC | Age: 59
End: 2024-10-18
Payer: MEDICARE

## 2024-10-21 DIAGNOSIS — J30.1 SEASONAL ALLERGIC RHINITIS DUE TO POLLEN: ICD-10-CM

## 2024-10-21 NOTE — TELEPHONE ENCOUNTER
Medication: Loratadine     Dosage: 10 mg     Sig: TAKE 1 TABLET (10 MG) BY MOUTH ONCE DAILY.     Dispense Quantity:    Refills:     Pharmacy: [facility]     LOV: 7/15/24    NOV: 11/20/24

## 2024-10-24 ENCOUNTER — TELEPHONE (OUTPATIENT)
Dept: PRIMARY CARE | Facility: CLINIC | Age: 59
End: 2024-10-24
Payer: MEDICARE

## 2024-10-24 DIAGNOSIS — G89.29 CHRONIC NECK PAIN: Primary | ICD-10-CM

## 2024-10-24 DIAGNOSIS — M54.2 CHRONIC NECK PAIN: Primary | ICD-10-CM

## 2024-10-24 RX ORDER — LIDOCAINE 50 MG/G
OINTMENT TOPICAL AS NEEDED
Qty: 60 G | Refills: 3 | Status: SHIPPED | OUTPATIENT
Start: 2024-10-24

## 2024-10-24 RX ORDER — LORATADINE 10 MG/1
10 TABLET ORAL DAILY
Qty: 30 TABLET | Refills: 11 | Status: SHIPPED | OUTPATIENT
Start: 2024-10-24

## 2024-10-24 NOTE — TELEPHONE ENCOUNTER
Med  Refill   lidocaine (Xylocaine) 5 % ointment [[id number]]     [facility] - [place], [place] - [address]  [address] [facility] [address]  Phone: [phone]  Fax: [phone]  JAMES #: --     Patient also wanted to know if there is anything that will also give her a boost of energy due to her losing her immune system patient medicine is making her tired and would prefer something natural please advise.

## 2024-10-29 ENCOUNTER — OFFICE VISIT (OUTPATIENT)
Dept: PAIN MEDICINE | Facility: CLINIC | Age: 59
End: 2024-10-29
Payer: MEDICARE

## 2024-10-29 VITALS — HEART RATE: 94 BPM | SYSTOLIC BLOOD PRESSURE: 102 MMHG | DIASTOLIC BLOOD PRESSURE: 69 MMHG | OXYGEN SATURATION: 97 %

## 2024-10-29 DIAGNOSIS — M54.16 LUMBAR RADICULOPATHY: ICD-10-CM

## 2024-10-29 PROCEDURE — 99214 OFFICE O/P EST MOD 30 MIN: CPT | Performed by: NURSE PRACTITIONER

## 2024-10-29 PROCEDURE — RXMED WILLOW AMBULATORY MEDICATION CHARGE

## 2024-10-29 PROCEDURE — 3078F DIAST BP <80 MM HG: CPT | Performed by: NURSE PRACTITIONER

## 2024-10-29 PROCEDURE — 3074F SYST BP LT 130 MM HG: CPT | Performed by: NURSE PRACTITIONER

## 2024-10-29 PROCEDURE — 3049F LDL-C 100-129 MG/DL: CPT | Performed by: NURSE PRACTITIONER

## 2024-10-29 PROCEDURE — 3044F HG A1C LEVEL LT 7.0%: CPT | Performed by: NURSE PRACTITIONER

## 2024-10-29 RX ORDER — PREGABALIN 150 MG/1
150 CAPSULE ORAL 3 TIMES DAILY
Qty: 90 CAPSULE | Refills: 5 | Status: SHIPPED | OUTPATIENT
Start: 2024-10-29

## 2024-10-29 ASSESSMENT — ENCOUNTER SYMPTOMS
CARDIOVASCULAR NEGATIVE: 1
FACIAL ASYMMETRY: 0
JOINT SWELLING: 0
CONSTITUTIONAL NEGATIVE: 1
BACK PAIN: 1
LIGHT-HEADEDNESS: 0
PSYCHIATRIC NEGATIVE: 1
ALLERGIC/IMMUNOLOGIC NEGATIVE: 1
HEADACHES: 0
EYES NEGATIVE: 1
SEIZURES: 0
GASTROINTESTINAL NEGATIVE: 1
PAIN: 1
TREMORS: 0
RESPIRATORY NEGATIVE: 1
NUMBNESS: 1
NECK PAIN: 1
WEAKNESS: 1
ARTHRALGIAS: 1
DIZZINESS: 0
MYALGIAS: 1
HEMATOLOGIC/LYMPHATIC NEGATIVE: 1
ENDOCRINE NEGATIVE: 1
NECK STIFFNESS: 0
SPEECH DIFFICULTY: 0

## 2024-10-29 ASSESSMENT — PAIN SCALES - GENERAL: PAINLEVEL_OUTOF10: 7

## 2024-10-31 ENCOUNTER — PHARMACY VISIT (OUTPATIENT)
Dept: PHARMACY | Facility: CLINIC | Age: 59
End: 2024-10-31
Payer: MEDICARE

## 2024-10-31 ENCOUNTER — INFUSION (OUTPATIENT)
Dept: INFUSION THERAPY | Facility: HOSPITAL | Age: 59
End: 2024-10-31
Payer: MEDICARE

## 2024-10-31 VITALS
WEIGHT: 134.04 LBS | OXYGEN SATURATION: 96 % | SYSTOLIC BLOOD PRESSURE: 107 MMHG | RESPIRATION RATE: 18 BRPM | DIASTOLIC BLOOD PRESSURE: 72 MMHG | HEART RATE: 80 BPM | TEMPERATURE: 94.6 F | BODY MASS INDEX: 23.74 KG/M2

## 2024-10-31 DIAGNOSIS — D80.3 SELECTIVE DEFICIENCY OF IGG (MULTI): ICD-10-CM

## 2024-10-31 DIAGNOSIS — M81.0 OSTEOPOROSIS, UNSPECIFIED OSTEOPOROSIS TYPE, UNSPECIFIED PATHOLOGICAL FRACTURE PRESENCE: ICD-10-CM

## 2024-10-31 LAB — IGG SERPL-MCNC: 692 MG/DL (ref 700–1600)

## 2024-10-31 PROCEDURE — 2500000001 HC RX 250 WO HCPCS SELF ADMINISTERED DRUGS (ALT 637 FOR MEDICARE OP): Performed by: FAMILY MEDICINE

## 2024-10-31 PROCEDURE — 96365 THER/PROPH/DIAG IV INF INIT: CPT | Mod: INF

## 2024-10-31 PROCEDURE — 82784 ASSAY IGA/IGD/IGG/IGM EACH: CPT | Mod: PORLAB

## 2024-10-31 PROCEDURE — 36415 COLL VENOUS BLD VENIPUNCTURE: CPT

## 2024-10-31 PROCEDURE — 96366 THER/PROPH/DIAG IV INF ADDON: CPT | Mod: INF

## 2024-10-31 PROCEDURE — 2500000004 HC RX 250 GENERAL PHARMACY W/ HCPCS (ALT 636 FOR OP/ED): Mod: JZ

## 2024-10-31 RX ORDER — ACETAMINOPHEN 325 MG/1
650 TABLET ORAL ONCE
Status: COMPLETED | OUTPATIENT
Start: 2024-10-31 | End: 2024-10-31

## 2024-10-31 RX ORDER — DIPHENHYDRAMINE HCL 25 MG
25 CAPSULE ORAL ONCE
OUTPATIENT
Start: 2024-11-28

## 2024-10-31 RX ORDER — DIPHENHYDRAMINE HCL 25 MG
25 CAPSULE ORAL ONCE
Status: COMPLETED | OUTPATIENT
Start: 2024-10-31 | End: 2024-10-31

## 2024-10-31 RX ORDER — HEPARIN 100 UNIT/ML
500 SYRINGE INTRAVENOUS AS NEEDED
OUTPATIENT
Start: 2024-10-31

## 2024-10-31 RX ORDER — ACETAMINOPHEN 325 MG/1
650 TABLET ORAL ONCE
OUTPATIENT
Start: 2024-11-28

## 2024-10-31 ASSESSMENT — ENCOUNTER SYMPTOMS
OCCASIONAL FEELINGS OF UNSTEADINESS: 0
DEPRESSION: 0
LOSS OF SENSATION IN FEET: 0

## 2024-11-07 ENCOUNTER — TELEPHONE (OUTPATIENT)
Dept: PRIMARY CARE | Facility: CLINIC | Age: 59
End: 2024-11-07
Payer: MEDICARE

## 2024-11-07 NOTE — TELEPHONE ENCOUNTER
The PA was not denied for quantity it was denied because of the answer to the question Is the requested drug being used for topical anesthesia? Of No so what would you like me to do appeal it?

## 2024-11-14 ENCOUNTER — HOSPITAL ENCOUNTER (OUTPATIENT)
Dept: PAIN MEDICINE | Facility: CLINIC | Age: 59
Discharge: HOME | End: 2024-11-14
Payer: MEDICARE

## 2024-11-14 VITALS
DIASTOLIC BLOOD PRESSURE: 74 MMHG | RESPIRATION RATE: 16 BRPM | OXYGEN SATURATION: 97 % | HEART RATE: 93 BPM | SYSTOLIC BLOOD PRESSURE: 109 MMHG

## 2024-11-14 DIAGNOSIS — M54.16 LUMBAR RADICULOPATHY: ICD-10-CM

## 2024-11-14 PROCEDURE — 2500000005 HC RX 250 GENERAL PHARMACY W/O HCPCS: Performed by: ANESTHESIOLOGY

## 2024-11-14 PROCEDURE — 64484 NJX AA&/STRD TFRM EPI L/S EA: CPT | Performed by: ANESTHESIOLOGY

## 2024-11-14 PROCEDURE — 64483 NJX AA&/STRD TFRM EPI L/S 1: CPT | Performed by: ANESTHESIOLOGY

## 2024-11-14 PROCEDURE — 2550000001 HC RX 255 CONTRASTS: Performed by: ANESTHESIOLOGY

## 2024-11-14 PROCEDURE — 2500000004 HC RX 250 GENERAL PHARMACY W/ HCPCS (ALT 636 FOR OP/ED): Performed by: ANESTHESIOLOGY

## 2024-11-14 RX ORDER — SODIUM CHLORIDE 9 MG/ML
INJECTION, SOLUTION INTRAMUSCULAR; INTRAVENOUS; SUBCUTANEOUS AS NEEDED
Status: COMPLETED | OUTPATIENT
Start: 2024-11-14 | End: 2024-11-14

## 2024-11-14 RX ORDER — LIDOCAINE HYDROCHLORIDE 5 MG/ML
INJECTION, SOLUTION INFILTRATION; INTRAVENOUS AS NEEDED
Status: COMPLETED | OUTPATIENT
Start: 2024-11-14 | End: 2024-11-14

## 2024-11-14 RX ORDER — INDOMETHACIN 25 MG/1
CAPSULE ORAL AS NEEDED
Status: COMPLETED | OUTPATIENT
Start: 2024-11-14 | End: 2024-11-14

## 2024-11-14 RX ORDER — DEXAMETHASONE SODIUM PHOSPHATE 10 MG/ML
INJECTION INTRAMUSCULAR; INTRAVENOUS AS NEEDED
Status: COMPLETED | OUTPATIENT
Start: 2024-11-14 | End: 2024-11-14

## 2024-11-14 ASSESSMENT — PAIN DESCRIPTION - DESCRIPTORS: DESCRIPTORS: BURNING;SHARP

## 2024-11-14 ASSESSMENT — PAIN SCALES - GENERAL
PAINLEVEL_OUTOF10: 8
PAINLEVEL_OUTOF10: 5 - MODERATE PAIN

## 2024-11-14 NOTE — DISCHARGE INSTRUCTIONS
[facility]  [facility] [address]  [address] [id number] [phone]    POST PROCEDURE INSTRUCTIONS    Activity  Medication used during your procedure may cause some temporary weakness or numbness in your arms or legs, depending on the type of injection you received. It is recommended that you do not drive or operate machinery the day of your injection.  You do not need to stay in bed when you get home. You should be able to resume your normal activities, including work. However, any pre-existing physical restriction you had prior to the procedure may still remain.   Have a responsible adult with you if you received sedation for the procedure. Do not drive or operate machinery for 12 hours.    Pain  Immediate pain relief from the local anesthetic will wear off after several hours.  Prolonged relief from the steroid may take 3-14 days to occur.  Some patients may experience a “flare up” of their normal pain for a few days after the procedure. You may apply ice packs to the sore area for 15minutes on/ 2 hours off and take your prescribed or over the counter analgesics as needed.  Common side effects from the steroid include facial flushing, headaches,fluid retention,trouble sleeping,and cold like symptoms for 24-48 hours post procedure.  Patients receiving diagnostic injections (no steroid): pain relief is intended to be temporary. Pay attention to the percentage of pain relief that occurs compared to before your injection so you can report this to your doctor. Pain scores before and after the procedure need to be documented.    Medications  Resume your normal medications unless otherwise instructed  If you held your blood thinning medication for the procedure,you will be instructed on when to restart.    Injection site care  Keep the injection site clean and dry. You may shower and remove the Band Aid after you arrive home.  If you notice excessive  bleeding (slow general oozing that completely soaks the dressing or fresh bright red bleeding),apply pressure and call our office immediately.  Observe for signs of infection: increasing pain at injection site, redness, swelling, drainage with a foul smell, any associated fever or chills                        If you notice any of these, call our office immediately.    Diabetic patients   You may notice an elevation in blood sugar if steroid is used. Notify your primary care doctor if blood sugar levels do not return to normal.    Follow up  Make a virtual injection follow up appointment  with Dr. Davis in 3-4 weeks      Call our office at [phone] to speak to the clinical staff with any concerns or problems.  Go to the nearest emergency room if you are not able to reach us and your problem is urgent.  Call [phone] if you develop serious symptoms such as: chest pain or difficulty breathing.

## 2024-11-14 NOTE — H&P
History Of Present Illness  Kelli Ortega is a 58 y.o. female presenting with low back pain radiating to the posterior lateral aspect of left lower extremity.  Patient scheduled for left L4 and L5 transforaminal epidural steroid injection.     Past Medical History  She has a past medical history of Asthma, Bipolar disorder, unspecified (Multi), Chronic pain, COPD (chronic obstructive pulmonary disease) (Multi), Diabetes (Multi), GERD (gastroesophageal reflux disease), Hypercholesteremia, Hypogammaglobulinemia (Multi), IBS (irritable bowel syndrome), Migraines, Osteoporosis, Panic disorder (episodic paroxysmal anxiety), SBO (small bowel obstruction) (Multi) (03/2024), and Transient amnesia (05/22/2023).    Surgical History  She has a past surgical history that includes Appendectomy; Hysterectomy; Esophagogastroduodenoscopy; Bladder surgery; Small intestine surgery; Upper gastrointestinal endoscopy; Colonoscopy; Tonsillectomy; and Other surgical history.     Social History  She reports that she has quit smoking. Her smoking use included cigarettes. She has a 21 pack-year smoking history. She has never used smokeless tobacco. She reports that she does not currently use alcohol. She reports that she does not currently use drugs.    Family History  Family History   Problem Relation Name Age of Onset    Diabetes Mother      Hypertension Mother      Breast cancer Sister      Asthma Son      Cancer Other uncle         Allergies  Amitriptyline, Bupropion, Cefazolin, Diclofenac, Divalproex sodium, Doxycycline, Grapefruit, Guaifenesin, Hydrocodone-acetaminophen, Hydroxyzine, Ibuprofen, Ketorolac, Lepirudin, Loperamide, Meloxicam, Milnacipran, Mirtazapine, Morphine, Nabumetone, Ondansetron hcl, Other, Paroxetine, Tramadol, and Ultracet [tramadol-acetaminophen]    Review of systems:   13-point review of systems done and negative except as noted in HPI      Physical Exam   Vital signs: Reviewed  Constitutional: No acute  distress, well appearing and well nourished. Patient appears stated age.   Eyes: Conjunctiva non-icteric and eye lids are without obvious rash or drooping. Pupils are symmetric.   Ears, Nose, Mouth, and Throat: External ears and nose appear to be without deformity or rash. No lesions or masses noted. Hearing is grossly intact.   Neck:. No JVD noted, tracheal position is midline.   Head and Face: Examination of the head and face revealed no abnormalities.   Respiratory: No gasping or shortness of breath noted, no use of accessory muscles noted.   Cardiovascular: Examination for edema is normal.   GI: Abdomen nontender to palpation.   Skin: No rashes or open lesions/ulcers identified on skin.   Musk: Digits/nails show no clubbing or cyanosis. No asymmetry or masses noted of the musculature. Examination of the muscles/joints/bones show normal range of motion. Gait is grossly [].  [] to walk on toes and heels.   Neurologic: Cranial nerves II-XII intact, motor strength 5/5 muscle strength of the lower extremities bilaterally and equal. 5/5 muscle strength of the upper extremities bilaterally and equal.   Reflexes: normal.   Sensation: []  Provocative tests:       Last Recorded Vitals  /75   Pulse 103   Resp 20     Relevant Results            Last OARRS Review: No data recorded    I have personally reviewed the OARRS report for Kelli Ortega I have considered the risks of abuse, dependence, addiction and diversion       Assessment/Plan   Assessment & Plan  Lumbar radiculopathy      Plan  As described above       Ricki Davis MD

## 2024-11-20 ENCOUNTER — TELEPHONE (OUTPATIENT)
Dept: PRIMARY CARE | Facility: CLINIC | Age: 59
End: 2024-11-20

## 2024-11-20 ENCOUNTER — APPOINTMENT (OUTPATIENT)
Dept: PRIMARY CARE | Facility: CLINIC | Age: 59
End: 2024-11-20
Payer: MEDICARE

## 2024-11-20 ENCOUNTER — LAB (OUTPATIENT)
Dept: LAB | Facility: LAB | Age: 59
End: 2024-11-20
Payer: MEDICARE

## 2024-11-20 VITALS
DIASTOLIC BLOOD PRESSURE: 60 MMHG | WEIGHT: 130 LBS | HEART RATE: 111 BPM | OXYGEN SATURATION: 97 % | BODY MASS INDEX: 23.03 KG/M2 | SYSTOLIC BLOOD PRESSURE: 106 MMHG

## 2024-11-20 DIAGNOSIS — F41.9 ANXIETY: ICD-10-CM

## 2024-11-20 DIAGNOSIS — E78.2 MIXED HYPERLIPIDEMIA: ICD-10-CM

## 2024-11-20 DIAGNOSIS — D80.1 HYPOGAMMAGLOBULINEMIA (MULTI): ICD-10-CM

## 2024-11-20 DIAGNOSIS — R00.0 TACHYCARDIA: ICD-10-CM

## 2024-11-20 DIAGNOSIS — E53.8 VITAMIN B12 DEFICIENCY: Primary | ICD-10-CM

## 2024-11-20 DIAGNOSIS — E53.8 VITAMIN B12 DEFICIENCY: ICD-10-CM

## 2024-11-20 DIAGNOSIS — K21.9 GASTROESOPHAGEAL REFLUX DISEASE WITHOUT ESOPHAGITIS: ICD-10-CM

## 2024-11-20 DIAGNOSIS — D80.3 SELECTIVE DEFICIENCY OF IGG (MULTI): ICD-10-CM

## 2024-11-20 DIAGNOSIS — Z23 NEEDS FLU SHOT: ICD-10-CM

## 2024-11-20 DIAGNOSIS — M48.062 NEUROGENIC CLAUDICATION DUE TO LUMBAR SPINAL STENOSIS: ICD-10-CM

## 2024-11-20 DIAGNOSIS — J43.9 PULMONARY EMPHYSEMA, UNSPECIFIED EMPHYSEMA TYPE (MULTI): ICD-10-CM

## 2024-11-20 DIAGNOSIS — N39.46 MIXED STRESS AND URGE URINARY INCONTINENCE: ICD-10-CM

## 2024-11-20 DIAGNOSIS — K58.2 IRRITABLE BOWEL SYNDROME WITH BOTH CONSTIPATION AND DIARRHEA: Chronic | ICD-10-CM

## 2024-11-20 DIAGNOSIS — M81.0 OSTEOPOROSIS, UNSPECIFIED OSTEOPOROSIS TYPE, UNSPECIFIED PATHOLOGICAL FRACTURE PRESENCE: Primary | ICD-10-CM

## 2024-11-20 DIAGNOSIS — R73.03 PREDIABETES: ICD-10-CM

## 2024-11-20 DIAGNOSIS — M54.2 CHRONIC NECK PAIN: ICD-10-CM

## 2024-11-20 DIAGNOSIS — F17.210 CIGARETTE NICOTINE DEPENDENCE WITHOUT COMPLICATION: ICD-10-CM

## 2024-11-20 DIAGNOSIS — G89.29 CHRONIC NECK PAIN: ICD-10-CM

## 2024-11-20 DIAGNOSIS — E11.9 DIET-CONTROLLED TYPE 2 DIABETES MELLITUS: ICD-10-CM

## 2024-11-20 PROBLEM — B96.5: Status: RESOLVED | Noted: 2024-05-22 | Resolved: 2024-11-20

## 2024-11-20 PROBLEM — A04.72 COLITIS DUE TO CLOSTRIDIOIDES DIFFICILE: Status: ACTIVE | Noted: 2024-11-20

## 2024-11-20 PROBLEM — Z86.69 HISTORY OF MIGRAINE: Status: ACTIVE | Noted: 2024-11-20

## 2024-11-20 PROBLEM — Z91.018 FOOD ALLERGY: Status: RESOLVED | Noted: 2023-05-22 | Resolved: 2024-11-20

## 2024-11-20 PROBLEM — Z86.19 HISTORY OF SEXUALLY TRANSMITTED DISEASE: Status: RESOLVED | Noted: 2024-11-20 | Resolved: 2024-11-20

## 2024-11-20 PROBLEM — Z86.59 HISTORY OF DEPRESSION: Status: ACTIVE | Noted: 2024-11-20

## 2024-11-20 PROBLEM — D72.829 LEUKOCYTOSIS: Status: RESOLVED | Noted: 2023-05-22 | Resolved: 2024-11-20

## 2024-11-20 PROBLEM — A04.72 COLITIS DUE TO CLOSTRIDIOIDES DIFFICILE: Status: RESOLVED | Noted: 2024-11-20 | Resolved: 2024-11-20

## 2024-11-20 PROBLEM — F42.4 DERMATILLOMANIA: Status: ACTIVE | Noted: 2024-11-20

## 2024-11-20 PROBLEM — F41.0 PANIC ATTACK: Status: ACTIVE | Noted: 2024-11-20

## 2024-11-20 PROBLEM — R70.0 ELEVATED SED RATE: Status: RESOLVED | Noted: 2023-05-22 | Resolved: 2024-11-20

## 2024-11-20 PROBLEM — D50.0 ANEMIA DUE TO BLOOD LOSS: Status: RESOLVED | Noted: 2023-05-22 | Resolved: 2024-11-20

## 2024-11-20 PROBLEM — M00.80: Status: RESOLVED | Noted: 2024-05-22 | Resolved: 2024-11-20

## 2024-11-20 LAB
ALBUMIN SERPL BCP-MCNC: 4.2 G/DL (ref 3.4–5)
ALP SERPL-CCNC: 28 U/L (ref 33–136)
ALT SERPL W P-5'-P-CCNC: 17 U/L (ref 7–45)
ANION GAP SERPL CALC-SCNC: 10 MMOL/L (ref 10–20)
AST SERPL W P-5'-P-CCNC: 20 U/L (ref 9–39)
BASOPHILS # BLD AUTO: 0.02 X10*3/UL (ref 0–0.1)
BASOPHILS NFR BLD AUTO: 0.2 %
BILIRUB SERPL-MCNC: 0.2 MG/DL (ref 0–1.2)
BUN SERPL-MCNC: 12 MG/DL (ref 6–23)
BURR CELLS BLD QL SMEAR: NORMAL
CALCIUM SERPL-MCNC: 9.2 MG/DL (ref 8.6–10.3)
CHLORIDE SERPL-SCNC: 110 MMOL/L (ref 98–107)
CHOLEST SERPL-MCNC: 200 MG/DL (ref 0–199)
CHOLESTEROL/HDL RATIO: 3.6
CO2 SERPL-SCNC: 25 MMOL/L (ref 21–32)
CREAT SERPL-MCNC: 0.93 MG/DL (ref 0.5–1.05)
EGFRCR SERPLBLD CKD-EPI 2021: 71 ML/MIN/1.73M*2
EOSINOPHIL # BLD AUTO: 0 X10*3/UL (ref 0–0.7)
EOSINOPHIL NFR BLD AUTO: 0 %
ERYTHROCYTE [DISTWIDTH] IN BLOOD BY AUTOMATED COUNT: 20.1 % (ref 11.5–14.5)
GIANT PLATELETS BLD QL SMEAR: NORMAL
GLUCOSE SERPL-MCNC: 87 MG/DL (ref 74–99)
HCT VFR BLD AUTO: 34.9 % (ref 36–46)
HDLC SERPL-MCNC: 56.1 MG/DL
HGB BLD-MCNC: 11.5 G/DL (ref 12–16)
IMM GRANULOCYTES # BLD AUTO: 0.04 X10*3/UL (ref 0–0.7)
IMM GRANULOCYTES NFR BLD AUTO: 0.4 % (ref 0–0.9)
LDLC SERPL CALC-MCNC: 99 MG/DL
LYMPHOCYTES # BLD AUTO: 1.59 X10*3/UL (ref 1.2–4.8)
LYMPHOCYTES NFR BLD AUTO: 14.1 %
MCH RBC QN AUTO: 29.2 PG (ref 26–34)
MCHC RBC AUTO-ENTMCNC: 33 G/DL (ref 32–36)
MCV RBC AUTO: 89 FL (ref 80–100)
MONOCYTES # BLD AUTO: 0.29 X10*3/UL (ref 0.1–1)
MONOCYTES NFR BLD AUTO: 2.6 %
NEUTROPHILS # BLD AUTO: 9.36 X10*3/UL (ref 1.2–7.7)
NEUTROPHILS NFR BLD AUTO: 82.7 %
NON HDL CHOLESTEROL: 144 MG/DL (ref 0–149)
NRBC BLD-RTO: 0 /100 WBCS (ref 0–0)
PLATELET # BLD AUTO: 266 X10*3/UL (ref 150–450)
POC HEMOGLOBIN A1C: 5.7 % (ref 4.2–6.5)
POLYCHROMASIA BLD QL SMEAR: NORMAL
POTASSIUM SERPL-SCNC: 4.2 MMOL/L (ref 3.5–5.3)
PROT SERPL-MCNC: 7.2 G/DL (ref 6.4–8.2)
RBC # BLD AUTO: 3.94 X10*6/UL (ref 4–5.2)
RBC MORPH BLD: NORMAL
SODIUM SERPL-SCNC: 141 MMOL/L (ref 136–145)
TRIGL SERPL-MCNC: 224 MG/DL (ref 0–149)
TSH SERPL-ACNC: 0.6 MIU/L (ref 0.44–3.98)
VIT B12 SERPL-MCNC: 468 PG/ML (ref 211–911)
VLDL: 45 MG/DL (ref 0–40)
WBC # BLD AUTO: 11.3 X10*3/UL (ref 4.4–11.3)

## 2024-11-20 PROCEDURE — 3048F LDL-C <100 MG/DL: CPT | Performed by: FAMILY MEDICINE

## 2024-11-20 PROCEDURE — 3078F DIAST BP <80 MM HG: CPT | Performed by: FAMILY MEDICINE

## 2024-11-20 PROCEDURE — 3074F SYST BP LT 130 MM HG: CPT | Performed by: FAMILY MEDICINE

## 2024-11-20 PROCEDURE — 84443 ASSAY THYROID STIM HORMONE: CPT

## 2024-11-20 PROCEDURE — 3044F HG A1C LEVEL LT 7.0%: CPT | Performed by: FAMILY MEDICINE

## 2024-11-20 PROCEDURE — 99214 OFFICE O/P EST MOD 30 MIN: CPT | Performed by: FAMILY MEDICINE

## 2024-11-20 PROCEDURE — 90656 IIV3 VACC NO PRSV 0.5 ML IM: CPT | Performed by: FAMILY MEDICINE

## 2024-11-20 PROCEDURE — 83036 HEMOGLOBIN GLYCOSYLATED A1C: CPT | Performed by: FAMILY MEDICINE

## 2024-11-20 PROCEDURE — 80053 COMPREHEN METABOLIC PANEL: CPT

## 2024-11-20 PROCEDURE — 36415 COLL VENOUS BLD VENIPUNCTURE: CPT

## 2024-11-20 PROCEDURE — 93000 ELECTROCARDIOGRAM COMPLETE: CPT | Performed by: FAMILY MEDICINE

## 2024-11-20 PROCEDURE — 82607 VITAMIN B-12: CPT

## 2024-11-20 PROCEDURE — 85025 COMPLETE CBC W/AUTO DIFF WBC: CPT

## 2024-11-20 PROCEDURE — G0008 ADMIN INFLUENZA VIRUS VAC: HCPCS | Performed by: FAMILY MEDICINE

## 2024-11-20 PROCEDURE — 80061 LIPID PANEL: CPT

## 2024-11-20 PROCEDURE — 83036 HEMOGLOBIN GLYCOSYLATED A1C: CPT

## 2024-11-20 RX ORDER — LOPERAMIDE HYDROCHLORIDE 2 MG/1
CAPSULE ORAL
COMMUNITY
Start: 2024-02-29 | End: 2024-11-20 | Stop reason: WASHOUT

## 2024-11-20 RX ORDER — PRAVASTATIN SODIUM 20 MG/1
TABLET ORAL
Qty: 90 TABLET | Refills: 3 | Status: SHIPPED | OUTPATIENT
Start: 2024-11-20

## 2024-11-20 RX ORDER — LIDOCAINE 50 MG/G
OINTMENT TOPICAL AS NEEDED
Qty: 90 G | Refills: 11 | Status: SHIPPED | OUTPATIENT
Start: 2024-11-20

## 2024-11-20 ASSESSMENT — ENCOUNTER SYMPTOMS
UNEXPECTED WEIGHT CHANGE: 1
MYALGIAS: 1
BACK PAIN: 1
ARTHRALGIAS: 1

## 2024-11-20 NOTE — TELEPHONE ENCOUNTER
Lidocaine Ointment:  Note from Payer: [facility] [employer] is not able to process this request because the previous Prior Authorization Appeal Request was Denied, please contact [facility]. at [phone] or fax in expedited request to [phone] and standard request to [phone].  Closed Reason Code: BY

## 2024-11-20 NOTE — PROGRESS NOTES
Subjective   Patient ID: Kelli Ortega is a 58 y.o. female.    MENG Peguero is here for follow up. She is seeing neurology, Errol Bonds) Gastro, IgG infusions every 28 days, Pain management, Rheumatology.   Review of Systems   Constitutional:  Positive for unexpected weight change (10 pound weight gain).   Cardiovascular:         Fast heart rate.    Musculoskeletal:  Positive for arthralgias, back pain and myalgias.   All other systems reviewed and are negative.  Due to recheck b12 - to see if it was low enough to get injections.     Objective   Physical Exam  Vitals reviewed.   Constitutional:       Appearance: Normal appearance.   HENT:      Head: Normocephalic and atraumatic.      Right Ear: Tympanic membrane normal.      Left Ear: Tympanic membrane normal.      Nose: Nose normal.      Mouth/Throat:      Mouth: Mucous membranes are moist.      Pharynx: Oropharynx is clear.   Eyes:      Extraocular Movements: Extraocular movements intact.      Conjunctiva/sclera: Conjunctivae normal.      Pupils: Pupils are equal, round, and reactive to light.   Cardiovascular:      Rate and Rhythm: Normal rate and regular rhythm.      Pulses: Normal pulses.      Heart sounds: Normal heart sounds.   Pulmonary:      Effort: Pulmonary effort is normal.      Breath sounds: Normal breath sounds.   Abdominal:      General: Abdomen is flat. Bowel sounds are normal.      Palpations: Abdomen is soft.   Musculoskeletal:         General: Normal range of motion.      Cervical back: Normal range of motion and neck supple.   Skin:     General: Skin is warm and dry.      Capillary Refill: Capillary refill takes less than 2 seconds.   Neurological:      General: No focal deficit present.      Mental Status: She is alert and oriented to person, place, and time.   Psychiatric:         Mood and Affect: Mood normal.         Behavior: Behavior normal.         Assessment/Plan   Diagnoses and all orders for this visit:  Osteoporosis, unspecified  osteoporosis type, unspecified pathological fracture presence  Diet-controlled type 2 diabetes mellitus-A1C was performed and patient does not have a diagnosis of diabetes.  She has prediabetes or impaired glucose tolerance  -     POCT glycosylated hemoglobin (Hb A1C) manually resulted; Future  Irritable bowel syndrome with both constipation and diarrhea-stable and controlled.  Dimethicone, Bentyl, promethazine, MiraLAX, Zofran,  -     Follow Up In [facility] - PCP - Established  Chronic neck pain-patient is going to pain management.  Receives Flexeril and injections.  -     lidocaine (Xylocaine) 5 % ointment; Apply topically if needed for mild pain (1 - 3). USE UP TO 5 TIMES A DAY AS NEEDED, may disp 30 day supply and substitute  Gastroesophageal reflux disease without esophagitis  Mixed stress and urge urinary incontinence-to see Dr. Cárdenas in follow-up  Anxiety  Hypogammaglobulinemia (Multi)-Dr. Valerio of rheumatology is managing the situation  Selective deficiency of IgG (Multi)-receives monthly injections  Neurogenic claudication due to lumbar spinal stenosis  Pulmonary emphysema, unspecified emphysema type (Multi)-currently under the care of pulmonology  Cigarette nicotine dependence without complication  Tachycardia  -     ECG 12 lead (Clinic Performed)  Mixed hyperlipidemia  -     pravastatin (Pravachol) 20 mg tablet; TAKE ONE (1) TABLET BY MOUTH EVERY EVENING AT BEDTIME  Needs flu shot  -     Flu vaccine, trivalent, preservative free, age 6 months and greater (Fluarix/Fluzone/Flulaval)  Tries to stay away from caffeine.   GET YOUR B12 at your convenience. Recheck six months with bloodwork before, we will order lung screening then as well.

## 2024-11-21 LAB
EST. AVERAGE GLUCOSE BLD GHB EST-MCNC: 131 MG/DL
HBA1C MFR BLD: 6.2 %

## 2024-11-22 NOTE — TELEPHONE ENCOUNTER
Please see duplicate message on medications appeal, I called [facility] FOR APPEAL STATUS HAD TO LEAVE A VOICEMAIL

## 2024-11-22 NOTE — RESULT ENCOUNTER NOTE
Marielena, labs look good when compared to your previous results.  Triglycerides were a bit up, and can be due to sugar but the remainder of your labs look great.  You are slightly anemic on this blood work we will continue to recheck that to see if it worsens.  And your B12 level was within normal limits.  Let me know if any questions.

## 2024-11-26 ENCOUNTER — ANCILLARY PROCEDURE (OUTPATIENT)
Dept: CARDIOLOGY | Facility: HOSPITAL | Age: 59
End: 2024-11-26
Payer: MEDICARE

## 2024-11-26 DIAGNOSIS — R00.0 TACHYCARDIA: ICD-10-CM

## 2024-11-26 PROCEDURE — 93242 EXT ECG>48HR<7D RECORDING: CPT

## 2024-11-26 PROCEDURE — RXMED WILLOW AMBULATORY MEDICATION CHARGE

## 2024-12-02 ENCOUNTER — PHARMACY VISIT (OUTPATIENT)
Dept: PHARMACY | Facility: CLINIC | Age: 59
End: 2024-12-02
Payer: MEDICARE

## 2024-12-04 RX ORDER — DIPHENHYDRAMINE HCL 25 MG
25 CAPSULE ORAL ONCE
OUTPATIENT
Start: 2024-12-05

## 2024-12-04 RX ORDER — ACETAMINOPHEN 325 MG/1
650 TABLET ORAL ONCE
OUTPATIENT
Start: 2024-12-05

## 2024-12-05 ENCOUNTER — APPOINTMENT (OUTPATIENT)
Dept: INFUSION THERAPY | Facility: HOSPITAL | Age: 59
End: 2024-12-05
Payer: MEDICARE

## 2024-12-05 DIAGNOSIS — D80.3 SELECTIVE DEFICIENCY OF IGG (MULTI): Primary | ICD-10-CM

## 2024-12-09 PROCEDURE — 93244 EXT ECG>48HR<7D REV&INTERPJ: CPT | Performed by: INTERNAL MEDICINE

## 2024-12-10 NOTE — RESULT ENCOUNTER NOTE
Marielena, I am very happy with your Holter monitor.  It did not show any significant abnormal rhythms, no prolonged tachycardia, and was essentially within normal limits.  Should you have any other sensations of fast heart rate, I would suggest a cardiology referral.  Let me know

## 2024-12-18 ENCOUNTER — INFUSION (OUTPATIENT)
Dept: INFUSION THERAPY | Facility: HOSPITAL | Age: 59
End: 2024-12-18
Payer: MEDICARE

## 2024-12-18 VITALS
OXYGEN SATURATION: 97 % | HEART RATE: 79 BPM | BODY MASS INDEX: 24.06 KG/M2 | SYSTOLIC BLOOD PRESSURE: 112 MMHG | TEMPERATURE: 97.8 F | WEIGHT: 135.8 LBS | DIASTOLIC BLOOD PRESSURE: 75 MMHG | RESPIRATION RATE: 16 BRPM

## 2024-12-18 DIAGNOSIS — D80.3 SELECTIVE DEFICIENCY OF IGG (MULTI): ICD-10-CM

## 2024-12-18 PROCEDURE — 96365 THER/PROPH/DIAG IV INF INIT: CPT | Mod: INF

## 2024-12-18 PROCEDURE — 2500000004 HC RX 250 GENERAL PHARMACY W/ HCPCS (ALT 636 FOR OP/ED): Mod: JZ | Performed by: INTERNAL MEDICINE

## 2024-12-18 PROCEDURE — 2500000001 HC RX 250 WO HCPCS SELF ADMINISTERED DRUGS (ALT 637 FOR MEDICARE OP): Performed by: INTERNAL MEDICINE

## 2024-12-18 PROCEDURE — 96366 THER/PROPH/DIAG IV INF ADDON: CPT | Mod: INF

## 2024-12-18 RX ORDER — DIPHENHYDRAMINE HCL 25 MG
25 CAPSULE ORAL ONCE
OUTPATIENT
Start: 2025-01-02

## 2024-12-18 RX ORDER — HEPARIN 100 UNIT/ML
500 SYRINGE INTRAVENOUS AS NEEDED
OUTPATIENT
Start: 2024-12-18

## 2024-12-18 RX ORDER — ACETAMINOPHEN 325 MG/1
650 TABLET ORAL ONCE
OUTPATIENT
Start: 2025-01-02

## 2024-12-18 RX ORDER — ACETAMINOPHEN 325 MG/1
650 TABLET ORAL ONCE
Status: COMPLETED | OUTPATIENT
Start: 2024-12-18 | End: 2024-12-18

## 2024-12-18 RX ORDER — DIPHENHYDRAMINE HCL 25 MG
25 CAPSULE ORAL ONCE
Status: COMPLETED | OUTPATIENT
Start: 2024-12-18 | End: 2024-12-18

## 2024-12-18 ASSESSMENT — PATIENT HEALTH QUESTIONNAIRE - PHQ9
4. FEELING TIRED OR HAVING LITTLE ENERGY: NEARLY EVERY DAY
7. TROUBLE CONCENTRATING ON THINGS, SUCH AS READING THE NEWSPAPER OR WATCHING TELEVISION: NOT AT ALL
1. LITTLE INTEREST OR PLEASURE IN DOING THINGS: MORE THAN HALF THE DAYS
6. FEELING BAD ABOUT YOURSELF - OR THAT YOU ARE A FAILURE OR HAVE LET YOURSELF OR YOUR FAMILY DOWN: NEARLY EVERY DAY
10. IF YOU CHECKED OFF ANY PROBLEMS, HOW DIFFICULT HAVE THESE PROBLEMS MADE IT FOR YOU TO DO YOUR WORK, TAKE CARE OF THINGS AT HOME, OR GET ALONG WITH OTHER PEOPLE: SOMEWHAT DIFFICULT
5. POOR APPETITE OR OVEREATING: NEARLY EVERY DAY
9. THOUGHTS THAT YOU WOULD BE BETTER OFF DEAD, OR OF HURTING YOURSELF: NOT AT ALL
3. TROUBLE FALLING OR STAYING ASLEEP OR SLEEPING TOO MUCH: NEARLY EVERY DAY
2. FEELING DOWN, DEPRESSED OR HOPELESS: NEARLY EVERY DAY
8. MOVING OR SPEAKING SO SLOWLY THAT OTHER PEOPLE COULD HAVE NOTICED. OR THE OPPOSITE, BEING SO FIGETY OR RESTLESS THAT YOU HAVE BEEN MOVING AROUND A LOT MORE THAN USUAL: NOT AT ALL
SUM OF ALL RESPONSES TO PHQ9 QUESTIONS 1 AND 2: 5
SUM OF ALL RESPONSES TO PHQ QUESTIONS 1-9: 17

## 2024-12-18 ASSESSMENT — ENCOUNTER SYMPTOMS
OCCASIONAL FEELINGS OF UNSTEADINESS: 1
LOSS OF SENSATION IN FEET: 1
DEPRESSION: 1

## 2024-12-18 ASSESSMENT — COLUMBIA-SUICIDE SEVERITY RATING SCALE - C-SSRS
6. HAVE YOU EVER DONE ANYTHING, STARTED TO DO ANYTHING, OR PREPARED TO DO ANYTHING TO END YOUR LIFE?: YES
1. IN THE PAST MONTH, HAVE YOU WISHED YOU WERE DEAD OR WISHED YOU COULD GO TO SLEEP AND NOT WAKE UP?: NO
2. HAVE YOU ACTUALLY HAD ANY THOUGHTS OF KILLING YOURSELF?: NO

## 2024-12-18 ASSESSMENT — PAIN SCALES - GENERAL: PAINLEVEL_OUTOF10: 6

## 2024-12-23 PROCEDURE — RXMED WILLOW AMBULATORY MEDICATION CHARGE

## 2024-12-26 ENCOUNTER — PHARMACY VISIT (OUTPATIENT)
Dept: PHARMACY | Facility: CLINIC | Age: 59
End: 2024-12-26
Payer: MEDICARE

## 2024-12-27 DIAGNOSIS — J43.9 PULMONARY EMPHYSEMA, UNSPECIFIED EMPHYSEMA TYPE (MULTI): ICD-10-CM

## 2024-12-27 RX ORDER — FLUTICASONE FUROATE, UMECLIDINIUM BROMIDE AND VILANTEROL TRIFENATATE 200; 62.5; 25 UG/1; UG/1; UG/1
POWDER RESPIRATORY (INHALATION)
Qty: 1 EACH | Refills: 2 | Status: SHIPPED | OUTPATIENT
Start: 2024-12-27

## 2024-12-31 NOTE — TELEPHONE ENCOUNTER
If she is taking Acyclovir (this is on her med list), she can take Acyclovir 800 mg twice a day for 5 days. So 4 tabs of the 200 mg twice daily for the next 5 days.   Thanks  
Left a message for patient to call back   
Patient says she went to [place] 06/to 06/10 and she came home and had broken out on her vagina and thighs she had blisters they went away over night now it is peeling with little blisters . Its very raw   should she come in and should she up her acyclovir she takes 1 a day 200mg   
Pt told- She does not have enough Acyclovir 200mg on hand. Per Florinda she will send in a new prescription for 800mg BID x 5 days    Patient told..verbalized understanding    medication pended for Florinda Riddle CNP   
Bed in lowest position, wheels locked, appropriate side rails in place/Call bell, personal items and telephone in reach/Instruct patient to call for assistance before getting out of bed or chair/Non-slip footwear when patient is out of bed/[place] to call system/Physically safe environment - no spills, clutter or unnecessary equipment/Purposeful Proactive Rounding/Room/bathroom lighting operational, light cord in reach

## 2025-01-02 ENCOUNTER — APPOINTMENT (OUTPATIENT)
Dept: CARDIOLOGY | Facility: HOSPITAL | Age: 60
End: 2025-01-02
Payer: COMMERCIAL

## 2025-01-02 NOTE — PROGRESS NOTES
Referred by self-referral for No chief complaint on file.     History Of Present Illness:    Kelli Ortega is a 59 y.o. female presenting to [facility] care with complaint of tachycardia.   H/o tachycardia, HLD, IGT, nicotine dependence, emphysema, neurogenic claudication due to lumbar spinal stenosis, osteoporosis, selective deficiency of IgG, hypogammaglobulinemia, IBS, GERD, anxiety.    Pt completed a 7-day Holter 11/2024 with Dr. Dyer (PCP) which did not show any significant abnormal rhythms, no prolonged tachycardia, and was essentially within normal limits.     Pt is scheduled for left temporal artery biopsy by Dr. Hernandez on 1/9/25. She has been experiencing chronic headaches and tenderness on the temporal region more prominent on the left than the right, with associated blurred vision again more prominent on the left than the right.     Review Of Systems:  The remainder of the review of systems was obtained, and was negative as pertains to the chief complaint.    CV testing and labs reviewed:    Labs 11/2024: K 4.2  BUN 12  Cr 0.93  ALP 28  ALT 17  AST 20  HgA1C 6.2  H&H 11.5 34.9  Lipid panel 11/2024:   HDL 56.1  LDL 99      Holter monitor 11/2024: SR. 1 run of SVT lasting 5 beats with max rate of 152 bpm. Isolated SVEs, VEs, SVE Couplets and Triplets were all rare. No VE Couplets or Triplets.     EKG 11/2024: normal rate. NSR.     Past Medical History:  She has a past medical history of Asthma, Bipolar disorder, unspecified (Multi), Chronic pain, Colitis due to Clostridioides difficile (11/20/2024), COPD (chronic obstructive pulmonary disease) (Multi), Diabetes (Multi), Diet-controlled type 2 diabetes mellitus (05/22/2024), GERD (gastroesophageal reflux disease), History of sexually transmitted disease (11/20/2024), Hypercholesteremia, Hypogammaglobulinemia (Multi), IBS (irritable bowel syndrome), Migraines, Osteoporosis, Panic disorder (episodic paroxysmal anxiety), SBO (small bowel  obstruction) (Multi) (03/2024), Transient amnesia (05/22/2023), and Vitamin B12 deficiency (05/22/2023).    Past Surgical History:  She has a past surgical history that includes Appendectomy; Hysterectomy; Esophagogastroduodenoscopy; Bladder surgery; Small intestine surgery; Upper gastrointestinal endoscopy; Colonoscopy; Tonsillectomy; and Other surgical history.      Social History:  She reports that she has quit smoking. Her smoking use included cigarettes. She has a 21 pack-year smoking history. She has never used smokeless tobacco. She reports that she does not currently use alcohol. She reports that she does not currently use drugs.    Family History:  Family History   Problem Relation Name Age of Onset    Diabetes Mother      Hypertension Mother      Breast cancer Sister      Asthma Son      Cancer Other uncle         Allergies:  Amitriptyline, Bupropion, Cefazolin, Diclofenac, Divalproex sodium, Doxycycline, Grapefruit, Guaifenesin, Hydrocodone-acetaminophen, Hydroxyzine, Ibuprofen, Ketorolac, Lepirudin, Loperamide, Meloxicam, Milnacipran, Mirtazapine, Morphine, Nabumetone, Ondansetron hcl, Other, Paroxetine, Tramadol, and Ultracet [tramadol-acetaminophen]    Outpatient Medications:  Current Outpatient Medications   Medication Instructions    acyclovir (ZOVIRAX) 200 mg, oral, Daily    albuterol 90 mcg/actuation inhaler INHALE 2 PUFFS BY MOUTH EVERY FOUR (4) HOURS IF NEEDED FOR WHEEZING/SHORTNESS OF BREATH    busPIRone (Buspar) 15 mg tablet TAKE 2 TABLETS 3 TIMES DAILY.    cholecalciferol (Vitamin D-3) 50,000 unit capsule TAKE ONE (1) CAPSULE BY MOUTH EVERY OTHER WEEK.    clobetasol (Temovate) 0.05 % ointment Topical, 2 times daily    cyclobenzaprine (FLEXERIL) 10 mg, oral, 3 times daily PRN    diazePAM (Valium) 5 mg tablet TAKE AS DIRECTED. TAKE 1 TABLET ORALLY TWICE DAILY AND 1 TABLET ORALLY DAILY IF NEEDED FOR ANXIETY.    dicyclomine (Bentyl) 20 mg tablet TAKE ONE TO TWO TABLETS BY MOUTH THREE (3) TIMES PER  DAY OR AS NEEDED FOR PAIN. **(BENTYL 20 MG TAB EQUIV)**    fluticasone-umeclidin-vilanter (Trelegy Ellipta) 200-62.5-25 mcg blister with device INHALE 1 PUFF BY MOUTH AND INTO THE LUNGS DAILY RINSE MOUTH AFTER EACH USE    galcanezumab (Emgality) 120 mg/mL auto-injector Inject 1 pen (120 mg) under the skin every 28 (twenty-eight) days.    immune globulin, human,, IgG, (Gamunex-C) 10% solution infusion Every 28 days    ipratropium-albuteroL (Duo-Neb) 0.5-2.5 mg/3 mL nebulizer solution USE 1 VIAL IN NEBULIZER 4 TIMES DAILY    lidocaine (Xylocaine) 5 % ointment Topical, As needed, USE UP TO 5 TIMES A DAY AS NEEDED, may disp 30 day supply and substitute    loratadine (CLARITIN) 10 mg, oral, Daily    multivitamin, stress formula 400 mcg tablet oral, Daily    nebulizer accessories kit 1 each, miscellaneous, Daily, Needs a replacement mask- hers is not working    nystatin (Mycostatin) cream Apply topically. APPLY  TOPICALLY TO MOUTH AND LIPS PRN    omeprazole (PRILOSEC) 40 mg, oral, 2 times daily before meals, Do not crush or chew.    ondansetron ODT (ZOFRAN-ODT) 8 mg, oral, Every 8 hours PRN    polyethylene glycol (Miralax) 17 gram/dose powder MIX 1 CAPFUL IN 8 OUNCES OF WATER AND DRINK AT BEDTIME AS NEEDED FOR CONSTIPATION.    pravastatin (Pravachol) 20 mg tablet TAKE ONE (1) TABLET BY MOUTH EVERY EVENING AT BEDTIME    pregabalin (LYRICA) 150 mg, oral, 3 times daily, 30 day rx    rizatriptan (Maxalt) 10 mg tablet TAKE 1 TABLET BY MOUTH 1 TIME IF NEEDED FOR MIGRAINE (MAY REPEAT X1) FOR UP TO 9 DOSES. MAY REPEAT IN 2 HOURS IF UNRESOLVED. DO NOT EXCEED 3/24HOURS    simethicone (Mylicon) 125 mg chewable tablet 4 times daily with meals and nightly    topiramate (TOPAMAX) 25 mg, 2 times daily    zolpidem (Ambien) 10 mg tablet Nightly PRN        Last Recorded Vitals:  There were no vitals filed for this visit.    Physical Exam:  Physical Exam  HENT:      Head: Normocephalic.      Nose: Nose normal.      Mouth/Throat:      Mouth:  Mucous membranes are moist.   Pulmonary:      Effort: Pulmonary effort is normal.   Abdominal:      Palpations: Abdomen is soft.   Musculoskeletal:         General: Normal range of motion.      Cervical back: Normal range of motion.   Skin:     General: Skin is warm and dry.   Neurological:      Mental Status: She is alert.   Psychiatric:         Mood and Affect: Mood normal.        Last Labs:  CBC -  Lab Results   Component Value Date    WBC 11.3 11/20/2024    HGB 11.5 (L) 11/20/2024    HCT 34.9 (L) 11/20/2024    MCV 89 11/20/2024     11/20/2024       CMP -  Lab Results   Component Value Date    CALCIUM 9.2 11/20/2024    PHOS 3.5 03/08/2024    PROT 7.2 11/20/2024    ALBUMIN 4.2 11/20/2024    AST 20 11/20/2024    ALT 17 11/20/2024    ALKPHOS 28 (L) 11/20/2024    BILITOT 0.2 11/20/2024       LIPID PANEL -   Lab Results   Component Value Date    CHOL 200 (H) 11/20/2024    TRIG 224 (H) 11/20/2024    HDL 56.1 11/20/2024    CHHDL 3.6 11/20/2024    LDLF 128 (H) 05/23/2023    VLDL 45 (H) 11/20/2024    NHDL 144 11/20/2024       RENAL FUNCTION PANEL -   Lab Results   Component Value Date    GLUCOSE 87 11/20/2024     11/20/2024    K 4.2 11/20/2024     (H) 11/20/2024    CO2 25 11/20/2024    ANIONGAP 10 11/20/2024    BUN 12 11/20/2024    CREATININE 0.93 11/20/2024    CALCIUM 9.2 11/20/2024    PHOS 3.5 03/08/2024    ALBUMIN 4.2 11/20/2024        Lab Results   Component Value Date    HGBA1C 6.2 (H) 11/20/2024    HGBA1C 5.7 11/20/2024     Assessment/Plan   Tachycardia:  Holter 11/2024 per PCP was essentially WNL. ***    HLD: FLP 11/2024 not at goal.  -continue pravastatin    Nicotine dependence:  -encouraged cessation    Scribe Attestation  By signing my name below, I, Michelle Chinchilla Scriboswaldo   attest that this documentation has been prepared under the direction and in the presence of Bernadette Schuler MD.

## 2025-01-03 NOTE — PROGRESS NOTES
Referred by self-referral for tachycardia     History Of Present Illness:    Kelli Ortega is a 59 y.o. female presenting to [facility] care with complaint of tachycardia.   H/o tachycardia, HLD, IGT, nicotine dependence, emphysema, neurogenic claudication due to lumbar spinal stenosis, osteoporosis, selective deficiency of IgG, hypogammaglobulinemia, IBS, GERD, anxiety.    Pt completed a 7-day Holter 11/2024 with Dr. Dyer (PCP) which did not show any significant abnormal rhythms, no prolonged tachycardia, and was essentially within normal limits.     Pt is s/p left temporal artery biopsy by Dr. Hernandez on 1/9/25. She had been experiencing chronic headaches and tenderness on the temporal region more prominent on the left than the right, with associated blurred vision again more prominent on the left than the right.     Review Of Systems:  The remainder of the review of systems was obtained, and was negative as pertains to the chief complaint.    CV testing and labs reviewed:    Labs 11/2024: K 4.2  BUN 12  Cr 0.93  ALP 28  ALT 17  AST 20  HgA1C 6.2  H&H 11.5 34.9  Lipid panel 11/2024:   HDL 56.1  LDL 99      Holter monitor 11/2024: SR. 1 run of SVT lasting 5 beats with max rate of 152 bpm. Isolated SVEs, VEs, SVE Couplets and Triplets were all rare. No VE Couplets or Triplets.     EKG 11/2024: normal rate. NSR.     Past Medical History:  She has a past medical history of Asthma, Bipolar disorder, unspecified (Multi), Chronic pain, Colitis due to Clostridioides difficile (11/20/2024), COPD (chronic obstructive pulmonary disease) (Multi), Diabetes (Multi), Diet-controlled type 2 diabetes mellitus (05/22/2024), GERD (gastroesophageal reflux disease), History of sexually transmitted disease (11/20/2024), Hypercholesteremia, Hypogammaglobulinemia (Multi), IBS (irritable bowel syndrome), Migraines, Osteoporosis, Panic disorder (episodic paroxysmal anxiety), SBO (small bowel obstruction) (Multi) (03/2024),  Transient amnesia (05/22/2023), and Vitamin B12 deficiency (05/22/2023).    Past Surgical History:  She has a past surgical history that includes Appendectomy; Hysterectomy; Esophagogastroduodenoscopy; Bladder surgery; Small intestine surgery; Upper gastrointestinal endoscopy; Colonoscopy; Tonsillectomy; and Other surgical history.      Social History:  She reports that she has quit smoking. Her smoking use included cigarettes. She has a 21 pack-year smoking history. She has never used smokeless tobacco. She reports that she does not currently use alcohol. She reports that she does not currently use drugs.    Family History:  Family History   Problem Relation Name Age of Onset    Diabetes Mother      Hypertension Mother      Breast cancer Sister      Asthma Son      Cancer Other uncle         Allergies:  Amitriptyline, Bupropion, Cefazolin, Diclofenac, Divalproex sodium, Doxycycline, Grapefruit, Guaifenesin, Hydrocodone-acetaminophen, Hydroxyzine, Ibuprofen, Ketorolac, Lepirudin, Loperamide, Meloxicam, Milnacipran, Mirtazapine, Morphine, Nabumetone, Ondansetron hcl, Other, Paroxetine, Tramadol, and Ultracet [tramadol-acetaminophen]    Outpatient Medications:  Current Outpatient Medications   Medication Instructions    acyclovir (ZOVIRAX) 200 mg, oral, Daily    albuterol 90 mcg/actuation inhaler INHALE 2 PUFFS BY MOUTH EVERY FOUR (4) HOURS IF NEEDED FOR WHEEZING/SHORTNESS OF BREATH    busPIRone (Buspar) 15 mg tablet TAKE 2 TABLETS 3 TIMES DAILY.    cholecalciferol (Vitamin D-3) 50,000 unit capsule TAKE ONE (1) CAPSULE BY MOUTH EVERY OTHER WEEK.    clobetasol (Temovate) 0.05 % ointment Topical, 2 times daily    cyclobenzaprine (FLEXERIL) 10 mg, oral, 3 times daily PRN    diazePAM (Valium) 5 mg tablet TAKE AS DIRECTED. TAKE 1 TABLET ORALLY TWICE DAILY AND 1 TABLET ORALLY DAILY IF NEEDED FOR ANXIETY.    dicyclomine (Bentyl) 20 mg tablet TAKE ONE TO TWO TABLETS BY MOUTH THREE (3) TIMES PER DAY OR AS NEEDED FOR PAIN.  **(BENTYL 20 MG TAB EQUIV)**    fluticasone-umeclidin-vilanter (Trelegy Ellipta) 200-62.5-25 mcg blister with device INHALE 1 PUFF BY MOUTH AND INTO THE LUNGS DAILY RINSE MOUTH AFTER EACH USE    galcanezumab (Emgality) 120 mg/mL auto-injector Inject 1 pen (120 mg) under the skin every 28 (twenty-eight) days.    immune globulin, human,, IgG, (Gamunex-C) 10% solution infusion Every 28 days    ipratropium-albuteroL (Duo-Neb) 0.5-2.5 mg/3 mL nebulizer solution USE 1 VIAL IN NEBULIZER 4 TIMES DAILY    lidocaine (Xylocaine) 5 % ointment Topical, As needed, USE UP TO 5 TIMES A DAY AS NEEDED, may disp 30 day supply and substitute    loratadine (CLARITIN) 10 mg, oral, Daily    multivitamin, stress formula 400 mcg tablet oral, Daily    nebulizer accessories kit 1 each, miscellaneous, Daily, Needs a replacement mask- hers is not working    nystatin (Mycostatin) cream Apply topically. APPLY  TOPICALLY TO MOUTH AND LIPS PRN    omeprazole (PRILOSEC) 40 mg, oral, 2 times daily before meals, Do not crush or chew.    ondansetron ODT (ZOFRAN-ODT) 8 mg, oral, Every 8 hours PRN    polyethylene glycol (Miralax) 17 gram/dose powder MIX 1 CAPFUL IN 8 OUNCES OF WATER AND DRINK AT BEDTIME AS NEEDED FOR CONSTIPATION.    pravastatin (Pravachol) 20 mg tablet TAKE ONE (1) TABLET BY MOUTH EVERY EVENING AT BEDTIME    pregabalin (LYRICA) 150 mg, oral, 3 times daily, 30 day rx    rizatriptan (Maxalt) 10 mg tablet TAKE 1 TABLET BY MOUTH 1 TIME IF NEEDED FOR MIGRAINE (MAY REPEAT X1) FOR UP TO 9 DOSES. MAY REPEAT IN 2 HOURS IF UNRESOLVED. DO NOT EXCEED 3/24HOURS    simethicone (Mylicon) 125 mg chewable tablet 4 times daily with meals and nightly    topiramate (TOPAMAX) 25 mg, 2 times daily    zolpidem (Ambien) 10 mg tablet Nightly PRN        Last Recorded Vitals:  There were no vitals filed for this visit.    Physical Exam:  Physical Exam  HENT:      Head: Normocephalic.      Nose: Nose normal.      Mouth/Throat:      Mouth: Mucous membranes are  moist.   Pulmonary:      Effort: Pulmonary effort is normal.   Abdominal:      Palpations: Abdomen is soft.   Musculoskeletal:         General: Normal range of motion.      Cervical back: Normal range of motion.   Skin:     General: Skin is warm and dry.   Neurological:      Mental Status: She is alert.   Psychiatric:         Mood and Affect: Mood normal.        Last Labs:  CBC -  Lab Results   Component Value Date    WBC 11.3 11/20/2024    HGB 11.5 (L) 11/20/2024    HCT 34.9 (L) 11/20/2024    MCV 89 11/20/2024     11/20/2024       CMP -  Lab Results   Component Value Date    CALCIUM 9.2 11/20/2024    PHOS 3.5 03/08/2024    PROT 7.2 11/20/2024    ALBUMIN 4.2 11/20/2024    AST 20 11/20/2024    ALT 17 11/20/2024    ALKPHOS 28 (L) 11/20/2024    BILITOT 0.2 11/20/2024       LIPID PANEL -   Lab Results   Component Value Date    CHOL 200 (H) 11/20/2024    TRIG 224 (H) 11/20/2024    HDL 56.1 11/20/2024    CHHDL 3.6 11/20/2024    LDLF 128 (H) 05/23/2023    VLDL 45 (H) 11/20/2024    NHDL 144 11/20/2024       RENAL FUNCTION PANEL -   Lab Results   Component Value Date    GLUCOSE 87 11/20/2024     11/20/2024    K 4.2 11/20/2024     (H) 11/20/2024    CO2 25 11/20/2024    ANIONGAP 10 11/20/2024    BUN 12 11/20/2024    CREATININE 0.93 11/20/2024    CALCIUM 9.2 11/20/2024    PHOS 3.5 03/08/2024    ALBUMIN 4.2 11/20/2024        Lab Results   Component Value Date    HGBA1C 6.2 (H) 11/20/2024    HGBA1C 5.7 11/20/2024     Assessment/Plan   Tachycardia:  Holter 11/2024 per PCP was essentially WNL. ***    HLD: FLP 11/2024 not at goal.  -continue pravastatin    Nicotine dependence:  -encouraged cessation    Scribe Attestation  By signing my name below, I, Michelle Chinchilla, Scribe   attest that this documentation has been prepared under the direction and in the presence of Bernadette Schuler MD.

## 2025-01-10 ENCOUNTER — TELEPHONE (OUTPATIENT)
Dept: PRIMARY CARE | Facility: CLINIC | Age: 60
End: 2025-01-10
Payer: COMMERCIAL

## 2025-01-10 DIAGNOSIS — G89.29 CHRONIC NECK PAIN: ICD-10-CM

## 2025-01-10 DIAGNOSIS — M54.2 CHRONIC NECK PAIN: ICD-10-CM

## 2025-01-10 RX ORDER — LIDOCAINE 50 MG/G
OINTMENT TOPICAL AS NEEDED
Qty: 90 G | Refills: 0 | Status: SHIPPED | OUTPATIENT
Start: 2025-01-10

## 2025-01-15 ENCOUNTER — INFUSION (OUTPATIENT)
Dept: INFUSION THERAPY | Facility: HOSPITAL | Age: 60
End: 2025-01-15
Payer: COMMERCIAL

## 2025-01-15 VITALS
TEMPERATURE: 98.2 F | HEART RATE: 78 BPM | BODY MASS INDEX: 23.71 KG/M2 | WEIGHT: 133.82 LBS | OXYGEN SATURATION: 97 % | SYSTOLIC BLOOD PRESSURE: 101 MMHG | DIASTOLIC BLOOD PRESSURE: 66 MMHG | RESPIRATION RATE: 16 BRPM

## 2025-01-15 DIAGNOSIS — D80.3 SELECTIVE DEFICIENCY OF IGG (MULTI): ICD-10-CM

## 2025-01-15 DIAGNOSIS — M81.0 OSTEOPOROSIS, UNSPECIFIED OSTEOPOROSIS TYPE, UNSPECIFIED PATHOLOGICAL FRACTURE PRESENCE: ICD-10-CM

## 2025-01-15 PROCEDURE — 96366 THER/PROPH/DIAG IV INF ADDON: CPT | Mod: INF

## 2025-01-15 PROCEDURE — 2500000004 HC RX 250 GENERAL PHARMACY W/ HCPCS (ALT 636 FOR OP/ED): Performed by: INTERNAL MEDICINE

## 2025-01-15 PROCEDURE — 2500000001 HC RX 250 WO HCPCS SELF ADMINISTERED DRUGS (ALT 637 FOR MEDICARE OP): Performed by: INTERNAL MEDICINE

## 2025-01-15 PROCEDURE — 96365 THER/PROPH/DIAG IV INF INIT: CPT | Mod: INF

## 2025-01-15 RX ORDER — HEPARIN 100 UNIT/ML
500 SYRINGE INTRAVENOUS AS NEEDED
OUTPATIENT
Start: 2025-01-15

## 2025-01-15 RX ORDER — METOPROLOL SUCCINATE 25 MG/1
25 TABLET, EXTENDED RELEASE ORAL DAILY
COMMUNITY

## 2025-01-15 RX ORDER — ACETAMINOPHEN 325 MG/1
650 TABLET ORAL ONCE
Status: COMPLETED | OUTPATIENT
Start: 2025-01-15 | End: 2025-01-15

## 2025-01-15 RX ORDER — DIPHENHYDRAMINE HCL 25 MG
25 CAPSULE ORAL ONCE
OUTPATIENT
Start: 2025-02-12

## 2025-01-15 RX ORDER — ACETAMINOPHEN 325 MG/1
650 TABLET ORAL ONCE
OUTPATIENT
Start: 2025-02-12

## 2025-01-15 RX ORDER — DIPHENHYDRAMINE HCL 25 MG
25 CAPSULE ORAL ONCE
Status: COMPLETED | OUTPATIENT
Start: 2025-01-15 | End: 2025-01-15

## 2025-01-15 RX ADMIN — DIPHENHYDRAMINE HYDROCHLORIDE 25 MG: 25 CAPSULE ORAL at 08:46

## 2025-01-15 RX ADMIN — IMMUNE GLOBULIN (HUMAN) 25 G: 10 INJECTION INTRAVENOUS; SUBCUTANEOUS at 09:22

## 2025-01-15 RX ADMIN — ACETAMINOPHEN 650 MG: 325 TABLET ORAL at 08:46

## 2025-01-15 ASSESSMENT — PATIENT HEALTH QUESTIONNAIRE - PHQ9
9. THOUGHTS THAT YOU WOULD BE BETTER OFF DEAD, OR OF HURTING YOURSELF: NOT AT ALL
1. LITTLE INTEREST OR PLEASURE IN DOING THINGS: SEVERAL DAYS
7. TROUBLE CONCENTRATING ON THINGS, SUCH AS READING THE NEWSPAPER OR WATCHING TELEVISION: NOT AT ALL
10. IF YOU CHECKED OFF ANY PROBLEMS, HOW DIFFICULT HAVE THESE PROBLEMS MADE IT FOR YOU TO DO YOUR WORK, TAKE CARE OF THINGS AT HOME, OR GET ALONG WITH OTHER PEOPLE: SOMEWHAT DIFFICULT
6. FEELING BAD ABOUT YOURSELF - OR THAT YOU ARE A FAILURE OR HAVE LET YOURSELF OR YOUR FAMILY DOWN: SEVERAL DAYS
SUM OF ALL RESPONSES TO PHQ QUESTIONS 1-9: 6
SUM OF ALL RESPONSES TO PHQ9 QUESTIONS 1 AND 2: 2
3. TROUBLE FALLING OR STAYING ASLEEP OR SLEEPING TOO MUCH: SEVERAL DAYS
2. FEELING DOWN, DEPRESSED OR HOPELESS: SEVERAL DAYS
8. MOVING OR SPEAKING SO SLOWLY THAT OTHER PEOPLE COULD HAVE NOTICED. OR THE OPPOSITE, BEING SO FIGETY OR RESTLESS THAT YOU HAVE BEEN MOVING AROUND A LOT MORE THAN USUAL: NOT AT ALL
4. FEELING TIRED OR HAVING LITTLE ENERGY: SEVERAL DAYS
5. POOR APPETITE OR OVEREATING: SEVERAL DAYS

## 2025-01-15 ASSESSMENT — ENCOUNTER SYMPTOMS
OCCASIONAL FEELINGS OF UNSTEADINESS: 1
LOSS OF SENSATION IN FEET: 1
DEPRESSION: 1

## 2025-01-15 ASSESSMENT — PAIN SCALES - GENERAL: PAINLEVEL_OUTOF10: 7

## 2025-01-16 ENCOUNTER — APPOINTMENT (OUTPATIENT)
Dept: OBSTETRICS AND GYNECOLOGY | Facility: CLINIC | Age: 60
End: 2025-01-16
Payer: MEDICARE

## 2025-01-20 PROCEDURE — RXMED WILLOW AMBULATORY MEDICATION CHARGE

## 2025-01-21 ENCOUNTER — APPOINTMENT (OUTPATIENT)
Dept: CARDIOLOGY | Facility: HOSPITAL | Age: 60
End: 2025-01-21
Payer: COMMERCIAL

## 2025-01-22 ENCOUNTER — PHARMACY VISIT (OUTPATIENT)
Dept: PHARMACY | Facility: CLINIC | Age: 60
End: 2025-01-22
Payer: COMMERCIAL

## 2025-01-23 ENCOUNTER — TELEPHONE (OUTPATIENT)
Dept: CARDIOLOGY | Facility: HOSPITAL | Age: 60
End: 2025-01-23
Payer: COMMERCIAL

## 2025-01-23 NOTE — TELEPHONE ENCOUNTER
NENA returning patient's VM at this time about rescheduling her appt because she was sick.    Please call the office to get that rescheduled     Thank you!  Rico REID

## 2025-01-24 ENCOUNTER — TELEPHONE (OUTPATIENT)
Dept: PRIMARY CARE | Facility: CLINIC | Age: 60
End: 2025-01-24
Payer: COMMERCIAL

## 2025-01-24 ENCOUNTER — APPOINTMENT (OUTPATIENT)
Dept: NEUROLOGY | Facility: HOSPITAL | Age: 60
End: 2025-01-24
Payer: COMMERCIAL

## 2025-01-24 DIAGNOSIS — K58.2 IRRITABLE BOWEL SYNDROME WITH BOTH CONSTIPATION AND DIARRHEA: ICD-10-CM

## 2025-01-24 RX ORDER — ONDANSETRON 8 MG/1
8 TABLET, ORALLY DISINTEGRATING ORAL EVERY 8 HOURS PRN
Qty: 60 TABLET | Refills: 0 | Status: SHIPPED | OUTPATIENT
Start: 2025-01-24

## 2025-01-24 NOTE — TELEPHONE ENCOUNTER
Rx Refill Request Telephone Encounter    Name:  Kelli Salmerondeborah  :  [id number]  Medication Name:  Zofran-ODT  8 mg        Take 1 tablet by mouth every 8 hours if needed for nausea or vomiting  Specific Pharmacy location:  [facility]  Date of last appointment:  2024

## 2025-01-31 ENCOUNTER — TELEPHONE (OUTPATIENT)
Dept: PRIMARY CARE | Facility: CLINIC | Age: 60
End: 2025-01-31

## 2025-01-31 ENCOUNTER — OFFICE VISIT (OUTPATIENT)
Dept: NEUROLOGY | Facility: HOSPITAL | Age: 60
End: 2025-01-31
Payer: COMMERCIAL

## 2025-01-31 VITALS
BODY MASS INDEX: 23.84 KG/M2 | HEART RATE: 94 BPM | DIASTOLIC BLOOD PRESSURE: 77 MMHG | SYSTOLIC BLOOD PRESSURE: 113 MMHG | WEIGHT: 134.6 LBS

## 2025-01-31 DIAGNOSIS — Z86.59 HISTORY OF DEPRESSION: ICD-10-CM

## 2025-01-31 DIAGNOSIS — G43.E09 CHRONIC MIGRAINE WITH AURA WITHOUT STATUS MIGRAINOSUS, NOT INTRACTABLE: Primary | ICD-10-CM

## 2025-01-31 PROCEDURE — 99214 OFFICE O/P EST MOD 30 MIN: CPT | Performed by: NURSE PRACTITIONER

## 2025-01-31 RX ORDER — RIZATRIPTAN BENZOATE 10 MG/1
10 TABLET ORAL ONCE AS NEEDED
Qty: 9 TABLET | Refills: 11 | Status: SHIPPED | OUTPATIENT
Start: 2025-01-31

## 2025-01-31 ASSESSMENT — PATIENT HEALTH QUESTIONNAIRE - PHQ9
7. TROUBLE CONCENTRATING ON THINGS, SUCH AS READING THE NEWSPAPER OR WATCHING TELEVISION: SEVERAL DAYS
8. MOVING OR SPEAKING SO SLOWLY THAT OTHER PEOPLE COULD HAVE NOTICED. OR THE OPPOSITE, BEING SO FIGETY OR RESTLESS THAT YOU HAVE BEEN MOVING AROUND A LOT MORE THAN USUAL: SEVERAL DAYS
SUM OF ALL RESPONSES TO PHQ QUESTIONS 1-9: 14
10. IF YOU CHECKED OFF ANY PROBLEMS, HOW DIFFICULT HAVE THESE PROBLEMS MADE IT FOR YOU TO DO YOUR WORK, TAKE CARE OF THINGS AT HOME, OR GET ALONG WITH OTHER PEOPLE: VERY DIFFICULT
3. TROUBLE FALLING OR STAYING ASLEEP OR SLEEPING TOO MUCH: MORE THAN HALF THE DAYS
1. LITTLE INTEREST OR PLEASURE IN DOING THINGS: NEARLY EVERY DAY
9. THOUGHTS THAT YOU WOULD BE BETTER OFF DEAD, OR OF HURTING YOURSELF: NOT AT ALL
SUM OF ALL RESPONSES TO PHQ9 QUESTIONS 1 AND 2: 6
5. POOR APPETITE OR OVEREATING: SEVERAL DAYS
4. FEELING TIRED OR HAVING LITTLE ENERGY: MORE THAN HALF THE DAYS
6. FEELING BAD ABOUT YOURSELF - OR THAT YOU ARE A FAILURE OR HAVE LET YOURSELF OR YOUR FAMILY DOWN: SEVERAL DAYS
2. FEELING DOWN, DEPRESSED OR HOPELESS: NEARLY EVERY DAY

## 2025-01-31 ASSESSMENT — ENCOUNTER SYMPTOMS
OCCASIONAL FEELINGS OF UNSTEADINESS: 1
DEPRESSION: 1
LOSS OF SENSATION IN FEET: 1

## 2025-01-31 ASSESSMENT — COLUMBIA-SUICIDE SEVERITY RATING SCALE - C-SSRS
2. HAVE YOU ACTUALLY HAD ANY THOUGHTS OF KILLING YOURSELF?: NO
1. IN THE PAST MONTH, HAVE YOU WISHED YOU WERE DEAD OR WISHED YOU COULD GO TO SLEEP AND NOT WAKE UP?: NO
6. HAVE YOU EVER DONE ANYTHING, STARTED TO DO ANYTHING, OR PREPARED TO DO ANYTHING TO END YOUR LIFE?: YES

## 2025-01-31 ASSESSMENT — PAIN SCALES - GENERAL: PAINLEVEL_OUTOF10: 7
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
## 2025-02-09 NOTE — PROGRESS NOTES
"Referred by self-referral for tachycardia     History Of Present Illness:    Kelli Ortega is a 59 y.o. female presenting to establish care with complaint of tachycardia.   H/o tachycardia, HLD, IGT, nicotine dependence, emphysema, neurogenic claudication due to lumbar spinal stenosis, osteoporosis, selective deficiency of IgG, hypogammaglobulinemia, IBS, GERD, anxiety.    Pt c/o tachycardia onset in 3/2024, always occurs when speaking to family, and notes issues with her sister. Pt completed a  Holter worn 11/26-30/24 with Dr. Dyer (PCP) which did not show any significant abnormal rhythms, no prolonged tachycardia, and was essentially within normal limits. She was started on metoprolol in 12/2024 by Dr. Valerio ([facility]). Since starting it, she is unsure if she has noticed a difference. Of note, she is a \"very nervous person\" and needs to take a Valium in the morning to stop shaking. Last night, she describes a burning sensation in the middle of her heart, lasting 1 hour, and resolved after lying down. She wakes up feeling dehydrated.    FHx: mother has a pacemaker. Uncle has a pacemaker. Grandmother passed from MI.  SocHx: smoker (trying to quit, last cigarette Saturday night, and also trying to wean off vaping)  Activity:  Diet: rarely has caffeinated coffee (often decaf)    Review Of Systems:  The remainder of the review of systems was obtained, and was negative as pertains to the chief complaint.    CV testing and labs reviewed:  Labs 11/2024: K 4.2  BUN 12  Cr 0.93  ALP 28  ALT 17  AST 20  HgA1C 6.2  H&H 11.5 34.9  Lipid panel 11/2024:   HDL 56.1  LDL 99      Holter monitor worn 11/26-30/24: SR. 1 run of SVT lasting 5 beats with max rate of 152 bpm. Isolated SVEs, VEs, SVE Couplets and Triplets were all rare. No VE Couplets or Triplets.     EKG 11/2024: normal rate. NSR.     Past Medical History:  She has a past medical history of Asthma, Bipolar disorder, unspecified (Multi), Chronic pain, " Colitis due to Clostridioides difficile (11/20/2024), COPD (chronic obstructive pulmonary disease) (Multi), Diabetes (Multi), Diet-controlled type 2 diabetes mellitus (05/22/2024), GERD (gastroesophageal reflux disease), History of sexually transmitted disease (11/20/2024), Hypercholesteremia, Hypogammaglobulinemia (Multi), IBS (irritable bowel syndrome), Migraines, Osteoporosis, Panic disorder (episodic paroxysmal anxiety), SBO (small bowel obstruction) (Multi) (03/2024), 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    Past Surgical History:  She has a past surgical history that includes Appendectomy; Hysterectomy; Esophagogastroduodenoscopy; Bladder surgery; Small intestine surgery; Upper gastrointestinal endoscopy; Colonoscopy; Tonsillectomy; Other surgical history; and biopsy (Left).      Social History:  She reports that she has been smoking cigarettes. She has a 21 pack-year smoking history. She has never used smokeless tobacco. She reports that she does not currently use alcohol. She reports that she does not currently use drugs.    Family History:  Family History   Problem Relation Name Age of Onset    Diabetes Mother      Hypertension Mother      Breast cancer Sister      Asthma Son      Cancer Other uncle         Allergies:  Amitriptyline, Bupropion, Cefazolin, Diclofenac, Divalproex sodium, Doxycycline, Grapefruit, Guaifenesin, Hydrocodone-acetaminophen, Hydroxyzine, Ibuprofen, Ketorolac, Lepirudin, Loperamide, Meloxicam, Milnacipran, Mirtazapine, Morphine, Nabumetone, Ondansetron hcl, Other, Paroxetine, Tramadol, and Ultracet [tramadol-acetaminophen]    Outpatient Medications:  Current Outpatient Medications   Medication Instructions    acyclovir (ZOVIRAX) 200 mg, oral, Daily    albuterol 90 mcg/actuation inhaler INHALE 2 PUFFS BY MOUTH EVERY FOUR (4) HOURS IF NEEDED FOR WHEEZING/SHORTNESS OF BREATH    busPIRone  (Buspar) 15 mg tablet TAKE 2 TABLETS 3 TIMES DAILY.    cholecalciferol (Vitamin D-3) 50,000 unit capsule TAKE ONE (1) CAPSULE BY MOUTH EVERY OTHER WEEK.    clobetasol (Temovate) 0.05 % ointment Topical, 2 times daily    cyclobenzaprine (FLEXERIL) 10 mg, oral, 3 times daily PRN    diazePAM (Valium) 5 mg tablet TAKE AS DIRECTED. TAKE 1 TABLET ORALLY TWICE DAILY AND 1 TABLET ORALLY DAILY IF NEEDED FOR ANXIETY.    dicyclomine (Bentyl) 20 mg tablet TAKE ONE TO TWO TABLETS BY MOUTH THREE (3) TIMES PER DAY OR AS NEEDED FOR PAIN. **(BENTYL 20 MG TAB EQUIV)**    fluticasone-umeclidin-vilanter (Trelegy Ellipta) 200-62.5-25 mcg blister with device INHALE 1 PUFF BY MOUTH AND INTO THE LUNGS DAILY RINSE MOUTH AFTER EACH USE    galcanezumab (Emgality) 120 mg/mL auto-injector Inject 1 pen (120 mg) under the skin every 28 (twenty-eight) days.    immune globulin, human,, IgG, (Gamunex-C) 10% solution infusion Every 28 days    ipratropium-albuteroL (Duo-Neb) 0.5-2.5 mg/3 mL nebulizer solution USE 1 VIAL IN NEBULIZER 4 TIMES DAILY    lidocaine (Xylocaine) 5 % ointment Topical, As needed, USE UP TO 5 TIMES A DAY AS NEEDED, may disp 30 day supply and substitute    loratadine (CLARITIN) 10 mg, oral, Daily    metoprolol succinate XL (TOPROL-XL) 25 mg, Daily    multivitamin, stress formula 400 mcg tablet oral, Daily    nebulizer accessories kit 1 each, miscellaneous, Daily, Needs a replacement mask- hers is not working    nystatin (Mycostatin) cream Apply topically. APPLY  TOPICALLY TO MOUTH AND LIPS PRN    omeprazole (PRILOSEC) 40 mg, oral, 2 times daily before meals, Do not crush or chew.    ondansetron ODT (ZOFRAN-ODT) 8 mg, oral, Every 8 hours PRN    polyethylene glycol (Miralax) 17 gram/dose powder MIX 1 CAPFUL IN 8 OUNCES OF WATER AND DRINK AT BEDTIME AS NEEDED FOR CONSTIPATION.    pravastatin (Pravachol) 20 mg tablet TAKE ONE (1) TABLET BY MOUTH EVERY EVENING AT BEDTIME    pregabalin (LYRICA) 150 mg, oral, 3 times daily, 30 day rx     rizatriptan (MAXALT) 10 mg, oral, Once as needed    simethicone (Mylicon) 125 mg chewable tablet 4 times daily with meals and nightly    topiramate (TOPAMAX) 25 mg, 2 times daily    zolpidem (Ambien) 10 mg tablet Nightly PRN        Last Recorded Vitals:  There were no vitals filed for this visit.    Physical Exam:  Physical Exam  HENT:      Head: Normocephalic.      Nose: Nose normal.      Mouth/Throat:      Mouth: Mucous membranes are dry.   Cardiovascular:      Comments: Normal S1, S2 regular  Pulmonary:      Effort: Pulmonary effort is normal.      Breath sounds: No wheezing.      Comments: Diminished breath sounds throughout  Abdominal:      Palpations: Abdomen is soft.   Musculoskeletal:         General: Normal range of motion.      Cervical back: Normal range of motion.      Right lower leg: No edema.      Left lower leg: No edema.   Skin:     General: Skin is warm and dry.   Neurological:      Mental Status: She is alert.   Psychiatric:         Mood and Affect: Mood normal.        Last Labs:  CBC -  Lab Results   Component Value Date    WBC 11.3 11/20/2024    HGB 11.5 (L) 11/20/2024    HCT 34.9 (L) 11/20/2024    MCV 89 11/20/2024     11/20/2024       CMP -  Lab Results   Component Value Date    CALCIUM 9.2 11/20/2024    PHOS 3.5 03/08/2024    PROT 7.2 11/20/2024    ALBUMIN 4.2 11/20/2024    AST 20 11/20/2024    ALT 17 11/20/2024    ALKPHOS 28 (L) 11/20/2024    BILITOT 0.2 11/20/2024       LIPID PANEL -   Lab Results   Component Value Date    CHOL 200 (H) 11/20/2024    TRIG 224 (H) 11/20/2024    HDL 56.1 11/20/2024    CHHDL 3.6 11/20/2024    LDLF 128 (H) 05/23/2023    VLDL 45 (H) 11/20/2024    NHDL 144 11/20/2024       RENAL FUNCTION PANEL -   Lab Results   Component Value Date    GLUCOSE 87 11/20/2024     11/20/2024    K 4.2 11/20/2024     (H) 11/20/2024    CO2 25 11/20/2024    ANIONGAP 10 11/20/2024    BUN 12 11/20/2024    CREATININE 0.93 11/20/2024    CALCIUM 9.2 11/20/2024    PHOS 3.5  03/08/2024    ALBUMIN 4.2 11/20/2024        Lab Results   Component Value Date    HGBA1C 6.2 (H) 11/20/2024    HGBA1C 5.7 11/20/2024     Assessment/Plan   Tachycardia:  Pt reports this is related to familial stressors and has been advised to avoid this.  Holter 11/2024 per PCP was essentially WNL, only showed 1 run of SVT lasting 5 beats.  Fhx of MI.  -check stress test  -continue metoprolol, instructed to hold prior the morning prior to testing    HLD:   FLP 11/2024, LDL 99  , not at goal.  -pravastatin    Nicotine dependence:  Pt is trying to quit cigarettes and vaping.  -encouraged cessation    Scribe Attestation  By signing my name below, I, Michelle Chinchilla, Scribe   attest that this documentation has been prepared under the direction and in the presence of Bernadette Schuler MD.

## 2025-02-10 ENCOUNTER — SPECIALTY PHARMACY (OUTPATIENT)
Dept: PHARMACY | Facility: CLINIC | Age: 60
End: 2025-02-10

## 2025-02-10 PROCEDURE — RXMED WILLOW AMBULATORY MEDICATION CHARGE

## 2025-02-11 ENCOUNTER — OFFICE VISIT (OUTPATIENT)
Dept: CARDIOLOGY | Facility: HOSPITAL | Age: 60
End: 2025-02-11
Payer: COMMERCIAL

## 2025-02-11 VITALS
SYSTOLIC BLOOD PRESSURE: 110 MMHG | BODY MASS INDEX: 23.92 KG/M2 | HEIGHT: 63 IN | DIASTOLIC BLOOD PRESSURE: 80 MMHG | WEIGHT: 135 LBS | HEART RATE: 99 BPM

## 2025-02-11 DIAGNOSIS — E78.2 MIXED HYPERLIPIDEMIA: ICD-10-CM

## 2025-02-11 DIAGNOSIS — R00.0 TACHYCARDIA: ICD-10-CM

## 2025-02-11 DIAGNOSIS — U07.0 VAPING-RELATED DISORDER: ICD-10-CM

## 2025-02-11 DIAGNOSIS — R07.89 ATYPICAL CHEST PAIN: Primary | ICD-10-CM

## 2025-02-11 LAB
ATRIAL RATE: 99 BPM
P AXIS: 81 DEGREES
P OFFSET: 205 MS
P ONSET: 154 MS
PR INTERVAL: 138 MS
Q ONSET: 223 MS
QRS COUNT: 16 BEATS
QRS DURATION: 70 MS
QT INTERVAL: 358 MS
QTC CALCULATION(BAZETT): 459 MS
QTC FREDERICIA: 423 MS
R AXIS: 66 DEGREES
T AXIS: 61 DEGREES
T OFFSET: 402 MS
VENTRICULAR RATE: 99 BPM

## 2025-02-11 PROCEDURE — 3008F BODY MASS INDEX DOCD: CPT | Performed by: INTERNAL MEDICINE

## 2025-02-11 PROCEDURE — 99214 OFFICE O/P EST MOD 30 MIN: CPT | Mod: 25 | Performed by: INTERNAL MEDICINE

## 2025-02-11 PROCEDURE — 93005 ELECTROCARDIOGRAM TRACING: CPT | Performed by: INTERNAL MEDICINE

## 2025-02-11 PROCEDURE — 99204 OFFICE O/P NEW MOD 45 MIN: CPT | Performed by: INTERNAL MEDICINE

## 2025-02-11 RX ORDER — SIMETHICONE 180 MG
180 CAPSULE ORAL 4 TIMES DAILY
COMMUNITY
Start: 2025-01-23

## 2025-02-11 NOTE — PATIENT INSTRUCTIONS
Thanks for following up in office today.    1)  I encourage you to continue your effort to quit smoking and vaping.    2)  We will set you up for a stress test to check your heart function. Do not take your metoprolol the morning of your stress test but you can take it with you to restart it after your testing.    3)  Please continue your cardiac medications as prescribed.    Follow up with Dr. Schuler after testing  If you have any questions, please call us at [phone]

## 2025-02-12 ENCOUNTER — PHARMACY VISIT (OUTPATIENT)
Dept: PHARMACY | Facility: CLINIC | Age: 60
End: 2025-02-12
Payer: COMMERCIAL

## 2025-02-12 ENCOUNTER — INFUSION (OUTPATIENT)
Dept: INFUSION THERAPY | Facility: HOSPITAL | Age: 60
End: 2025-02-12
Payer: COMMERCIAL

## 2025-02-12 VITALS
WEIGHT: 136.47 LBS | HEART RATE: 78 BPM | OXYGEN SATURATION: 97 % | DIASTOLIC BLOOD PRESSURE: 79 MMHG | BODY MASS INDEX: 24.17 KG/M2 | TEMPERATURE: 97.2 F | RESPIRATION RATE: 16 BRPM | SYSTOLIC BLOOD PRESSURE: 123 MMHG

## 2025-02-12 DIAGNOSIS — D80.3 SELECTIVE DEFICIENCY OF IGG (MULTI): ICD-10-CM

## 2025-02-12 DIAGNOSIS — M81.0 OSTEOPOROSIS, UNSPECIFIED OSTEOPOROSIS TYPE, UNSPECIFIED PATHOLOGICAL FRACTURE PRESENCE: ICD-10-CM

## 2025-02-12 PROCEDURE — 96366 THER/PROPH/DIAG IV INF ADDON: CPT | Mod: INF

## 2025-02-12 PROCEDURE — 2500000001 HC RX 250 WO HCPCS SELF ADMINISTERED DRUGS (ALT 637 FOR MEDICARE OP): Performed by: INTERNAL MEDICINE

## 2025-02-12 PROCEDURE — 2500000004 HC RX 250 GENERAL PHARMACY W/ HCPCS (ALT 636 FOR OP/ED): Mod: JZ | Performed by: INTERNAL MEDICINE

## 2025-02-12 PROCEDURE — 96365 THER/PROPH/DIAG IV INF INIT: CPT | Mod: INF

## 2025-02-12 RX ORDER — HEPARIN 100 UNIT/ML
500 SYRINGE INTRAVENOUS AS NEEDED
OUTPATIENT
Start: 2025-02-12

## 2025-02-12 RX ORDER — ACETAMINOPHEN 325 MG/1
650 TABLET ORAL ONCE
OUTPATIENT
Start: 2025-03-12

## 2025-02-12 RX ORDER — DIPHENHYDRAMINE HCL 25 MG
25 CAPSULE ORAL ONCE
Status: COMPLETED | OUTPATIENT
Start: 2025-02-12 | End: 2025-02-12

## 2025-02-12 RX ORDER — DIPHENHYDRAMINE HCL 25 MG
25 CAPSULE ORAL ONCE
OUTPATIENT
Start: 2025-03-12

## 2025-02-12 RX ORDER — ACETAMINOPHEN 325 MG/1
650 TABLET ORAL ONCE
Status: COMPLETED | OUTPATIENT
Start: 2025-02-12 | End: 2025-02-12

## 2025-02-12 RX ADMIN — DIPHENHYDRAMINE HYDROCHLORIDE 25 MG: 25 CAPSULE ORAL at 08:46

## 2025-02-12 RX ADMIN — ACETAMINOPHEN 650 MG: 325 TABLET ORAL at 08:44

## 2025-02-12 RX ADMIN — IMMUNE GLOBULIN (HUMAN) 25 G: 10 INJECTION INTRAVENOUS; SUBCUTANEOUS at 09:17

## 2025-02-12 ASSESSMENT — ENCOUNTER SYMPTOMS
DEPRESSION: 0
LOSS OF SENSATION IN FEET: 1
OCCASIONAL FEELINGS OF UNSTEADINESS: 1

## 2025-02-12 ASSESSMENT — PAIN SCALES - GENERAL: PAINLEVEL_OUTOF10: 8

## 2025-02-14 ENCOUNTER — TELEPHONE (OUTPATIENT)
Dept: CARDIOLOGY | Facility: HOSPITAL | Age: 60
End: 2025-02-14
Payer: COMMERCIAL

## 2025-02-14 NOTE — TELEPHONE ENCOUNTER
Patient called office lvm asking if she actually needs a f/up with Dr. Schuler because she believes she will need additional testing post her appt. Patient asks to speak with care team to clarify and answer some questions.

## 2025-02-24 DIAGNOSIS — K21.9 GASTROESOPHAGEAL REFLUX DISEASE WITHOUT ESOPHAGITIS: ICD-10-CM

## 2025-02-24 RX ORDER — OMEPRAZOLE 40 MG/1
40 CAPSULE, DELAYED RELEASE ORAL
Qty: 60 CAPSULE | Refills: 1 | Status: SHIPPED | OUTPATIENT
Start: 2025-02-24 | End: 2026-02-24

## 2025-02-27 ENCOUNTER — APPOINTMENT (OUTPATIENT)
Dept: CARDIOLOGY | Facility: HOSPITAL | Age: 60
End: 2025-02-27
Payer: COMMERCIAL

## 2025-03-04 ENCOUNTER — HOSPITAL ENCOUNTER (OUTPATIENT)
Dept: CARDIOLOGY | Facility: HOSPITAL | Age: 60
Discharge: HOME | End: 2025-03-04
Payer: COMMERCIAL

## 2025-03-04 DIAGNOSIS — R00.0 TACHYCARDIA: ICD-10-CM

## 2025-03-04 DIAGNOSIS — R07.89 ATYPICAL CHEST PAIN: ICD-10-CM

## 2025-03-04 PROCEDURE — 93018 CV STRESS TEST I&R ONLY: CPT | Performed by: INTERNAL MEDICINE

## 2025-03-04 PROCEDURE — 93017 CV STRESS TEST TRACING ONLY: CPT

## 2025-03-04 PROCEDURE — 93016 CV STRESS TEST SUPVJ ONLY: CPT | Performed by: INTERNAL MEDICINE

## 2025-03-04 PROCEDURE — 2500000004 HC RX 250 GENERAL PHARMACY W/ HCPCS (ALT 636 FOR OP/ED): Performed by: INTERNAL MEDICINE

## 2025-03-04 PROCEDURE — 93350 STRESS TTE ONLY: CPT | Performed by: INTERNAL MEDICINE

## 2025-03-04 RX ADMIN — PERFLUTREN 8 ML OF DILUTION: 6.52 INJECTION, SUSPENSION INTRAVENOUS at 08:34

## 2025-03-04 NOTE — PROGRESS NOTES
"Chief Complaint:   No chief complaint on file.     History Of Present Illness:    Kelli Ortega is a 59 y.o. female presenting s/p testing.  H/o tachycardia, HLD, IGT, nicotine dependence, emphysema, neurogenic claudication due to lumbar spinal stenosis, osteoporosis, selective deficiency of IgG, hypogammaglobulinemia, IBS, GERD, anxiety.     Pt established care last visit for tachycardia, onset in 3/2024, always occurs when speaking to family, and notes issues with her sister. Pt completed a Holter worn 11/26-30/24 with Dr. Dyer (PCP) which was essentially normal. She was started on metoprolol in 12/2024 by Dr. Valerio ([facility]). Since starting it, she is unsure if she has noticed a difference. Of note, she is a \"very nervous person\" and needs to take a Valium in the morning to \"stop shaking.\" Recent stress echo 2/2025 was negative for ischemia.    Pt reports there was an area under breast that was tender to palpation during testing. She still mentions elevated BP \"just from a phone call.\" She is trying to block them. She remembers her HR was 106 this morning but believes she was nervous. She wonders if she should continue metoprolol.    Review Of Systems:  The remainder of the review of systems was obtained, and was negative as pertains to the chief complaint.    CV testing and labs reviewed:    Labs 11/2024: K 4.2  BUN 12  Cr 0.93  ALP 28  ALT 17  AST 20  HgA1C 6.2  H&H 11.5 34.9  Lipid panel 11/2024:   HDL 56.1  LDL 99       Exercise stress echo 2/2025:  Summary:   1. The resting ejection fraction was estimated at 55 to 60%.   2. Normal global left ventricular systolic function.   3. Adequate level of stress achieved.   4. No clinical or electrocardiographic evidence for ischemia at maximal workload.   5. No ECG changes from baseline.   6. There were no stress-induced wall motion abnormalities. This is a negative stress echo test for ischemia.    Holter monitor worn 11/26-30/24: SR. 1 run of SVT " lasting 5 beats with max rate of 152 bpm. Isolated SVEs, VEs, SVE Couplets and Triplets were all rare. No VE Couplets or Triplets.      Last Recorded Vitals:  There were no vitals filed for this visit.    Past Medical History:  She has a past medical history of Asthma, Bipolar disorder, unspecified (Multi), Chronic pain, Colitis due to Clostridioides difficile (11/20/2024), COPD (chronic obstructive pulmonary disease) (Multi), Diabetes (Multi), Diet-controlled type 2 diabetes mellitus (05/22/2024), GERD (gastroesophageal reflux disease), History of sexually transmitted disease (11/20/2024), Hypercholesteremia, Hypogammaglobulinemia (Multi), IBS (irritable bowel syndrome), Migraines, Osteoporosis, Panic disorder (episodic paroxysmal anxiety), SBO (small bowel obstruction) (Multi) (03/2024), 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    Past Surgical History:  She has a past surgical history that includes Appendectomy; Hysterectomy; Esophagogastroduodenoscopy; Bladder surgery; Small intestine surgery; Upper gastrointestinal endoscopy; Colonoscopy; Tonsillectomy; Other surgical history; and biopsy (Left).      Social History:  She reports that she has been smoking cigarettes. She has a 21 pack-year smoking history. She has never used smokeless tobacco. She reports that she does not currently use alcohol. She reports that she does not currently use drugs.    Family History:  Family History   Problem Relation Name Age of Onset    Diabetes Mother      Hypertension Mother      Breast cancer Sister      Asthma Son      Cancer Other uncle         Allergies:  Amitriptyline, Bupropion, Cefazolin, Diclofenac, Divalproex sodium, Doxycycline, Grapefruit, Guaifenesin, Hydrocodone-acetaminophen, Hydroxyzine, Ibuprofen, Ketorolac, Lepirudin, Loperamide, Meloxicam, Milnacipran, Mirtazapine, Morphine, Nabumetone, Ondansetron hcl, Other,  Paroxetine, Tramadol, and Ultracet [tramadol-acetaminophen]    Outpatient Medications:  Current Outpatient Medications   Medication Instructions    acyclovir (ZOVIRAX) 200 mg, oral, Daily    albuterol 90 mcg/actuation inhaler INHALE 2 PUFFS BY MOUTH EVERY FOUR (4) HOURS IF NEEDED FOR WHEEZING/SHORTNESS OF BREATH    busPIRone (Buspar) 15 mg tablet TAKE 2 TABLETS 3 TIMES DAILY.    cholecalciferol (Vitamin D-3) 50,000 unit capsule TAKE ONE (1) CAPSULE BY MOUTH EVERY OTHER WEEK.    clobetasol (Temovate) 0.05 % ointment Topical, 2 times daily    cyclobenzaprine (FLEXERIL) 10 mg, oral, 3 times daily PRN    diazePAM (Valium) 5 mg tablet TAKE AS DIRECTED. TAKE 1 TABLET ORALLY TWICE DAILY AND 1 TABLET ORALLY DAILY IF NEEDED FOR ANXIETY.    dicyclomine (Bentyl) 20 mg tablet TAKE ONE TO TWO TABLETS BY MOUTH THREE (3) TIMES PER DAY OR AS NEEDED FOR PAIN. **(BENTYL 20 MG TAB EQUIV)**    fluticasone-umeclidin-vilanter (Trelegy Ellipta) 200-62.5-25 mcg blister with device INHALE 1 PUFF BY MOUTH AND INTO THE LUNGS DAILY RINSE MOUTH AFTER EACH USE    galcanezumab (Emgality) 120 mg/mL auto-injector Inject 1 pen (120 mg) under the skin every 28 (twenty-eight) days.    immune globulin, human,, IgG, (Gamunex-C) 10% solution infusion Every 28 days    ipratropium-albuteroL (Duo-Neb) 0.5-2.5 mg/3 mL nebulizer solution USE 1 VIAL IN NEBULIZER 4 TIMES DAILY    lidocaine (Xylocaine) 5 % ointment Topical, As needed, USE UP TO 5 TIMES A DAY AS NEEDED, may disp 30 day supply and substitute    loratadine (CLARITIN) 10 mg, oral, Daily    metoprolol succinate XL (TOPROL-XL) 25 mg, Daily    multivitamin, stress formula 400 mcg tablet oral, Daily    nebulizer accessories kit 1 each, miscellaneous, Daily, Needs a replacement mask- hers is not working    nystatin (Mycostatin) cream Apply topically. APPLY  TOPICALLY TO MOUTH AND LIPS PRN    omeprazole (PRILOSEC) 40 mg, oral, 2 times daily before meals, Do not crush or chew.    ondansetron ODT  (ZOFRAN-ODT) 8 mg, oral, Every 8 hours PRN    polyethylene glycol (Miralax) 17 gram/dose powder MIX 1 CAPFUL IN 8 OUNCES OF WATER AND DRINK AT BEDTIME AS NEEDED FOR CONSTIPATION.    pravastatin (Pravachol) 20 mg tablet TAKE ONE (1) TABLET BY MOUTH EVERY EVENING AT BEDTIME    pregabalin (LYRICA) 150 mg, oral, 3 times daily, 30 day rx    rizatriptan (MAXALT) 10 mg, oral, Once as needed    simethicone (Mylicon) 125 mg chewable tablet 4 times daily with meals and nightly    simethicone (MYLICON,GAS-X) 180 mg, 4 times daily    topiramate (TOPAMAX) 25 mg, 2 times daily    zolpidem (Ambien) 10 mg tablet Nightly PRN       Physical Exam:  Physical Exam  HENT:      Head: Normocephalic.      Nose: Nose normal.      Mouth/Throat:      Mouth: Mucous membranes are moist.   Cardiovascular:      Rate and Rhythm: Normal rate and regular rhythm.      Heart sounds: No murmur heard.     Comments: Normal S1, S2 regular  No significant murmurs  No definite bruits  Pulmonary:      Effort: Pulmonary effort is normal.      Breath sounds: Normal breath sounds.   Abdominal:      Palpations: Abdomen is soft.   Musculoskeletal:         General: Normal range of motion.      Cervical back: Normal range of motion.      Right lower leg: No edema.      Left lower leg: No edema.   Skin:     General: Skin is warm and dry.   Neurological:      Mental Status: She is alert.   Psychiatric:         Mood and Affect: Mood normal.        Last Labs:  CBC -  Lab Results   Component Value Date    WBC 11.3 11/20/2024    HGB 11.5 (L) 11/20/2024    HCT 34.9 (L) 11/20/2024    MCV 89 11/20/2024     11/20/2024       CMP -  Lab Results   Component Value Date    CALCIUM 9.2 11/20/2024    PHOS 3.5 03/08/2024    PROT 7.2 11/20/2024    ALBUMIN 4.2 11/20/2024    AST 20 11/20/2024    ALT 17 11/20/2024    ALKPHOS 28 (L) 11/20/2024    BILITOT 0.2 11/20/2024       LIPID PANEL -   Lab Results   Component Value Date    CHOL 200 (H) 11/20/2024    TRIG 224 (H) 11/20/2024     HDL 56.1 11/20/2024    CHHDL 3.6 11/20/2024    LDLF 128 (H) 05/23/2023    VLDL 45 (H) 11/20/2024    NHDL 144 11/20/2024       RENAL FUNCTION PANEL -   Lab Results   Component Value Date    GLUCOSE 87 11/20/2024     11/20/2024    K 4.2 11/20/2024     (H) 11/20/2024    CO2 25 11/20/2024    ANIONGAP 10 11/20/2024    BUN 12 11/20/2024    CREATININE 0.93 11/20/2024    CALCIUM 9.2 11/20/2024    PHOS 3.5 03/08/2024    ALBUMIN 4.2 11/20/2024        Lab Results   Component Value Date    HGBA1C 6.2 (H) 11/20/2024    HGBA1C 5.7 11/20/2024     Assessment/Plan   Tachycardia:  Pt reports this is related to familial stressors and has been advised to avoid this.  Holter 11/2024 per PCP was essentially WNL, only showed 1 run of SVT lasting 5 beats.  Fhx of MI.  Stress test 2/2025 negative for ischemia.  -continue metoprolol     HLD:   FLP 11/2024, LDL 99  , not at goal.  -pravastatin     Nicotine dependence:  Pt is trying to quit cigarettes and vaping.  -encouraged cessation    Scribe Attestation  By signing my name below, I, Michelle Chinchilla, Scribe   attest that this documentation has been prepared under the direction and in the presence of Bernadette Schuler MD.

## 2025-03-05 DIAGNOSIS — K58.2 IRRITABLE BOWEL SYNDROME WITH BOTH CONSTIPATION AND DIARRHEA: ICD-10-CM

## 2025-03-05 RX ORDER — ONDANSETRON 8 MG/1
8 TABLET, ORALLY DISINTEGRATING ORAL EVERY 8 HOURS PRN
Qty: 60 TABLET | Refills: 5 | Status: SHIPPED | OUTPATIENT
Start: 2025-03-05

## 2025-03-05 NOTE — TELEPHONE ENCOUNTER
Patient called to request medication refill.    Last appointment with our providers: 11/20/2024  Next appointment with our providers: 05/27/2025  Name of Medication:  ondansetron ODT (Zofran-ODT) 8 mg disintegrating tablet     Pharmacy:    [facility] - [place], [place] - [address]

## 2025-03-06 ENCOUNTER — SPECIALTY PHARMACY (OUTPATIENT)
Dept: PHARMACY | Facility: CLINIC | Age: 60
End: 2025-03-06

## 2025-03-06 ENCOUNTER — OFFICE VISIT (OUTPATIENT)
Dept: CARDIOLOGY | Facility: HOSPITAL | Age: 60
End: 2025-03-06
Payer: COMMERCIAL

## 2025-03-06 VITALS
WEIGHT: 134.4 LBS | BODY MASS INDEX: 23.81 KG/M2 | DIASTOLIC BLOOD PRESSURE: 64 MMHG | SYSTOLIC BLOOD PRESSURE: 106 MMHG | HEIGHT: 63 IN | HEART RATE: 86 BPM

## 2025-03-06 DIAGNOSIS — R00.2 PALPITATIONS: Primary | ICD-10-CM

## 2025-03-06 DIAGNOSIS — E78.2 MIXED HYPERLIPIDEMIA: ICD-10-CM

## 2025-03-06 DIAGNOSIS — F17.210 CIGARETTE SMOKER: ICD-10-CM

## 2025-03-06 PROCEDURE — 99214 OFFICE O/P EST MOD 30 MIN: CPT | Performed by: INTERNAL MEDICINE

## 2025-03-06 PROCEDURE — 99214 OFFICE O/P EST MOD 30 MIN: CPT | Mod: 25 | Performed by: INTERNAL MEDICINE

## 2025-03-06 PROCEDURE — 3008F BODY MASS INDEX DOCD: CPT | Performed by: INTERNAL MEDICINE

## 2025-03-06 PROCEDURE — RXMED WILLOW AMBULATORY MEDICATION CHARGE

## 2025-03-06 PROCEDURE — 4004F PT TOBACCO SCREEN RCVD TLK: CPT | Performed by: INTERNAL MEDICINE

## 2025-03-06 RX ORDER — METOPROLOL SUCCINATE 25 MG/1
25 TABLET, EXTENDED RELEASE ORAL DAILY
Qty: 90 TABLET | Refills: 3 | Status: SHIPPED | OUTPATIENT
Start: 2025-03-06 | End: 2026-03-06

## 2025-03-06 NOTE — PATIENT INSTRUCTIONS
Thanks for following up in office today.    1)  I think you should continue your metoprolol - this should help with your elevated heart rate and palpitations. I refilled this medication today.    2)  Please continue your cardiac medications as prescribed.    Follow up with Holly Crabtree in 6 months  If you have any questions, please call us at [phone]

## 2025-03-08 ENCOUNTER — PHARMACY VISIT (OUTPATIENT)
Dept: PHARMACY | Facility: CLINIC | Age: 60
End: 2025-03-08
Payer: COMMERCIAL

## 2025-03-12 ENCOUNTER — INFUSION (OUTPATIENT)
Dept: INFUSION THERAPY | Facility: HOSPITAL | Age: 60
End: 2025-03-12
Payer: COMMERCIAL

## 2025-03-12 VITALS
TEMPERATURE: 98.2 F | DIASTOLIC BLOOD PRESSURE: 74 MMHG | RESPIRATION RATE: 18 BRPM | WEIGHT: 136.91 LBS | SYSTOLIC BLOOD PRESSURE: 115 MMHG | OXYGEN SATURATION: 96 % | HEART RATE: 80 BPM | BODY MASS INDEX: 24.25 KG/M2

## 2025-03-12 DIAGNOSIS — M81.0 OSTEOPOROSIS, UNSPECIFIED OSTEOPOROSIS TYPE, UNSPECIFIED PATHOLOGICAL FRACTURE PRESENCE: ICD-10-CM

## 2025-03-12 DIAGNOSIS — D80.3 SELECTIVE DEFICIENCY OF IGG (MULTI): ICD-10-CM

## 2025-03-12 PROCEDURE — 96361 HYDRATE IV INFUSION ADD-ON: CPT | Mod: INF

## 2025-03-12 PROCEDURE — 2500000004 HC RX 250 GENERAL PHARMACY W/ HCPCS (ALT 636 FOR OP/ED): Mod: JZ | Performed by: INTERNAL MEDICINE

## 2025-03-12 PROCEDURE — 96360 HYDRATION IV INFUSION INIT: CPT | Mod: INF

## 2025-03-12 PROCEDURE — 2500000001 HC RX 250 WO HCPCS SELF ADMINISTERED DRUGS (ALT 637 FOR MEDICARE OP): Performed by: INTERNAL MEDICINE

## 2025-03-12 RX ORDER — ACETAMINOPHEN 325 MG/1
650 TABLET ORAL ONCE
Status: COMPLETED | OUTPATIENT
Start: 2025-03-12 | End: 2025-03-12

## 2025-03-12 RX ORDER — HEPARIN 100 UNIT/ML
500 SYRINGE INTRAVENOUS AS NEEDED
OUTPATIENT
Start: 2025-03-12

## 2025-03-12 RX ORDER — HEPARIN 100 UNIT/ML
500 SYRINGE INTRAVENOUS AS NEEDED
Status: DISCONTINUED | OUTPATIENT
Start: 2025-03-12 | End: 2025-03-12 | Stop reason: HOSPADM

## 2025-03-12 RX ORDER — DIPHENHYDRAMINE HCL 25 MG
25 CAPSULE ORAL ONCE
OUTPATIENT
Start: 2025-04-09

## 2025-03-12 RX ORDER — DIPHENHYDRAMINE HCL 25 MG
25 CAPSULE ORAL ONCE
Status: COMPLETED | OUTPATIENT
Start: 2025-03-12 | End: 2025-03-12

## 2025-03-12 RX ORDER — ACETAMINOPHEN 325 MG/1
650 TABLET ORAL ONCE
OUTPATIENT
Start: 2025-04-09

## 2025-03-12 RX ADMIN — IMMUNE GLOBULIN (HUMAN) 25 G: 10 INJECTION INTRAVENOUS; SUBCUTANEOUS at 09:32

## 2025-03-12 RX ADMIN — ACETAMINOPHEN 650 MG: 325 TABLET ORAL at 09:09

## 2025-03-12 RX ADMIN — DIPHENHYDRAMINE HYDROCHLORIDE 25 MG: 25 CAPSULE ORAL at 09:09

## 2025-03-12 ASSESSMENT — ENCOUNTER SYMPTOMS
DEPRESSION: 1
OCCASIONAL FEELINGS OF UNSTEADINESS: 0
LOSS OF SENSATION IN FEET: 1

## 2025-03-12 ASSESSMENT — PAIN SCALES - GENERAL: PAINLEVEL_OUTOF10: 7

## 2025-03-20 ENCOUNTER — TELEPHONE (OUTPATIENT)
Dept: PRIMARY CARE | Facility: CLINIC | Age: 60
End: 2025-03-20
Payer: COMMERCIAL

## 2025-03-20 DIAGNOSIS — J44.1 COPD EXACERBATION (MULTI): Primary | ICD-10-CM

## 2025-03-20 RX ORDER — AZITHROMYCIN 250 MG/1
TABLET, FILM COATED ORAL
Qty: 6 TABLET | Refills: 0 | Status: SHIPPED | OUTPATIENT
Start: 2025-03-20

## 2025-03-20 NOTE — TELEPHONE ENCOUNTER
Patient has been sick with a low grade fever, coughing, congestion, sore throat, phlem patient has tried Tylenol, Robitussin and Mucinex nothing is working  [facility]

## 2025-04-07 ENCOUNTER — OFFICE VISIT (OUTPATIENT)
Dept: PAIN MEDICINE | Facility: CLINIC | Age: 60
End: 2025-04-07
Payer: COMMERCIAL

## 2025-04-07 VITALS
SYSTOLIC BLOOD PRESSURE: 118 MMHG | HEART RATE: 78 BPM | RESPIRATION RATE: 18 BRPM | OXYGEN SATURATION: 93 % | DIASTOLIC BLOOD PRESSURE: 88 MMHG

## 2025-04-07 DIAGNOSIS — M54.16 LUMBAR RADICULOPATHY: ICD-10-CM

## 2025-04-07 PROCEDURE — 99213 OFFICE O/P EST LOW 20 MIN: CPT | Performed by: ANESTHESIOLOGY

## 2025-04-07 PROCEDURE — G2211 COMPLEX E/M VISIT ADD ON: HCPCS | Performed by: ANESTHESIOLOGY

## 2025-04-07 RX ORDER — PREGABALIN 150 MG/1
150 CAPSULE ORAL 3 TIMES DAILY
Qty: 90 CAPSULE | Refills: 5 | Status: SHIPPED | OUTPATIENT
Start: 2025-05-06

## 2025-04-07 ASSESSMENT — PAIN DESCRIPTION - DESCRIPTORS: DESCRIPTORS: THROBBING

## 2025-04-07 ASSESSMENT — ENCOUNTER SYMPTOMS
DEPRESSION: 1
OCCASIONAL FEELINGS OF UNSTEADINESS: 1
LOSS OF SENSATION IN FEET: 0

## 2025-04-07 ASSESSMENT — PAIN - FUNCTIONAL ASSESSMENT: PAIN_FUNCTIONAL_ASSESSMENT: 0-10

## 2025-04-07 ASSESSMENT — PAIN SCALES - GENERAL: PAINLEVEL_OUTOF10: 9

## 2025-04-07 NOTE — PROGRESS NOTES
Subjective   Patient ID: Kelli Ortega is a 59 y.o. female with a past medical history of bipolar, COPD, diabetes who presents with chronic low back and leg pain      HPI:   59-year-old female presents with chronic low back and neck pain.  Patient describes pain as sharp sometimes stabbing and radiates to the posterior aspect of the left lower extremity, worse with bending and sometimes standing, radiate 9 out of 10 in severity.  Patient currently taking Lyrica 150 mg 3 times daily which provides about 50% relief of pain.  Patient had a left L4 and L5 transforaminal epidural steroid injection in November 2024 which provided 70 to 80% relief for a few months.  Patient doing home exercise program.  Denies any new symptoms, no worsening weakness, no saddle anesthesia, no bowel or bladder incontinence.      I have personally reviewed the OARRS report for Kelli Ortega I have considered the risks of abuse, dependence, addiction and diversion    OARRS:  No data recorded  I have personally reviewed the OARRS report for Kelli Ortega. I have considered the risks of abuse, dependence, addiction and diversion    Is the patient prescribed a combination of a benzodiazepine and opioid?  No  Controlled Substance Agreement:  Reviewed Controlled Substance Agreement including but not limited to the benefits, risks, and alternatives to treatment with a Controlled Substance medication(s).        Review of Systems   13-point ROS done and negative except for HPI.     Current Outpatient Medications   Medication Instructions    acyclovir (ZOVIRAX) 200 mg, oral, Daily    albuterol 90 mcg/actuation inhaler INHALE 2 PUFFS BY MOUTH EVERY FOUR (4) HOURS IF NEEDED FOR WHEEZING/SHORTNESS OF BREATH    azithromycin (Zithromax) 250 mg tablet Take 2 tabs (500 mg) by mouth today, than 1 daily for 4 days.    busPIRone (Buspar) 15 mg tablet TAKE 2 TABLETS 3 TIMES DAILY.    cholecalciferol (Vitamin D-3) 50,000 unit capsule TAKE ONE (1) CAPSULE  BY MOUTH EVERY OTHER WEEK.    clobetasol (Temovate) 0.05 % ointment Topical, 2 times daily    cyclobenzaprine (FLEXERIL) 10 mg, oral, 3 times daily PRN    diazePAM (Valium) 5 mg tablet TAKE AS DIRECTED. TAKE 1 TABLET ORALLY TWICE DAILY AND 1 TABLET ORALLY DAILY IF NEEDED FOR ANXIETY.    dicyclomine (Bentyl) 20 mg tablet TAKE ONE TO TWO TABLETS BY MOUTH THREE (3) TIMES PER DAY OR AS NEEDED FOR PAIN. **(BENTYL 20 MG TAB EQUIV)**    fluticasone-umeclidin-vilanter (Trelegy Ellipta) 200-62.5-25 mcg blister with device INHALE 1 PUFF BY MOUTH AND INTO THE LUNGS DAILY RINSE MOUTH AFTER EACH USE    galcanezumab (Emgality) 120 mg/mL auto-injector Inject 1 pen (120 mg) under the skin every 28 (twenty-eight) days.    immune globulin, human,, IgG, (Gamunex-C) 10% solution infusion Every 28 days    ipratropium-albuteroL (Duo-Neb) 0.5-2.5 mg/3 mL nebulizer solution USE 1 VIAL IN NEBULIZER 4 TIMES DAILY    lidocaine (Xylocaine) 5 % ointment Topical, As needed, USE UP TO 5 TIMES A DAY AS NEEDED, may disp 30 day supply and substitute    loratadine (CLARITIN) 10 mg, oral, Daily    metoprolol succinate XL (TOPROL-XL) 25 mg, oral, Daily, Do not crush or chew.    multivitamin, stress formula 400 mcg tablet oral, Daily    nebulizer accessories kit 1 each, miscellaneous, Daily, Needs a replacement mask- hers is not working    nystatin (Mycostatin) cream Apply topically. APPLY  TOPICALLY TO MOUTH AND LIPS PRN    omeprazole (PRILOSEC) 40 mg, oral, 2 times daily before meals, Do not crush or chew.    ondansetron ODT (ZOFRAN-ODT) 8 mg, oral, Every 8 hours PRN    polyethylene glycol (Miralax) 17 gram/dose powder MIX 1 CAPFUL IN 8 OUNCES OF WATER AND DRINK AT BEDTIME AS NEEDED FOR CONSTIPATION.    pravastatin (Pravachol) 20 mg tablet TAKE ONE (1) TABLET BY MOUTH EVERY EVENING AT BEDTIME    pregabalin (LYRICA) 150 mg, oral, 3 times daily, 30 day rx    rizatriptan (MAXALT) 10 mg, oral, Once as needed    simethicone (Mylicon) 125 mg chewable tablet  "4 times daily with meals and nightly    simethicone (MYLICON,GAS-X) 180 mg, 4 times daily    topiramate (TOPAMAX) 25 mg, 2 times daily    zolpidem (Ambien) 10 mg tablet Nightly PRN       Past Medical History:   Diagnosis Date    Asthma     Bipolar disorder, unspecified (Multi)     Chronic pain     fibromyalgia    Colitis due to Clostridioides difficile 11/20/2024    COPD (chronic obstructive pulmonary disease) (Multi)     Diabetes (Multi)     diet-controlled    Diet-controlled type 2 diabetes mellitus 05/22/2024    GERD (gastroesophageal reflux disease)     History of sexually transmitted disease 11/20/2024    Hypercholesteremia     Hypogammaglobulinemia (Multi)     IBS (irritable bowel syndrome)     Migraines     Osteoporosis     Panic disorder (episodic paroxysmal anxiety)     SBO (small bowel obstruction) (Multi) 03/2024    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     Transient amnesia 05/22/2023    Having episodes of transient ammesia/AMS--previously thought to be due to polypharmacy  EEG wnl      Vitamin B12 deficiency 05/22/2023        Past Surgical History:   Procedure Laterality Date    APPENDECTOMY      BIOPSY Left     \"temporal artery\"    BLADDER SURGERY      COLONOSCOPY      ESOPHAGOGASTRODUODENOSCOPY      HYSTERECTOMY      OTHER SURGICAL HISTORY      left elbow tendon repair    SMALL INTESTINE SURGERY      TONSILLECTOMY      UPPER GASTROINTESTINAL ENDOSCOPY          Family History   Problem Relation Name Age of Onset    Diabetes Mother      Hypertension Mother      Breast cancer Sister      Asthma Son      Cancer Other uncle         Allergies   Allergen Reactions    Amitriptyline Other     Delirium, constipation, dizziness    Bupropion Other     delirious    Cefazolin GI Upset     Diarrhea abdominal pain bloating    Diclofenac Other     Black stools    Divalproex Sodium Other     Hair loss    Doxycycline Other     delirious    Grapefruit Other     \"Can't take with certain medication\"    " "Guaifenesin Nausea/vomiting    Hydrocodone-Acetaminophen Headache    Hydroxyzine Itching, Nausea/vomiting and Unknown    Ibuprofen GI bleeding    Ketorolac Unknown    Lepirudin Itching and Other     Bloody stool    Loperamide Other     Bloody stool    Meloxicam GI bleeding    Milnacipran Nausea/vomiting     \"[place]\"    Mirtazapine GI bleeding    Morphine Psychosis     Severe mood change    Nabumetone Other     Black stools    Ondansetron Hcl Nausea/vomiting     Injectable zofran caused increase in vomiting    Other Other     No antibiotics unless necessary (recurrent C-Diff)  Antidepressants may cause me to walk in sleep    Paroxetine Unknown    Tramadol Itching    Ultracet [Tramadol-Acetaminophen] Other     Itching & nausea        Objective     Vitals:    04/07/25 1341   BP: 118/88   Pulse: 78   Resp: 18   SpO2: 93%        Physical Exam  General: NAD, well groomed, well nourished  Eyes: Non-icteric sclera, EOMI  Ears, Nose, Mouth, and Throat: External ears and nose appear to be without deformity or rash. No lesions or masses noted. Hearing is grossly intact.   Neck: Trachea midline  Respiratory: Nonlabored breathing   Cardiovascular: no peripheral edema   Skin: No rashes or open lesions/ulcers identified on skin.    Back:   Palpation: No tenderness to palpation over lumbar paraspinous muscles.   Straight leg raise: positive at 40 degrees on the left  HELDER Maneuver does not reproduce pain bilaterally    Neurologic:   Cranial nerves grossly intact.   Strength 5/5 and symmetric plantar/dorsiflexion   Sensation: Normal to light touch throughout, pinprick intact throughout.  DTRs:normal and symmetric throughout  Knight: absent  Clonus: absent    Psychiatric: Alert, orientation to person, place, and time. Cooperative.    Imaging personally reviewed and independently interpreted:   MRI L spine 2022  L1-2: The thecal sac is minimally indented by disc bulge.     L2-3: The lateral recesses are mildly stenosed by ligament " thickening  and disc bulge.     L3-4: The lateral recesses and spinal canal are mildly stenosed by  disc bulge, ligament thickening and facet hypertrophy. The facet  joints are moderately to severely arthritic bilaterally.     L4-5: Spinal canal and lateral recesses are severely stenosed by  central disc extrusion, facet hypertrophy and ligament thickening.  The facet joints are severely arthritic bilaterally. The neural  foramina are mildly stenosed bilaterally as well.     L5-S1: The left lateral recess is mildly stenosed by disc bulge and  ligament thickening. The left neural foramen is mildly stenosed as  well.      Assessment/Plan   59-year-old female presents as follow-up for chronic low back and leg pain.  Patient history and evaluation consistent with lumbar radiculopathy.  On physical send patient had positive straight leg raise at 40 degrees on the left.  Patient had a left L4 and L5 transforaminal epidural steroid injection in November 2024 with 70 to 80% relief for a few months.  Patient taking Lyrica 150 mg 3 times daily which provides about 50% relief of pain and symptoms.  Discussed plan to repeat injection.  Courage patient to continue home exercise program.    Plan:  - Continue medications as prescribed  - Continue home exercise program  - Left L4 and L5 transforaminal epidural steroid injection under fluoroscopy    We discussed  the risks, benefits and alternatives of the procedure including but not limited to: , Lack of efficacy , Transiently worsening pain , Bleeding, Infection , and Nerve Damage    Follow up: After procedure    The patient was invited to contact us back anytime with any questions or concerns and follow-up with us in the office as needed.     Diagnoses and all orders for this visit:  Lumbar radiculopathy      This note was generated with the aid of dictation software, there may be typos despite my attempts at proofreading.     Patient seen and plan of care discussed with   [facility]: The patient is presenting primarily with Lumbar pain and radicular symptoms.  The pain is constant and of moderate severity that interferes with activities of daily living and sleep. The patient failed conservative therapy to include Tylenol/NSAIDS and physical therapy/supervised home exercise program and continues to participate in their supervised home exercise program.  Lumbar spine MRI which I personally reviewed showed L4-L5 severe canal stenosis with ligament hypertrophy.  The patient has previously undergone a Lumbar Transforaminal Epidural Steroid Injection at Left L4-5 and L5-S1 greater than 50% pain relief for greater than 3 months.  Based on these results will plan to repeat the previous injection.  Will proceed with Lumbar Transforaminal Epidural Steroid Injection at Left L3-4 and L4-5 with fluoroscopy.  We discussed the risks, benefits and alternatives to the procedure(s) and the patient would like to proceed.  This procedure is part of a comprehensive and multimodal treatment plan to facility physical therapy and a supervised home exercise program.     Bud Hernández MD  Pain Fellow

## 2025-04-08 ENCOUNTER — OFFICE VISIT (OUTPATIENT)
Dept: PULMONOLOGY | Facility: HOSPITAL | Age: 60
End: 2025-04-08
Payer: COMMERCIAL

## 2025-04-08 VITALS
OXYGEN SATURATION: 98 % | HEART RATE: 95 BPM | RESPIRATION RATE: 16 BRPM | BODY MASS INDEX: 24.1 KG/M2 | WEIGHT: 136 LBS | DIASTOLIC BLOOD PRESSURE: 68 MMHG | SYSTOLIC BLOOD PRESSURE: 136 MMHG | TEMPERATURE: 96.9 F | HEIGHT: 63 IN

## 2025-04-08 DIAGNOSIS — M79.7 FIBROMYALGIA, PRIMARY: ICD-10-CM

## 2025-04-08 DIAGNOSIS — J30.9 ALLERGIC RHINITIS, UNSPECIFIED SEASONALITY, UNSPECIFIED TRIGGER: ICD-10-CM

## 2025-04-08 DIAGNOSIS — J44.9 CHRONIC OBSTRUCTIVE PULMONARY DISEASE, UNSPECIFIED COPD TYPE (MULTI): Primary | ICD-10-CM

## 2025-04-08 DIAGNOSIS — F17.210 CIGARETTE SMOKER: ICD-10-CM

## 2025-04-08 DIAGNOSIS — J43.9 PULMONARY EMPHYSEMA, UNSPECIFIED EMPHYSEMA TYPE (MULTI): ICD-10-CM

## 2025-04-08 PROCEDURE — 3008F BODY MASS INDEX DOCD: CPT | Performed by: NURSE PRACTITIONER

## 2025-04-08 PROCEDURE — 99214 OFFICE O/P EST MOD 30 MIN: CPT | Performed by: NURSE PRACTITIONER

## 2025-04-08 PROCEDURE — 4004F PT TOBACCO SCREEN RCVD TLK: CPT | Performed by: NURSE PRACTITIONER

## 2025-04-08 RX ORDER — FLUTICASONE FUROATE, UMECLIDINIUM BROMIDE AND VILANTEROL TRIFENATATE 200; 62.5; 25 UG/1; UG/1; UG/1
POWDER RESPIRATORY (INHALATION)
Qty: 1 EACH | Refills: 11 | Status: SHIPPED | OUTPATIENT
Start: 2025-04-08

## 2025-04-08 RX ORDER — CYCLOBENZAPRINE HCL 10 MG
10 TABLET ORAL 3 TIMES DAILY PRN
Qty: 270 TABLET | Refills: 3 | Status: SHIPPED | OUTPATIENT
Start: 2025-04-08 | End: 2026-04-03

## 2025-04-08 ASSESSMENT — ENCOUNTER SYMPTOMS
UNEXPECTED WEIGHT CHANGE: 0
COUGH: 1
CHILLS: 0
FATIGUE: 0
SHORTNESS OF BREATH: 1
RHINORRHEA: 0
WHEEZING: 0
FEVER: 0

## 2025-04-08 NOTE — TELEPHONE ENCOUNTER
cyclobenzaprine (Flexeril) 10 mg tablet [[id number]]    Order Details  Dose: 10 mg Route: oral Frequency: 3 times daily PRN for muscle spasms   Dispense Quantity: 270 tablet Refills: 3    Note to Pharmacy: PATIENT REQUESTS REFILLS. THANKS.         Sig: TAKE 1 TABLET (10 MG) BY MOUTH 3 TIMES A DAY AS NEEDED FOR MUSCLE SPASMS.         Start Date: 04/29/24 End Date: 04/24/25   Written Date: 04/29/24 Rx Expiration Date: 04/29/25        Associated Diagnoses: Fibromyalgia, primary [M79.7]   Original Order: cyclobenzaprine (Flexeril) 10 mg tablet [[id number]]   Pharmacy    [facility] - [place] [id number] [address]   Sched 5/27

## 2025-04-08 NOTE — PROGRESS NOTES
Subjective   Patient ID: Kelli Ortega is a 59 y.o. female who presents for follow up COPD.     HPI: Patient has PMH of COPD, nicotine dependence, hypothyroidism, fibromyalgia, and anxiety. She was referred for COPD management. She is currently on Anoro once a day and has an albuterol inhaler, she is also using nebulizers about 3 times per day. She states that she is getting short of breath on pretty much any exertion. She has a daily productive cough, with white/gray sputum. She reports her sinus drainage and congestion is currently controlled with Claritin daily. She hears herself wheezing frequently. She reports waking up at night choking as well as hypersomnia. She denies any fever, chills, chest pain, leg swelling, or hemoptysis. Patient currently smokes at 1/2 ppd since age 13 mostly at 2-3 ppd. 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 She denies growing up on a farm or having barn animals.      Today she is here for follow up. She states breathing has been stable on Trelegy other than getting sick in March, she was given Azithromycin and symptoms improved. She states last time she took prednisone for another provider she was not tolerating the dose and had to decrease to very little amount, she prefers to defer if possible. She is doing well otherwise on the higher dose of Trelegy. She is smoking at 1/2 ppd. She has no other concerns today.    Review of Systems   Constitutional:  Negative for chills, fatigue, fever and unexpected weight change.   HENT:  Negative for congestion, postnasal drip and rhinorrhea.    Respiratory:  Positive for cough (denies hemoptysis.) and shortness of breath. Negative for wheezing.    Cardiovascular:  Negative for chest pain and leg swelling.   All other systems reviewed and are negative.      Objective   Physical Exam  Vitals reviewed.   Constitutional:       Appearance: Normal appearance.   HENT:      Head: Normocephalic.   Cardiovascular:      Rate and Rhythm: Normal  rate and regular rhythm.   Pulmonary:      Effort: Pulmonary effort is normal.      Breath sounds: Decreased breath sounds present.   Skin:     General: Skin is warm and dry.   Neurological:      Mental Status: She is alert.       Assessment/Plan   1. COPD  2. Nicotine dependence  3. Allergic rhinitis  4. Multiple lung nodules, 3 mm  5. Anxiety  6. GERD  7. Hypersomnia     Plan:     -PFTs with FEV1/FVC 60, FEV1 56-64, significant BDR suspect she could have asthma overlap. AAT normal. Continue Trelegy 200. Continue albuterol and nebulizer. She states that a prednisone taper in the past by another physician caused her significant side effects, will defer for her.      -She is a current 1/2 ppd smoker, mostly smoked at 2-3 ppd and started at age 13. She had LDCT done in November 2021 with few scattered noncalcified nodules. Repeat done November 2022 with scattered mucous plugging and emphysema. LDCT was done 4/2/24 and prior 3 mm nodule is now 5 mm and recommended follow up in 3 months, order placed for July. This was done and showed nodules are stable, will continue yearly screening July 2025, order placed today.     -Continue Claritin for seasonal allergies. IGE, RAST normal.     -She is currently on buspar for anxiety and following up with PCP.      -GERD is currently controlled with Nexium.     -Sleep study done and patient does not have NELSON.     Overall we will continue with current regimen and get LDCT in July. I will see her back in one year. I instructed patient to call sooner if needed.      Total time:  30 min.

## 2025-04-08 NOTE — PATIENT INSTRUCTIONS
Continue on Trelegy one puff once a day.  Continue on albuterol and nebulizer as needed.  Continue on Claritin as needed.   Please get CT scan of your chest in end of July.  Call with any questions or concerns.   Follow up with me in one year.

## 2025-04-09 ENCOUNTER — INFUSION (OUTPATIENT)
Dept: INFUSION THERAPY | Facility: HOSPITAL | Age: 60
End: 2025-04-09
Payer: COMMERCIAL

## 2025-04-09 VITALS
BODY MASS INDEX: 23.67 KG/M2 | TEMPERATURE: 97.4 F | HEART RATE: 78 BPM | DIASTOLIC BLOOD PRESSURE: 66 MMHG | OXYGEN SATURATION: 97 % | WEIGHT: 133.6 LBS | RESPIRATION RATE: 20 BRPM | SYSTOLIC BLOOD PRESSURE: 104 MMHG

## 2025-04-09 DIAGNOSIS — M81.0 OSTEOPOROSIS, UNSPECIFIED OSTEOPOROSIS TYPE, UNSPECIFIED PATHOLOGICAL FRACTURE PRESENCE: ICD-10-CM

## 2025-04-09 DIAGNOSIS — D80.3 SELECTIVE DEFICIENCY OF IGG (MULTI): ICD-10-CM

## 2025-04-09 PROCEDURE — 2500000004 HC RX 250 GENERAL PHARMACY W/ HCPCS (ALT 636 FOR OP/ED): Mod: JZ | Performed by: INTERNAL MEDICINE

## 2025-04-09 PROCEDURE — 96365 THER/PROPH/DIAG IV INF INIT: CPT | Mod: INF

## 2025-04-09 PROCEDURE — RXMED WILLOW AMBULATORY MEDICATION CHARGE

## 2025-04-09 PROCEDURE — 96366 THER/PROPH/DIAG IV INF ADDON: CPT | Mod: INF

## 2025-04-09 PROCEDURE — 2500000001 HC RX 250 WO HCPCS SELF ADMINISTERED DRUGS (ALT 637 FOR MEDICARE OP): Performed by: INTERNAL MEDICINE

## 2025-04-09 RX ORDER — DIPHENHYDRAMINE HCL 25 MG
25 CAPSULE ORAL ONCE
Status: COMPLETED | OUTPATIENT
Start: 2025-04-09 | End: 2025-04-09

## 2025-04-09 RX ORDER — DIPHENHYDRAMINE HCL 25 MG
25 CAPSULE ORAL ONCE
OUTPATIENT
Start: 2025-05-07

## 2025-04-09 RX ORDER — ACETAMINOPHEN 325 MG/1
650 TABLET ORAL ONCE
Status: COMPLETED | OUTPATIENT
Start: 2025-04-09 | End: 2025-04-09

## 2025-04-09 RX ORDER — HEPARIN 100 UNIT/ML
500 SYRINGE INTRAVENOUS AS NEEDED
OUTPATIENT
Start: 2025-04-09

## 2025-04-09 RX ORDER — ACETAMINOPHEN 325 MG/1
650 TABLET ORAL ONCE
OUTPATIENT
Start: 2025-05-07

## 2025-04-09 RX ADMIN — ACETAMINOPHEN 650 MG: 325 TABLET ORAL at 08:48

## 2025-04-09 RX ADMIN — IMMUNE GLOBULIN (HUMAN) 25 G: 10 INJECTION INTRAVENOUS; SUBCUTANEOUS at 09:29

## 2025-04-09 RX ADMIN — DIPHENHYDRAMINE HYDROCHLORIDE 25 MG: 25 CAPSULE ORAL at 08:50

## 2025-04-09 ASSESSMENT — ENCOUNTER SYMPTOMS
DEPRESSION: 1
OCCASIONAL FEELINGS OF UNSTEADINESS: 1
LOSS OF SENSATION IN FEET: 1

## 2025-04-09 ASSESSMENT — PAIN SCALES - GENERAL: PAINLEVEL_OUTOF10: 8

## 2025-04-10 ENCOUNTER — PHARMACY VISIT (OUTPATIENT)
Dept: PHARMACY | Facility: CLINIC | Age: 60
End: 2025-04-10
Payer: COMMERCIAL

## 2025-04-10 ENCOUNTER — APPOINTMENT (OUTPATIENT)
Dept: PAIN MEDICINE | Facility: CLINIC | Age: 60
End: 2025-04-10
Payer: COMMERCIAL

## 2025-04-14 DIAGNOSIS — J43.9 PULMONARY EMPHYSEMA, UNSPECIFIED EMPHYSEMA TYPE (MULTI): ICD-10-CM

## 2025-04-14 RX ORDER — ALBUTEROL SULFATE 90 UG/1
2 INHALANT RESPIRATORY (INHALATION) EVERY 4 HOURS PRN
Qty: 18 G | Refills: 5 | Status: SHIPPED | OUTPATIENT
Start: 2025-04-14

## 2025-04-21 DIAGNOSIS — E55.9 VITAMIN D DEFICIENCY: ICD-10-CM

## 2025-04-21 DIAGNOSIS — K58.2 IRRITABLE BOWEL SYNDROME WITH BOTH CONSTIPATION AND DIARRHEA: ICD-10-CM

## 2025-04-21 DIAGNOSIS — K21.9 GASTROESOPHAGEAL REFLUX DISEASE WITHOUT ESOPHAGITIS: ICD-10-CM

## 2025-04-21 RX ORDER — POLYETHYLENE GLYCOL 3350 17 G/17G
POWDER, FOR SOLUTION ORAL
Qty: 510 G | Refills: 1 | Status: SHIPPED | OUTPATIENT
Start: 2025-04-21

## 2025-04-21 RX ORDER — ASPIRIN 325 MG
TABLET, DELAYED RELEASE (ENTERIC COATED) ORAL
Qty: 3 CAPSULE | Refills: 0 | Status: SHIPPED | OUTPATIENT
Start: 2025-04-21

## 2025-04-21 RX ORDER — OMEPRAZOLE 40 MG/1
40 CAPSULE, DELAYED RELEASE ORAL
Qty: 60 CAPSULE | Refills: 1 | Status: SHIPPED | OUTPATIENT
Start: 2025-04-21 | End: 2025-04-24 | Stop reason: ALTCHOICE

## 2025-04-22 ENCOUNTER — TELEPHONE (OUTPATIENT)
Facility: CLINIC | Age: 60
End: 2025-04-22
Payer: COMMERCIAL

## 2025-04-22 DIAGNOSIS — K58.2 IRRITABLE BOWEL SYNDROME WITH BOTH CONSTIPATION AND DIARRHEA: Primary | ICD-10-CM

## 2025-04-22 RX ORDER — SIMETHICONE 180 MG
180 CAPSULE ORAL 4 TIMES DAILY
Qty: 120 CAPSULE | Refills: 0 | Status: SHIPPED | OUTPATIENT
Start: 2025-04-22 | End: 2025-04-24 | Stop reason: SDUPTHER

## 2025-04-22 NOTE — TELEPHONE ENCOUNTER
Refill: [facility]  Has appt 4/24/24 - pt stated she will be here for appt

## 2025-04-23 NOTE — TELEPHONE ENCOUNTER
Order from [facility] for Incontinence supplies placed on providers desk for signature

## 2025-04-24 ENCOUNTER — TELEPHONE (OUTPATIENT)
Facility: CLINIC | Age: 60
End: 2025-04-24

## 2025-04-24 ENCOUNTER — APPOINTMENT (OUTPATIENT)
Facility: CLINIC | Age: 60
End: 2025-04-24
Payer: COMMERCIAL

## 2025-04-24 VITALS
HEART RATE: 94 BPM | WEIGHT: 131 LBS | BODY MASS INDEX: 23.21 KG/M2 | SYSTOLIC BLOOD PRESSURE: 100 MMHG | DIASTOLIC BLOOD PRESSURE: 69 MMHG | OXYGEN SATURATION: 92 % | HEIGHT: 63 IN

## 2025-04-24 DIAGNOSIS — R19.7 ACUTE DIARRHEA: ICD-10-CM

## 2025-04-24 DIAGNOSIS — R10.84 GENERALIZED ABDOMINAL PAIN: Primary | ICD-10-CM

## 2025-04-24 DIAGNOSIS — R13.12 OROPHARYNGEAL DYSPHAGIA: ICD-10-CM

## 2025-04-24 DIAGNOSIS — K21.9 GASTROESOPHAGEAL REFLUX DISEASE, UNSPECIFIED WHETHER ESOPHAGITIS PRESENT: ICD-10-CM

## 2025-04-24 DIAGNOSIS — K58.2 IRRITABLE BOWEL SYNDROME WITH BOTH CONSTIPATION AND DIARRHEA: ICD-10-CM

## 2025-04-24 PROBLEM — K92.1 GASTROINTESTINAL HEMORRHAGE WITH MELENA: Status: RESOLVED | Noted: 2024-07-01 | Resolved: 2025-04-24

## 2025-04-24 PROBLEM — K31.819 GASTRIC AVM: Status: RESOLVED | Noted: 2023-05-22 | Resolved: 2025-04-24

## 2025-04-24 PROCEDURE — 3008F BODY MASS INDEX DOCD: CPT | Performed by: PHYSICIAN ASSISTANT

## 2025-04-24 PROCEDURE — 99215 OFFICE O/P EST HI 40 MIN: CPT | Performed by: PHYSICIAN ASSISTANT

## 2025-04-24 PROCEDURE — 4004F PT TOBACCO SCREEN RCVD TLK: CPT | Performed by: PHYSICIAN ASSISTANT

## 2025-04-24 RX ORDER — ESOMEPRAZOLE MAGNESIUM 40 MG/1
40 CAPSULE, DELAYED RELEASE ORAL 2 TIMES DAILY
Qty: 60 CAPSULE | Refills: 11 | Status: SHIPPED | OUTPATIENT
Start: 2025-04-24

## 2025-04-24 RX ORDER — HYOSCYAMINE SULFATE 0.125 MG
0.12 TABLET ORAL EVERY 6 HOURS PRN
Qty: 100 TABLET | Refills: 11 | Status: SHIPPED | OUTPATIENT
Start: 2025-04-24 | End: 2026-04-24

## 2025-04-24 RX ORDER — SIMETHICONE 180 MG
180 CAPSULE ORAL 4 TIMES DAILY
Qty: 120 CAPSULE | Refills: 11 | Status: SHIPPED | OUTPATIENT
Start: 2025-04-24

## 2025-04-24 RX ORDER — LOPERAMIDE HCL 2 MG
TABLET ORAL
Qty: 90 TABLET | Refills: 11 | Status: SHIPPED | OUTPATIENT
Start: 2025-04-24

## 2025-04-24 NOTE — PATIENT INSTRUCTIONS
Thank you for coming in for your appointment.    -With the 1 week of worsening abdominal pain and subjective hot/cold. STAT blood work and CT scan ordered  -Refills of chronic medications sent. I will try switching dicyclomine to a different medication called Levsin to see if this works better. I will also try to change the omeprazole to nexium  -Repeat modified barium swallow/speech therapy referral placed

## 2025-04-24 NOTE — ASSESSMENT & PLAN NOTE
Patient doesn't feel that the omeprazole is effective. She requests that I try and send Nexium as this worked better in the past.

## 2025-04-24 NOTE — ASSESSMENT & PLAN NOTE
Patient does have chronic abdominal pain related to IBS. However, over the past week she has developed lower abdominal pain that isn't responding as much to her dicyclomine. She reports subjective fever and chills. She does have a sick family member, so infectious colitis is possible. Other possibilities would include diverticulitis or less likely appendicitis. STAT CBC, BMP, and CT ordered.

## 2025-04-24 NOTE — ASSESSMENT & PLAN NOTE
"Patient with history of oropharyngeal dysphagia and in the past was in speech therapy. Last MBS was 2 years ago. Will update this along with ST evaluation as patient has began coughing and \"choking\" on liquids again.  "

## 2025-04-24 NOTE — ASSESSMENT & PLAN NOTE
Recommended she continue anti-spasmodic, recommended trial of levsin. She can continue to use imodium daily if infectious stool studies negative. Refills of simethicone sent.

## 2025-04-24 NOTE — PROGRESS NOTES
"Subjective   Patient ID: Kelli Ortega is a 59 y.o. female presents for follow up of IBS.     Patient's PCP is Dr. Dyer    PMH: HLD, osteoporosis, IBS, oropharyngeal dysphagia, anxiety/depression/bipolar disorder, COPD    HPI  Patient presents for follow up. She has been seen numerous times for IBS, chronic N/V, oropharyngeal dysphagia and has had extensive GI evaluation, documented below. She was last seen by myself in February 2024. At that time, she reported worsening upper abdominal pain after eating. There was associated bloating, nausea, and intermittent vomiting. She continued to have a lot of coughing/choking on saliva and trouble swallowing. However, in addition to coughing with eating, she also feels that food is sticking in the epigastric region. She is still on omeprazole daily. Her IBS-D diarrhea is stable. She is having diarrhea every other day, using imodium PRN. She has been under a lot of stress due to medical issues. She stated that her depression has been worse. She recently lost her dog. Denies weight loss, melena, hematochezia. Denies SI/HI.    At the time of last appointment, we did discuss doing another EGD, which was ordered but not done. I recommended she continue PPI and imodium. She also has prescriptions for zofran and dicyclomine. Patient states that overall she has been doing \"OK\". She states that she is taking the omeprazole twice daily but still with regular heartburn/regurgitaiton. In the past, nexium worked better and she is wondering if she can try switching back. She is still using imodium PRN for diarrhea. Last week, she was constipated and passing small amount of stool, which was unusual for her. Now, she is back to having significant amounts of diarrhea and an increase in lower abdominal pain. She pain is located across the general lower abdomen. She states that she still uses dicyclomine, unclear how much it is helping. She states that over the past week she has felt " "\"hot and cold\" but doesn't know if she's had a true fever. She was on antibiotics 3 weeks ago for URI. She states that her son has had diarrhea as well. No travel. She states that she stopped going to speech therapy and has started to \"choke on saliva\" again. Her weight is stable. Denies N/V, melena, hematochezia. She is still under large amounts of stress. She still deals with fecal incontinence    Prior GI evaluation:      Esophagram 3/2023: Consistent with oropharygneal dysphagia, no esophageal dysphagia signs seen        CT scan 5/2023: Mild enterocolitis (distal ileum and right colon), bladder WT, stable liver lesion (likely hemangioma from prior testing and stable dating back to 2019)    Gastric emptying study normal in August 2022    EGD July 2022: Gastritis, biopsies showed chronic gastritis without dysplasia, malignancy, H. pylori infection        Colonoscopy February 2022: tortuous colon    EGD February 2022: 2 fundic gland polyps removed, chronic gastritis seen (confirmed on biopsy)    Colonoscopy December 2019: normal    EGD December 2019: small hiatal hernia and gastritis seen. Gastric biopsies showed reactive gastropathy    EGD March 2018: chronic gastritis and esophageal candidiasis seen    EGD October 2017: Chronic gastritis and gastric AVM seen    EGD October 2013: retained food in stomach, gastric polyps and gastric AVM seen     ROS:  Constitutional: +Denies any significant weight loss  Mouth: +oropharyngeal dysphagia  GI: +ongoing acute on chronic diarrhea, abdominal pain  Psych: +stress    Medications:  Prior to Admission medications    Medication Sig Start Date End Date Taking? Authorizing Provider   acyclovir (Zovirax) 200 mg capsule Take by mouth once daily. 1/6/17  Yes Historical Provider, MD   albuterol 90 mcg/actuation inhaler Inhale 2 puffs every 4 hours if needed for wheezing or shortness of breath. 10/30/23  Yes Jennifer Lu, APRN-CNP   b complex-vitamin c (multivitamin, stress " formula) tablet Take 1 tablet by mouth once daily. 8/1/23 7/31/24 Yes Mariaelena Dyer MD   busPIRone (Buspar) 15 mg tablet TAKE 2 TABLETS 3 TIMES DAILY. 3/9/16  Yes Historical Provider, MD   cholecalciferol (Vitamin D-3) 1,250 mcg (50,000 unit) capsule TAKE ONE (1) CAPSULE BY MOUTH EVERY OTHER WEEK. 9/26/23  Yes Mariaelena Dyer MD   cyclobenzaprine (Flexeril) 10 mg tablet Take 1 tablet (10 mg) by mouth 3 times a day as needed for muscle spasms. 10/31/23 1/29/24 Yes Mariaelena Dyer MD   diazePAM (Valium) 5 mg tablet TAKE AS DIRECTED. TAKE 1 TABLET ORALLY TWICE DAILY AND 1 TABLET ORALLY DAILY IF NEEDED FOR ANXIETY.   Yes Historical Provider, MD   doxepin (Zonalon) 5 % cream APPLY 2-3 GRAMS TOPICALLY TWICE DAILY TO THREE TIMES DAILY AS NEEDED FOR NEUROPATHIC PAIN RELIEF. 90 days supply. Letter attached regarding approval. 10/6/23  Yes Mariaelena Dyer MD   Emgality Pen 120 mg/mL auto-injector Inject under the skin. 10/27/20  Yes Historical Provider, MD   ergocalciferol (Vitamin D-2) 1.25 MG (55846 UT) capsule Take 1 capsule (50,000 Units) by mouth every 14 (fourteen) days. 10/31/23 10/30/24 Yes Mariaelena Dyer MD   galcanezumab (Emgality) 120 mg/mL auto-injector INJECT 120 MG (1 PEN) UNDER THE SKIN ONCE A MONTH AS DIRECTED. 1/24/23 1/23/24 Yes Keven Escobar MD   Gas Relief Extra Strength 125 mg capsule Take 1 capsule (125 mg) by mouth 4 times a day as needed for flatulence. 10/11/23  Yes Historical Provider, MD   hydrocortisone (hydrocortisone-aloe vera) 1 % cream APPLY SPARINGLY AND RUB IN WELL TO  AFFECTED AREA(S) AS DIRECTED. 2/9/23  Yes Historical Provider, MD   hydrocortisone 1 % cream APPLY SPARINGLY AND RUB IN WELL TO  AFFECTED AREA(S) AS DIRECTED. 2/9/23  Yes Historical Provider, MD   ipratropium-albuteroL (Duo-Neb) 0.5-2.5 mg/3 mL nebulizer solution USE 1 VIAL IN NEBULIZER 4 TIMES DAILY 9/18/23  Yes Mariaelena Dyer MD   lactobacillus acidophilus 500 million cell capsule Take 1 capsule by  mouth once daily. 5/22/23 5/21/24 Yes Mariaelena Dyer MD   lidocaine (Xylocaine) 5 % ointment Apply topically if needed for mild pain (1 - 3). 10/31/23  Yes Mariaelena Dyer MD   loratadine (Claritin) 10 mg tablet TAKE ONE (1) TABLET BY MOUTH ONCE DAILY AT BEDTIME. **(CLARITIN 10 MG TAB EQUIV)** 6/2/23  Yes Mariaelena Dyer MD   mupirocin (Bactroban) 2 % ointment Apply topically 3 times a day. 9/7/16  Yes Historical Provider, MD   nebulizer accessories kit 1 each once daily. Needs a replacement mask- hers is not working 6/5/23  Yes AYAZ Dasilva   NexIUM 40 mg DR capsule Take 1 capsule (40 mg) by mouth once daily in the morning. Take before meals. Do not open capsule. 9/14/23 9/13/24 Yes AYAZ Landrum   nystatin (Mycostatin) cream Apply topically. 10/27/23  Yes Historical Provider, MD   ondansetron ODT (Zofran-ODT) 8 mg disintegrating tablet DISSOLVE 1 TABLET (8 MG) ON TONGUE AND SWALLOW EVERY 8 HOURS IF NEEDED FOR VOMITING. **(ZOFRAN ODT 8 MG TAB EQUIV)** 10/31/23  Yes Mariaelena Dyer MD   pravastatin (Pravachol) 20 mg tablet TAKE 1 TABLET BY MOUTH DAILY AT BEDTIME. **(PRAVACHOL 20 MG TAB EQUIV)** Clarification of previous order. 6/13/23  Yes Mariaelena Dyer MD   pregabalin (Lyrica) 150 mg capsule Take 1 capsule (150 mg) by mouth 3 times a day. 1/19/16  Yes Historical Provider, MD   SUMAtriptan (Imitrex) 50 mg tablet take 1 tab for migraine relief at onset of migraine. Can take another 1 tab 2 hours later. Max 2 tabs in 24 hours 10/30/23  Yes MD Андрей Stevens Ellipta 200-62.5-25 mcg blister with device INHALE ONE (1) PUFF BY MOUTH INTO THE LUNGS ONCE DAILY. 12/28/21  Yes Historical Provider, MD   umeclidinium-vilanteroL (Anoro Ellipta) 62.5-25 mcg/actuation blister with device Inhale. 3/9/16  Yes Historical Provider, MD   zolpidem (Ambien) 10 mg tablet TAKE 1/2 - 1 TABLET BY MOUTH AT BEDTIME AS NEEDED FOR SLEEP. DX: INSOMNIA RELATED TO BIPOLAR D/O:  F51.09/F31.81.   Yes Historical Provider, MD   dicyclomine (Bentyl) 20 mg tablet TAKE ONE TO TWO TABLETS BY MOUTH THREE (3) TIMES PER DAY OR AS NEEDED FOR PAIN. **(BENTYL 20 MG TAB EQUIV)** 5/22/23 11/7/23 Yes Mariaelena Dyer MD   polyethylene glycol (Miralax) 17 gram/dose powder MIX 1 CAPFUL IN 8 OUNCES OF WATER AND DRINK AT BEDTIME AS NEEDED FOR CONSTIPATION. 10/20/20 11/7/23 Yes Historical Provider, MD   psyllium husk, aspartame, (Metamucil Sugar-Free, aspart,) 3.4 gram/5.8 gram powder Take by mouth. 10/24/22 11/7/23 Yes Historical Provider, MD   dicyclomine (Bentyl) 20 mg tablet TAKE ONE TO TWO TABLETS BY MOUTH THREE (3) TIMES PER DAY OR AS NEEDED FOR PAIN. **(BENTYL 20 MG TAB EQUIV)** 11/7/23   Sheba Arciniega PA-C   polyethylene glycol (Miralax) 17 gram/dose powder MIX 1 CAPFUL IN 8 OUNCES OF WATER AND DRINK AT BEDTIME AS NEEDED FOR CONSTIPATION. 11/7/23   Sheba Arciniega PA-C   psyllium husk, aspartame, (Metamucil Sugar-Free, aspart,) 3.4 gram/5.8 gram powder Dissolve 2 scoops into 8 ounces of water and drink daily. 11/7/23   Sheba Arciniega PA-C   cyanocobalamin (Vitamin B-12) 1,000 mcg tablet TAKE ONE (1) TABLET BY MOUTH ONCE DAILY AS DIRECTED. 6/29/23 11/7/23  Mariaelena Dyer MD   cyanocobalamin (Vitamin B-12) 100 mcg tablet Take 0.5 tablets (50 mcg) by mouth once daily.  11/7/23  Historical Provider, MD   estradiol (Estrace) 0.1 MG/GM vaginal cream Insert into the vagina. 9/24/20 11/7/23  Historical Provider, MD   hyoscyamine 0.125 mg disintegrating tablet  3/3/20 11/7/23  Historical Provider, MD   L. acidophilus/Bifid. animalis 32 billion cell capsule Take by mouth. 12/11/20 11/7/23  Historical Provider, MD   nicotine (Nicoderm CQ) 14 mg/24 hr patch Place 1 patch on the skin once daily. 10/12/22 11/7/23  Historical Provider, MD   pantoprazole (ProtoNix) 40 mg EC tablet Take 1 tablet (40 mg) by mouth once daily.  11/7/23  Historical Provider, MD   promethazine (Phenergan) 25 mg tablet  TAKE ONE (1) TABLET BY MOUTH EVERY SIX (6) HOURS IF NEEDED FOR NAUSEA. **(PHENERGAN 25 MG TAB EQUIV)** 12/29/16 11/7/23  Historical Provider, MD   promethazine-DM (Phenergan-DM) 6.25-15 mg/5 mL syrup  1/28/20 11/7/23  Historical Provider, MD   topiramate (Topamax) 25 mg tablet Take 1 tablet (25 mg) by mouth twice a day.  11/7/23  Historical Provider, MD       Allergies:  Amitriptyline, Bupropion, Cefazolin, Diclofenac, Divalproex sodium, Doxycycline, Grapefruit, Guaifenesin, Hydrocodone-acetaminophen, Hydroxyzine, Ibuprofen, Ketorolac, Lepirudin, Loperamide, Meloxicam, Milnacipran, Mirtazapine, Morphine, Nabumetone, Ondansetron hcl, Other, Paroxetine, Tramadol, and Ultracet [tramadol-acetaminophen]    Past Medical History:  She has a past medical history of Anxiety, Asthma, Bipolar disorder, unspecified (Multi), Chronic pain, Colitis due to Clostridioides difficile (11/20/2024), COPD (chronic obstructive pulmonary disease) (Multi), Depression, Diabetes (Multi), Diet-controlled type 2 diabetes mellitus (05/22/2024), GERD (gastroesophageal reflux disease), History of sexually transmitted disease (11/20/2024), Hypercholesteremia, Hypogammaglobulinemia (Multi), IBS (irritable bowel syndrome), Migraines, Osteoporosis, Panic disorder (episodic paroxysmal anxiety), SBO (small bowel obstruction) (Multi) (03/2024), 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    Past Surgical History:  She has a past surgical history that includes Appendectomy; Hysterectomy; Esophagogastroduodenoscopy; Bladder surgery; Small intestine surgery; Upper gastrointestinal endoscopy; Colonoscopy; Tonsillectomy; Other surgical history; and biopsy (Left).    Social History:  She reports that she has been smoking cigarettes. She has a 21 pack-year smoking history. She has never used smokeless tobacco. She reports that she does not currently use alcohol. She reports  "that she does not currently use drugs.    Objective   Physical Exam  Vitals reviewed.   Constitutional:       General: She is not in acute distress.     Appearance: Normal appearance.   Cardiovascular:      Rate and Rhythm: Normal rate and regular rhythm.   Pulmonary:      Effort: Pulmonary effort is normal.      Breath sounds: Normal breath sounds.   Abdominal:      General: Bowel sounds are normal. There is no distension.      Palpations: Abdomen is soft.      Tenderness: There is abdominal tenderness (TTP in diffuse lower abdomen). There is no guarding or rebound.   Neurological:      General: No focal deficit present.      Mental Status: She is alert and oriented to person, place, and time.   Psychiatric:         Behavior: Behavior normal.         Assessment/Plan   Problem List Items Addressed This Visit           ICD-10-CM    GERD (gastroesophageal reflux disease) K21.9    Patient doesn't feel that the omeprazole is effective. She requests that I try and send Nexium as this worked better in the past.         Relevant Medications    esomeprazole (NexIUM) 40 mg DR capsule    Other Relevant Orders    CBC    Basic metabolic panel    Dysphagia R13.10    Patient with history of oropharyngeal dysphagia and in the past was in speech therapy. Last MBS was 2 years ago. Will update this along with ST evaluation as patient has began coughing and \"choking\" on liquids again.         Relevant Orders    FL modified barium swallow study    IBS (irritable bowel syndrome) (Chronic) K58.9    Recommended she continue anti-spasmodic, recommended trial of levsin. She can continue to use imodium daily if infectious stool studies negative. Refills of simethicone sent.         Relevant Medications    simethicone (Mylicon,Gas-X) 180 mg capsule    loperamide (Imodium A-D) 2 mg tablet    Other Relevant Orders    CBC    Basic metabolic panel    Generalized abdominal pain - Primary R10.84    Patient does have chronic abdominal pain related " to IBS. However, over the past week she has developed lower abdominal pain that isn't responding as much to her dicyclomine. She reports subjective fever and chills. She does have a sick family member, so infectious colitis is possible. Other possibilities would include diverticulitis or less likely appendicitis. STAT CBC, BMP, and CT ordered.          Relevant Medications    hyoscyamine (Anaspaz, Levsin) 0.125 mg tablet    Other Relevant Orders    CBC    Basic metabolic panel    CT abdomen pelvis w IV contrast    Acute diarrhea R19.7    Currently worse than normal over the past week. BMP, CBC and infectious stool studies ordered.         Relevant Orders    C. difficile, PCR    Stool Pathogen Panel, PCR        Greater than 45 minutes spent reviewing chart day of appointment, face to face interaction with patient, and documentation in note.      Sheba Arciniega PA-C

## 2025-04-25 ENCOUNTER — LAB REQUISITION (OUTPATIENT)
Dept: LAB | Facility: HOSPITAL | Age: 60
End: 2025-04-25
Payer: COMMERCIAL

## 2025-04-25 ENCOUNTER — HOSPITAL ENCOUNTER (OUTPATIENT)
Dept: RADIOLOGY | Facility: HOSPITAL | Age: 60
Discharge: HOME | End: 2025-04-25
Payer: COMMERCIAL

## 2025-04-25 ENCOUNTER — TELEPHONE (OUTPATIENT)
Facility: CLINIC | Age: 60
End: 2025-04-25

## 2025-04-25 DIAGNOSIS — K21.9 GASTRO-ESOPHAGEAL REFLUX DISEASE WITHOUT ESOPHAGITIS: ICD-10-CM

## 2025-04-25 DIAGNOSIS — R10.84 GENERALIZED ABDOMINAL PAIN: ICD-10-CM

## 2025-04-25 DIAGNOSIS — K58.2 MIXED IRRITABLE BOWEL SYNDROME: ICD-10-CM

## 2025-04-25 LAB
ANION GAP SERPL CALC-SCNC: 15 MMOL/L (ref 10–20)
BUN SERPL-MCNC: 11 MG/DL (ref 6–23)
CALCIUM SERPL-MCNC: 9.2 MG/DL (ref 8.6–10.3)
CHLORIDE SERPL-SCNC: 105 MMOL/L (ref 98–107)
CO2 SERPL-SCNC: 25 MMOL/L (ref 21–32)
CREAT SERPL-MCNC: 0.68 MG/DL (ref 0.6–1.3)
CREAT SERPL-MCNC: 0.86 MG/DL (ref 0.5–1.05)
EGFRCR SERPLBLD CKD-EPI 2021: 77 ML/MIN/1.73M*2
ERYTHROCYTE [DISTWIDTH] IN BLOOD BY AUTOMATED COUNT: 19.3 % (ref 11.5–14.5)
GFR SERPL CREATININE-BSD FRML MDRD: >90 ML/MIN/1.73M*2
GLUCOSE SERPL-MCNC: 92 MG/DL (ref 74–99)
HCT VFR BLD AUTO: 35.4 % (ref 36–46)
HGB BLD-MCNC: 11.2 G/DL (ref 12–16)
MCH RBC QN AUTO: 26.4 PG (ref 26–34)
MCHC RBC AUTO-ENTMCNC: 31.6 G/DL (ref 32–36)
MCV RBC AUTO: 84 FL (ref 80–100)
NRBC BLD-RTO: 0 /100 WBCS (ref 0–0)
PLATELET # BLD AUTO: 243 X10*3/UL (ref 150–450)
POTASSIUM SERPL-SCNC: 3.7 MMOL/L (ref 3.5–5.3)
RBC # BLD AUTO: 4.24 X10*6/UL (ref 4–5.2)
SODIUM SERPL-SCNC: 141 MMOL/L (ref 136–145)
WBC # BLD AUTO: 11 X10*3/UL (ref 4.4–11.3)

## 2025-04-25 PROCEDURE — 85027 COMPLETE CBC AUTOMATED: CPT

## 2025-04-25 PROCEDURE — 2550000001 HC RX 255 CONTRASTS: Mod: JZ | Performed by: PHYSICIAN ASSISTANT

## 2025-04-25 PROCEDURE — 82565 ASSAY OF CREATININE: CPT

## 2025-04-25 PROCEDURE — 74177 CT ABD & PELVIS W/CONTRAST: CPT

## 2025-04-25 PROCEDURE — 80048 BASIC METABOLIC PNL TOTAL CA: CPT

## 2025-04-25 RX ADMIN — IOHEXOL 75 ML: 350 INJECTION, SOLUTION INTRAVENOUS at 14:37

## 2025-04-25 NOTE — TELEPHONE ENCOUNTER
"Spoke with pharmacy on 4/24/25.  Pharmacy was running the incorrect NDC and PA is requested for \"solution\", which was not what was prescribed.  Pharmacy was to run correct NDC.  No PA completed, as this was not what was prescribed.  "

## 2025-04-27 LAB
C COLI+JEJUNI+LARI FUSA STL QL NAA+PROBE: NOT DETECTED
EC STX1 GENE STL QL NAA+PROBE: NOT DETECTED
EC STX2 GENE STL QL NAA+PROBE: NOT DETECTED
NOROV GI+II ORF1-ORF2 JNC STL QL NAA+PR: NOT DETECTED
RVA NSP5 STL QL NAA+PROBE: NOT DETECTED
SALMONELLA SP RPOD STL QL NAA+PROBE: NOT DETECTED
SHIGELLA DNA SPEC QL NAA+PROBE: NOT DETECTED
V CHOL+PARA RFBL+TRKH+TNAA STL QL NAA+PR: NOT DETECTED
Y ENTERO RECN STL QL NAA+PROBE: NOT DETECTED

## 2025-04-28 DIAGNOSIS — M54.16 LUMBAR RADICULOPATHY: ICD-10-CM

## 2025-04-30 ENCOUNTER — TELEPHONE (OUTPATIENT)
Facility: CLINIC | Age: 60
End: 2025-04-30
Payer: COMMERCIAL

## 2025-05-01 PROCEDURE — RXMED WILLOW AMBULATORY MEDICATION CHARGE

## 2025-05-01 NOTE — TELEPHONE ENCOUNTER
Patient called back.  She wanted to let you know that she is not currently in [place], but has questions on her hemoglobin and hematocrit levels being low.  She is also experiencing black stools (black on toilet paper when wiping after BM)   I did advise her to go to the ER based on her symptoms.  She verbalized understanding, but still wanted the message to be sent to you to advise on those specific tests.

## 2025-05-05 ENCOUNTER — PHARMACY VISIT (OUTPATIENT)
Dept: PHARMACY | Facility: CLINIC | Age: 60
End: 2025-05-05
Payer: COMMERCIAL

## 2025-05-07 ENCOUNTER — APPOINTMENT (OUTPATIENT)
Dept: INFUSION THERAPY | Facility: HOSPITAL | Age: 60
End: 2025-05-07
Payer: COMMERCIAL

## 2025-05-12 ENCOUNTER — INFUSION (OUTPATIENT)
Dept: INFUSION THERAPY | Facility: HOSPITAL | Age: 60
End: 2025-05-12
Payer: COMMERCIAL

## 2025-05-12 VITALS
DIASTOLIC BLOOD PRESSURE: 81 MMHG | HEART RATE: 91 BPM | BODY MASS INDEX: 23.12 KG/M2 | SYSTOLIC BLOOD PRESSURE: 118 MMHG | WEIGHT: 130.51 LBS | RESPIRATION RATE: 16 BRPM | TEMPERATURE: 97.4 F | OXYGEN SATURATION: 93 %

## 2025-05-12 DIAGNOSIS — M81.0 OSTEOPOROSIS, UNSPECIFIED OSTEOPOROSIS TYPE, UNSPECIFIED PATHOLOGICAL FRACTURE PRESENCE: ICD-10-CM

## 2025-05-12 DIAGNOSIS — D80.3 SELECTIVE DEFICIENCY OF IGG (MULTI): ICD-10-CM

## 2025-05-12 PROCEDURE — 96365 THER/PROPH/DIAG IV INF INIT: CPT | Mod: INF

## 2025-05-12 PROCEDURE — 2500000001 HC RX 250 WO HCPCS SELF ADMINISTERED DRUGS (ALT 637 FOR MEDICARE OP): Performed by: INTERNAL MEDICINE

## 2025-05-12 PROCEDURE — 2500000004 HC RX 250 GENERAL PHARMACY W/ HCPCS (ALT 636 FOR OP/ED): Mod: JZ | Performed by: INTERNAL MEDICINE

## 2025-05-12 PROCEDURE — 96366 THER/PROPH/DIAG IV INF ADDON: CPT | Mod: INF

## 2025-05-12 RX ORDER — DIPHENHYDRAMINE HCL 25 MG
25 CAPSULE ORAL ONCE
Status: CANCELLED | OUTPATIENT
Start: 2025-05-12

## 2025-05-12 RX ORDER — ACETAMINOPHEN 325 MG/1
650 TABLET ORAL ONCE
Status: CANCELLED | OUTPATIENT
Start: 2025-05-12

## 2025-05-12 RX ORDER — DIPHENHYDRAMINE HCL 25 MG
25 CAPSULE ORAL ONCE
Status: COMPLETED | OUTPATIENT
Start: 2025-05-12 | End: 2025-05-12

## 2025-05-12 RX ORDER — HEPARIN 100 UNIT/ML
500 SYRINGE INTRAVENOUS AS NEEDED
OUTPATIENT
Start: 2025-05-12

## 2025-05-12 RX ORDER — ACETAMINOPHEN 325 MG/1
650 TABLET ORAL ONCE
Status: COMPLETED | OUTPATIENT
Start: 2025-05-12 | End: 2025-05-12

## 2025-05-12 RX ADMIN — IMMUNE GLOBULIN (HUMAN) 25 G: 10 INJECTION INTRAVENOUS; SUBCUTANEOUS at 09:23

## 2025-05-12 RX ADMIN — ACETAMINOPHEN 650 MG: 325 TABLET ORAL at 09:02

## 2025-05-12 RX ADMIN — DIPHENHYDRAMINE HYDROCHLORIDE 25 MG: 25 CAPSULE ORAL at 09:02

## 2025-05-12 ASSESSMENT — PAIN SCALES - GENERAL: PAINLEVEL_OUTOF10: 7

## 2025-05-12 ASSESSMENT — ENCOUNTER SYMPTOMS
OCCASIONAL FEELINGS OF UNSTEADINESS: 1
LOSS OF SENSATION IN FEET: 1
DEPRESSION: 1

## 2025-05-14 RX ORDER — DIPHENHYDRAMINE HCL 25 MG
25 CAPSULE ORAL ONCE
OUTPATIENT
Start: 2025-05-14 | End: 2025-05-14

## 2025-05-14 RX ORDER — ACETAMINOPHEN 325 MG/1
650 TABLET ORAL ONCE
OUTPATIENT
Start: 2025-05-14 | End: 2025-05-14

## 2025-05-15 ENCOUNTER — HOSPITAL ENCOUNTER (OUTPATIENT)
Dept: PAIN MEDICINE | Facility: CLINIC | Age: 60
Discharge: HOME | End: 2025-05-15
Payer: COMMERCIAL

## 2025-05-15 VITALS
OXYGEN SATURATION: 96 % | RESPIRATION RATE: 16 BRPM | DIASTOLIC BLOOD PRESSURE: 77 MMHG | HEART RATE: 82 BPM | SYSTOLIC BLOOD PRESSURE: 121 MMHG

## 2025-05-15 DIAGNOSIS — M54.16 LUMBAR RADICULOPATHY: ICD-10-CM

## 2025-05-15 PROCEDURE — 64483 NJX AA&/STRD TFRM EPI L/S 1: CPT | Performed by: ANESTHESIOLOGY

## 2025-05-15 PROCEDURE — 64484 NJX AA&/STRD TFRM EPI L/S EA: CPT | Performed by: ANESTHESIOLOGY

## 2025-05-15 PROCEDURE — 2500000004 HC RX 250 GENERAL PHARMACY W/ HCPCS (ALT 636 FOR OP/ED): Mod: JZ | Performed by: ANESTHESIOLOGY

## 2025-05-15 PROCEDURE — 2550000001 HC RX 255 CONTRASTS: Performed by: ANESTHESIOLOGY

## 2025-05-15 RX ORDER — LIDOCAINE HYDROCHLORIDE 5 MG/ML
INJECTION, SOLUTION INFILTRATION; INTRAVENOUS AS NEEDED
Status: COMPLETED | OUTPATIENT
Start: 2025-05-15 | End: 2025-05-15

## 2025-05-15 RX ORDER — DEXAMETHASONE SODIUM PHOSPHATE 10 MG/ML
INJECTION INTRAMUSCULAR; INTRAVENOUS AS NEEDED
Status: COMPLETED | OUTPATIENT
Start: 2025-05-15 | End: 2025-05-15

## 2025-05-15 RX ORDER — INDOMETHACIN 25 MG/1
CAPSULE ORAL AS NEEDED
Status: COMPLETED | OUTPATIENT
Start: 2025-05-15 | End: 2025-05-15

## 2025-05-15 RX ADMIN — LIDOCAINE HYDROCHLORIDE 11 ML: 5 INJECTION, SOLUTION INFILTRATION at 07:52

## 2025-05-15 RX ADMIN — DEXAMETHASONE SODIUM PHOSPHATE 10 MG: 10 INJECTION, SOLUTION INTRAMUSCULAR; INTRAVENOUS at 07:52

## 2025-05-15 RX ADMIN — IOHEXOL 5 ML: 240 INJECTION, SOLUTION INTRATHECAL; INTRAVASCULAR; INTRAVENOUS; ORAL at 07:53

## 2025-05-15 RX ADMIN — SODIUM BICARBONATE 1 MEQ: 84 INJECTION, SOLUTION INTRAVENOUS at 07:53

## 2025-05-15 ASSESSMENT — PAIN DESCRIPTION - DESCRIPTORS: DESCRIPTORS: SHARP;RADIATING

## 2025-05-15 ASSESSMENT — PAIN SCALES - GENERAL
PAINLEVEL_OUTOF10: 8
PAINLEVEL_OUTOF10: 4

## 2025-05-15 ASSESSMENT — PAIN - FUNCTIONAL ASSESSMENT: PAIN_FUNCTIONAL_ASSESSMENT: 0-10

## 2025-05-15 NOTE — DISCHARGE INSTRUCTIONS
[facility]  [facility] [address]  [address] [id number] [phone]    POST PROCEDURE INSTRUCTIONS    Activity  Medication used during your procedure may cause some temporary weakness or numbness in your arms or legs, depending on the type of injection you received. It is recommended that you do not drive or operate machinery the day of your injection.  You do not need to stay in bed when you get home. You should be able to resume your normal activities, including work. However, any pre-existing physical restriction you had prior to the procedure may still remain.   Have a responsible adult with you if you received sedation for the procedure. Do not drive or operate machinery for 12 hours.    Pain  Immediate pain relief from the local anesthetic will wear off after several hours.  Prolonged relief from the steroid may take 3-14 days to occur.  Some patients may experience a “flare up” of their normal pain for a few days after the procedure. You may apply ice packs to the sore area for 15minutes on/ 2 hours off and take your prescribed or over the counter analgesics as needed.  Common side effects from the steroid include facial flushing, headaches,fluid retention,trouble sleeping,and cold like symptoms for 24-48 hours post procedure.  Patients receiving diagnostic injections (no steroid): pain relief is intended to be temporary. Pay attention to the percentage of pain relief that occurs compared to before your injection so you can report this to your doctor. Pain scores before and after the procedure need to be documented.    Medications  Resume your normal medications unless otherwise instructed  If you held your blood thinning medication for the procedure,you will be instructed on when to restart.    Injection site care  Keep the injection site clean and dry. You may shower and remove the Band Aid after you arrive home.  If you notice excessive  bleeding (slow general oozing that completely soaks the dressing or fresh bright red bleeding),apply pressure and call our office immediately.  Observe for signs of infection: increasing pain at injection site, redness, swelling, drainage with a foul smell, any associated fever or chills                        If you notice any of these, call our office immediately.    Diabetic patients   You may notice an elevation in blood sugar if steroid is used. Notify your primary care doctor if blood sugar levels do not return to normal.    Follow up  Call office to schedule  injection follow up appointment  with Dr. Davis in 3-4 weeks      Call our office at [phone] to speak to the clinical staff with any concerns or problems.  Go to the nearest emergency room if you are not able to reach us and your problem is urgent.  Call [phone] if you develop serious symptoms such as: chest pain or difficulty breathing.

## 2025-05-15 NOTE — H&P
History Of Present Illness  Kelli Ortega is a 59 y.o. female presenting with low back pain radiating to posterior lateral aspect of left lower extremity.  Patient scheduled for left L4 and L5 transforaminal epidural steroid injection.     Past Medical History  She has a past medical history of Anxiety, Asthma, Bipolar disorder, unspecified (Multi), Chronic pain, Colitis due to Clostridioides difficile (11/20/2024), COPD (chronic obstructive pulmonary disease) (Multi), Depression, Diabetes (Multi), Diet-controlled type 2 diabetes mellitus (05/22/2024), GERD (gastroesophageal reflux disease), History of sexually transmitted disease (11/20/2024), Hypercholesteremia, Hypogammaglobulinemia (Multi), IBS (irritable bowel syndrome), Migraines, Osteoporosis, Panic disorder (episodic paroxysmal anxiety), SBO (small bowel obstruction) (Multi) (03/2024), 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    Surgical History  She has a past surgical history that includes Appendectomy; Hysterectomy; Esophagogastroduodenoscopy; Bladder surgery; Small intestine surgery; Upper gastrointestinal endoscopy; Colonoscopy; Tonsillectomy; Other surgical history; and biopsy (Left).     Social History  She reports that she has been smoking cigarettes. She has a 21 pack-year smoking history. She has never used smokeless tobacco. She reports that she does not currently use alcohol. She reports that she does not currently use drugs.    Family History  Family History[1]     Allergies  Amitriptyline, Bupropion, Cefazolin, Diclofenac, Divalproex sodium, Doxycycline, Grapefruit, Guaifenesin, Hydrocodone-acetaminophen, Hydroxyzine, Ibuprofen, Ketorolac, Lepirudin, Loperamide, Meloxicam, Milnacipran, Mirtazapine, Morphine, Nabumetone, Ondansetron hcl, Other, Paroxetine, Tramadol, and Ultracet [tramadol-acetaminophen]    Review of systems:   13-point review of systems  done and negative except as noted in HPI      Physical Exam   Vital signs: Reviewed  Constitutional: No acute distress, well appearing and well nourished. Patient appears stated age.   Eyes: Conjunctiva non-icteric and eye lids are without obvious rash or drooping. Pupils are symmetric.   Ears, Nose, Mouth, and Throat: External ears and nose appear to be without deformity or rash. No lesions or masses noted. Hearing is grossly intact.   Neck:. No JVD noted, tracheal position is midline.   Head and Face: Examination of the head and face revealed no abnormalities.   Respiratory: No gasping or shortness of breath noted, no use of accessory muscles noted.   Cardiovascular: Examination for edema is normal.   GI: Abdomen nontender to palpation.   Skin: No rashes or open lesions/ulcers identified on skin.   Musk: Digits/nails show no clubbing or cyanosis. No asymmetry or masses noted of the musculature. Examination of the muscles/joints/bones show normal range of motion. Gait is grossly [].  [] to walk on toes and heels.   Neurologic: Cranial nerves II-XII intact, motor strength 5/5 muscle strength of the lower extremities bilaterally and equal. 5/5 muscle strength of the upper extremities bilaterally and equal.   Reflexes: normal.   Sensation: []  Provocative tests:    Psychiatric: Mood and affect are normal.      Last Recorded Vitals  /77   Pulse 82   Resp 16   SpO2 96%     Relevant Results            Last OARRS Review: No data recorded    I have personally reviewed the OARRS report for Kelli Ortega I have considered the risks of abuse, dependence, addiction and diversion       Assessment/Plan   Assessment & Plan  Lumbar radiculopathy      As discussed above       This note was generated with the aid of dictation software, there may be typos despite my attempts at proofreading.    Ricki Davis MD        [1]   Family History  Problem Relation Name Age of Onset    Diabetes Mother      Hypertension Mother       Breast cancer Sister      Asthma Son      Cancer Other uncle

## 2025-05-16 ENCOUNTER — TELEPHONE (OUTPATIENT)
Facility: CLINIC | Age: 60
End: 2025-05-16
Payer: COMMERCIAL

## 2025-05-16 DIAGNOSIS — K58.2 IRRITABLE BOWEL SYNDROME WITH BOTH CONSTIPATION AND DIARRHEA: Primary | ICD-10-CM

## 2025-05-16 RX ORDER — HYOSCYAMINE SULFATE 0.125 MG
0.12 TABLET ORAL EVERY 6 HOURS PRN
Qty: 100 TABLET | Refills: 11 | Status: SHIPPED | OUTPATIENT
Start: 2025-05-16 | End: 2026-05-16

## 2025-05-16 NOTE — TELEPHONE ENCOUNTER
Left message for patient to return call.  When she returns the call, please advise that coverage denial was due to FDA approval on diagnosis code attached to prescription.  New prescription has been submitted with a new diagnosis.

## 2025-05-16 NOTE — TELEPHONE ENCOUNTER
Patient called back and was advised. New prescription with change of diagnosis to be sent to [facility] pharmacy.

## 2025-05-16 NOTE — TELEPHONE ENCOUNTER
Patient called- states she spoke to the pharm and they said that the TABLET form of the Levsin is needing a PA.   NOT the solution.     Please process PA for tablet form and update patient/pharm with outcome.

## 2025-05-16 NOTE — TELEPHONE ENCOUNTER
Unable to initiate PA because we do not have pharmacy benefit information.  I called [facility] pharmacy to inquire on PA and also patient's insurance information.  ID- [id number]  BIN-[id number]  [facility]  [facility]      **Pharmacist states that the denial that he is receiving is that the Levsin is not FDA approved for the associated diagnosis generalized abdominal pain. PA would not be helpful in this situation.  He was not permitted to advise on covered diagnosis.   Is there another diagnosis that can be attached to the Levsin tablets and have the medication sent back to the pharmacy?

## 2025-05-23 NOTE — TELEPHONE ENCOUNTER
[facility] called and stated prescription is still not approved.  The denial code is now NDC.  They are faxing the denial over for office to do another prior auth with different DX

## 2025-05-23 NOTE — TELEPHONE ENCOUNTER
Per Medicare coverage guidelines, the medication is not covered. See attached denial. Patient to be advised through MyChart that her insurance does not cover this class of drugs.  Will suggest to patient to try using [facility] if she wishes to purchase without insurance.

## 2025-05-23 NOTE — TELEPHONE ENCOUNTER
"Patient called to check on status.   Informed patient that the denial was due to a DX code needing changed. The change in dx was made on our end and then re-submitted to the pharm. The change was confirmed as \"successfully changed\" and at this time there was no response from the pharm of an additional denial after the DX was changed and resubmitted.     Patient will check with pharm on the status of RX.     Patient verbalizes and understands.   "

## 2025-05-27 ENCOUNTER — APPOINTMENT (OUTPATIENT)
Dept: PRIMARY CARE | Facility: CLINIC | Age: 60
End: 2025-05-27
Payer: MEDICARE

## 2025-05-27 ENCOUNTER — TELEPHONE (OUTPATIENT)
Dept: PRIMARY CARE | Facility: CLINIC | Age: 60
End: 2025-05-27

## 2025-05-27 VITALS
DIASTOLIC BLOOD PRESSURE: 60 MMHG | RESPIRATION RATE: 16 BRPM | WEIGHT: 137.4 LBS | BODY MASS INDEX: 24.34 KG/M2 | HEART RATE: 77 BPM | SYSTOLIC BLOOD PRESSURE: 98 MMHG | OXYGEN SATURATION: 98 % | HEIGHT: 63 IN

## 2025-05-27 DIAGNOSIS — D80.3 SELECTIVE DEFICIENCY OF IMMUNOGLOBULIN G (IGG) SUBCLASSES: ICD-10-CM

## 2025-05-27 DIAGNOSIS — E11.9 TYPE 2 DIABETES MELLITUS WITHOUT COMPLICATION, WITHOUT LONG-TERM CURRENT USE OF INSULIN: ICD-10-CM

## 2025-05-27 DIAGNOSIS — D50.0 IRON DEFICIENCY ANEMIA DUE TO CHRONIC BLOOD LOSS: ICD-10-CM

## 2025-05-27 DIAGNOSIS — M48.07 SPINAL STENOSIS OF LUMBOSACRAL REGION: ICD-10-CM

## 2025-05-27 DIAGNOSIS — Z00.00 ROUTINE GENERAL MEDICAL EXAMINATION AT HEALTH CARE FACILITY: Primary | ICD-10-CM

## 2025-05-27 DIAGNOSIS — M81.0 OSTEOPOROSIS, UNSPECIFIED OSTEOPOROSIS TYPE, UNSPECIFIED PATHOLOGICAL FRACTURE PRESENCE: ICD-10-CM

## 2025-05-27 DIAGNOSIS — J43.2 CENTRILOBULAR EMPHYSEMA (MULTI): ICD-10-CM

## 2025-05-27 DIAGNOSIS — Z23 NEED FOR PNEUMOCOCCAL VACCINE: ICD-10-CM

## 2025-05-27 DIAGNOSIS — E55.9 VITAMIN D DEFICIENCY: ICD-10-CM

## 2025-05-27 DIAGNOSIS — E11.9 DIET-CONTROLLED DIABETES MELLITUS (MULTI): ICD-10-CM

## 2025-05-27 DIAGNOSIS — E78.2 MIXED HYPERLIPIDEMIA: ICD-10-CM

## 2025-05-27 DIAGNOSIS — Z12.31 VISIT FOR SCREENING MAMMOGRAM: ICD-10-CM

## 2025-05-27 DIAGNOSIS — F41.9 ANXIETY: ICD-10-CM

## 2025-05-27 PROCEDURE — 90677 PCV20 VACCINE IM: CPT | Performed by: FAMILY MEDICINE

## 2025-05-27 PROCEDURE — 3078F DIAST BP <80 MM HG: CPT | Performed by: FAMILY MEDICINE

## 2025-05-27 PROCEDURE — 3008F BODY MASS INDEX DOCD: CPT | Performed by: FAMILY MEDICINE

## 2025-05-27 PROCEDURE — 4004F PT TOBACCO SCREEN RCVD TLK: CPT | Performed by: FAMILY MEDICINE

## 2025-05-27 PROCEDURE — 3074F SYST BP LT 130 MM HG: CPT | Performed by: FAMILY MEDICINE

## 2025-05-27 PROCEDURE — G0009 ADMIN PNEUMOCOCCAL VACCINE: HCPCS | Performed by: FAMILY MEDICINE

## 2025-05-27 PROCEDURE — G0439 PPPS, SUBSEQ VISIT: HCPCS | Performed by: FAMILY MEDICINE

## 2025-05-27 RX ORDER — ASPIRIN 325 MG
TABLET, DELAYED RELEASE (ENTERIC COATED) ORAL
Qty: 3 CAPSULE | Refills: 0 | Status: SHIPPED | OUTPATIENT
Start: 2025-05-27

## 2025-05-27 RX ORDER — ZOLPIDEM TARTRATE 5 MG/1
5 TABLET ORAL NIGHTLY PRN
COMMUNITY

## 2025-05-27 ASSESSMENT — ENCOUNTER SYMPTOMS
DYSPHORIC MOOD: 1
ARTHRALGIAS: 1
DEPRESSION: 1
LOSS OF SENSATION IN FEET: 0
MYALGIAS: 1
BACK PAIN: 1
OCCASIONAL FEELINGS OF UNSTEADINESS: 1

## 2025-05-27 ASSESSMENT — PATIENT HEALTH QUESTIONNAIRE - PHQ9
10. IF YOU CHECKED OFF ANY PROBLEMS, HOW DIFFICULT HAVE THESE PROBLEMS MADE IT FOR YOU TO DO YOUR WORK, TAKE CARE OF THINGS AT HOME, OR GET ALONG WITH OTHER PEOPLE: SOMEWHAT DIFFICULT
3. TROUBLE FALLING OR STAYING ASLEEP OR SLEEPING TOO MUCH: NEARLY EVERY DAY
6. FEELING BAD ABOUT YOURSELF - OR THAT YOU ARE A FAILURE OR HAVE LET YOURSELF OR YOUR FAMILY DOWN: NEARLY EVERY DAY
5. POOR APPETITE OR OVEREATING: NEARLY EVERY DAY
1. LITTLE INTEREST OR PLEASURE IN DOING THINGS: NEARLY EVERY DAY
9. THOUGHTS THAT YOU WOULD BE BETTER OFF DEAD, OR OF HURTING YOURSELF: NOT AT ALL
SUM OF ALL RESPONSES TO PHQ9 QUESTIONS 1 AND 2: 2
10. IF YOU CHECKED OFF ANY PROBLEMS, HOW DIFFICULT HAVE THESE PROBLEMS MADE IT FOR YOU TO DO YOUR WORK, TAKE CARE OF THINGS AT HOME, OR GET ALONG WITH OTHER PEOPLE: VERY DIFFICULT
SUM OF ALL RESPONSES TO PHQ9 QUESTIONS 1 AND 2: 6
2. FEELING DOWN, DEPRESSED OR HOPELESS: NEARLY EVERY DAY
4. FEELING TIRED OR HAVING LITTLE ENERGY: NEARLY EVERY DAY
1. LITTLE INTEREST OR PLEASURE IN DOING THINGS: SEVERAL DAYS
2. FEELING DOWN, DEPRESSED OR HOPELESS: SEVERAL DAYS
8. MOVING OR SPEAKING SO SLOWLY THAT OTHER PEOPLE COULD HAVE NOTICED. OR THE OPPOSITE, BEING SO FIGETY OR RESTLESS THAT YOU HAVE BEEN MOVING AROUND A LOT MORE THAN USUAL: NEARLY EVERY DAY

## 2025-05-27 ASSESSMENT — ANXIETY QUESTIONNAIRES
7. FEELING AFRAID AS IF SOMETHING AWFUL MIGHT HAPPEN: NEARLY EVERY DAY
IF YOU CHECKED OFF ANY PROBLEMS ON THIS QUESTIONNAIRE, HOW DIFFICULT HAVE THESE PROBLEMS MADE IT FOR YOU TO DO YOUR WORK, TAKE CARE OF THINGS AT HOME, OR GET ALONG WITH OTHER PEOPLE: VERY DIFFICULT
3. WORRYING TOO MUCH ABOUT DIFFERENT THINGS: NEARLY EVERY DAY
5. BEING SO RESTLESS THAT IT IS HARD TO SIT STILL: NEARLY EVERY DAY
1. FEELING NERVOUS, ANXIOUS, OR ON EDGE: NEARLY EVERY DAY
6. BECOMING EASILY ANNOYED OR IRRITABLE: NEARLY EVERY DAY
GAD7 TOTAL SCORE: 21
4. TROUBLE RELAXING: NEARLY EVERY DAY
2. NOT BEING ABLE TO STOP OR CONTROL WORRYING: NEARLY EVERY DAY

## 2025-05-27 ASSESSMENT — ACTIVITIES OF DAILY LIVING (ADL)
BATHING: INDEPENDENT
MANAGING_FINANCES: INDEPENDENT
DRESSING: INDEPENDENT
DOING_HOUSEWORK: INDEPENDENT
GROCERY_SHOPPING: INDEPENDENT
TAKING_MEDICATION: INDEPENDENT

## 2025-05-27 ASSESSMENT — PAIN SCALES - GENERAL: PAINLEVEL_OUTOF10: 8

## 2025-05-27 NOTE — PATIENT INSTRUCTIONS
It was good to see you. Plan to do bloodwork before you come back in 6 months.   Labwork order written.    Pneumonia shot today.   Call if you need anything.   Ways to Help Prevent Falls at Home    Quick Tips   ? Ask for help if you need it. Most people want to help!   ? Get up slowly after sitting or laying down   ? Wear a medical alert device or keep cell phone in your pocket   ? Use night lights, especially areas near a bathroom   ? Keep the items you use often within reach on a small stool or end table   ? Use an assistive device such as walker or cane, as directed by provider/physical therapy   ? Use a non-slip mat and grab bars in your bathroom. Look for home health sections for best options     Other Areas to Focus On   ? Exercise and nutrition: Regular exercise or taking a falls prevention class are great ways improve strength and balance. Don’t forget to stay hydrated and bring a snack!   ? Medicine side effects: Some medicines can make you sleepy or dizzy, which could cause a fall. Ask your healthcare provider about the side effects your medicines could cause. Be sure to let them know if you take any vitamins or supplements as well.   ? Tripping hazards: Remove items you could trip on, such as loose mats, rugs, cords, and clutter. Wear closed toe shoes with rubber soles.   ? Health and wellness: Get regular checkups with your healthcare provider, plus routine vision and hearing screenings. Talk with your healthcare provider about:   o Your medicines and the possible side effects - bring them in a bag if that is easier!   o Problems with balance or feeling dizzy   o Ways to promote bone health, such as Vitamin D and calcium supplements   o Questions or concerns about falling     *Ask your healthcare team if you have questions     ©[facility], 2022

## 2025-05-27 NOTE — ASSESSMENT & PLAN NOTE
Orders:    cholecalciferol (Vitamin D-3) 1.25 mg (50,000 units) capsule; TAKE ONE (1) CAPSULE BY MOUTH EVERY OTHER WEEK.

## 2025-05-27 NOTE — ASSESSMENT & PLAN NOTE
Sees Pain management, lyrica.   Orders:    Hemoglobin A1C; Future    CBC and Auto Differential; Future    Comprehensive Metabolic Panel; Future    Lipid Panel; Future    TSH with reflex to Free T4 if abnormal; Future

## 2025-05-27 NOTE — PROGRESS NOTES
"Subjective   Reason for Visit: Kelli Ortega is an 59 y.o. female here for a Medicare Wellness visit.     Past Medical, Surgical, and Family History reviewed and updated in chart.    Reviewed all medications by prescribing practitioner or clinical pharmacist (such as prescriptions, OTCs, herbal therapies and supplements) and documented in the medical record.    HPI  Marielena struggles at home with her family, They are accusing her of things she did not do. Trying to kill her nephew or ex . Accused of selling drugs in [place], pulled over by police.   Patient Care Team:  Mariaelena Dyer MD as PCP - General (Family Medicine)  Mariaelena Dyer MD as PCP - [facility] PCP  REDD Yan-CNP as Nurse Practitioner (Neurology)     Review of Systems   Musculoskeletal:  Positive for arthralgias, back pain and myalgias.   Psychiatric/Behavioral:  Positive for dysphoric mood.    All other systems reviewed and are negative.      Objective   Vitals:  BP 98/60 (BP Location: Left arm, Patient Position: Sitting, BP Cuff Size: Adult)   Pulse 77   Resp 16   Ht 1.6 m (5' 3\")   Wt 62.3 kg (137 lb 6.4 oz)   SpO2 98%   BMI 24.34 kg/m²       Physical Exam  Vitals reviewed.   Constitutional:       Appearance: Normal appearance.   HENT:      Head: Normocephalic and atraumatic.      Right Ear: Tympanic membrane normal.      Left Ear: Tympanic membrane normal.      Nose: Nose normal.      Mouth/Throat:      Mouth: Mucous membranes are moist.      Pharynx: Oropharynx is clear.   Eyes:      Extraocular Movements: Extraocular movements intact.      Conjunctiva/sclera: Conjunctivae normal.      Pupils: Pupils are equal, round, and reactive to light.   Cardiovascular:      Rate and Rhythm: Normal rate and regular rhythm.      Pulses: Normal pulses.      Heart sounds: Normal heart sounds.   Pulmonary:      Effort: Pulmonary effort is normal.      Breath sounds: Normal breath sounds.   Abdominal:      General: Abdomen " is flat. Bowel sounds are normal.      Palpations: Abdomen is soft.   Musculoskeletal:         General: Tenderness present.      Cervical back: Normal range of motion and neck supple.   Skin:     General: Skin is warm and dry.      Capillary Refill: Capillary refill takes less than 2 seconds.   Neurological:      General: No focal deficit present.      Mental Status: She is alert and oriented to person, place, and time.      Motor: Weakness present.   Psychiatric:         Mood and Affect: Mood normal.         Behavior: Behavior normal.       Assessment & Plan  Vitamin D deficiency    Orders:  •  cholecalciferol (Vitamin D-3) 1.25 mg (50,000 units) capsule; TAKE ONE (1) CAPSULE BY MOUTH EVERY OTHER WEEK.    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   Orders:  •  Hemoglobin A1C; Future  •  CBC and Auto Differential; Future  •  Comprehensive Metabolic Panel; Future  •  Lipid Panel; Future  •  TSH with reflex to Free T4 if abnormal; Future    Osteoporosis, unspecified osteoporosis type, unspecified pathological fracture presence         Visit for screening mammogram    Orders:  •  BI mammo bilateral screening tomosynthesis; Future    Need for  pneumococcal vaccine    Orders:  •  Pneumococcal conjugate vaccine, 20-valent (PREVNAR 20)    Diet-controlled diabetes mellitus (Multi)    Orders:  •  Lipid Panel; Future    Iron deficiency anemia due to chronic blood loss    Orders:  •  TSH with reflex to Free T4 if abnormal; Future    Anxiety    Orders:  •  TSH with reflex to Free T4 if abnormal; Future            Patient was identified as a fall risk. Risk prevention instructions provided.

## 2025-05-27 NOTE — ASSESSMENT & PLAN NOTE
Orders:    Referral to Clinical Pharmacy; Future    Follow Up In [facility] - PCP - Established; Future

## 2025-05-28 ENCOUNTER — HOSPITAL ENCOUNTER (OUTPATIENT)
Dept: RADIOLOGY | Facility: HOSPITAL | Age: 60
Discharge: HOME | End: 2025-05-28
Payer: COMMERCIAL

## 2025-05-28 DIAGNOSIS — R13.12 OROPHARYNGEAL DYSPHAGIA: ICD-10-CM

## 2025-05-28 PROCEDURE — 92611 MOTION FLUOROSCOPY/SWALLOW: CPT | Mod: GN | Performed by: SPEECH-LANGUAGE PATHOLOGIST

## 2025-05-28 PROCEDURE — 92526 ORAL FUNCTION THERAPY: CPT | Mod: GN | Performed by: SPEECH-LANGUAGE PATHOLOGIST

## 2025-05-28 PROCEDURE — 2500000005 HC RX 250 GENERAL PHARMACY W/O HCPCS: Performed by: PHYSICIAN ASSISTANT

## 2025-05-28 PROCEDURE — 74230 X-RAY XM SWLNG FUNCJ C+: CPT

## 2025-05-28 RX ADMIN — BARIUM SULFATE 15 ML: 400 PASTE ORAL at 14:57

## 2025-05-28 RX ADMIN — BARIUM SULFATE 150 ML: 0.81 POWDER, FOR SUSPENSION ORAL at 14:56

## 2025-05-28 RX ADMIN — BARIUM SULFATE 50 ML: 400 SUSPENSION ORAL at 14:57

## 2025-05-28 RX ADMIN — BARIUM SULFATE 700 MG: 700 TABLET ORAL at 14:58

## 2025-05-28 RX ADMIN — BARIUM SULFATE 10 ML: 400 SUSPENSION ORAL at 14:57

## 2025-05-28 NOTE — PROCEDURES
Speech-Language Pathology    Outpatient Modified Barium Swallow Study    Patient Name: Kelli Ortega  MRN: [id number]  : 1965  Today's Date: 25   Start time: 14:10    End time: 15:10    TECHNIQUE:  Modified Barium Swallow Study completed. Informed verbal consent obtained prior to completion of exam.     Pt given the followin trials 5ml THIN via syringe, 1 trial 20ml THIN via medicine cup, 1 trial of THIN via independent cup sip, 1 trial of THIN consecutive sips via cup/straw (~60ml = 3 sips); 1 trial 5ml NECTAR via syringe, 1 trial 20ml  NECTAR via medicine cup, 1 trial of NECTAR via independent cup sip; 2 trials 5ml each HONEY via spoon; 2 trials 5ml each PUDDING via spoon; 1 trial Soft Solids (5 cubes pears/peaches drained); 1 trial Soft Solids (5 cubes pears/peaches in liquid); 1 trial Hard Solid (½ Mariia Doone cookie).     In addition the pt was given the followin Trial in an A-P View with 20ml NECTAR via medicine cup while standing with an esophageal scan.   1 Trial in an A-P View of 5ml PUDDING via spoon while standing with an esophageal scan.  1 Trial 13 mm Barium Tablet with water via cup while standing - A-P view with an esophageal scan.    A 1.9 cm or .75 inch (outer diameter) ring was placed under the chin and on the lateral, left side of the neck in order to complete objective measurements during swallowing. The anatomic structures and function of the oropharynx, larynx, hypopharynx and cervical esophagus were evaluated.      SLP: CHUN Hopkins   Contact info: [employer] secure chat; phone: [phone]      Reason for Referral: Pt has the sensation of foods getting stuck in her esophagus. Pt points to mid breast bone area.  Ongoing for about a year. Occurs about three times per week.  Pt states she if it is a liquid she has to push on that areas and help it go down and if it is food she can usually take a few small sips of liquid and that will help it go down.  Pt does endorse  some regurgitation of food, but not often.   Pt reported a few days ago while sleeping she had a coughing fit and had to sit up in bed with a waste basket and she kept coughing up phlegm.     Patient Hx: borderline diabetic controlling it with diet. HLD, COPD, pt is a smoker; she has cut down to 6 cigarettes per day.  GERD, fibromyalgia, arthritis.  Fast heart rate, stress.     Respiratory Status: Room air    Current diet: Regular diet and thin (cut up into small pieces)    Pain:  Pain Scale: 0-10  Ratin-7  Location: lower back and intestines.   Type: chronic      FINAL SPEECH RECOMMENDATIONS    DIET:   - Regular (IDDSI Level 7)  - Thin liquids (IDDSI Level 0)    Per the results of today's MBSS it is recommended pt perform the following strategies listed below:    STRATEGIES:  - Upright positioning for all PO intake  - Remain upright for >30 min after meals  - Slow rate of intake  - Small bites  - Small/SINGLE sips  - One bite/sip at a time  - Alternate bites and sips (warm or room temp)   - Effortful swallow with all swallows (swallow strong)  - Chew thoroughly  - If taking consecutive sips  in a row throat clear afterwards and dry swallow   - Cut up foods in to smaller pieces as needed  - Add moisture to foods that are too dry    Esophageal motility strategies:  - Eat all meals sitting up at 90 degrees  - Avoid restrictive clothing during meals (such as tight belts)  - Take small bites and mash food completely in mouth prior to swallowing  - Alternate between bites of solids and sips of warm or room temperature liquids  - Try to eat 6-8 smaller meals/snacks per day instead of 3 larger meals  - Avoid very cold or very hot beverages during meals  - Avoid bending over after meals (bend at the knees instead of the waist)  - Do not lie down for at least 60 minutes after eating  - Avoid eating 2-3 hours before bedtime  - Reduce high acidity foods  - Limit or eliminate the  following:   -caffeine   -alcohol   -tobacco   -chocolate   -mint   -spicy foods   -carbonation  - Sleep with your torso at an incline to allow gravity to help keep the food in the stomach, You can put blocks under the head of the bed so that the head is 6-8 inches higher than the foot, or you can sleep with a wedge pillow  - Gradually increase the amount of water you drink (aim for 8 glasses per day - if not on a fluid restriction)  - Lastly and most importantly, speak with your physician about medical management of your dysmotility or GERD      SLP PLAN:  Skilled SLP Services: No further skilled SLP intervention is warranted for dysphagia at this time.  SLP Frequency: N/A  Duration: N/A  Treatment/Interventions:   - Bolus trials  - Compensatory strategy training  - Patient/caregiver education      Discussed POC: Patient  Discussed Risks/Benefits: Yes  Patient/Caregiver Agreeable: Yes    Short term goals established 05/28/25:   - Pt will demonstrate follow-through of trained compensatory strategies during a meal/snack with 90% acc independently.  Status: Goal met  Progress: Pt practiced an effortful swallow with sips of water after the study.  Pt completed 3/4 effortful swallows.  Pt reported that one sip got away from her.     - Pt/family will demonstrate understanding of education related to dysphagia independently.  Status: Goal met  Progress: SLP d/w pt regarding swallow precautions and esophageal precautions as listed above.  Pt informed SLP will post info in TrackBill. Pt in agreement.     Long term goals 05/28/25:   Patient will tolerate the least restrictive diet without overt difficulty or further pulmonary compromise by time of discharge.    Education Provided to: Patient   SLP discussed results and recommendations of the MBS study while utilizing the MBS study video. In addition, SLP education regarding diet, diet modifications, compensatory strategies, anatomy and physiology of the swallow mechanism as  well as risk for penetration and or aspiration.  Patient asked appropriate questions and SLP answered them.  Pt verbalized understanding, teachback/return demonstration, and Meets goals/outcomes     Treatment Provided Today: ST provided extensive education to Patient regarding anatomy/physiology of swallow function, risk of aspiration/aspiration pna, diet modifications, and the use of compensatory swallow strategies to promote pt safety upon PO intake including one bite/sip at a time, slow rate, alternate solid with liquids (warm/room temp). In addition, ST provided instruction/training on oral and pharyngeal exercises (re: effortful swallow).  Pt performed effortful swallow with sip of water.  Pt verbalized noting the difference and how much more her throat muscles contract.     Additional Medical Consults Suggested:   - No new disciplines indicated    Repeat study/dc plan:   Repeat MBS study as clinically indicated.      Patient's Presentation: Pt pleasant and cooperative with assessment. Pt seated in the [place] chair in a left lateral view. Pt fed self independently Thin and [place] consistencies.  SLP fed pt the remaining consistencies to control bolus size.  Pts dentition: upper dentures and lower dentures    Mechanics of the Swallow Summary:   ORAL PHASE:  Lip Closure - No labial escape/anterior loss of bolus   Tongue Control During Bolus Hold - Posterior escape of less than half of the bolus   Bolus prep/mastication - Slow prolonged mastication with complete re-collection necessary   Bolus transport/lingual motion - Slowed Tongue Motion for A-P movement of the bolus (tongue pumping)  Oral residue - Residue collection on oral structure - min   Premature Pharyngeal entry to Vallecula - trace to min with thin.      Penetration Before the Swallow - None  Aspiration Before the Swallow - None    Additional Comments: N/A     PHARYNGEAL PHASE:  Initiation of pharyngeal swallow - Bolus head at posterior laryngeal  surface of epiglottis  mostly with thin and honey; bolus head at ramus with nectar and pudding   Soft palate elevation - No bolus between soft palate/pharyngeal wall   Laryngeal elevation - Partial superior movement of thyroid cartilage and/or partial approximation of arytenoids to epiglottic petiole   Anterior hyoid excursion - Partial anterior movement   Epiglottic movement - Complete inversion    Laryngeal vestibule closure - Incomplete - narrow column of air/contrast in laryngeal vestibule   Pharyngeal stripping wave - Complete  Pharyngeal contraction (A/P view) - Complete  Pharyngoesophageal segment opening - Partial distension/partial duration with partial obstruction of flow of bolus   Tongue base retraction - Narrow column of contrast or air between tongue base and pharyngeal wall   Pharyngeal residue - Collection of residue within or on the pharyngeal structures  trace to min     Penetration During the Swallow - 20ml thin, cup sip thin, and consecutive cup/straw sip thin  Penetration After the Swallow - fruit drained  Aspiration During the Swallow - None  Aspiration After the Swallow - None    Penetrated material independently cleared as the swallow completed: majority of material was ejected as the swallow completed, however a scant amount of material was retained with thin on the underside of the epiglottis.  A cued throat clear was effective to eject the remaining material.   Reflexive Response - No reflexive cough or throat clear to penetrated material.   Cued by Clinician Response - pt able to eject the material when cued to throat clear and dry swallow.     Compensatory Strategies Attempted - cued throat clear and dry swallow.     Additional Comments - N/A     Anatomical Abnormalities - Bony prominence(s) noted @ C4-C7      ESOPHAGEAL PHASE:  Esophageal clearance - Complete clearance  - the barium tablet was briefly retained at the GE junction for <1min. Then with an additional sip of water the tablet  moved into the stomach.        SLP Impressions with Severity Rating:   Pt exhibited a min oropharyngeal dysphagia upon completion of modified barium swallow study this date. Swallowing physiology is detailed above. Impairments most impacting swallowing safety and efficiency include delayed onset of the swallow trigger and delayed laryngeal vestibule closure. Patient demonstrated trace penetration during the swallow with thin and after the swallow when trying to clear oral residues with fruit drained. Majority of material is ejected at the swallow is completed, however, a cued throat clear and dry swallow is effective. No further penetration was observed for any other consistency, and no aspiration was visualized during study. Patient demonstrated min oral and trace to min pharyngeal residue that was reduced with a subsequent swallow.    *Of note: The A-P or Lateral bolus follow-through is not intended to be utilized as a diagnostic assessment of the esophagus, rather a tool to observe the biomechanical aspects of the swallow continuum and to inform the need for further evaluation by medical specialists, as applicable.     Images for this study are available in PACS.      OUTCOME MEASURES:  Functional Oral Intake Scale  Functional Oral Intake Scale: Level 5        total oral diet with multiple consistencies, but requires special preparations and compensations       Eating Assessment Tool (EAT-10)   0=No problem, 1=Mild problem, 2=Mild to moderate problem, 3=Moderate problem, 4=Severe problem    EAT 10  My swallowing problem has caused me to lose weight.: 0  My swallowing problem interferes with my ability to go out for meals.: 0  Swallowing liquids takes extra effort.: 1  Swallowing solids takes extra effort.: 1  Swallowing pills takes extra effort.: 1  Swallowing is painful: 2  The pleasure of eating is affected by my swallowing.: 2  When I swallow food sticks in my throat.: 2  I cough when I eat.: 0  Swallowing is  stressful: 1  EAT-10 TOTAL SCORE:: 10    A total score of 3 or above may indicate difficulty with swallowing safely and/or efficiently      Rosenbek's Penetration Aspiration Scale  Thin Liquids: 3. PENETRATION with LOW ASPIRATION risk - contrast remains above vocal cords, visible residue]   During the Swallow - a cued throat clear ejects any remaining material. (Material is scant)     Nectar Thick Liquids: 1. NO ASPIRATION & NO PENETRATION - no aspiration, contrast does not enter airway    Honey Thick Liquids: 1. NO ASPIRATION & NO PENETRATION - no aspiration, contrast does not enter airway    Pudding/Puree: 1. NO ASPIRATION & NO PENETRATION - no aspiration, contrast does not enter airway    Soft Solids fruit drained: 5. DEEP PENETRATION with HIGH ASPIRATION risk - contrast contacts vocal cords, visible residue  After the Swallow - this material did clear when cued to throat clear and dry swallow.     Soft Solids w/liquid added: 1. NO ASPIRATION & NO PENETRATION - no aspiration, contrast does not enter airway    Hard Solids: 1. NO ASPIRATION & NO PENETRATION - no aspiration, contrast does not enter airway

## 2025-05-28 NOTE — PATIENT INSTRUCTIONS
DIET:   - Regular (IDDSI Level 7)  - Thin liquids (IDDSI Level 0)    Per the results of today's MBSS it is recommended pt perform the following strategies listed below:    STRATEGIES:  - Upright positioning for all PO intake  - Remain upright for >30 min after meals  - Slow rate of intake  - Small bites  - Small/SINGLE sips  - One bite/sip at a time  - Alternate bites and sips (warm or room temp)   - Effortful swallow with all swallows (swallow strong)  - Chew thoroughly  - If taking consecutive sips  in a row throat clear afterwards and dry swallow   - Cut up foods in to smaller pieces as needed  - Add moisture to foods that are too dry    Esophageal motility strategies:  - Eat all meals sitting up at 90 degrees  - Avoid restrictive clothing during meals (such as tight belts)  - Take small bites and mash food completely in mouth prior to swallowing  - Alternate between bites of solids and sips of warm or room temperature liquids  - Try to eat 6-8 smaller meals/snacks per day instead of 3 larger meals  - Avoid very cold or very hot beverages during meals  - Avoid bending over after meals (bend at the knees instead of the waist)  - Do not lie down for at least 60 minutes after eating  - Avoid eating 2-3 hours before bedtime  - Reduce high acidity foods  - Limit or eliminate the following:   -caffeine   -alcohol   -tobacco   -chocolate   -mint   -spicy foods   -carbonation  - Sleep with your torso at an incline to allow gravity to help keep the food in the stomach, You can put blocks under the head of the bed so that the head is 6-8 inches higher than the foot, or you can sleep with a wedge pillow  - Gradually increase the amount of water you drink (aim for 8 glasses per day - if not on a fluid restriction)  - Lastly and most importantly, speak with your physician about medical management of your dysmotility or GERD

## 2025-05-29 PROCEDURE — RXMED WILLOW AMBULATORY MEDICATION CHARGE

## 2025-06-04 ENCOUNTER — PHARMACY VISIT (OUTPATIENT)
Dept: PHARMACY | Facility: CLINIC | Age: 60
End: 2025-06-04
Payer: COMMERCIAL

## 2025-06-06 ENCOUNTER — TELEMEDICINE (OUTPATIENT)
Dept: PHARMACY | Facility: HOSPITAL | Age: 60
End: 2025-06-06
Payer: COMMERCIAL

## 2025-06-06 ENCOUNTER — TELEPHONE (OUTPATIENT)
Facility: CLINIC | Age: 60
End: 2025-06-06

## 2025-06-06 DIAGNOSIS — J43.2 CENTRILOBULAR EMPHYSEMA (MULTI): ICD-10-CM

## 2025-06-06 NOTE — PROGRESS NOTES
"Pharmacy Clinic Note    Kelli Ortega is a 59 y.o. female was referred to Clinical Pharmacy Team for COPD ALISHA.     Referring Provider: Mariaelena Dyer MD    Subjective   Allergies[1]    [facility] - [facility] [place] - [address]  [address]  [facility] [address]  Phone: [phone] Fax: [phone]    [facility] - [place], [place] - [address]  [address]  [facility] [address]  Phone: [phone] Fax: [phone]    [facility] - [place], [place] - [address]  [address]  [address]  [facility] [address]  Phone: [phone] Fax: [phone]     Specialty Pharmacy  [address] [facility] [address]  Phone: [phone] Fax: [phone]    [facility] - [address]  [address]  [place] [address]  Phone: [phone] Fax: [phone]      Objective     There were no vitals taken for this visit.     LAB  Lab Results   Component Value Date    BILITOT 0.2 11/20/2024    CALCIUM 9.2 04/25/2025    CO2 25 04/25/2025     04/25/2025    CREATININE 0.86 04/25/2025    GLUCOSE 92 04/25/2025    ALKPHOS 28 (L) 11/20/2024    K 3.7 04/25/2025    PROT 7.2 11/20/2024     04/25/2025    AST 20 11/20/2024    ALT 17 11/20/2024    BUN 11 04/25/2025    ANIONGAP 15 04/25/2025    MG 1.92 07/03/2024    PHOS 3.5 03/08/2024    ALBUMIN 4.2 11/20/2024    LIPASE 9 03/07/2024    GFRF 78 05/23/2023     Lab Results   Component Value Date    TRIG 224 (H) 11/20/2024    CHOL 200 (H) 11/20/2024    LDLCALC 99 11/20/2024    HDL 56.1 11/20/2024     Lab Results   Component Value Date    HGBA1C 6.2 (H) 11/20/2024     Estimated body mass index is 24.34 kg/m² as calculated from the following:    Height as of 5/27/25: 1.6 m (5' 3\").    Weight as of 5/27/25: 62.3 kg (137 lb 6.4 oz).      Medications Ordered Prior to Encounter[2]       DRUG INTERACTIONS  - No significant drug-drug interactions " "exist that require change in therapy  _______________________________________________________________________  PATIENT EDUCATION/GOALS    1.  Patient referred to pharmacy team as part of COPD Systems of Excellence program. Patient state the the are currently well controlled on their regimen. They state that they can afford the cost of the medications at this time.  -Patient did provide instructional changes on diazepam and zolpidem which have been updated.  _______________________________________________________________________    Assessment/Plan   Problem List Items Addressed This Visit       COPD (chronic obstructive pulmonary disease) with emphysema (Multi)        Continue all meds under the continuation of care with the referring provider and clinical pharmacy team.    Clinical Pharmacist follow-up: n/a    Nitin Linares, Trina     Verbal consent to manage patient's drug therapy was obtained from [the patient and/or an individual authorized to act on behalf of a patient]. They were informed they may decline to participate or withdraw from participation in pharmacy services at any time.          [1]   Allergies  Allergen Reactions    Amitriptyline Other     Delirium, constipation, dizziness    Bupropion Other     delirious    Cefazolin GI Upset     Diarrhea abdominal pain bloating    Diclofenac Other     Black stools    Divalproex Sodium Other     Hair loss    Doxycycline Other     delirious    Grapefruit Other     \"Can't take with certain medication\"    Guaifenesin Nausea/vomiting    Hydrocodone-Acetaminophen Headache    Hydroxyzine Itching, Nausea/vomiting and Unknown    Ibuprofen GI bleeding    Ketorolac Unknown    Lepirudin Itching and Other     Bloody stool    Loperamide Other     Bloody stool    Meloxicam GI bleeding    Milnacipran Nausea/vomiting     \"Savilla\"    Mirtazapine GI bleeding    Morphine Psychosis     Severe mood change    Nabumetone Other     Black stools    Ondansetron Hcl Nausea/vomiting     " Injectable zofran caused increase in vomiting    Other Other     No antibiotics unless necessary (recurrent C-Diff)  Antidepressants may cause me to walk in sleep    Paroxetine Unknown    Tramadol Itching    Ultracet [Tramadol-Acetaminophen] Other     Itching & nausea   [2]   Current Outpatient Medications on File Prior to Visit   Medication Sig Dispense Refill    acyclovir (Zovirax) 200 mg capsule TAKE ONE (1) CAPSULE BY MOUTH DAILY 30 capsule 10    albuterol 90 mcg/actuation inhaler Inhale 2 puffs every 4 hours if needed for wheezing or shortness of breath. 18 g 5    busPIRone (Buspar) 15 mg tablet TAKE 2 TABLETS 3 TIMES DAILY.      cholecalciferol (Vitamin D-3) 1.25 mg (50,000 units) capsule TAKE ONE (1) CAPSULE BY MOUTH EVERY OTHER WEEK. 3 capsule 0    clobetasol (Temovate) 0.05 % ointment Apply topically 2 times a day. 15 g 0    cyclobenzaprine (Flexeril) 10 mg tablet Take 1 tablet (10 mg) by mouth 3 times a day as needed for muscle spasms. 270 tablet 3    diazePAM (Valium) 5 mg tablet       esomeprazole (NexIUM) 40 mg DR capsule Take 1 capsule (40 mg) by mouth 2 times a day. Do not open capsule. 60 capsule 11    fluticasone-umeclidin-vilanter (Trelegy Ellipta) 200-62.5-25 mcg blister with device INHALE 1 PUFF BY MOUTH AND INTO THE LUNGS DAILY RINSE MOUTH AFTER EACH USE 1 each 11    galcanezumab (Emgality) 120 mg/mL auto-injector Inject 1 pen (120 mg) under the skin every 28 (twenty-eight) days. 1 each 11    hyoscyamine (Anaspaz, Levsin) 0.125 mg tablet Take 1 tablet (0.125 mg) by mouth every 6 hours if needed for cramping or diarrhea. 100 tablet 11    immune globulin, human,, IgG, (Gamunex-C) 10% solution infusion Infuse into a venous catheter every 28 (twenty-eight) days. Gamunex C  20 gm      ipratropium-albuteroL (Duo-Neb) 0.5-2.5 mg/3 mL nebulizer solution USE 1 VIAL IN NEBULIZER 4 TIMES DAILY 3 mL 0    lidocaine (Xylocaine) 5 % ointment Apply topically if needed for mild pain (1 - 3). USE UP TO 5 TIMES A  DAY AS NEEDED, may disp 30 day supply and substitute 90 g 0    loperamide (Imodium A-D) 2 mg tablet Take 1 tablet first thing in the morning as then as needed for diarrhea. Do not take more than 8 tablets in 24 hours 90 tablet 11    loratadine (Claritin) 10 mg tablet Take 1 tablet (10 mg) by mouth once daily. 30 tablet 11    metoprolol succinate XL (Toprol-XL) 25 mg 24 hr tablet Take 1 tablet (25 mg) by mouth once daily. Do not crush or chew. 90 tablet 3    multivitamin, stress formula 400 mcg tablet TAKE ONE (1) CAPSULE BY MOUTH DAILY 30 tablet 10    nebulizer accessories kit 1 each once daily. Needs a replacement mask- hers is not working 1 kit 0    nystatin (Mycostatin) cream Apply topically. APPLY  TOPICALLY TO MOUTH AND LIPS PRN      ondansetron ODT (Zofran-ODT) 8 mg disintegrating tablet Dissolve 1 tablet (8 mg) in the mouth every 8 hours if needed for nausea or vomiting. 60 tablet 5    polyethylene glycol (ClearLax) 17 gram/dose powder Mix of powder and drink. MIX 1 CAPFUL IN 8 OUNCES OF WATER AND DRINK AT BEDTIME AS NEEDED FOR CONSTIPATION 510 g 1    pravastatin (Pravachol) 20 mg tablet TAKE ONE (1) TABLET BY MOUTH EVERY EVENING AT BEDTIME 90 tablet 3    pregabalin (Lyrica) 150 mg capsule Take 1 capsule (150 mg) by mouth 3 times a day. 30 day rx Do not fill before May 6, 2025. 90 capsule 5    rizatriptan (Maxalt) 10 mg tablet Take 1 tablet (10 mg) by mouth 1 time if needed for migraine (may repeat dose in 2 hours if needed, no more than 2 doses in 24 hours). 9 tablet 11    simethicone (Mylicon,Gas-X) 180 mg capsule Take 1 capsule (180 mg) by mouth 4 times a day. 120 capsule 11    topiramate (Topamax) 25 mg tablet Take 1 tablet (25 mg) by mouth 2 times a day.      zolpidem (Ambien) 5 mg tablet Take 1 tablet (5 mg) by mouth as needed at bedtime for sleep.       No current facility-administered medications on file prior to visit.

## 2025-06-06 NOTE — TELEPHONE ENCOUNTER
Patient tried to fill Dicyclomine but the pharmacy told her it was suspended.  She would like you to send a new prescription to   [facility]

## 2025-06-09 DIAGNOSIS — K58.2 IRRITABLE BOWEL SYNDROME WITH BOTH CONSTIPATION AND DIARRHEA: Primary | ICD-10-CM

## 2025-06-09 RX ORDER — DICYCLOMINE HYDROCHLORIDE 10 MG/1
10 CAPSULE ORAL EVERY 6 HOURS PRN
Qty: 120 CAPSULE | Refills: 2 | Status: SHIPPED | OUTPATIENT
Start: 2025-06-09 | End: 2025-09-07

## 2025-06-09 NOTE — TELEPHONE ENCOUNTER
Pt called to check on this . She is also bringing a book for us to make copies of for her covered medications.

## 2025-06-11 ENCOUNTER — INFUSION (OUTPATIENT)
Dept: INFUSION THERAPY | Facility: HOSPITAL | Age: 60
End: 2025-06-11
Payer: COMMERCIAL

## 2025-06-11 VITALS
SYSTOLIC BLOOD PRESSURE: 111 MMHG | DIASTOLIC BLOOD PRESSURE: 75 MMHG | TEMPERATURE: 97.6 F | WEIGHT: 135.14 LBS | RESPIRATION RATE: 18 BRPM | HEART RATE: 88 BPM | OXYGEN SATURATION: 95 % | BODY MASS INDEX: 23.94 KG/M2

## 2025-06-11 DIAGNOSIS — D80.3 SELECTIVE DEFICIENCY OF IGG (MULTI): ICD-10-CM

## 2025-06-11 DIAGNOSIS — M81.0 OSTEOPOROSIS, UNSPECIFIED OSTEOPOROSIS TYPE, UNSPECIFIED PATHOLOGICAL FRACTURE PRESENCE: ICD-10-CM

## 2025-06-11 PROCEDURE — 96365 THER/PROPH/DIAG IV INF INIT: CPT | Mod: INF

## 2025-06-11 PROCEDURE — 96366 THER/PROPH/DIAG IV INF ADDON: CPT | Mod: INF

## 2025-06-11 PROCEDURE — 2500000004 HC RX 250 GENERAL PHARMACY W/ HCPCS (ALT 636 FOR OP/ED): Mod: JZ | Performed by: FAMILY MEDICINE

## 2025-06-11 RX ORDER — HEPARIN 100 UNIT/ML
500 SYRINGE INTRAVENOUS AS NEEDED
OUTPATIENT
Start: 2025-06-11

## 2025-06-11 RX ORDER — ACETAMINOPHEN 325 MG/1
650 TABLET ORAL ONCE
OUTPATIENT
Start: 2025-07-09 | End: 2025-07-09

## 2025-06-11 RX ORDER — DIPHENHYDRAMINE HCL 25 MG
25 CAPSULE ORAL ONCE
OUTPATIENT
Start: 2025-07-09 | End: 2025-07-09

## 2025-06-11 RX ORDER — DIPHENHYDRAMINE HCL 25 MG
25 CAPSULE ORAL ONCE
Status: DISCONTINUED | OUTPATIENT
Start: 2025-06-11 | End: 2025-06-11 | Stop reason: HOSPADM

## 2025-06-11 RX ORDER — ACETAMINOPHEN 325 MG/1
650 TABLET ORAL ONCE
Status: DISCONTINUED | OUTPATIENT
Start: 2025-06-11 | End: 2025-06-11 | Stop reason: HOSPADM

## 2025-06-11 RX ADMIN — IMMUNE GLOBULIN (HUMAN) 25 G: 10 INJECTION INTRAVENOUS; SUBCUTANEOUS at 09:51

## 2025-06-11 ASSESSMENT — ENCOUNTER SYMPTOMS
OCCASIONAL FEELINGS OF UNSTEADINESS: 0
DEPRESSION: 0
LOSS OF SENSATION IN FEET: 0

## 2025-06-13 ENCOUNTER — TELEPHONE (OUTPATIENT)
Dept: NEUROLOGY | Facility: HOSPITAL | Age: 60
End: 2025-06-13
Payer: COMMERCIAL

## 2025-06-13 NOTE — TELEPHONE ENCOUNTER
Dr Hernandez did a biopsy on left artery in her head  jan/feb this year. Ever since he did that she has been having headaches on that side.  She has been trying to take tylenol but that doesn't help so she has to take the maxalt.  She just got them filled on 5/27/25 and has one left.  She put the refill in yesterday for it and they didn't contact us for it because is was too soon.  She got all her other scripts.    She is asking if we can do a prior auth if needed for more pills?

## 2025-06-13 NOTE — TELEPHONE ENCOUNTER
If she is having that many headaches, then we need to adjust her prophylaxis. She previously was on topiramate and stopped to help eliminate her pills. I would like to start her back on 25 mg nightly for now to see if this helps.     Should not be taking more than the 9 triptans per month as it can cause rebound headaches. That is why the insurance limits it. I will send for the topiramate, please have her schedule follow up to discuss further options. Let me know how she is doing in a couple weeks, can increase dose further if needed and tolerating.

## 2025-06-26 PROCEDURE — RXMED WILLOW AMBULATORY MEDICATION CHARGE

## 2025-06-27 ENCOUNTER — OFFICE VISIT (OUTPATIENT)
Dept: NEUROLOGY | Facility: HOSPITAL | Age: 60
End: 2025-06-27
Payer: COMMERCIAL

## 2025-06-27 VITALS
SYSTOLIC BLOOD PRESSURE: 105 MMHG | HEART RATE: 91 BPM | WEIGHT: 137.2 LBS | BODY MASS INDEX: 24.3 KG/M2 | DIASTOLIC BLOOD PRESSURE: 68 MMHG

## 2025-06-27 DIAGNOSIS — R53.81 MALAISE AND FATIGUE: ICD-10-CM

## 2025-06-27 DIAGNOSIS — G43.E09 CHRONIC MIGRAINE WITH AURA WITHOUT STATUS MIGRAINOSUS, NOT INTRACTABLE: Primary | ICD-10-CM

## 2025-06-27 DIAGNOSIS — R53.83 MALAISE AND FATIGUE: ICD-10-CM

## 2025-06-27 PROCEDURE — 3078F DIAST BP <80 MM HG: CPT | Performed by: NURSE PRACTITIONER

## 2025-06-27 PROCEDURE — 4004F PT TOBACCO SCREEN RCVD TLK: CPT | Performed by: NURSE PRACTITIONER

## 2025-06-27 PROCEDURE — 99214 OFFICE O/P EST MOD 30 MIN: CPT | Performed by: NURSE PRACTITIONER

## 2025-06-27 PROCEDURE — 3074F SYST BP LT 130 MM HG: CPT | Performed by: NURSE PRACTITIONER

## 2025-06-27 ASSESSMENT — PAIN SCALES - GENERAL: PAINLEVEL_OUTOF10: 7

## 2025-06-27 NOTE — PROGRESS NOTES
[facility]    SUBJECTIVE    Kelli Ortega is a 59 y.o. right-handed female who presents with   Chief Complaint   Patient presents with    Headache     After biopsy        Presents for follow up visit  Visit type: in-clinic visit    HISTORY OF PRESENT ILLNESS    RECAP:  Former patient of Dr. Escobar. first saw 10/3/18 for migraines. Started migraines in her 30s, with visual aura. HA debilitating. Smoker. Was having 3-4 days in a row per week HA. Imitrex and topiramate 100mg bid helps. s/p depakote, had hair loss. Listed allergy to amitriptyline. Hasn't been on propranolol and Botox. I advised quit smoking, subsequently agreed to try Emgality, which was helping HA. I lowered topiramate to 50mg bid as HA was better. Was having neck pain, x-rayed, and referred to pain management as not wanting PT. On Emgality, HA still happening about 2-3x/week. I transitioned Emgality to Aimovig, and during last visit, discussed continuing Aimovig. She then called saying Aimovig not helping. Wanting to switch back to Emgality. On pregabalin 150mg tid by pain management. On some other psychotropic medication. Gained wt 30 lbs, thinks from lowering topiramate dose. Belatedly tells me had episode this past weekend, she couldn't remember. Used to happen when she was on oxycodone and Opana. Didn't remember she talked with certain people, remembers it's her mother. Her children reminded her of events on Saturday. No hx sz. Strongly considered polypharmacy as cause. EEG ordered, normal. During last visit, to continue Emgality and sumatriptan prn. I ordered EMG/NCT testing due to L hand paresthesias, showed reported minimal abn with no evidence of peripheral neuropathy or cervical radiculopathy or brachial plexopathy in L arm. I recommended see orthopedic surgeon still if symptomatic.      HA better generally better. Was sick in last 2 months. Was having a little more HA. Thinks it was due to her illness.On  sumatriptan 100mg prn--ends up taking about 2x/mo. On Emgality, no issues, no SE.     Initially stated no recurrence of amnesia episodes, but then later stated she had been having them still, more recently was in last week or so. She continues to be on multiple medications. She reports she sees Laisha Epps at [place] for pyschiatry. She is on high doses of multiple psychoactive medications. She states she has been made aware of polypharmacy link. We certainly had discussed this before. She has had negative EEG in the past. I encouraged her to make sure her psychiatrist is aware of the episodes and that she wants to get down on dosing if able.      Hx of reported hallucinations, follows with [facility] for psych care.      07/24/24 - On Emgality now again. States she is doing well on this. Had issue with Ajovy injections. States it was running down her leg.      Having 3 migraines per month. Seems to be related to her infusions only. Not getting any other ones during month.      Rizatriptan, does help with her migraines. Works better than sumatriptan did.      Gets IgG infusions. Has 3 days of side effects. These seem to be the migraines she is getting. Follows with Dr. Valerio.     States not having issues with mood or hallucinations, now back on her medications.      Denies any further amnesia episodes either.     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 Treated with Dr. Valerio at [facility], had biopsy which came back ok. Denies any vision loss with it. States she was unable to tolerate the high dose steroids.      She has some soreness to L temporal surgical site.      Otherwise migraines are rare. Rizatriptan does help when she needs it. Tolerating emgality well. Happy with current control.      Does feel that her depression is a little worse, interested in seeing a counselor, has not yet discussed with PCP.     06/27/25 - presents alone for today's visit.     Pt reached out, running out of her rizatriptan early. States taking for  ongoing L temporal pain s/p biopsy in December. States it has been progressively worse pain since about February.     Again was unable to tolerate high dose steroids. Did not have and still denies any vision changes.     States her most recent dose of Emgality has helped some, dose last dispensed 6/4/25, states she takes it as soon as she receives it. Since taking this dose, has only needed 2 rizatriptan for break through migraines.     Topiramate 25 mg BID already.     If feeling depleted on energy, taking 1/8 of 25 mg pill of steroid at home per pt.     States rheumatology also recently increased her IgG dosing so that may be why her headaches/L temporal pain is also better.      REVIEW OF SYSTEMS:  10 point review of systems performed and is negative except as noted in the HPI.     Medical History[1]    No relevant family history has been documented for this patient.    Surgical History[2]    Social History     Substance and Sexual Activity   Drug Use Not Currently     Tobacco Use: High Risk (6/27/2025)    Patient History     Smoking Tobacco Use: Every Day     Smokeless Tobacco Use: Never     Passive Exposure: Not on file     Alcohol Use: Not At Risk (7/1/2024)    AUDIT-C     Frequency of Alcohol Consumption: Never     Average Number of Drinks: Patient does not drink     Frequency of Binge Drinking: Never       Current Outpatient Medications   Medication Instructions    acyclovir (ZOVIRAX) 200 mg, oral, Daily    albuterol 90 mcg/actuation inhaler 2 puffs, inhalation, Every 4 hours PRN    busPIRone (Buspar) 15 mg tablet TAKE 2 TABLETS 3 TIMES DAILY.    cholecalciferol (Vitamin D-3) 1.25 mg (50,000 units) capsule TAKE ONE (1) CAPSULE BY MOUTH EVERY OTHER WEEK.    clobetasol (Temovate) 0.05 % ointment Topical, 2 times daily    cyclobenzaprine (FLEXERIL) 10 mg, oral, 3 times daily PRN    diazePAM (Valium) 5 mg tablet     dicyclomine (BENTYL) 10 mg, oral, Every 6 hours PRN    esomeprazole (NEXIUM) 40 mg, oral, 2 times  daily, Do not open capsule.    fluticasone-umeclidin-vilanter (Trelegy Ellipta) 200-62.5-25 mcg blister with device INHALE 1 PUFF BY MOUTH AND INTO THE LUNGS DAILY RINSE MOUTH AFTER EACH USE    galcanezumab (Emgality) 120 mg/mL auto-injector Inject 1 pen (120 mg) under the skin every 28 (twenty-eight) days.    hyoscyamine (ANASPAZ, LEVSIN) 0.125 mg, oral, Every 6 hours PRN    immune globulin, human,, IgG, (Gamunex-C) 10% solution infusion Every 28 days    ipratropium-albuteroL (Duo-Neb) 0.5-2.5 mg/3 mL nebulizer solution USE 1 VIAL IN NEBULIZER 4 TIMES DAILY    lidocaine (Xylocaine) 5 % ointment Topical, As needed, USE UP TO 5 TIMES A DAY AS NEEDED, may disp 30 day supply and substitute    loperamide (Imodium A-D) 2 mg tablet Take 1 tablet first thing in the morning as then as needed for diarrhea. Do not take more than 8 tablets in 24 hours    loratadine (CLARITIN) 10 mg, oral, Daily    metoprolol succinate XL (TOPROL-XL) 25 mg, oral, Daily, Do not crush or chew.    multivitamin, stress formula 400 mcg tablet oral, Daily    nebulizer accessories kit 1 each, miscellaneous, Daily, Needs a replacement mask- hers is not working    nystatin (Mycostatin) cream Apply topically. APPLY  TOPICALLY TO MOUTH AND LIPS PRN    ondansetron ODT (ZOFRAN-ODT) 8 mg, oral, Every 8 hours PRN    polyethylene glycol (ClearLax) 17 gram/dose powder MIX 1 CAPFUL IN 8 OUNCES OF WATER AND DRINK AT BEDTIME AS NEEDED FOR CONSTIPATION    pravastatin (Pravachol) 20 mg tablet TAKE ONE (1) TABLET BY MOUTH EVERY EVENING AT BEDTIME    pregabalin (LYRICA) 150 mg, oral, 3 times daily, 30 day rx    rizatriptan (MAXALT) 10 mg, oral, Once as needed    simethicone (MYLICON,GAS-X) 180 mg, oral, 4 times daily    topiramate (TOPAMAX) 25 mg, 2 times daily    zolpidem (AMBIEN) 5 mg, Nightly PRN         OBJECTIVE    /68   Pulse 91   Wt 62.2 kg (137 lb 3.2 oz)   BMI 24.30 kg/m²       Physical Exam  HENT:      Head:     Psychiatric:         Speech: Speech  normal.       Neurological Exam  Mental Status  Awake, alert and oriented to person, place and time. Speech is normal. Language is fluent with no aphasia. Attention and concentration are normal. Fund of knowledge is appropriate for level of education.    Cranial Nerves  CN II-XII grossly intact.    Motor  Normal muscle bulk throughout. No fasciculations present. Normal muscle tone. No abnormal involuntary movements.  Strength at least antigravity throughout.    Sensory  Light touch is normal in upper and lower extremities.     Coordination  Right: Finger-to-nose normal.Left: Finger-to-nose normal.    Gait  Casual gait is normal including stance, stride, and arm swing.        MR lumbar spine wo IV contrast 11/28/2022    Narrative  MRN: [id number]  Patient Name: LEA IZQUIERDO    STUDY:  MRI L-SPINE WO;  11/28/2022 2:14 pm    INDICATION:  Left-sided low back pain that radiates into the posterior lateral hip  and knee over the last year.  Currently in physical therapy and not  much improvement.  Is tried chiropractory therapy and again has felt  worse.  Does have allodynia to light touch  M51.16: Displacement of  lumbar disc with radiculopathy.    COMPARISON:  None.    ACCESSION NUMBER(S):  [id number]    ORDERING CLINICIAN:  GINETTE REED    TECHNIQUE:  Sagittal T1, T2, STIR, axial T1 and T2 weighted images of the lumbar  spine were acquired.    FINDINGS:  Alignment: There are 5 lumbar type vertebrae. The spine curves 10-15  degrees convex to the left centered at L3 with curvature to the right  at the lumbosacral junction as well..    Vertebrae/Intervertebral Discs: The vertebral bodies demonstrate  expected height.The left L5 and S1 vertebral bodies have bands of  degenerative marrow edema adjacent to the L5-S1 disc. The L1-2  through L5-S1 discs have degenerative desiccation with mild narrowing  of the disc on the left at L5-S1.    Conus: The lower thoracic cord appears unremarkable. The conus  terminates at  L1-2.    T11-12: No stenosis is noted.    T12-L1: No stenosis is noted.    L1-2: The thecal sac is minimally indented by disc bulge.    L2-3: The lateral recesses are mildly stenosed by ligament thickening  and disc bulge.    L3-4: The lateral recesses and spinal canal are mildly stenosed by  disc bulge, ligament thickening and facet hypertrophy. The facet  joints are moderately to severely arthritic bilaterally.    L4-5: Spinal canal and lateral recesses are severely stenosed by  central disc extrusion, facet hypertrophy and ligament thickening.  The facet joints are severely arthritic bilaterally. The neural  foramina are mildly stenosed bilaterally as well.    L5-S1: The left lateral recess is mildly stenosed by disc bulge and  ligament thickening. The left neural foramen is mildly stenosed as  well.    The prevertebral and posterior paraspinous soft tissues are  unremarkable.    Impression  Multilevel degenerative changes are present with severe stenosis at  L4-5.      Lab Results   Component Value Date    WBC 11.0 04/25/2025    RBC 4.24 04/25/2025    HGB 11.2 (L) 04/25/2025    HCT 35.4 (L) 04/25/2025     04/25/2025     04/25/2025    K 3.7 04/25/2025     04/25/2025    BUN 11 04/25/2025    CREATININE 0.86 04/25/2025    EGFR >90 04/25/2025    CALCIUM 9.2 04/25/2025    ALKPHOS 28 (L) 11/20/2024    AST 20 11/20/2024    ALT 17 11/20/2024    MG 1.92 07/03/2024    [id number] 468 11/20/2024    VITD25 58 04/29/2024    HGBA1C 6.2 (H) 11/20/2024    LDLCALC 99 11/20/2024    CHOL 200 (H) 11/20/2024    HDL 56.1 11/20/2024    TRIG 224 (H) 11/20/2024    TSH 0.60 11/20/2024          ASSESSMENT & PLAN  Problem List Items Addressed This Visit       Chronic migraine with aura without status migrainosus, not intractable - Primary    Overview   Has tried and failed multiple prophylactic migraine meds  Listed allergy to amitriptyline  s/p Depakote  s/p topiramate--stopped due to polypharmacy  s/p Aimovig, helping  "but still having HA and migraine and wanted back to Emgality  Was well controlled on Emgality SC monthly and sumatriptan 100mg prn  Switched to Ajovy monthly and rizatriptan PRN per insurance preferences - Ajovy failed due to issues with the injector    Now back on Emgality monthly  On rizatriptan PRN         Relevant Orders    Follow Up In Neurology    Malaise and fatigue     Continue Emgality for migraine prophylaxis.  Continue PRN rizatriptan for breakthrough headaches.     FU in 3 months         Amber Resendez, APRN-CNP           [1]   Past Medical History:  Diagnosis Date    Anxiety     Asthma     Bipolar disorder, unspecified (Multi)     Chronic pain     fibromyalgia    Colitis due to Clostridioides difficile 11/20/2024    COPD (chronic obstructive pulmonary disease) (Multi)     Depression     Diabetes (Multi)     diet-controlled    Diet-controlled type 2 diabetes mellitus 05/22/2024    GERD (gastroesophageal reflux disease)     History of sexually transmitted disease 11/20/2024    Hypercholesteremia     Hypogammaglobulinemia (Multi)     IBS (irritable bowel syndrome)     Migraines     Osteoporosis     Panic disorder (episodic paroxysmal anxiety)     SBO (small bowel obstruction) (Multi) 03/2024    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

## 2025-06-28 ENCOUNTER — PHARMACY VISIT (OUTPATIENT)
Dept: PHARMACY | Facility: CLINIC | Age: 60
End: 2025-06-28
Payer: COMMERCIAL

## 2025-07-09 ENCOUNTER — INFUSION (OUTPATIENT)
Dept: INFUSION THERAPY | Facility: HOSPITAL | Age: 60
End: 2025-07-09
Payer: COMMERCIAL

## 2025-07-09 VITALS
WEIGHT: 132.5 LBS | OXYGEN SATURATION: 97 % | DIASTOLIC BLOOD PRESSURE: 63 MMHG | SYSTOLIC BLOOD PRESSURE: 94 MMHG | TEMPERATURE: 97.6 F | BODY MASS INDEX: 23.47 KG/M2 | RESPIRATION RATE: 16 BRPM | HEART RATE: 85 BPM

## 2025-07-09 DIAGNOSIS — D80.3 SELECTIVE DEFICIENCY OF IGG (MULTI): ICD-10-CM

## 2025-07-09 DIAGNOSIS — R51.9 NEW ONSET OF HEADACHES AFTER AGE 50: ICD-10-CM

## 2025-07-09 DIAGNOSIS — M81.0 OSTEOPOROSIS, UNSPECIFIED OSTEOPOROSIS TYPE, UNSPECIFIED PATHOLOGICAL FRACTURE PRESENCE: ICD-10-CM

## 2025-07-09 LAB
CRP SERPL-MCNC: 0.69 MG/DL
ERYTHROCYTE [SEDIMENTATION RATE] IN BLOOD BY WESTERGREN METHOD: 57 MM/H (ref 0–30)
IGG SERPL-MCNC: 901 MG/DL (ref 700–1600)

## 2025-07-09 PROCEDURE — 82784 ASSAY IGA/IGD/IGG/IGM EACH: CPT | Mod: PORLAB

## 2025-07-09 PROCEDURE — 96365 THER/PROPH/DIAG IV INF INIT: CPT | Mod: INF

## 2025-07-09 PROCEDURE — 96366 THER/PROPH/DIAG IV INF ADDON: CPT | Mod: INF

## 2025-07-09 PROCEDURE — 36415 COLL VENOUS BLD VENIPUNCTURE: CPT

## 2025-07-09 PROCEDURE — 86140 C-REACTIVE PROTEIN: CPT

## 2025-07-09 PROCEDURE — 2500000004 HC RX 250 GENERAL PHARMACY W/ HCPCS (ALT 636 FOR OP/ED): Mod: JZ | Performed by: FAMILY MEDICINE

## 2025-07-09 PROCEDURE — 85652 RBC SED RATE AUTOMATED: CPT

## 2025-07-09 RX ORDER — HEPARIN 100 UNIT/ML
500 SYRINGE INTRAVENOUS AS NEEDED
OUTPATIENT
Start: 2025-07-09

## 2025-07-09 RX ORDER — DIPHENHYDRAMINE HCL 25 MG
25 CAPSULE ORAL ONCE
OUTPATIENT
Start: 2025-08-06 | End: 2025-08-06

## 2025-07-09 RX ORDER — ACETAMINOPHEN 325 MG/1
650 TABLET ORAL ONCE
Status: DISCONTINUED | OUTPATIENT
Start: 2025-07-09 | End: 2025-07-09 | Stop reason: HOSPADM

## 2025-07-09 RX ORDER — ACETAMINOPHEN 325 MG/1
650 TABLET ORAL ONCE
OUTPATIENT
Start: 2025-08-06 | End: 2025-08-06

## 2025-07-09 RX ORDER — DIPHENHYDRAMINE HCL 25 MG
25 CAPSULE ORAL ONCE
Status: DISCONTINUED | OUTPATIENT
Start: 2025-07-09 | End: 2025-07-09 | Stop reason: HOSPADM

## 2025-07-09 RX ADMIN — IMMUNE GLOBULIN (HUMAN) 25 G: 10 INJECTION INTRAVENOUS; SUBCUTANEOUS at 09:57

## 2025-07-09 SDOH — ECONOMIC STABILITY: FOOD INSECURITY: WITHIN THE PAST 12 MONTHS, YOU WORRIED THAT YOUR FOOD WOULD RUN OUT BEFORE YOU GOT MONEY TO BUY MORE.: NEVER TRUE

## 2025-07-09 SDOH — ECONOMIC STABILITY: FOOD INSECURITY: WITHIN THE PAST 12 MONTHS, THE FOOD YOU BOUGHT JUST DIDN'T LAST AND YOU DIDN'T HAVE MONEY TO GET MORE.: NEVER TRUE

## 2025-07-09 ASSESSMENT — COLUMBIA-SUICIDE SEVERITY RATING SCALE - C-SSRS
2. HAVE YOU ACTUALLY HAD ANY THOUGHTS OF KILLING YOURSELF?: NO
1. IN THE PAST MONTH, HAVE YOU WISHED YOU WERE DEAD OR WISHED YOU COULD GO TO SLEEP AND NOT WAKE UP?: NO

## 2025-07-11 ENCOUNTER — APPOINTMENT (OUTPATIENT)
Dept: RADIOLOGY | Facility: HOSPITAL | Age: 60
End: 2025-07-11
Payer: COMMERCIAL

## 2025-07-11 DIAGNOSIS — K58.2 IRRITABLE BOWEL SYNDROME WITH BOTH CONSTIPATION AND DIARRHEA: ICD-10-CM

## 2025-07-11 RX ORDER — ONDANSETRON 8 MG/1
8 TABLET, ORALLY DISINTEGRATING ORAL EVERY 8 HOURS PRN
Qty: 60 TABLET | Refills: 0 | Status: SHIPPED | OUTPATIENT
Start: 2025-07-11

## 2025-07-21 ENCOUNTER — TELEPHONE (OUTPATIENT)
Dept: PRIMARY CARE | Facility: CLINIC | Age: 60
End: 2025-07-21
Payer: COMMERCIAL

## 2025-07-21 ENCOUNTER — TELEPHONE (OUTPATIENT)
Dept: OBSTETRICS AND GYNECOLOGY | Facility: CLINIC | Age: 60
End: 2025-07-21
Payer: COMMERCIAL

## 2025-07-21 DIAGNOSIS — N89.8 VAGINAL LESION: ICD-10-CM

## 2025-07-21 DIAGNOSIS — L73.9 FOLLICULITIS: Primary | ICD-10-CM

## 2025-07-21 DIAGNOSIS — E55.9 VITAMIN D DEFICIENCY: ICD-10-CM

## 2025-07-21 DIAGNOSIS — J43.9 PULMONARY EMPHYSEMA, UNSPECIFIED EMPHYSEMA TYPE (MULTI): ICD-10-CM

## 2025-07-21 RX ORDER — ACYCLOVIR 200 MG/1
200 CAPSULE ORAL DAILY
Qty: 30 CAPSULE | Refills: 10 | Status: SHIPPED | OUTPATIENT
Start: 2025-07-21

## 2025-07-21 RX ORDER — SULFAMETHOXAZOLE AND TRIMETHOPRIM 800; 160 MG/1; MG/1
1 TABLET ORAL 2 TIMES DAILY
Qty: 14 TABLET | Refills: 0 | Status: SHIPPED | OUTPATIENT
Start: 2025-07-21 | End: 2025-07-28

## 2025-07-21 RX ORDER — IPRATROPIUM BROMIDE AND ALBUTEROL SULFATE 2.5; .5 MG/3ML; MG/3ML
SOLUTION RESPIRATORY (INHALATION)
Qty: 3 ML | Refills: 0 | Status: SHIPPED | OUTPATIENT
Start: 2025-07-21

## 2025-07-21 NOTE — TELEPHONE ENCOUNTER
Patient is requesting refills of acyclovir sent to the [facility]. Patient last seen August 2024 with next annual scheduled on 8/14/25.

## 2025-07-21 NOTE — TELEPHONE ENCOUNTER
She called cause she has a rash on her left and is is going down to the back of her leg and the cream you gave before is not working but it has to do with the hair growth that is coming in. Please advise and call her at [phone]

## 2025-07-23 PROCEDURE — RXMED WILLOW AMBULATORY MEDICATION CHARGE

## 2025-07-25 ENCOUNTER — PHARMACY VISIT (OUTPATIENT)
Dept: PHARMACY | Facility: CLINIC | Age: 60
End: 2025-07-25
Payer: COMMERCIAL

## 2025-07-25 ENCOUNTER — APPOINTMENT (OUTPATIENT)
Dept: RADIOLOGY | Facility: HOSPITAL | Age: 60
End: 2025-07-25
Payer: COMMERCIAL

## 2025-07-25 DIAGNOSIS — J43.9 PULMONARY EMPHYSEMA, UNSPECIFIED EMPHYSEMA TYPE (MULTI): ICD-10-CM

## 2025-07-25 RX ORDER — ALBUTEROL SULFATE 90 UG/1
2 INHALANT RESPIRATORY (INHALATION) EVERY 4 HOURS PRN
Qty: 18 G | Refills: 11 | Status: SHIPPED | OUTPATIENT
Start: 2025-07-25

## 2025-07-28 RX ORDER — ASPIRIN 325 MG
TABLET, DELAYED RELEASE (ENTERIC COATED) ORAL
Qty: 3 CAPSULE | Refills: 0 | Status: SHIPPED | OUTPATIENT
Start: 2025-07-28

## 2025-08-02 ENCOUNTER — APPOINTMENT (OUTPATIENT)
Dept: RADIOLOGY | Facility: HOSPITAL | Age: 60
End: 2025-08-02
Payer: COMMERCIAL

## 2025-08-06 ENCOUNTER — INFUSION (OUTPATIENT)
Dept: INFUSION THERAPY | Facility: HOSPITAL | Age: 60
End: 2025-08-06
Payer: COMMERCIAL

## 2025-08-06 VITALS
HEART RATE: 85 BPM | TEMPERATURE: 97 F | RESPIRATION RATE: 16 BRPM | SYSTOLIC BLOOD PRESSURE: 109 MMHG | DIASTOLIC BLOOD PRESSURE: 75 MMHG | OXYGEN SATURATION: 96 % | BODY MASS INDEX: 23.75 KG/M2 | WEIGHT: 134.1 LBS

## 2025-08-06 DIAGNOSIS — D80.3 SELECTIVE DEFICIENCY OF IGG (MULTI): ICD-10-CM

## 2025-08-06 DIAGNOSIS — M81.0 OSTEOPOROSIS, UNSPECIFIED OSTEOPOROSIS TYPE, UNSPECIFIED PATHOLOGICAL FRACTURE PRESENCE: ICD-10-CM

## 2025-08-06 PROCEDURE — 2500000004 HC RX 250 GENERAL PHARMACY W/ HCPCS (ALT 636 FOR OP/ED): Mod: JZ | Performed by: FAMILY MEDICINE

## 2025-08-06 PROCEDURE — 2500000001 HC RX 250 WO HCPCS SELF ADMINISTERED DRUGS (ALT 637 FOR MEDICARE OP): Performed by: FAMILY MEDICINE

## 2025-08-06 PROCEDURE — 96365 THER/PROPH/DIAG IV INF INIT: CPT | Mod: INF

## 2025-08-06 PROCEDURE — 96366 THER/PROPH/DIAG IV INF ADDON: CPT | Mod: INF

## 2025-08-06 RX ORDER — DIPHENHYDRAMINE HCL 25 MG
25 CAPSULE ORAL ONCE
Status: COMPLETED | OUTPATIENT
Start: 2025-08-06 | End: 2025-08-06

## 2025-08-06 RX ORDER — ACETAMINOPHEN 325 MG/1
650 TABLET ORAL ONCE
Status: COMPLETED | OUTPATIENT
Start: 2025-08-06 | End: 2025-08-06

## 2025-08-06 RX ORDER — DIPHENHYDRAMINE HCL 25 MG
25 CAPSULE ORAL ONCE
OUTPATIENT
Start: 2025-09-03 | End: 2025-09-03

## 2025-08-06 RX ORDER — HEPARIN 100 UNIT/ML
500 SYRINGE INTRAVENOUS AS NEEDED
OUTPATIENT
Start: 2025-08-06

## 2025-08-06 RX ORDER — ACETAMINOPHEN 325 MG/1
650 TABLET ORAL ONCE
OUTPATIENT
Start: 2025-09-03 | End: 2025-09-03

## 2025-08-06 RX ADMIN — IMMUNE GLOBULIN (HUMAN) 25 G: 10 INJECTION INTRAVENOUS; SUBCUTANEOUS at 09:39

## 2025-08-06 RX ADMIN — ACETAMINOPHEN 650 MG: 325 TABLET ORAL at 09:11

## 2025-08-06 RX ADMIN — DIPHENHYDRAMINE HYDROCHLORIDE 25 MG: 25 CAPSULE ORAL at 09:11

## 2025-08-06 ASSESSMENT — ENCOUNTER SYMPTOMS
LOSS OF SENSATION IN FEET: 0
OCCASIONAL FEELINGS OF UNSTEADINESS: 0
DEPRESSION: 0

## 2025-08-07 ENCOUNTER — TELEPHONE (OUTPATIENT)
Dept: PRIMARY CARE | Facility: CLINIC | Age: 60
End: 2025-08-07
Payer: COMMERCIAL

## 2025-08-07 DIAGNOSIS — J43.9 PULMONARY EMPHYSEMA, UNSPECIFIED EMPHYSEMA TYPE (MULTI): ICD-10-CM

## 2025-08-07 RX ORDER — ALBUTEROL SULFATE 90 UG/1
2 INHALANT RESPIRATORY (INHALATION) EVERY 4 HOURS PRN
Qty: 18 G | Refills: 0 | Status: SHIPPED | OUTPATIENT
Start: 2025-08-07

## 2025-08-07 RX ORDER — IPRATROPIUM BROMIDE AND ALBUTEROL SULFATE 2.5; .5 MG/3ML; MG/3ML
SOLUTION RESPIRATORY (INHALATION)
Qty: 360 ML | Refills: 11 | Status: SHIPPED | OUTPATIENT
Start: 2025-08-07

## 2025-08-07 RX ORDER — IPRATROPIUM BROMIDE AND ALBUTEROL SULFATE 2.5; .5 MG/3ML; MG/3ML
SOLUTION RESPIRATORY (INHALATION)
Qty: 3 ML | Refills: 0 | Status: SHIPPED | OUTPATIENT
Start: 2025-08-07 | End: 2025-08-07 | Stop reason: SDUPTHER

## 2025-08-07 NOTE — TELEPHONE ENCOUNTER
Patient needed the nebulizer solution sent to [facility] [place] they do not fill albuterol inhalers.

## 2025-08-07 NOTE — TELEPHONE ENCOUNTER
She called to let us know that [facility] pharmacy didn't get the refill request. Please advise and resend to them

## 2025-08-07 NOTE — TELEPHONE ENCOUNTER
Helena's message says not to send albuterol to [facility] as they won't fill the inhaler. I left pt message to clarify where the inhaler needs to go to

## 2025-08-08 NOTE — TELEPHONE ENCOUNTER
Patient called,  [facility] is who the Albuterol which it was sent out yesterday

## 2025-08-11 ENCOUNTER — TELEPHONE (OUTPATIENT)
Dept: PRIMARY CARE | Facility: CLINIC | Age: 60
End: 2025-08-11
Payer: COMMERCIAL

## 2025-08-11 DIAGNOSIS — D50.0 ANEMIA DUE TO BLOOD LOSS: ICD-10-CM

## 2025-08-11 DIAGNOSIS — K58.2 IRRITABLE BOWEL SYNDROME WITH BOTH CONSTIPATION AND DIARRHEA: ICD-10-CM

## 2025-08-11 RX ORDER — WITCH HAZEL 50 %
1 PADS, MEDICATED (EA) TOPICAL DAILY
Qty: 30 TABLET | Refills: 0 | Status: SHIPPED | OUTPATIENT
Start: 2025-08-11 | End: 2025-08-11 | Stop reason: SDUPTHER

## 2025-08-11 RX ORDER — ONDANSETRON 8 MG/1
8 TABLET, ORALLY DISINTEGRATING ORAL EVERY 8 HOURS PRN
Qty: 60 TABLET | Refills: 0 | Status: SHIPPED | OUTPATIENT
Start: 2025-08-11

## 2025-08-11 RX ORDER — WITCH HAZEL 50 %
1 PADS, MEDICATED (EA) TOPICAL DAILY
Qty: 90 TABLET | Refills: 0 | Status: SHIPPED | OUTPATIENT
Start: 2025-08-11

## 2025-08-14 ENCOUNTER — APPOINTMENT (OUTPATIENT)
Dept: OBSTETRICS AND GYNECOLOGY | Facility: CLINIC | Age: 60
End: 2025-08-14
Payer: COMMERCIAL

## 2025-08-18 ENCOUNTER — HOSPITAL ENCOUNTER (OUTPATIENT)
Dept: RADIOLOGY | Facility: HOSPITAL | Age: 60
Discharge: HOME | End: 2025-08-18
Payer: COMMERCIAL

## 2025-08-18 VITALS — BODY MASS INDEX: 23.74 KG/M2 | WEIGHT: 134 LBS | HEIGHT: 63 IN

## 2025-08-18 DIAGNOSIS — Z12.31 VISIT FOR SCREENING MAMMOGRAM: ICD-10-CM

## 2025-08-18 PROCEDURE — 77067 SCR MAMMO BI INCL CAD: CPT | Performed by: RADIOLOGY

## 2025-08-18 PROCEDURE — 77063 BREAST TOMOSYNTHESIS BI: CPT

## 2025-08-18 PROCEDURE — 77063 BREAST TOMOSYNTHESIS BI: CPT | Performed by: RADIOLOGY

## 2025-08-20 ENCOUNTER — PHARMACY VISIT (OUTPATIENT)
Dept: PHARMACY | Facility: CLINIC | Age: 60
End: 2025-08-20
Payer: COMMERCIAL

## 2025-08-20 PROCEDURE — RXMED WILLOW AMBULATORY MEDICATION CHARGE

## 2025-08-21 ENCOUNTER — TELEPHONE (OUTPATIENT)
Dept: PULMONOLOGY | Facility: HOSPITAL | Age: 60
End: 2025-08-21
Payer: COMMERCIAL

## 2025-08-21 DIAGNOSIS — J44.9 CHRONIC OBSTRUCTIVE PULMONARY DISEASE, UNSPECIFIED COPD TYPE (MULTI): ICD-10-CM

## 2025-08-21 DIAGNOSIS — R06.02 SHORTNESS OF BREATH: Primary | ICD-10-CM

## 2025-08-21 RX ORDER — PREDNISONE 10 MG/1
TABLET ORAL
Qty: 40 TABLET | Refills: 0 | Status: SHIPPED | OUTPATIENT
Start: 2025-08-21 | End: 2025-09-06

## 2025-08-22 ENCOUNTER — HOSPITAL ENCOUNTER (EMERGENCY)
Facility: HOSPITAL | Age: 60
Discharge: HOME | End: 2025-08-22
Payer: COMMERCIAL

## 2025-08-22 ENCOUNTER — APPOINTMENT (OUTPATIENT)
Dept: RADIOLOGY | Facility: HOSPITAL | Age: 60
End: 2025-08-22
Payer: COMMERCIAL

## 2025-08-22 ENCOUNTER — HOSPITAL ENCOUNTER (OUTPATIENT)
Dept: RADIOLOGY | Facility: HOSPITAL | Age: 60
Discharge: HOME | End: 2025-08-22
Payer: COMMERCIAL

## 2025-08-22 VITALS
OXYGEN SATURATION: 92 % | BODY MASS INDEX: 23.74 KG/M2 | RESPIRATION RATE: 16 BRPM | DIASTOLIC BLOOD PRESSURE: 75 MMHG | TEMPERATURE: 98.1 F | HEART RATE: 62 BPM | SYSTOLIC BLOOD PRESSURE: 124 MMHG | WEIGHT: 134 LBS | HEIGHT: 63 IN

## 2025-08-22 DIAGNOSIS — S93.402A SPRAIN OF LEFT ANKLE, UNSPECIFIED LIGAMENT, INITIAL ENCOUNTER: ICD-10-CM

## 2025-08-22 DIAGNOSIS — R06.02 SHORTNESS OF BREATH: ICD-10-CM

## 2025-08-22 DIAGNOSIS — S93.602A FOOT SPRAIN, LEFT, INITIAL ENCOUNTER: Primary | ICD-10-CM

## 2025-08-22 PROCEDURE — 73590 X-RAY EXAM OF LOWER LEG: CPT | Mod: LT

## 2025-08-22 PROCEDURE — 2500000001 HC RX 250 WO HCPCS SELF ADMINISTERED DRUGS (ALT 637 FOR MEDICARE OP): Performed by: NURSE PRACTITIONER

## 2025-08-22 PROCEDURE — 73590 X-RAY EXAM OF LOWER LEG: CPT | Mod: LEFT SIDE | Performed by: RADIOLOGY

## 2025-08-22 PROCEDURE — 73630 X-RAY EXAM OF FOOT: CPT | Mod: LEFT SIDE | Performed by: RADIOLOGY

## 2025-08-22 PROCEDURE — 73610 X-RAY EXAM OF ANKLE: CPT | Mod: LT

## 2025-08-22 PROCEDURE — 71046 X-RAY EXAM CHEST 2 VIEWS: CPT

## 2025-08-22 PROCEDURE — 73630 X-RAY EXAM OF FOOT: CPT | Mod: LT

## 2025-08-22 PROCEDURE — 99284 EMERGENCY DEPT VISIT MOD MDM: CPT | Mod: 25

## 2025-08-22 PROCEDURE — 73610 X-RAY EXAM OF ANKLE: CPT | Mod: LEFT SIDE | Performed by: RADIOLOGY

## 2025-08-22 RX ORDER — OXYCODONE AND ACETAMINOPHEN 5; 325 MG/1; MG/1
1 TABLET ORAL ONCE
Refills: 0 | Status: COMPLETED | OUTPATIENT
Start: 2025-08-22 | End: 2025-08-22

## 2025-08-22 RX ADMIN — OXYCODONE HYDROCHLORIDE AND ACETAMINOPHEN 1 TABLET: 5; 325 TABLET ORAL at 20:16

## 2025-08-22 ASSESSMENT — PAIN SCALES - GENERAL
PAINLEVEL_OUTOF10: 9
PAINLEVEL_OUTOF10: 0 - NO PAIN
PAINLEVEL_OUTOF10: 8

## 2025-08-22 ASSESSMENT — VISUAL ACUITY: OU: 1

## 2025-08-22 ASSESSMENT — LIFESTYLE VARIABLES
HAVE YOU EVER FELT YOU SHOULD CUT DOWN ON YOUR DRINKING: NO
EVER HAD A DRINK FIRST THING IN THE MORNING TO STEADY YOUR NERVES TO GET RID OF A HANGOVER: NO
TOTAL SCORE: 0
EVER FELT BAD OR GUILTY ABOUT YOUR DRINKING: NO
HAVE PEOPLE ANNOYED YOU BY CRITICIZING YOUR DRINKING: NO

## 2025-08-22 ASSESSMENT — PAIN DESCRIPTION - LOCATION: LOCATION: ANKLE

## 2025-08-22 ASSESSMENT — PAIN DESCRIPTION - ORIENTATION: ORIENTATION: LEFT

## 2025-08-22 ASSESSMENT — PAIN - FUNCTIONAL ASSESSMENT
PAIN_FUNCTIONAL_ASSESSMENT: 0-10
PAIN_FUNCTIONAL_ASSESSMENT: 0-10

## 2025-08-26 ENCOUNTER — RESULTS FOLLOW-UP (OUTPATIENT)
Dept: PULMONOLOGY | Facility: HOSPITAL | Age: 60
End: 2025-08-26
Payer: COMMERCIAL

## 2025-08-26 ENCOUNTER — DOCUMENTATION (OUTPATIENT)
Dept: PAIN MEDICINE | Facility: CLINIC | Age: 60
End: 2025-08-26
Payer: COMMERCIAL

## 2025-09-03 ENCOUNTER — APPOINTMENT (OUTPATIENT)
Dept: RADIOLOGY | Facility: HOSPITAL | Age: 60
End: 2025-09-03
Payer: COMMERCIAL

## 2025-09-03 ENCOUNTER — HOSPITAL ENCOUNTER (EMERGENCY)
Facility: HOSPITAL | Age: 60
Discharge: HOME | End: 2025-09-03
Payer: MEDICARE

## 2025-09-03 ENCOUNTER — APPOINTMENT (OUTPATIENT)
Dept: INFUSION THERAPY | Facility: HOSPITAL | Age: 60
End: 2025-09-03
Payer: COMMERCIAL

## 2025-09-03 ENCOUNTER — PHARMACY VISIT (OUTPATIENT)
Dept: PHARMACY | Facility: CLINIC | Age: 60
End: 2025-09-03
Payer: COMMERCIAL

## 2025-09-03 ENCOUNTER — APPOINTMENT (OUTPATIENT)
Dept: RADIOLOGY | Facility: HOSPITAL | Age: 60
End: 2025-09-03
Payer: MEDICARE

## 2025-09-03 VITALS
HEART RATE: 76 BPM | HEIGHT: 63 IN | TEMPERATURE: 97.7 F | DIASTOLIC BLOOD PRESSURE: 67 MMHG | SYSTOLIC BLOOD PRESSURE: 103 MMHG | BODY MASS INDEX: 23.74 KG/M2 | OXYGEN SATURATION: 98 % | WEIGHT: 134 LBS | RESPIRATION RATE: 18 BRPM

## 2025-09-03 DIAGNOSIS — S50.01XA CONTUSION OF RIGHT ELBOW, INITIAL ENCOUNTER: ICD-10-CM

## 2025-09-03 DIAGNOSIS — S16.1XXA CERVICAL STRAIN, ACUTE, INITIAL ENCOUNTER: Primary | ICD-10-CM

## 2025-09-03 PROCEDURE — 72125 CT NECK SPINE W/O DYE: CPT | Performed by: RADIOLOGY

## 2025-09-03 PROCEDURE — 73630 X-RAY EXAM OF FOOT: CPT | Mod: LT

## 2025-09-03 PROCEDURE — 70450 CT HEAD/BRAIN W/O DYE: CPT | Performed by: RADIOLOGY

## 2025-09-03 PROCEDURE — 72125 CT NECK SPINE W/O DYE: CPT

## 2025-09-03 PROCEDURE — 70450 CT HEAD/BRAIN W/O DYE: CPT

## 2025-09-03 PROCEDURE — 72128 CT CHEST SPINE W/O DYE: CPT

## 2025-09-03 PROCEDURE — 73080 X-RAY EXAM OF ELBOW: CPT | Mod: RIGHT SIDE | Performed by: RADIOLOGY

## 2025-09-03 PROCEDURE — 2500000001 HC RX 250 WO HCPCS SELF ADMINISTERED DRUGS (ALT 637 FOR MEDICARE OP): Performed by: PHYSICIAN ASSISTANT

## 2025-09-03 PROCEDURE — 99284 EMERGENCY DEPT VISIT MOD MDM: CPT | Mod: 25

## 2025-09-03 PROCEDURE — 73630 X-RAY EXAM OF FOOT: CPT | Mod: LEFT SIDE | Performed by: RADIOLOGY

## 2025-09-03 PROCEDURE — 72128 CT CHEST SPINE W/O DYE: CPT | Performed by: RADIOLOGY

## 2025-09-03 PROCEDURE — 72131 CT LUMBAR SPINE W/O DYE: CPT

## 2025-09-03 PROCEDURE — RXMED WILLOW AMBULATORY MEDICATION CHARGE

## 2025-09-03 PROCEDURE — 73080 X-RAY EXAM OF ELBOW: CPT | Mod: RT

## 2025-09-03 PROCEDURE — 72131 CT LUMBAR SPINE W/O DYE: CPT | Performed by: RADIOLOGY

## 2025-09-03 RX ORDER — CYCLOBENZAPRINE HCL 10 MG
5 TABLET ORAL ONCE
Status: COMPLETED | OUTPATIENT
Start: 2025-09-03 | End: 2025-09-03

## 2025-09-03 RX ORDER — TIZANIDINE 2 MG/1
2 TABLET ORAL EVERY 6 HOURS PRN
Qty: 30 TABLET | Refills: 0 | Status: SHIPPED | OUTPATIENT
Start: 2025-09-03 | End: 2025-09-13

## 2025-09-03 RX ADMIN — CYCLOBENZAPRINE 5 MG: 10 TABLET, FILM COATED ORAL at 09:07

## 2025-09-03 ASSESSMENT — PAIN DESCRIPTION - LOCATION: LOCATION: ARM

## 2025-09-03 ASSESSMENT — PAIN DESCRIPTION - DESCRIPTORS: DESCRIPTORS: RADIATING

## 2025-09-03 ASSESSMENT — PAIN - FUNCTIONAL ASSESSMENT: PAIN_FUNCTIONAL_ASSESSMENT: 0-10

## 2025-09-03 ASSESSMENT — PAIN SCALES - GENERAL: PAINLEVEL_OUTOF10: 5 - MODERATE PAIN

## 2025-09-03 ASSESSMENT — PAIN DESCRIPTION - ORIENTATION: ORIENTATION: RIGHT

## 2025-09-04 ENCOUNTER — TELEPHONE (OUTPATIENT)
Dept: PRIMARY CARE | Facility: CLINIC | Age: 60
End: 2025-09-04
Payer: COMMERCIAL

## 2025-09-04 ENCOUNTER — TELEPHONE (OUTPATIENT)
Dept: CARDIOLOGY | Facility: HOSPITAL | Age: 60
End: 2025-09-04
Payer: COMMERCIAL

## 2025-09-15 ENCOUNTER — APPOINTMENT (OUTPATIENT)
Dept: PAIN MEDICINE | Facility: CLINIC | Age: 60
End: 2025-09-15
Payer: COMMERCIAL

## 2025-10-01 ENCOUNTER — APPOINTMENT (OUTPATIENT)
Dept: INFUSION THERAPY | Facility: HOSPITAL | Age: 60
End: 2025-10-01
Payer: COMMERCIAL

## 2025-12-08 ENCOUNTER — APPOINTMENT (OUTPATIENT)
Dept: PRIMARY CARE | Facility: CLINIC | Age: 60
End: 2025-12-08
Payer: COMMERCIAL

## 2026-04-07 ENCOUNTER — APPOINTMENT (OUTPATIENT)
Dept: PULMONOLOGY | Facility: HOSPITAL | Age: 61
End: 2026-04-07
Payer: COMMERCIAL

## 2026-06-08 ENCOUNTER — APPOINTMENT (OUTPATIENT)
Dept: PRIMARY CARE | Facility: CLINIC | Age: 61
End: 2026-06-08
Payer: COMMERCIAL

## 2026-08-27 ENCOUNTER — APPOINTMENT (OUTPATIENT)
Dept: OBSTETRICS AND GYNECOLOGY | Facility: CLINIC | Age: 61
End: 2026-08-27
Payer: COMMERCIAL